# Patient Record
Sex: FEMALE | Race: WHITE | NOT HISPANIC OR LATINO
[De-identification: names, ages, dates, MRNs, and addresses within clinical notes are randomized per-mention and may not be internally consistent; named-entity substitution may affect disease eponyms.]

---

## 2017-01-09 ENCOUNTER — FORM ENCOUNTER (OUTPATIENT)
Age: 72
End: 2017-01-09

## 2017-01-10 ENCOUNTER — OUTPATIENT (OUTPATIENT)
Dept: OUTPATIENT SERVICES | Facility: HOSPITAL | Age: 72
LOS: 1 days | End: 2017-01-10
Payer: MEDICARE

## 2017-01-10 VITALS
HEART RATE: 60 BPM | TEMPERATURE: 98 F | WEIGHT: 222.01 LBS | SYSTOLIC BLOOD PRESSURE: 160 MMHG | HEIGHT: 65 IN | DIASTOLIC BLOOD PRESSURE: 82 MMHG | RESPIRATION RATE: 16 BRPM

## 2017-01-10 DIAGNOSIS — I10 ESSENTIAL (PRIMARY) HYPERTENSION: ICD-10-CM

## 2017-01-10 DIAGNOSIS — M16.11 UNILATERAL PRIMARY OSTEOARTHRITIS, RIGHT HIP: ICD-10-CM

## 2017-01-10 DIAGNOSIS — Z95.828 PRESENCE OF OTHER VASCULAR IMPLANTS AND GRAFTS: Chronic | ICD-10-CM

## 2017-01-10 DIAGNOSIS — Z96.652 PRESENCE OF LEFT ARTIFICIAL KNEE JOINT: Chronic | ICD-10-CM

## 2017-01-10 DIAGNOSIS — I26.99 OTHER PULMONARY EMBOLISM WITHOUT ACUTE COR PULMONALE: ICD-10-CM

## 2017-01-10 DIAGNOSIS — Z96.651 PRESENCE OF RIGHT ARTIFICIAL KNEE JOINT: Chronic | ICD-10-CM

## 2017-01-10 LAB
APPEARANCE UR: CLEAR — SIGNIFICANT CHANGE UP
APTT BLD: 27.7 SEC — SIGNIFICANT CHANGE UP (ref 27.5–37.4)
BILIRUB UR-MCNC: NEGATIVE — SIGNIFICANT CHANGE UP
BLD GP AB SCN SERPL QL: NEGATIVE — SIGNIFICANT CHANGE UP
BLOOD UR QL VISUAL: NEGATIVE — SIGNIFICANT CHANGE UP
BUN SERPL-MCNC: 13 MG/DL — SIGNIFICANT CHANGE UP (ref 7–23)
CALCIUM SERPL-MCNC: 9.4 MG/DL — SIGNIFICANT CHANGE UP (ref 8.4–10.5)
CHLORIDE SERPL-SCNC: 105 MMOL/L — SIGNIFICANT CHANGE UP (ref 98–107)
CO2 SERPL-SCNC: 26 MMOL/L — SIGNIFICANT CHANGE UP (ref 22–31)
COLOR SPEC: YELLOW — SIGNIFICANT CHANGE UP
CREAT SERPL-MCNC: 0.67 MG/DL — SIGNIFICANT CHANGE UP (ref 0.5–1.3)
GLUCOSE SERPL-MCNC: 74 MG/DL — SIGNIFICANT CHANGE UP (ref 70–99)
GLUCOSE UR-MCNC: NEGATIVE — SIGNIFICANT CHANGE UP
HCT VFR BLD CALC: 42.7 % — SIGNIFICANT CHANGE UP (ref 34.5–45)
HGB BLD-MCNC: 13.9 G/DL — SIGNIFICANT CHANGE UP (ref 11.5–15.5)
HYALINE CASTS # UR AUTO: SIGNIFICANT CHANGE UP (ref 0–?)
INR BLD: 0.95 — SIGNIFICANT CHANGE UP (ref 0.87–1.18)
KETONES UR-MCNC: NEGATIVE — SIGNIFICANT CHANGE UP
LEUKOCYTE ESTERASE UR-ACNC: NEGATIVE — SIGNIFICANT CHANGE UP
MCHC RBC-ENTMCNC: 29.2 PG — SIGNIFICANT CHANGE UP (ref 27–34)
MCHC RBC-ENTMCNC: 32.6 % — SIGNIFICANT CHANGE UP (ref 32–36)
MCV RBC AUTO: 89.7 FL — SIGNIFICANT CHANGE UP (ref 80–100)
MUCOUS THREADS # UR AUTO: SIGNIFICANT CHANGE UP
NITRITE UR-MCNC: NEGATIVE — SIGNIFICANT CHANGE UP
PH UR: 5.5 — SIGNIFICANT CHANGE UP (ref 4.6–8)
PLATELET # BLD AUTO: 207 K/UL — SIGNIFICANT CHANGE UP (ref 150–400)
PMV BLD: 10.7 FL — SIGNIFICANT CHANGE UP (ref 7–13)
POTASSIUM SERPL-MCNC: 4 MMOL/L — SIGNIFICANT CHANGE UP (ref 3.5–5.3)
POTASSIUM SERPL-SCNC: 4 MMOL/L — SIGNIFICANT CHANGE UP (ref 3.5–5.3)
PROT UR-MCNC: NEGATIVE — SIGNIFICANT CHANGE UP
PROTHROM AB SERPL-ACNC: 10.8 SEC — SIGNIFICANT CHANGE UP (ref 10–13.1)
RBC # BLD: 4.76 M/UL — SIGNIFICANT CHANGE UP (ref 3.8–5.2)
RBC # FLD: 13.9 % — SIGNIFICANT CHANGE UP (ref 10.3–14.5)
RH IG SCN BLD-IMP: POSITIVE — SIGNIFICANT CHANGE UP
SODIUM SERPL-SCNC: 146 MMOL/L — HIGH (ref 135–145)
SP GR SPEC: 1.02 — SIGNIFICANT CHANGE UP (ref 1–1.03)
SQUAMOUS # UR AUTO: SIGNIFICANT CHANGE UP
UROBILINOGEN FLD QL: NORMAL E.U. — SIGNIFICANT CHANGE UP (ref 0.1–0.2)
WBC # BLD: 5.02 K/UL — SIGNIFICANT CHANGE UP (ref 3.8–10.5)
WBC # FLD AUTO: 5.02 K/UL — SIGNIFICANT CHANGE UP (ref 3.8–10.5)
WBC UR QL: SIGNIFICANT CHANGE UP (ref 0–?)

## 2017-01-10 PROCEDURE — 93010 ELECTROCARDIOGRAM REPORT: CPT

## 2017-01-10 PROCEDURE — 71020: CPT | Mod: 26

## 2017-01-10 NOTE — H&P PST ADULT - LAB RESULTS AND INTERPRETATION
cbc, bmp, pt/ptt, type and screen , clean catch ua , urine culture , nasal culture cbc, bmp, pt/ptt, type and screen , clean catch ua , urine culture , nasal culture in pst

## 2017-01-10 NOTE — H&P PST ADULT - PROBLEM SELECTOR PLAN 3
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

## 2017-01-10 NOTE — H&P PST ADULT - HISTORY OF PRESENT ILLNESS
This is a 71 y.o. female who presented to Dr Clements complaining of right hip pain associated with groin pain. Pt evaluated , x-rays done . Pt has unilateral primary osteoarthritis ,right hip. Pt now for surgery.

## 2017-01-10 NOTE — H&P PST ADULT - ACTIVITY
walks with walker and/or cane 2-3 blocks daily, light housework ,ADL's walks with cane 2-3 blocks daily, light housework ,ADL's

## 2017-01-10 NOTE — H&P PST ADULT - RS GEN PE MLT RESP DETAILS PC
no wheezes/breath sounds equal/good air movement/no rhonchi/no rales/respirations non-labored/clear to auscultation bilaterally

## 2017-01-10 NOTE — H&P PST ADULT - PSH
[place] filter in place  placed approximately 5/2016 - [facility]  H/O total knee replacement, right  11/2015  History of total left knee replacement  2007 [place] filter in place  placed approximately 5/2016 - [facility]  H/O total knee replacement, right  11/2015, 8/2016  History of total left knee replacement  2007

## 2017-01-10 NOTE — H&P PST ADULT - NS MD HP INPLANTS MED DEV
right knee replacement , marilyn filter/Artificial joint right knee replacement , left knee replacement ,marilyn filter/Artificial joint

## 2017-01-10 NOTE — H&P PST ADULT - VISION (WITH CORRECTIVE LENSES IF THE PATIENT USUALLY WEARS THEM):
Partially impaired: cannot see medication labels or newsprint, but can see obstacles in path, and the surrounding layout; can count fingers at arm's length/Pt uses glasses

## 2017-01-10 NOTE — H&P PST ADULT - NEGATIVE ENMT SYMPTOMS
no gum bleeding/no nose bleeds/no tinnitus/no ear pain/no throat pain/no dysphagia/no hearing difficulty

## 2017-01-10 NOTE — H&P PST ADULT - NSANTHOSAYNRD_GEN_A_CORE
No. CONCEPCIÓN screening performed.  STOP BANG Legend: 0-2 = LOW Risk; 3-4 = INTERMEDIATE Risk; 5-8 = HIGH Risk

## 2017-01-10 NOTE — H&P PST ADULT - FAMILY HISTORY
Mother  Still living? Unknown  Family history of rheumatoid arthritis, Age at diagnosis: Age Unknown

## 2017-01-10 NOTE — H&P PST ADULT - PMH
Depression    Edema of lower extremity  both for 40 years  HTN (hypertension)    Hyperlipidemia    Knee pain, right    Obesity    Osteoarthritis    PE (pulmonary thromboembolism)  DVT/PE 2/2016 - on Xarelto - discontinued  Pneumonia  3/2016

## 2017-01-11 LAB — SPECIMEN SOURCE: SIGNIFICANT CHANGE UP

## 2017-01-12 ENCOUNTER — APPOINTMENT (OUTPATIENT)
Dept: INTERNAL MEDICINE | Facility: CLINIC | Age: 72
End: 2017-01-12

## 2017-01-12 VITALS
DIASTOLIC BLOOD PRESSURE: 72 MMHG | HEIGHT: 63 IN | BODY MASS INDEX: 38.98 KG/M2 | RESPIRATION RATE: 18 BRPM | HEART RATE: 82 BPM | SYSTOLIC BLOOD PRESSURE: 128 MMHG | OXYGEN SATURATION: 98 % | TEMPERATURE: 98.3 F | WEIGHT: 220 LBS

## 2017-01-12 DIAGNOSIS — M25.551 PAIN IN RIGHT HIP: ICD-10-CM

## 2017-01-12 LAB — SPECIMEN SOURCE: SIGNIFICANT CHANGE UP

## 2017-01-13 ENCOUNTER — OTHER (OUTPATIENT)
Age: 72
End: 2017-01-13

## 2017-01-13 LAB
-  AMIKACIN: SIGNIFICANT CHANGE UP
-  AMPICILLIN/SULBACTAM: SIGNIFICANT CHANGE UP
-  AMPICILLIN: SIGNIFICANT CHANGE UP
-  AZTREONAM: SIGNIFICANT CHANGE UP
-  CEFAZOLIN: SIGNIFICANT CHANGE UP
-  CEFEPIME: SIGNIFICANT CHANGE UP
-  CEFOXITIN: SIGNIFICANT CHANGE UP
-  CEFTAZIDIME: SIGNIFICANT CHANGE UP
-  CEFTRIAXONE: SIGNIFICANT CHANGE UP
-  CIPROFLOXACIN: SIGNIFICANT CHANGE UP
-  ERTAPENEM: SIGNIFICANT CHANGE UP
-  GENTAMICIN: SIGNIFICANT CHANGE UP
-  IMIPENEM: SIGNIFICANT CHANGE UP
-  LEVOFLOXACIN: SIGNIFICANT CHANGE UP
-  MEROPENEM: SIGNIFICANT CHANGE UP
-  NITROFURANTOIN: SIGNIFICANT CHANGE UP
-  PIPERACILLIN/TAZOBACTAM: SIGNIFICANT CHANGE UP
-  TIGECYCLINE: SIGNIFICANT CHANGE UP
-  TOBRAMYCIN: SIGNIFICANT CHANGE UP
-  TRIMETHOPRIM/SULFAMETHOXAZOLE: SIGNIFICANT CHANGE UP
BACTERIA NPH CULT: SIGNIFICANT CHANGE UP
BACTERIA UR CULT: SIGNIFICANT CHANGE UP
METHOD TYPE: SIGNIFICANT CHANGE UP
ORGANISM # SPEC MICROSCOPIC CNT: SIGNIFICANT CHANGE UP
ORGANISM # SPEC MICROSCOPIC CNT: SIGNIFICANT CHANGE UP

## 2017-01-17 ENCOUNTER — OTHER (OUTPATIENT)
Age: 72
End: 2017-01-17

## 2017-01-20 NOTE — ASU PATIENT PROFILE, ADULT - PSH
[place] filter in place  placed approximately 5/2016 - [facility]  H/O total knee replacement, right  11/2015, 8/2016  History of total left knee replacement  2007

## 2017-01-23 ENCOUNTER — OTHER (OUTPATIENT)
Age: 72
End: 2017-01-23

## 2017-01-23 ENCOUNTER — INPATIENT (INPATIENT)
Facility: HOSPITAL | Age: 72
LOS: 0 days | Discharge: HOME | End: 2017-01-23
Attending: ORTHOPAEDIC SURGERY | Admitting: ORTHOPAEDIC SURGERY

## 2017-01-23 VITALS
RESPIRATION RATE: 16 BRPM | HEART RATE: 83 BPM | HEIGHT: 65 IN | TEMPERATURE: 98 F | DIASTOLIC BLOOD PRESSURE: 75 MMHG | SYSTOLIC BLOOD PRESSURE: 165 MMHG | WEIGHT: 222.01 LBS | OXYGEN SATURATION: 98 %

## 2017-01-23 DIAGNOSIS — Z95.828 PRESENCE OF OTHER VASCULAR IMPLANTS AND GRAFTS: Chronic | ICD-10-CM

## 2017-01-23 DIAGNOSIS — Z96.652 PRESENCE OF LEFT ARTIFICIAL KNEE JOINT: Chronic | ICD-10-CM

## 2017-01-23 DIAGNOSIS — M16.11 UNILATERAL PRIMARY OSTEOARTHRITIS, RIGHT HIP: ICD-10-CM

## 2017-01-23 DIAGNOSIS — Z96.651 PRESENCE OF RIGHT ARTIFICIAL KNEE JOINT: Chronic | ICD-10-CM

## 2017-01-31 ENCOUNTER — OUTPATIENT (OUTPATIENT)
Dept: OUTPATIENT SERVICES | Facility: HOSPITAL | Age: 72
LOS: 1 days | End: 2017-01-31
Payer: MEDICARE

## 2017-01-31 VITALS
HEART RATE: 64 BPM | DIASTOLIC BLOOD PRESSURE: 80 MMHG | WEIGHT: 216.05 LBS | OXYGEN SATURATION: 98 % | HEIGHT: 65 IN | RESPIRATION RATE: 16 BRPM | SYSTOLIC BLOOD PRESSURE: 140 MMHG | TEMPERATURE: 98 F

## 2017-01-31 DIAGNOSIS — Z95.828 PRESENCE OF OTHER VASCULAR IMPLANTS AND GRAFTS: Chronic | ICD-10-CM

## 2017-01-31 DIAGNOSIS — Z98.890 OTHER SPECIFIED POSTPROCEDURAL STATES: Chronic | ICD-10-CM

## 2017-01-31 DIAGNOSIS — M16.11 UNILATERAL PRIMARY OSTEOARTHRITIS, RIGHT HIP: ICD-10-CM

## 2017-01-31 DIAGNOSIS — M19.90 UNSPECIFIED OSTEOARTHRITIS, UNSPECIFIED SITE: ICD-10-CM

## 2017-01-31 DIAGNOSIS — Z96.651 PRESENCE OF RIGHT ARTIFICIAL KNEE JOINT: Chronic | ICD-10-CM

## 2017-01-31 DIAGNOSIS — I10 ESSENTIAL (PRIMARY) HYPERTENSION: ICD-10-CM

## 2017-01-31 DIAGNOSIS — Z96.652 PRESENCE OF LEFT ARTIFICIAL KNEE JOINT: Chronic | ICD-10-CM

## 2017-01-31 LAB
APPEARANCE UR: CLEAR — SIGNIFICANT CHANGE UP
APTT BLD: 23.7 SEC — LOW (ref 27.5–37.4)
BACTERIA # UR AUTO: SIGNIFICANT CHANGE UP
BILIRUB UR-MCNC: NEGATIVE — SIGNIFICANT CHANGE UP
BLD GP AB SCN SERPL QL: NEGATIVE — SIGNIFICANT CHANGE UP
BLOOD UR QL VISUAL: NEGATIVE — SIGNIFICANT CHANGE UP
BUN SERPL-MCNC: 8 MG/DL — SIGNIFICANT CHANGE UP (ref 7–23)
CALCIUM SERPL-MCNC: 9.2 MG/DL — SIGNIFICANT CHANGE UP (ref 8.4–10.5)
CHLORIDE SERPL-SCNC: 104 MMOL/L — SIGNIFICANT CHANGE UP (ref 98–107)
CO2 SERPL-SCNC: 22 MMOL/L — SIGNIFICANT CHANGE UP (ref 22–31)
COLOR SPEC: YELLOW — SIGNIFICANT CHANGE UP
CREAT SERPL-MCNC: 0.63 MG/DL — SIGNIFICANT CHANGE UP (ref 0.5–1.3)
GLUCOSE SERPL-MCNC: 100 MG/DL — HIGH (ref 70–99)
GLUCOSE UR-MCNC: NEGATIVE — SIGNIFICANT CHANGE UP
HCT VFR BLD CALC: 43.7 % — SIGNIFICANT CHANGE UP (ref 34.5–45)
HGB BLD-MCNC: 14.6 G/DL — SIGNIFICANT CHANGE UP (ref 11.5–15.5)
INR BLD: 0.95 — SIGNIFICANT CHANGE UP (ref 0.87–1.18)
KETONES UR-MCNC: NEGATIVE — SIGNIFICANT CHANGE UP
LEUKOCYTE ESTERASE UR-ACNC: HIGH
MCHC RBC-ENTMCNC: 30 PG — SIGNIFICANT CHANGE UP (ref 27–34)
MCHC RBC-ENTMCNC: 33.4 % — SIGNIFICANT CHANGE UP (ref 32–36)
MCV RBC AUTO: 89.7 FL — SIGNIFICANT CHANGE UP (ref 80–100)
MUCOUS THREADS # UR AUTO: SIGNIFICANT CHANGE UP
NITRITE UR-MCNC: POSITIVE — HIGH
PH UR: 6 — SIGNIFICANT CHANGE UP (ref 4.6–8)
PLATELET # BLD AUTO: 194 K/UL — SIGNIFICANT CHANGE UP (ref 150–400)
PMV BLD: 11.4 FL — SIGNIFICANT CHANGE UP (ref 7–13)
POTASSIUM SERPL-MCNC: 4.3 MMOL/L — SIGNIFICANT CHANGE UP (ref 3.5–5.3)
POTASSIUM SERPL-SCNC: 4.3 MMOL/L — SIGNIFICANT CHANGE UP (ref 3.5–5.3)
PROT UR-MCNC: 30 — HIGH
PROTHROM AB SERPL-ACNC: 10.8 SEC — SIGNIFICANT CHANGE UP (ref 10–13.1)
RBC # BLD: 4.87 M/UL — SIGNIFICANT CHANGE UP (ref 3.8–5.2)
RBC # FLD: 13.6 % — SIGNIFICANT CHANGE UP (ref 10.3–14.5)
RBC CASTS # UR COMP ASSIST: SIGNIFICANT CHANGE UP (ref 0–?)
RH IG SCN BLD-IMP: POSITIVE — SIGNIFICANT CHANGE UP
SODIUM SERPL-SCNC: 143 MMOL/L — SIGNIFICANT CHANGE UP (ref 135–145)
SP GR SPEC: 1.02 — SIGNIFICANT CHANGE UP (ref 1–1.03)
SQUAMOUS # UR AUTO: SIGNIFICANT CHANGE UP
UROBILINOGEN FLD QL: NORMAL E.U. — SIGNIFICANT CHANGE UP (ref 0.1–0.2)
WBC # BLD: 4.98 K/UL — SIGNIFICANT CHANGE UP (ref 3.8–10.5)
WBC # FLD AUTO: 4.98 K/UL — SIGNIFICANT CHANGE UP (ref 3.8–10.5)
WBC UR QL: SIGNIFICANT CHANGE UP (ref 0–?)

## 2017-01-31 PROCEDURE — 93010 ELECTROCARDIOGRAM REPORT: CPT

## 2017-01-31 RX ORDER — TRAMADOL HYDROCHLORIDE 50 MG/1
50 TABLET ORAL ONCE
Qty: 0 | Refills: 0 | Status: DISCONTINUED | OUTPATIENT
Start: 2017-02-13 | End: 2017-02-13

## 2017-01-31 RX ORDER — PANTOPRAZOLE SODIUM 20 MG/1
40 TABLET, DELAYED RELEASE ORAL ONCE
Qty: 0 | Refills: 0 | Status: COMPLETED | OUTPATIENT
Start: 2017-02-13 | End: 2017-02-13

## 2017-01-31 RX ORDER — ACETAMINOPHEN 500 MG
975 TABLET ORAL ONCE
Qty: 0 | Refills: 0 | Status: COMPLETED | OUTPATIENT
Start: 2017-02-13 | End: 2017-02-13

## 2017-01-31 RX ORDER — SODIUM CHLORIDE 9 MG/ML
1000 INJECTION, SOLUTION INTRAVENOUS
Qty: 0 | Refills: 0 | Status: DISCONTINUED | OUTPATIENT
Start: 2017-02-13 | End: 2017-02-13

## 2017-01-31 RX ORDER — SODIUM CHLORIDE 9 MG/ML
3 INJECTION INTRAMUSCULAR; INTRAVENOUS; SUBCUTANEOUS EVERY 8 HOURS
Qty: 0 | Refills: 0 | Status: DISCONTINUED | OUTPATIENT
Start: 2017-02-13 | End: 2017-02-13

## 2017-01-31 RX ORDER — GABAPENTIN 400 MG/1
100 CAPSULE ORAL ONCE
Qty: 0 | Refills: 0 | Status: COMPLETED | OUTPATIENT
Start: 2017-02-13 | End: 2017-02-13

## 2017-01-31 NOTE — H&P PST ADULT - MUSCULOSKELETAL
details… detailed exam decreased ROM due to pain/Right hip pain with wt bearing and bending/decreased ROM

## 2017-01-31 NOTE — H&P PST ADULT - MUSCULOSKELETAL COMMENTS
Pt c/o of occasional lower back pain and neck stiffness - severe right hip pain especially with wt bearing - pt uses cane for support when walking.

## 2017-01-31 NOTE — H&P PST ADULT - NEGATIVE NEUROLOGICAL SYMPTOMS
no paresthesias/no vertigo/no weakness/no confusion/no transient paralysis/no tremors/no generalized seizures/no headache/no loss of sensation

## 2017-01-31 NOTE — H&P PST ADULT - NEGATIVE OPHTHALMOLOGIC SYMPTOMS
no pain R/no diplopia/no blurred vision R/no pain L/no blurred vision L/no lacrimation R/no lacrimation L

## 2017-01-31 NOTE — H&P PST ADULT - NEUROLOGICAL DETAILS
normal strength/sensation intact/alert and oriented x 3/responds to verbal commands/responds to pain

## 2017-01-31 NOTE — H&P PST ADULT - PROBLEM SELECTOR PLAN 1
Pt given pre-op instructions and Famotidine and Chlorhexidine and verbalized understanding.   Pt to see PCP for MC - forms given.   CXR printed from PST 2 weeks ago.   OR Booking notified of CONCEPCIÓN precautions and Natasha filter via fax.

## 2017-01-31 NOTE — H&P PST ADULT - GENITOURINARY COMMENTS
Surgery scheduled for 1/23/17 cancelled due to UTI that was not treated - pt took antibiotic and is now rescheduled for 2/13/17 - pt denies dysuria

## 2017-01-31 NOTE — H&P PST ADULT - ABILITY TO HEAR (WITH HEARING AID OR HEARING APPLIANCE IF NORMALLY USED):
Mildly to Moderately Impaired: difficulty hearing in some environments or speaker may need to increase volume or speak distinctly/Pt. feels she may need follow-up for hearing tests.

## 2017-01-31 NOTE — H&P PST ADULT - PSH
[place] filter in place  placed approximately 5/2016 - [facility]  H/O total knee replacement, right  11/2015, 8/2016  History of total left knee replacement  2007 [place] filter in place  placed approximately 5/2016 - [facility]  H/O knee surgery  2016 - repair right  H/O total knee replacement, right  11/2015, 8/2016  History of total left knee replacement  2007

## 2017-01-31 NOTE — H&P PST ADULT - HISTORY OF PRESENT ILLNESS
This is a 71 y.o. female who presented to Dr Clements complaining of right hip pain associated with groin pain. Pt evaluated , x-rays done . Pt has unilateral primary osteoarthritis ,right hip. Pt now for surgery. Pt is a 71 yr old female scheduled for Right Total Hip replacement with Dr Clements 2/13/17 for OA Rigth hip and chronic pain over past year that has increased. Pt c/o of pain 8/10 with activity. Surgery originally scheduled for 1/23/17 but cancelled on DOS for untreated UTI - pt given Rx and has completed med. Pt denies dysuria. Pt Hx of bilateral knee replacement and DVT/PE 2015 postop. Natasha filter placed

## 2017-02-01 LAB — SPECIMEN SOURCE: SIGNIFICANT CHANGE UP

## 2017-02-02 LAB
-  AMIKACIN: SIGNIFICANT CHANGE UP
-  AMPICILLIN/SULBACTAM: SIGNIFICANT CHANGE UP
-  AMPICILLIN: SIGNIFICANT CHANGE UP
-  AZTREONAM: SIGNIFICANT CHANGE UP
-  CEFAZOLIN: SIGNIFICANT CHANGE UP
-  CEFEPIME: SIGNIFICANT CHANGE UP
-  CEFOXITIN: SIGNIFICANT CHANGE UP
-  CEFTAZIDIME: SIGNIFICANT CHANGE UP
-  CEFTRIAXONE: SIGNIFICANT CHANGE UP
-  CIPROFLOXACIN: SIGNIFICANT CHANGE UP
-  ERTAPENEM: SIGNIFICANT CHANGE UP
-  GENTAMICIN: SIGNIFICANT CHANGE UP
-  IMIPENEM: SIGNIFICANT CHANGE UP
-  LEVOFLOXACIN: SIGNIFICANT CHANGE UP
-  MEROPENEM: SIGNIFICANT CHANGE UP
-  NITROFURANTOIN: SIGNIFICANT CHANGE UP
-  PIPERACILLIN/TAZOBACTAM: SIGNIFICANT CHANGE UP
-  TIGECYCLINE: SIGNIFICANT CHANGE UP
-  TOBRAMYCIN: SIGNIFICANT CHANGE UP
-  TRIMETHOPRIM/SULFAMETHOXAZOLE: SIGNIFICANT CHANGE UP
BACTERIA UR CULT: SIGNIFICANT CHANGE UP
METHOD TYPE: SIGNIFICANT CHANGE UP
ORGANISM # SPEC MICROSCOPIC CNT: SIGNIFICANT CHANGE UP
ORGANISM # SPEC MICROSCOPIC CNT: SIGNIFICANT CHANGE UP
SPECIMEN SOURCE: SIGNIFICANT CHANGE UP

## 2017-02-03 LAB — BACTERIA NPH CULT: SIGNIFICANT CHANGE UP

## 2017-02-04 ENCOUNTER — APPOINTMENT (OUTPATIENT)
Dept: INTERNAL MEDICINE | Facility: CLINIC | Age: 72
End: 2017-02-04

## 2017-02-04 VITALS
OXYGEN SATURATION: 98 % | HEIGHT: 63 IN | WEIGHT: 220 LBS | DIASTOLIC BLOOD PRESSURE: 70 MMHG | HEART RATE: 76 BPM | TEMPERATURE: 98.2 F | BODY MASS INDEX: 38.98 KG/M2 | SYSTOLIC BLOOD PRESSURE: 110 MMHG

## 2017-02-06 ENCOUNTER — OTHER (OUTPATIENT)
Age: 72
End: 2017-02-06

## 2017-02-10 ENCOUNTER — APPOINTMENT (OUTPATIENT)
Dept: ORTHOPEDIC SURGERY | Facility: CLINIC | Age: 72
End: 2017-02-10

## 2017-02-10 NOTE — ASU PATIENT PROFILE, ADULT - ABILITY TO HEAR (WITH HEARING AID OR HEARING APPLIANCE IF NORMALLY USED):
Pt. feels she may need follow-up for hearing tests./Mildly to Moderately Impaired: difficulty hearing in some environments or speaker may need to increase volume or speak distinctly

## 2017-02-12 ENCOUNTER — RESULT REVIEW (OUTPATIENT)
Age: 72
End: 2017-02-12

## 2017-02-13 ENCOUNTER — INPATIENT (INPATIENT)
Facility: HOSPITAL | Age: 72
LOS: 1 days | Discharge: HOME HEALTH | End: 2017-02-15
Attending: ORTHOPAEDIC SURGERY | Admitting: ORTHOPAEDIC SURGERY
Payer: MEDICARE

## 2017-02-13 ENCOUNTER — APPOINTMENT (OUTPATIENT)
Dept: ORTHOPEDIC SURGERY | Facility: HOSPITAL | Age: 72
End: 2017-02-13
Payer: MEDICARE

## 2017-02-13 ENCOUNTER — TRANSCRIPTION ENCOUNTER (OUTPATIENT)
Age: 72
End: 2017-02-13

## 2017-02-13 VITALS
TEMPERATURE: 97 F | RESPIRATION RATE: 16 BRPM | WEIGHT: 216.05 LBS | DIASTOLIC BLOOD PRESSURE: 52 MMHG | SYSTOLIC BLOOD PRESSURE: 142 MMHG | HEART RATE: 74 BPM | HEIGHT: 65 IN | OXYGEN SATURATION: 96 %

## 2017-02-13 DIAGNOSIS — Z95.828 PRESENCE OF OTHER VASCULAR IMPLANTS AND GRAFTS: Chronic | ICD-10-CM

## 2017-02-13 DIAGNOSIS — Z96.651 PRESENCE OF RIGHT ARTIFICIAL KNEE JOINT: Chronic | ICD-10-CM

## 2017-02-13 DIAGNOSIS — M16.11 UNILATERAL PRIMARY OSTEOARTHRITIS, RIGHT HIP: ICD-10-CM

## 2017-02-13 DIAGNOSIS — Z96.652 PRESENCE OF LEFT ARTIFICIAL KNEE JOINT: Chronic | ICD-10-CM

## 2017-02-13 DIAGNOSIS — Z98.890 OTHER SPECIFIED POSTPROCEDURAL STATES: Chronic | ICD-10-CM

## 2017-02-13 LAB
BUN SERPL-MCNC: 14 MG/DL — SIGNIFICANT CHANGE UP (ref 7–23)
CALCIUM SERPL-MCNC: 9.1 MG/DL — SIGNIFICANT CHANGE UP (ref 8.4–10.5)
CHLORIDE SERPL-SCNC: 104 MMOL/L — SIGNIFICANT CHANGE UP (ref 98–107)
CO2 SERPL-SCNC: 21 MMOL/L — LOW (ref 22–31)
CREAT SERPL-MCNC: 0.75 MG/DL — SIGNIFICANT CHANGE UP (ref 0.5–1.3)
GLUCOSE SERPL-MCNC: 151 MG/DL — HIGH (ref 70–99)
HCT VFR BLD CALC: 42.5 % — SIGNIFICANT CHANGE UP (ref 34.5–45)
HGB BLD-MCNC: 14 G/DL — SIGNIFICANT CHANGE UP (ref 11.5–15.5)
MCHC RBC-ENTMCNC: 29.9 PG — SIGNIFICANT CHANGE UP (ref 27–34)
MCHC RBC-ENTMCNC: 32.9 % — SIGNIFICANT CHANGE UP (ref 32–36)
MCV RBC AUTO: 90.6 FL — SIGNIFICANT CHANGE UP (ref 80–100)
PLATELET # BLD AUTO: 170 K/UL — SIGNIFICANT CHANGE UP (ref 150–400)
PMV BLD: 10.7 FL — SIGNIFICANT CHANGE UP (ref 7–13)
POTASSIUM SERPL-MCNC: 3.5 MMOL/L — SIGNIFICANT CHANGE UP (ref 3.5–5.3)
POTASSIUM SERPL-SCNC: 3.5 MMOL/L — SIGNIFICANT CHANGE UP (ref 3.5–5.3)
RBC # BLD: 4.69 M/UL — SIGNIFICANT CHANGE UP (ref 3.8–5.2)
RBC # FLD: 13.5 % — SIGNIFICANT CHANGE UP (ref 10.3–14.5)
SODIUM SERPL-SCNC: 142 MMOL/L — SIGNIFICANT CHANGE UP (ref 135–145)
WBC # BLD: 9.92 K/UL — SIGNIFICANT CHANGE UP (ref 3.8–10.5)
WBC # FLD AUTO: 9.92 K/UL — SIGNIFICANT CHANGE UP (ref 3.8–10.5)

## 2017-02-13 PROCEDURE — 27130 TOTAL HIP ARTHROPLASTY: CPT | Mod: RT

## 2017-02-13 PROCEDURE — 72170 X-RAY EXAM OF PELVIS: CPT | Mod: 26

## 2017-02-13 RX ORDER — PANTOPRAZOLE SODIUM 20 MG/1
40 TABLET, DELAYED RELEASE ORAL DAILY
Qty: 0 | Refills: 0 | Status: DISCONTINUED | OUTPATIENT
Start: 2017-02-13 | End: 2017-02-13

## 2017-02-13 RX ORDER — CELECOXIB 200 MG/1
200 CAPSULE ORAL
Qty: 0 | Refills: 0 | Status: DISCONTINUED | OUTPATIENT
Start: 2017-02-15 | End: 2017-02-15

## 2017-02-13 RX ORDER — ONDANSETRON 8 MG/1
4 TABLET, FILM COATED ORAL EVERY 6 HOURS
Qty: 0 | Refills: 0 | Status: DISCONTINUED | OUTPATIENT
Start: 2017-02-13 | End: 2017-02-13

## 2017-02-13 RX ORDER — SODIUM CHLORIDE 9 MG/ML
1000 INJECTION INTRAMUSCULAR; INTRAVENOUS; SUBCUTANEOUS ONCE
Qty: 0 | Refills: 0 | Status: DISCONTINUED | OUTPATIENT
Start: 2017-02-13 | End: 2017-02-15

## 2017-02-13 RX ORDER — CEFAZOLIN SODIUM 1 G
2000 VIAL (EA) INJECTION EVERY 8 HOURS
Qty: 0 | Refills: 0 | Status: COMPLETED | OUTPATIENT
Start: 2017-02-13 | End: 2017-02-13

## 2017-02-13 RX ORDER — TRAMADOL HYDROCHLORIDE 50 MG/1
50 TABLET ORAL EVERY 8 HOURS
Qty: 0 | Refills: 0 | Status: DISCONTINUED | OUTPATIENT
Start: 2017-02-13 | End: 2017-02-15

## 2017-02-13 RX ORDER — NALOXONE HYDROCHLORIDE 4 MG/.1ML
0.1 SPRAY NASAL
Qty: 0 | Refills: 0 | Status: DISCONTINUED | OUTPATIENT
Start: 2017-02-13 | End: 2017-02-15

## 2017-02-13 RX ORDER — POLYETHYLENE GLYCOL 3350 17 G/17G
17 POWDER, FOR SOLUTION ORAL DAILY
Qty: 0 | Refills: 0 | Status: DISCONTINUED | OUTPATIENT
Start: 2017-02-13 | End: 2017-02-15

## 2017-02-13 RX ORDER — SODIUM CHLORIDE 9 MG/ML
1000 INJECTION INTRAMUSCULAR; INTRAVENOUS; SUBCUTANEOUS ONCE
Qty: 0 | Refills: 0 | Status: COMPLETED | OUTPATIENT
Start: 2017-02-14 | End: 2017-02-14

## 2017-02-13 RX ORDER — ONDANSETRON 8 MG/1
4 TABLET, FILM COATED ORAL EVERY 6 HOURS
Qty: 0 | Refills: 0 | Status: DISCONTINUED | OUTPATIENT
Start: 2017-02-13 | End: 2017-02-15

## 2017-02-13 RX ORDER — MORPHINE SULFATE 50 MG/1
0.1 CAPSULE, EXTENDED RELEASE ORAL ONCE
Qty: 0 | Refills: 0 | Status: DISCONTINUED | OUTPATIENT
Start: 2017-02-13 | End: 2017-02-13

## 2017-02-13 RX ORDER — OXYCODONE HYDROCHLORIDE 5 MG/1
10 TABLET ORAL EVERY 4 HOURS
Qty: 0 | Refills: 0 | Status: DISCONTINUED | OUTPATIENT
Start: 2017-02-13 | End: 2017-02-15

## 2017-02-13 RX ORDER — RIVAROXABAN 15 MG-20MG
10 KIT ORAL DAILY
Qty: 0 | Refills: 0 | Status: DISCONTINUED | OUTPATIENT
Start: 2017-02-14 | End: 2017-02-15

## 2017-02-13 RX ORDER — KETOROLAC TROMETHAMINE 30 MG/ML
15 SYRINGE (ML) INJECTION EVERY 6 HOURS
Qty: 0 | Refills: 0 | Status: DISCONTINUED | OUTPATIENT
Start: 2017-02-13 | End: 2017-02-14

## 2017-02-13 RX ORDER — FERROUS SULFATE 325(65) MG
325 TABLET ORAL
Qty: 0 | Refills: 0 | Status: DISCONTINUED | OUTPATIENT
Start: 2017-02-13 | End: 2017-02-13

## 2017-02-13 RX ORDER — HYDROMORPHONE HYDROCHLORIDE 2 MG/ML
0.5 INJECTION INTRAMUSCULAR; INTRAVENOUS; SUBCUTANEOUS
Qty: 0 | Refills: 0 | Status: DISCONTINUED | OUTPATIENT
Start: 2017-02-13 | End: 2017-02-13

## 2017-02-13 RX ORDER — RIVAROXABAN 15 MG-20MG
10 KIT ORAL DAILY
Qty: 0 | Refills: 0 | Status: DISCONTINUED | OUTPATIENT
Start: 2017-02-13 | End: 2017-02-13

## 2017-02-13 RX ORDER — FAMOTIDINE 10 MG/ML
20 INJECTION INTRAVENOUS EVERY 12 HOURS
Qty: 0 | Refills: 0 | Status: DISCONTINUED | OUTPATIENT
Start: 2017-02-13 | End: 2017-02-15

## 2017-02-13 RX ORDER — TRAMADOL HYDROCHLORIDE 50 MG/1
50 TABLET ORAL EVERY 8 HOURS
Qty: 0 | Refills: 0 | Status: DISCONTINUED | OUTPATIENT
Start: 2017-02-13 | End: 2017-02-13

## 2017-02-13 RX ORDER — SIMVASTATIN 20 MG/1
40 TABLET, FILM COATED ORAL DAILY
Qty: 0 | Refills: 0 | Status: DISCONTINUED | OUTPATIENT
Start: 2017-02-13 | End: 2017-02-15

## 2017-02-13 RX ORDER — SENNA PLUS 8.6 MG/1
2 TABLET ORAL AT BEDTIME
Qty: 0 | Refills: 0 | Status: DISCONTINUED | OUTPATIENT
Start: 2017-02-13 | End: 2017-02-15

## 2017-02-13 RX ORDER — ACETAMINOPHEN 500 MG
650 TABLET ORAL EVERY 6 HOURS
Qty: 0 | Refills: 0 | Status: DISCONTINUED | OUTPATIENT
Start: 2017-02-13 | End: 2017-02-15

## 2017-02-13 RX ORDER — KETOROLAC TROMETHAMINE 30 MG/ML
15 SYRINGE (ML) INJECTION EVERY 8 HOURS
Qty: 0 | Refills: 0 | Status: DISCONTINUED | OUTPATIENT
Start: 2017-02-13 | End: 2017-02-13

## 2017-02-13 RX ORDER — MORPHINE SULFATE 50 MG/1
2 CAPSULE, EXTENDED RELEASE ORAL EVERY 4 HOURS
Qty: 0 | Refills: 0 | Status: DISCONTINUED | OUTPATIENT
Start: 2017-02-13 | End: 2017-02-15

## 2017-02-13 RX ORDER — FUROSEMIDE 40 MG
40 TABLET ORAL DAILY
Qty: 0 | Refills: 0 | Status: DISCONTINUED | OUTPATIENT
Start: 2017-02-14 | End: 2017-02-15

## 2017-02-13 RX ORDER — LISINOPRIL 2.5 MG/1
40 TABLET ORAL DAILY
Qty: 0 | Refills: 0 | Status: DISCONTINUED | OUTPATIENT
Start: 2017-02-13 | End: 2017-02-14

## 2017-02-13 RX ORDER — ASCORBIC ACID 60 MG
500 TABLET,CHEWABLE ORAL
Qty: 0 | Refills: 0 | Status: DISCONTINUED | OUTPATIENT
Start: 2017-02-13 | End: 2017-02-13

## 2017-02-13 RX ORDER — MORPHINE SULFATE 50 MG/1
4 CAPSULE, EXTENDED RELEASE ORAL ONCE
Qty: 0 | Refills: 0 | Status: DISCONTINUED | OUTPATIENT
Start: 2017-02-13 | End: 2017-02-13

## 2017-02-13 RX ORDER — OXYCODONE HYDROCHLORIDE 5 MG/1
5 TABLET ORAL EVERY 4 HOURS
Qty: 0 | Refills: 0 | Status: DISCONTINUED | OUTPATIENT
Start: 2017-02-13 | End: 2017-02-13

## 2017-02-13 RX ORDER — FOLIC ACID 0.8 MG
1 TABLET ORAL DAILY
Qty: 0 | Refills: 0 | Status: DISCONTINUED | OUTPATIENT
Start: 2017-02-13 | End: 2017-02-13

## 2017-02-13 RX ORDER — OXYCODONE HYDROCHLORIDE 5 MG/1
10 TABLET ORAL EVERY 4 HOURS
Qty: 0 | Refills: 0 | Status: DISCONTINUED | OUTPATIENT
Start: 2017-02-13 | End: 2017-02-13

## 2017-02-13 RX ORDER — DOCUSATE SODIUM 100 MG
100 CAPSULE ORAL THREE TIMES A DAY
Qty: 0 | Refills: 0 | Status: DISCONTINUED | OUTPATIENT
Start: 2017-02-13 | End: 2017-02-15

## 2017-02-13 RX ORDER — SODIUM CHLORIDE 9 MG/ML
1000 INJECTION, SOLUTION INTRAVENOUS
Qty: 0 | Refills: 0 | Status: DISCONTINUED | OUTPATIENT
Start: 2017-02-13 | End: 2017-02-14

## 2017-02-13 RX ORDER — OXYCODONE HYDROCHLORIDE 5 MG/1
5 TABLET ORAL EVERY 4 HOURS
Qty: 0 | Refills: 0 | Status: DISCONTINUED | OUTPATIENT
Start: 2017-02-13 | End: 2017-02-15

## 2017-02-13 RX ADMIN — GABAPENTIN 100 MILLIGRAM(S): 400 CAPSULE ORAL at 06:48

## 2017-02-13 RX ADMIN — Medication 15 MILLIGRAM(S): at 19:41

## 2017-02-13 RX ADMIN — Medication 975 MILLIGRAM(S): at 06:52

## 2017-02-13 RX ADMIN — FAMOTIDINE 20 MILLIGRAM(S): 10 INJECTION INTRAVENOUS at 18:08

## 2017-02-13 RX ADMIN — TRAMADOL HYDROCHLORIDE 50 MILLIGRAM(S): 50 TABLET ORAL at 06:53

## 2017-02-13 RX ADMIN — OXYCODONE HYDROCHLORIDE 5 MILLIGRAM(S): 5 TABLET ORAL at 14:45

## 2017-02-13 RX ADMIN — Medication 100 MILLIGRAM(S): at 23:35

## 2017-02-13 RX ADMIN — TRAMADOL HYDROCHLORIDE 50 MILLIGRAM(S): 50 TABLET ORAL at 21:50

## 2017-02-13 RX ADMIN — TRAMADOL HYDROCHLORIDE 50 MILLIGRAM(S): 50 TABLET ORAL at 16:01

## 2017-02-13 RX ADMIN — TRAMADOL HYDROCHLORIDE 50 MILLIGRAM(S): 50 TABLET ORAL at 06:49

## 2017-02-13 RX ADMIN — Medication 650 MILLIGRAM(S): at 23:35

## 2017-02-13 RX ADMIN — PANTOPRAZOLE SODIUM 40 MILLIGRAM(S): 20 TABLET, DELAYED RELEASE ORAL at 06:48

## 2017-02-13 RX ADMIN — OXYCODONE HYDROCHLORIDE 5 MILLIGRAM(S): 5 TABLET ORAL at 13:55

## 2017-02-13 RX ADMIN — Medication 100 MILLIGRAM(S): at 15:57

## 2017-02-13 RX ADMIN — Medication 15 MILLIGRAM(S): at 20:11

## 2017-02-13 RX ADMIN — SODIUM CHLORIDE 30 MILLILITER(S): 9 INJECTION, SOLUTION INTRAVENOUS at 06:48

## 2017-02-13 RX ADMIN — Medication 100 MILLIGRAM(S): at 21:50

## 2017-02-13 RX ADMIN — Medication 650 MILLIGRAM(S): at 18:08

## 2017-02-13 RX ADMIN — SODIUM CHLORIDE 125 MILLILITER(S): 9 INJECTION, SOLUTION INTRAVENOUS at 10:05

## 2017-02-13 RX ADMIN — Medication 975 MILLIGRAM(S): at 06:49

## 2017-02-13 RX ADMIN — SENNA PLUS 2 TABLET(S): 8.6 TABLET ORAL at 21:50

## 2017-02-13 NOTE — DISCHARGE NOTE ADULT - PATIENT PORTAL LINK FT
“You can access the [facility] Patient Portal, offered by [facility], by registering with the following website: [email or web address]”

## 2017-02-13 NOTE — DISCHARGE NOTE ADULT - HOSPITAL COURSE
72yo female is s/p Right total hip arthroplasty 2/13/2017 without any intraoperative complications.  Patient is doing well and stable for discharge.  Patient is tolerating physical therapy: weight bearing to Right leg, gait training, STRICT ANTERIOR HIP PRECAUTIONS.  If applicable, keep Aquacel dressing on as is until day of staple removal.  Staples/sutures to be removed 14 days from date of surgery.  Patient is on Xarelto for anticoagulation.  Orthopaedic Surgeon Dr. Clements would like patient to call private MD/Internist for appointment postop to maintain continuity of care.  Follow up with Dr. Clements's office in 1-2 weeks.     Istop reviewed 72yo female is s/p Right total hip arthroplasty 2/13/2017 without any intraoperative complications.  Patient is doing well and stable for discharge.  Patient is tolerating physical therapy: weight bearing to Right leg, gait training, STRICT ANTERIOR HIP PRECAUTIONS.  If applicable, keep Aquacel dressing on as is until day of staple removal.  Staples/sutures to be removed 14 days from date of surgery.  Patient is on Xarelto for anticoagulation.  Orthopaedic Surgeon Dr. Clements would like patient to call private MD/Internist for appointment postop to maintain continuity of care.  Follow up with Dr. Clements's office in 1-2 weeks.     Istop reviewed Reference #: [id number]

## 2017-02-13 NOTE — DISCHARGE NOTE ADULT - MEDICATION SUMMARY - MEDICATIONS TO CHANGE
I will SWITCH the dose or number of times a day I take the medications listed below when I get home from the hospital:    traMADol 50 mg oral tablet  -- 1 tab(s) by mouth prn I will SWITCH the dose or number of times a day I take the medications listed below when I get home from the hospital:    lisinopril 40 mg oral tablet  -- 1 tab(s) by mouth once a day in pm    traMADol 50 mg oral tablet  -- 1 tab(s) by mouth prn

## 2017-02-13 NOTE — PHYSICAL THERAPY INITIAL EVALUATION ADULT - CRITERIA FOR SKILLED THERAPEUTIC INTERVENTIONS
therapy frequency/impairments found/predicted duration of therapy intervention/anticipated discharge recommendation/Home w/ home PT

## 2017-02-13 NOTE — PHYSICAL THERAPY INITIAL EVALUATION ADULT - PERTINENT HX OF CURRENT PROBLEM, REHAB EVAL
Pt. is a 70 y/o female adm. to [facility] on 02/13/17 w/ a dx of primary OA R hip and chronic pain over last year.  PT consult request 2/2 ortho trauma/surgery --> s/p R anterior NANCY.  H/O Depression.

## 2017-02-13 NOTE — BRIEF OPERATIVE NOTE - OPERATION/FINDINGS
Preop Dx: Right Hip Osteoarthritis  Postop Dx; Right Hip Osteoarthritis  Procedure: Right Anterior Total Hip Replacement  Findings:No unusual Findings.

## 2017-02-13 NOTE — PHYSICAL THERAPY INITIAL EVALUATION ADULT - PLANNED THERAPY INTERVENTIONS, PT EVAL
stair training/transfer training/ROM/gait training/bed mobility training/strengthening/balance training

## 2017-02-13 NOTE — DISCHARGE NOTE ADULT - CONDITIONS AT DISCHARGE
Right hip Aquacel drssing clean dry and intact, to be removed by MD in office. Positve NV status VSS afebrile. Pt karmen po diet well, voiding without difficulty. Pt sen by PT and cleared for Dc to home per safe DC plan.

## 2017-02-13 NOTE — DISCHARGE NOTE ADULT - INSTRUCTIONS
resume same diet as prior to surgery Maintain Right hip Aquacel dressing clean dry and intact, to be removed by MD in office at follow-up appt on 3/3/2017 at 8:30 AM. Call MD with any signs ofinfection ie fever, redness or drainage, or with persistent nausea or unrelieved increased pain. Maintain Right hip Aquacel dressing clean dry and intact, to be removed by MD in office at follow-up appt on March 3rd, 2017 at 8:30 AM. Call MD with any signs of infection ie fever, redness or drainage, or with persistent nausea or unrelieved increased pain.

## 2017-02-13 NOTE — ASU PREOP CHECKLIST - SELECT TESTS ORDERED
BMP/PT/PTT/CXR/urine culture +   nasal culture/CBC/Urinalysis/EKG/INR/Type and Screen CXR/urine culture +   nasal culture -/CBC/Urinalysis/BMP/PT/PTT/EKG/INR/Type and Screen

## 2017-02-13 NOTE — PATIENT PROFILE ADULT. - PSH
[place] filter in place  placed approximately 5/2016 - [facility]  H/O knee surgery  2016 - repair right  H/O total knee replacement, right  11/2015, 8/2016  History of total left knee replacement  2007

## 2017-02-13 NOTE — DISCHARGE NOTE ADULT - HOME CARE AGENCY
[facility] - [phone]  Nurse to visit the day after hospital discharge; physical therapist to follow. Please contact the home care agency at the above phone number if you have not heard from them by 12 noon on the day after your hospital discharge.

## 2017-02-13 NOTE — DISCHARGE NOTE ADULT - CARE PROVIDERS DIRECT ADDRESSES
,[email or web address]

## 2017-02-13 NOTE — DISCHARGE NOTE ADULT - PLAN OF CARE
pain control-> pain med may cause drowsiness, refrain from activities require decision making; physical therapy to help resume activities of daily living call Surgeon's office to make appointment in 1 week Dr. Clements [phone]  weight bearing as tolerated to Right leg  TOTAL HIP PRECAUTIONS

## 2017-02-13 NOTE — DISCHARGE NOTE ADULT - MEDICATION SUMMARY - MEDICATIONS TO TAKE
I will START or STAY ON the medications listed below when I get home from the hospital:    Mobic 15 mg oral tablet  -- 1 tab(s) by mouth once a day take with food  -- Do not take this drug if you are pregnant.  Obtain medical advice before taking any non-prescription drugs as some may affect the action of this medication.  Take with food or milk.    -- Indication: For anti-inflammatory    Percocet 5/325 325 mg-5 mg oral tablet  -- 1-2 tab(s) by mouth every 6 hours as needed for moderate to severe pain  begin tapering off 2-3 weeks from date of surgery  MDD:EIGHT TABS  -- Caution federal law prohibits the transfer of this drug to any person other  than the person for whom it was prescribed.  May cause drowsiness.  Alcohol may intensify this effect.  Use care when operating dangerous machinery.  This prescription cannot be refilled.  This product contains acetaminophen.  Do not use  with any other product containing acetaminophen to prevent possible liver damage.  Using more of this medication than prescribed may cause serious breathing problems.    -- Indication: For Pain control    traMADol 50 mg oral tablet  -- 1 tab(s) by mouth every 8 hours as needed for mild pain  begin tapering off 2-3 weeks from date of surgery  MDD:THREE TABS  -- Indication: For Pain control    lisinopril 40 mg oral tablet  -- 1 tab(s) by mouth once a day in pm  -- Indication: For Home med    rivaroxaban 10 mg oral tablet  -- 1 tab(s) by mouth once a day  -- ***Note to pharmacist: please run this script into patient's insurace asap to check if this is covered; if not covered call [facility] at [phone].***  -- Indication: For blood thinner    simvastatin 40 mg oral tablet  -- 1 tab(s) by mouth once a day in am  -- Indication: For Home med    furosemide 40 mg oral tablet  -- 1 tab(s) by mouth once a day in am  -- Indication: For Home med    senna oral tablet  -- 2 tab(s) by mouth once a day (at bedtime)  -- over the counter for constipation from pain meds   -- Indication: For stool stimulant    docusate sodium 100 mg oral capsule  -- 1 cap(s) by mouth 3 times a day  -- over the counter for constipation from pain meds   -- Indication: For stool softener I will START or STAY ON the medications listed below when I get home from the hospital:    Mobic 15 mg oral tablet  -- 1 tab(s) by mouth once a day take with food  -- Do not take this drug if you are pregnant.  Obtain medical advice before taking any non-prescription drugs as some may affect the action of this medication.  Take with food or milk.    -- Indication: For anti-inflammatory    Percocet 5/325 325 mg-5 mg oral tablet  -- 1-2 tab(s) by mouth every 6 hours as needed for moderate to severe pain  begin tapering off 2-3 weeks from date of surgery  MDD:EIGHT TABS  -- Caution federal law prohibits the transfer of this drug to any person other  than the person for whom it was prescribed.  May cause drowsiness.  Alcohol may intensify this effect.  Use care when operating dangerous machinery.  This prescription cannot be refilled.  This product contains acetaminophen.  Do not use  with any other product containing acetaminophen to prevent possible liver damage.  Using more of this medication than prescribed may cause serious breathing problems.    -- Indication: For Pain control    traMADol 50 mg oral tablet  -- 1 tab(s) by mouth every 8 hours as needed for mild pain  begin tapering off 2-3 weeks from date of surgery  MDD:THREE TABS  -- Indication: For Pain control    lisinopril 40 mg oral tablet  -- 1 tab(s) by mouth once a day in pm  -- Indication: For Home med    rivaroxaban 10 mg oral tablet  -- 1 tab(s) by mouth once a day  -- ***Note to pharmacist: please run this script into patient's insurace asap to check if this is covered; if not covered call [facility] at [phone].***  -- Indication: For blood thinner    simvastatin 40 mg oral tablet  -- 1 tab(s) by mouth once a day in am  -- Indication: For Home med    furosemide 40 mg oral tablet  -- 1 tab(s) by mouth once a day in am  -- Indication: For Home med    senna oral tablet  -- 2 tab(s) by mouth once a day (at bedtime)  -- over the counter for constipation from pain meds   -- Indication: For stool stimulant    docusate sodium 100 mg oral capsule  -- 1 cap(s) by mouth 3 times a day  -- over the counter for constipation from pain meds   -- Indication: For stool softener I will START or STAY ON the medications listed below when I get home from the hospital:    Mobic 15 mg oral tablet  -- 1 tab(s) by mouth once a day take with food  -- Do not take this drug if you are pregnant.  Obtain medical advice before taking any non-prescription drugs as some may affect the action of this medication.  Take with food or milk.    -- Indication: For anti-inflammatory    Percocet 5/325 325 mg-5 mg oral tablet  -- 1-2 tab(s) by mouth every 6 hours as needed for moderate to severe pain  begin tapering off 2-3 weeks from date of surgery  MDD:EIGHT TABS  -- Caution federal law prohibits the transfer of this drug to any person other  than the person for whom it was prescribed.  May cause drowsiness.  Alcohol may intensify this effect.  Use care when operating dangerous machinery.  This prescription cannot be refilled.  This product contains acetaminophen.  Do not use  with any other product containing acetaminophen to prevent possible liver damage.  Using more of this medication than prescribed may cause serious breathing problems.    -- Indication: For Pain control    traMADol 50 mg oral tablet  -- 1 tab(s) by mouth every 8 hours as needed for mild pain  begin tapering off 2-3 weeks from date of surgery  MDD:THREE TABS  -- Indication: For Pain control    lisinopril 40 mg oral tablet  -- 0.5 tab(s) by mouth once a day  -- take half of tab for now while taking pain meds  -- Indication: For Home med - dosage changed    rivaroxaban 10 mg oral tablet  -- 1 tab(s) by mouth once a day  -- ***Note to pharmacist: please run this script into patient's insurace asap to check if this is covered; if not covered call [facility] at [phone].***  -- Indication: For blood thinner    simvastatin 40 mg oral tablet  -- 1 tab(s) by mouth once a day in am  -- Indication: For Home med    furosemide 40 mg oral tablet  -- 1 tab(s) by mouth once a day in am  -- Indication: For Home med    senna oral tablet  -- 2 tab(s) by mouth once a day (at bedtime)  -- over the counter for constipation from pain meds   -- Indication: For stool stimulant    docusate sodium 100 mg oral capsule  -- 1 cap(s) by mouth 3 times a day  -- over the counter for constipation from pain meds   -- Indication: For stool softener

## 2017-02-13 NOTE — DISCHARGE NOTE ADULT - ADDITIONAL INSTRUCTIONS
post surgical care  70yo female is s/p Right total hip arthroplasty 2/13/2017 without any intraoperative complications.  Patient is doing well and stable for discharge.  Patient is tolerating physical therapy: weight bearing to Right leg, gait training, STRICT ANTERIOR HIP PRECAUTIONS.  If applicable, keep Aquacel dressing on as is until day of staple removal.  Staples/sutures to be removed 14 days from date of surgery.  Patient is on Xarelto for anticoagulation.  Orthopaedic Surgeon Dr. Clements would like patient to call private MD/Internist for appointment postop to maintain continuity of care.  Follow up with Dr. Clements's office in 1-2 weeks.   Istop reviewed post surgical care  72yo female is s/p Right total hip arthroplasty 2/13/2017 without any intraoperative complications.  Patient is doing well and stable for discharge.  Patient is tolerating physical therapy: weight bearing to Right leg, gait training, STRICT ANTERIOR HIP PRECAUTIONS.  If applicable, keep Aquacel dressing on as is until day of staple removal.  Staples/sutures to be removed 14 days from date of surgery.  Patient is on Xarelto for anticoagulation.  Orthopaedic Surgeon Dr. Clements would like patient to call private MD/Internist for appointment postop to maintain continuity of care.  Follow up with Dr. Clements's office in 1-2 weeks.   Istop reviewed Reference #: [id number]

## 2017-02-13 NOTE — PHYSICAL THERAPY INITIAL EVALUATION ADULT - ADDITIONAL COMMENTS
Pt. left in bed post-PT in NAD w/ all lines/tubes intact & table/TV/phone/call bell within reach.  HEDY Dawkins @ bedside.  Visitor also @ bedside.

## 2017-02-14 LAB
BUN SERPL-MCNC: 13 MG/DL — SIGNIFICANT CHANGE UP (ref 7–23)
CALCIUM SERPL-MCNC: 8.7 MG/DL — SIGNIFICANT CHANGE UP (ref 8.4–10.5)
CHLORIDE SERPL-SCNC: 103 MMOL/L — SIGNIFICANT CHANGE UP (ref 98–107)
CO2 SERPL-SCNC: 24 MMOL/L — SIGNIFICANT CHANGE UP (ref 22–31)
CREAT SERPL-MCNC: 0.63 MG/DL — SIGNIFICANT CHANGE UP (ref 0.5–1.3)
GLUCOSE SERPL-MCNC: 134 MG/DL — HIGH (ref 70–99)
HCT VFR BLD CALC: 33.4 % — LOW (ref 34.5–45)
HGB BLD-MCNC: 11 G/DL — LOW (ref 11.5–15.5)
MCHC RBC-ENTMCNC: 29.2 PG — SIGNIFICANT CHANGE UP (ref 27–34)
MCHC RBC-ENTMCNC: 32.9 % — SIGNIFICANT CHANGE UP (ref 32–36)
MCV RBC AUTO: 88.6 FL — SIGNIFICANT CHANGE UP (ref 80–100)
PLATELET # BLD AUTO: 171 K/UL — SIGNIFICANT CHANGE UP (ref 150–400)
PMV BLD: 10.2 FL — SIGNIFICANT CHANGE UP (ref 7–13)
POTASSIUM SERPL-MCNC: 4.5 MMOL/L — SIGNIFICANT CHANGE UP (ref 3.5–5.3)
POTASSIUM SERPL-SCNC: 4.5 MMOL/L — SIGNIFICANT CHANGE UP (ref 3.5–5.3)
RBC # BLD: 3.77 M/UL — LOW (ref 3.8–5.2)
RBC # FLD: 13.5 % — SIGNIFICANT CHANGE UP (ref 10.3–14.5)
SODIUM SERPL-SCNC: 140 MMOL/L — SIGNIFICANT CHANGE UP (ref 135–145)
WBC # BLD: 8.52 K/UL — SIGNIFICANT CHANGE UP (ref 3.8–10.5)
WBC # FLD AUTO: 8.52 K/UL — SIGNIFICANT CHANGE UP (ref 3.8–10.5)

## 2017-02-14 PROCEDURE — 99223 1ST HOSP IP/OBS HIGH 75: CPT

## 2017-02-14 RX ORDER — DOCUSATE SODIUM 100 MG
1 CAPSULE ORAL
Qty: 0 | Refills: 0 | COMMUNITY
Start: 2017-02-14

## 2017-02-14 RX ORDER — TRAMADOL HYDROCHLORIDE 50 MG/1
1 TABLET ORAL
Qty: 0 | Refills: 0 | COMMUNITY

## 2017-02-14 RX ORDER — LISINOPRIL 2.5 MG/1
20 TABLET ORAL DAILY
Qty: 0 | Refills: 0 | Status: DISCONTINUED | OUTPATIENT
Start: 2017-02-14 | End: 2017-02-15

## 2017-02-14 RX ORDER — RIVAROXABAN 15 MG-20MG
1 KIT ORAL
Qty: 42 | Refills: 0 | OUTPATIENT
Start: 2017-02-14 | End: 2017-03-28

## 2017-02-14 RX ORDER — MELOXICAM 15 MG/1
1 TABLET ORAL
Qty: 30 | Refills: 0 | OUTPATIENT
Start: 2017-02-14 | End: 2017-03-16

## 2017-02-14 RX ORDER — SENNA PLUS 8.6 MG/1
2 TABLET ORAL
Qty: 0 | Refills: 0 | COMMUNITY
Start: 2017-02-14

## 2017-02-14 RX ORDER — TRAMADOL HYDROCHLORIDE 50 MG/1
1 TABLET ORAL
Qty: 21 | Refills: 0 | OUTPATIENT
Start: 2017-02-14 | End: 2017-02-21

## 2017-02-14 RX ADMIN — OXYCODONE HYDROCHLORIDE 10 MILLIGRAM(S): 5 TABLET ORAL at 08:56

## 2017-02-14 RX ADMIN — SODIUM CHLORIDE 2000 MILLILITER(S): 9 INJECTION INTRAMUSCULAR; INTRAVENOUS; SUBCUTANEOUS at 07:20

## 2017-02-14 RX ADMIN — Medication 15 MILLIGRAM(S): at 07:26

## 2017-02-14 RX ADMIN — FAMOTIDINE 20 MILLIGRAM(S): 10 INJECTION INTRAVENOUS at 18:18

## 2017-02-14 RX ADMIN — Medication 100 MILLIGRAM(S): at 13:38

## 2017-02-14 RX ADMIN — LISINOPRIL 40 MILLIGRAM(S): 2.5 TABLET ORAL at 05:40

## 2017-02-14 RX ADMIN — OXYCODONE HYDROCHLORIDE 10 MILLIGRAM(S): 5 TABLET ORAL at 02:50

## 2017-02-14 RX ADMIN — Medication 650 MILLIGRAM(S): at 13:38

## 2017-02-14 RX ADMIN — OXYCODONE HYDROCHLORIDE 10 MILLIGRAM(S): 5 TABLET ORAL at 20:18

## 2017-02-14 RX ADMIN — TRAMADOL HYDROCHLORIDE 50 MILLIGRAM(S): 50 TABLET ORAL at 05:40

## 2017-02-14 RX ADMIN — Medication 15 MILLIGRAM(S): at 13:38

## 2017-02-14 RX ADMIN — OXYCODONE HYDROCHLORIDE 10 MILLIGRAM(S): 5 TABLET ORAL at 16:01

## 2017-02-14 RX ADMIN — OXYCODONE HYDROCHLORIDE 10 MILLIGRAM(S): 5 TABLET ORAL at 17:00

## 2017-02-14 RX ADMIN — Medication 15 MILLIGRAM(S): at 01:19

## 2017-02-14 RX ADMIN — Medication 650 MILLIGRAM(S): at 05:40

## 2017-02-14 RX ADMIN — TRAMADOL HYDROCHLORIDE 50 MILLIGRAM(S): 50 TABLET ORAL at 13:38

## 2017-02-14 RX ADMIN — FAMOTIDINE 20 MILLIGRAM(S): 10 INJECTION INTRAVENOUS at 05:40

## 2017-02-14 RX ADMIN — OXYCODONE HYDROCHLORIDE 10 MILLIGRAM(S): 5 TABLET ORAL at 20:48

## 2017-02-14 RX ADMIN — RIVAROXABAN 10 MILLIGRAM(S): KIT at 13:38

## 2017-02-14 RX ADMIN — OXYCODONE HYDROCHLORIDE 10 MILLIGRAM(S): 5 TABLET ORAL at 09:30

## 2017-02-14 RX ADMIN — OXYCODONE HYDROCHLORIDE 10 MILLIGRAM(S): 5 TABLET ORAL at 02:16

## 2017-02-14 RX ADMIN — Medication 650 MILLIGRAM(S): at 18:19

## 2017-02-14 RX ADMIN — Medication 100 MILLIGRAM(S): at 05:40

## 2017-02-14 NOTE — OCCUPATIONAL THERAPY INITIAL EVALUATION ADULT - PERTINENT HX OF CURRENT PROBLEM, REHAB EVAL
Pt is a 71 yr old female w/ PMHx of bilateral knee replacement and DVT/PE 2015 postop. [place] filter placed. Pt w/ c/o of R Hip pain 8/10 with activity. Pt w/ OA of Right hip & chronic pain over past year that has increased. Surgery originally scheduled for 1/23/17 but cancelled on DOS for untreated UTI - pt given Rx and has completed med. Pt denies dysuria. Pt is now s/p R Total Hip Replacement on 2/13/17.

## 2017-02-14 NOTE — OCCUPATIONAL THERAPY INITIAL EVALUATION ADULT - NS ASR BATHING EQUIP NEEDS
Pt reports she already owns a Shower Chair. Pt. may also benefit from a Grab bar. Pt. to be educated on benefits and how to privately purchase during upcoming ADL session.

## 2017-02-14 NOTE — OCCUPATIONAL THERAPY INITIAL EVALUATION ADULT - GENERAL OBSERVATIONS, REHAB EVAL
Pt. received semisupine in bed. NAD. VSS. +C/D/I Dressing to R Hip, +IV, +pulse ox on L finger, +Tele.

## 2017-02-14 NOTE — OCCUPATIONAL THERAPY INITIAL EVALUATION ADULT - ANTICIPATED DISCHARGE DISPOSITION, OT EVAL
Pt. may benefit from Home w/ OT. The patient should be evaluated upon discharge for the need for home OT services.

## 2017-02-14 NOTE — OCCUPATIONAL THERAPY INITIAL EVALUATION ADULT - MD ORDER
Occupational Therapy to evaluate and treat. Occupational Therapy to evaluate and treat. OOB to Chair. Per HEDY Zamarripa, pt. is okay to participate in OT evaluation & perform OOB activity.

## 2017-02-14 NOTE — OCCUPATIONAL THERAPY INITIAL EVALUATION ADULT - ADDITIONAL COMMENTS
Pt. reports she owns a Shower Chair & 3:1 commode; however, pt. explains she was not using either prior to hospitalization.

## 2017-02-14 NOTE — OCCUPATIONAL THERAPY INITIAL EVALUATION ADULT - PRECAUTIONS/LIMITATIONS, REHAB EVAL
fall precautions/right hip precautions/Anterior & Posterior Dislocation precautions/surgical precautions

## 2017-02-14 NOTE — OCCUPATIONAL THERAPY INITIAL EVALUATION ADULT - LIVES WITH, PROFILE
Pt. reports she lives alone in a house with 2 steps to enter. Once inside, pt. reports she has no steps to negotiate & bedroom and bathroom are located on the main level. Per pt., she has a bathtub in her bathroom.

## 2017-02-15 VITALS
SYSTOLIC BLOOD PRESSURE: 121 MMHG | RESPIRATION RATE: 18 BRPM | DIASTOLIC BLOOD PRESSURE: 46 MMHG | TEMPERATURE: 98 F | HEART RATE: 72 BPM | OXYGEN SATURATION: 98 %

## 2017-02-15 PROCEDURE — 99232 SBSQ HOSP IP/OBS MODERATE 35: CPT

## 2017-02-15 PROCEDURE — 99239 HOSP IP/OBS DSCHRG MGMT >30: CPT

## 2017-02-15 RX ORDER — LISINOPRIL 2.5 MG/1
1 TABLET ORAL
Qty: 0 | Refills: 0 | COMMUNITY

## 2017-02-15 RX ADMIN — OXYCODONE HYDROCHLORIDE 10 MILLIGRAM(S): 5 TABLET ORAL at 09:31

## 2017-02-15 RX ADMIN — Medication 650 MILLIGRAM(S): at 13:04

## 2017-02-15 RX ADMIN — TRAMADOL HYDROCHLORIDE 50 MILLIGRAM(S): 50 TABLET ORAL at 13:03

## 2017-02-15 RX ADMIN — TRAMADOL HYDROCHLORIDE 50 MILLIGRAM(S): 50 TABLET ORAL at 07:36

## 2017-02-15 RX ADMIN — Medication 40 MILLIGRAM(S): at 05:18

## 2017-02-15 RX ADMIN — CELECOXIB 200 MILLIGRAM(S): 200 CAPSULE ORAL at 07:36

## 2017-02-15 RX ADMIN — Medication 650 MILLIGRAM(S): at 05:18

## 2017-02-15 RX ADMIN — Medication 100 MILLIGRAM(S): at 13:03

## 2017-02-15 RX ADMIN — OXYCODONE HYDROCHLORIDE 10 MILLIGRAM(S): 5 TABLET ORAL at 00:40

## 2017-02-15 RX ADMIN — FAMOTIDINE 20 MILLIGRAM(S): 10 INJECTION INTRAVENOUS at 05:18

## 2017-02-15 RX ADMIN — TRAMADOL HYDROCHLORIDE 50 MILLIGRAM(S): 50 TABLET ORAL at 00:10

## 2017-02-15 RX ADMIN — OXYCODONE HYDROCHLORIDE 10 MILLIGRAM(S): 5 TABLET ORAL at 00:07

## 2017-02-15 RX ADMIN — OXYCODONE HYDROCHLORIDE 10 MILLIGRAM(S): 5 TABLET ORAL at 06:00

## 2017-02-15 RX ADMIN — RIVAROXABAN 10 MILLIGRAM(S): KIT at 13:03

## 2017-02-15 RX ADMIN — Medication 100 MILLIGRAM(S): at 05:18

## 2017-02-15 RX ADMIN — OXYCODONE HYDROCHLORIDE 10 MILLIGRAM(S): 5 TABLET ORAL at 10:30

## 2017-02-15 RX ADMIN — Medication 650 MILLIGRAM(S): at 00:16

## 2017-02-15 RX ADMIN — OXYCODONE HYDROCHLORIDE 10 MILLIGRAM(S): 5 TABLET ORAL at 05:18

## 2017-02-15 RX ADMIN — LISINOPRIL 20 MILLIGRAM(S): 2.5 TABLET ORAL at 05:18

## 2017-02-15 RX ADMIN — SIMVASTATIN 40 MILLIGRAM(S): 20 TABLET, FILM COATED ORAL at 00:12

## 2017-02-16 ENCOUNTER — MEDICATION RENEWAL (OUTPATIENT)
Age: 72
End: 2017-02-16

## 2017-02-16 ENCOUNTER — MOBILE ON CALL (OUTPATIENT)
Age: 72
End: 2017-02-16

## 2017-02-16 ENCOUNTER — RX RENEWAL (OUTPATIENT)
Age: 72
End: 2017-02-16

## 2017-02-18 ENCOUNTER — INPATIENT (INPATIENT)
Facility: HOSPITAL | Age: 72
LOS: 4 days | Discharge: HOME HEALTH | End: 2017-02-23
Attending: INTERNAL MEDICINE | Admitting: INTERNAL MEDICINE
Payer: MEDICARE

## 2017-02-18 VITALS
TEMPERATURE: 98 F | DIASTOLIC BLOOD PRESSURE: 49 MMHG | SYSTOLIC BLOOD PRESSURE: 133 MMHG | OXYGEN SATURATION: 100 % | HEART RATE: 75 BPM | RESPIRATION RATE: 16 BRPM

## 2017-02-18 DIAGNOSIS — Z98.890 OTHER SPECIFIED POSTPROCEDURAL STATES: Chronic | ICD-10-CM

## 2017-02-18 DIAGNOSIS — Z96.652 PRESENCE OF LEFT ARTIFICIAL KNEE JOINT: Chronic | ICD-10-CM

## 2017-02-18 DIAGNOSIS — Z95.828 PRESENCE OF OTHER VASCULAR IMPLANTS AND GRAFTS: Chronic | ICD-10-CM

## 2017-02-18 DIAGNOSIS — R60.0 LOCALIZED EDEMA: ICD-10-CM

## 2017-02-18 DIAGNOSIS — I82.409 ACUTE EMBOLISM AND THROMBOSIS OF UNSPECIFIED DEEP VEINS OF UNSPECIFIED LOWER EXTREMITY: ICD-10-CM

## 2017-02-18 DIAGNOSIS — I10 ESSENTIAL (PRIMARY) HYPERTENSION: ICD-10-CM

## 2017-02-18 DIAGNOSIS — R79.89 OTHER SPECIFIED ABNORMAL FINDINGS OF BLOOD CHEMISTRY: ICD-10-CM

## 2017-02-18 DIAGNOSIS — F32.9 MAJOR DEPRESSIVE DISORDER, SINGLE EPISODE, UNSPECIFIED: ICD-10-CM

## 2017-02-18 DIAGNOSIS — I82.501 CHRONIC EMBOLISM AND THROMBOSIS OF UNSPECIFIED DEEP VEINS OF RIGHT LOWER EXTREMITY: ICD-10-CM

## 2017-02-18 DIAGNOSIS — R22.42 LOCALIZED SWELLING, MASS AND LUMP, LEFT LOWER LIMB: ICD-10-CM

## 2017-02-18 DIAGNOSIS — Z96.651 PRESENCE OF RIGHT ARTIFICIAL KNEE JOINT: Chronic | ICD-10-CM

## 2017-02-18 DIAGNOSIS — E78.5 HYPERLIPIDEMIA, UNSPECIFIED: ICD-10-CM

## 2017-02-18 LAB
ALBUMIN SERPL ELPH-MCNC: 3.5 G/DL — SIGNIFICANT CHANGE UP (ref 3.3–5)
ALP SERPL-CCNC: 142 U/L — HIGH (ref 40–120)
ALT FLD-CCNC: 31 U/L — SIGNIFICANT CHANGE UP (ref 4–33)
AST SERPL-CCNC: 43 U/L — HIGH (ref 4–32)
BASE EXCESS BLDV CALC-SCNC: 2.2 MMOL/L — SIGNIFICANT CHANGE UP
BASOPHILS # BLD AUTO: 0.02 K/UL — SIGNIFICANT CHANGE UP (ref 0–0.2)
BASOPHILS NFR BLD AUTO: 0.2 % — SIGNIFICANT CHANGE UP (ref 0–2)
BILIRUB SERPL-MCNC: 1.6 MG/DL — HIGH (ref 0.2–1.2)
BLOOD GAS VENOUS - CREATININE: 0.85 MG/DL — SIGNIFICANT CHANGE UP (ref 0.5–1.3)
BUN SERPL-MCNC: 29 MG/DL — HIGH (ref 7–23)
CALCIUM SERPL-MCNC: 9 MG/DL — SIGNIFICANT CHANGE UP (ref 8.4–10.5)
CHLORIDE BLDV-SCNC: 109 MMOL/L — HIGH (ref 96–108)
CHLORIDE SERPL-SCNC: 103 MMOL/L — SIGNIFICANT CHANGE UP (ref 98–107)
CO2 SERPL-SCNC: 23 MMOL/L — SIGNIFICANT CHANGE UP (ref 22–31)
CREAT SERPL-MCNC: 0.81 MG/DL — SIGNIFICANT CHANGE UP (ref 0.5–1.3)
EOSINOPHIL # BLD AUTO: 0.06 K/UL — SIGNIFICANT CHANGE UP (ref 0–0.5)
EOSINOPHIL NFR BLD AUTO: 0.7 % — SIGNIFICANT CHANGE UP (ref 0–6)
GAS PNL BLDV: 137 MMOL/L — SIGNIFICANT CHANGE UP (ref 136–146)
GLUCOSE BLDV-MCNC: 107 — HIGH (ref 70–99)
GLUCOSE SERPL-MCNC: 114 MG/DL — HIGH (ref 70–99)
HCO3 BLDV-SCNC: 25 MMOL/L — SIGNIFICANT CHANGE UP (ref 20–27)
HCT VFR BLD CALC: 34.5 % — SIGNIFICANT CHANGE UP (ref 34.5–45)
HCT VFR BLDV CALC: 33.9 % — LOW (ref 34.5–45)
HGB BLD-MCNC: 11.1 G/DL — LOW (ref 11.5–15.5)
HGB BLDV-MCNC: 11 G/DL — LOW (ref 11.5–15.5)
IMM GRANULOCYTES NFR BLD AUTO: 0.2 % — SIGNIFICANT CHANGE UP (ref 0–1.5)
LACTATE BLDV-MCNC: 2 MMOL/L — SIGNIFICANT CHANGE UP (ref 0.5–2)
LYMPHOCYTES # BLD AUTO: 1.22 K/UL — SIGNIFICANT CHANGE UP (ref 1–3.3)
LYMPHOCYTES # BLD AUTO: 15.1 % — SIGNIFICANT CHANGE UP (ref 13–44)
MCHC RBC-ENTMCNC: 29.4 PG — SIGNIFICANT CHANGE UP (ref 27–34)
MCHC RBC-ENTMCNC: 32.2 % — SIGNIFICANT CHANGE UP (ref 32–36)
MCV RBC AUTO: 91.5 FL — SIGNIFICANT CHANGE UP (ref 80–100)
MONOCYTES # BLD AUTO: 0.84 K/UL — SIGNIFICANT CHANGE UP (ref 0–0.9)
MONOCYTES NFR BLD AUTO: 10.4 % — SIGNIFICANT CHANGE UP (ref 2–14)
NEUTROPHILS # BLD AUTO: 5.93 K/UL — SIGNIFICANT CHANGE UP (ref 1.8–7.4)
NEUTROPHILS NFR BLD AUTO: 73.4 % — SIGNIFICANT CHANGE UP (ref 43–77)
PCO2 BLDV: 46 MMHG — SIGNIFICANT CHANGE UP (ref 41–51)
PH BLDV: 7.39 PH — SIGNIFICANT CHANGE UP (ref 7.32–7.43)
PLATELET # BLD AUTO: 293 K/UL — SIGNIFICANT CHANGE UP (ref 150–400)
PMV BLD: 10.5 FL — SIGNIFICANT CHANGE UP (ref 7–13)
PO2 BLDV: 29 MMHG — LOW (ref 35–40)
POTASSIUM BLDV-SCNC: 3.6 MMOL/L — SIGNIFICANT CHANGE UP (ref 3.4–4.5)
POTASSIUM SERPL-MCNC: 3.7 MMOL/L — SIGNIFICANT CHANGE UP (ref 3.5–5.3)
POTASSIUM SERPL-SCNC: 3.7 MMOL/L — SIGNIFICANT CHANGE UP (ref 3.5–5.3)
PROT SERPL-MCNC: 6.8 G/DL — SIGNIFICANT CHANGE UP (ref 6–8.3)
RBC # BLD: 3.77 M/UL — LOW (ref 3.8–5.2)
RBC # FLD: 14.1 % — SIGNIFICANT CHANGE UP (ref 10.3–14.5)
SAO2 % BLDV: 49 % — LOW (ref 60–85)
SODIUM SERPL-SCNC: 144 MMOL/L — SIGNIFICANT CHANGE UP (ref 135–145)
WBC # BLD: 8.09 K/UL — SIGNIFICANT CHANGE UP (ref 3.8–10.5)
WBC # FLD AUTO: 8.09 K/UL — SIGNIFICANT CHANGE UP (ref 3.8–10.5)

## 2017-02-18 RX ORDER — VANCOMYCIN HCL 1 G
1250 VIAL (EA) INTRAVENOUS EVERY 12 HOURS
Qty: 0 | Refills: 0 | Status: DISCONTINUED | OUTPATIENT
Start: 2017-02-19 | End: 2017-02-19

## 2017-02-18 RX ORDER — ONDANSETRON 8 MG/1
4 TABLET, FILM COATED ORAL EVERY 8 HOURS
Qty: 0 | Refills: 0 | Status: DISCONTINUED | OUTPATIENT
Start: 2017-02-18 | End: 2017-02-23

## 2017-02-18 RX ORDER — ACETAMINOPHEN 500 MG
650 TABLET ORAL EVERY 6 HOURS
Qty: 0 | Refills: 0 | Status: DISCONTINUED | OUTPATIENT
Start: 2017-02-18 | End: 2017-02-23

## 2017-02-18 RX ORDER — RIVAROXABAN 15 MG-20MG
20 KIT ORAL DAILY
Qty: 0 | Refills: 0 | Status: DISCONTINUED | OUTPATIENT
Start: 2017-02-18 | End: 2017-02-21

## 2017-02-18 RX ORDER — RIVAROXABAN 15 MG-20MG
10 KIT ORAL DAILY
Qty: 0 | Refills: 0 | Status: DISCONTINUED | OUTPATIENT
Start: 2017-02-18 | End: 2017-02-18

## 2017-02-18 RX ORDER — VANCOMYCIN HCL 1 G
1250 VIAL (EA) INTRAVENOUS ONCE
Qty: 0 | Refills: 0 | Status: COMPLETED | OUTPATIENT
Start: 2017-02-18 | End: 2017-02-18

## 2017-02-18 RX ORDER — FUROSEMIDE 40 MG
40 TABLET ORAL DAILY
Qty: 0 | Refills: 0 | Status: DISCONTINUED | OUTPATIENT
Start: 2017-02-18 | End: 2017-02-23

## 2017-02-18 RX ORDER — MORPHINE SULFATE 50 MG/1
4 CAPSULE, EXTENDED RELEASE ORAL ONCE
Qty: 0 | Refills: 0 | Status: DISCONTINUED | OUTPATIENT
Start: 2017-02-18 | End: 2017-02-18

## 2017-02-18 RX ORDER — OXYCODONE HYDROCHLORIDE 5 MG/1
5 TABLET ORAL EVERY 4 HOURS
Qty: 0 | Refills: 0 | Status: DISCONTINUED | OUTPATIENT
Start: 2017-02-18 | End: 2017-02-22

## 2017-02-18 RX ORDER — MORPHINE SULFATE 50 MG/1
2 CAPSULE, EXTENDED RELEASE ORAL EVERY 4 HOURS
Qty: 0 | Refills: 0 | Status: DISCONTINUED | OUTPATIENT
Start: 2017-02-18 | End: 2017-02-20

## 2017-02-18 RX ORDER — SENNA PLUS 8.6 MG/1
2 TABLET ORAL AT BEDTIME
Qty: 0 | Refills: 0 | Status: DISCONTINUED | OUTPATIENT
Start: 2017-02-18 | End: 2017-02-23

## 2017-02-18 RX ORDER — ONDANSETRON 8 MG/1
4 TABLET, FILM COATED ORAL ONCE
Qty: 0 | Refills: 0 | Status: COMPLETED | OUTPATIENT
Start: 2017-02-18 | End: 2017-02-18

## 2017-02-18 RX ORDER — DOCUSATE SODIUM 100 MG
100 CAPSULE ORAL THREE TIMES A DAY
Qty: 0 | Refills: 0 | Status: DISCONTINUED | OUTPATIENT
Start: 2017-02-18 | End: 2017-02-23

## 2017-02-18 RX ORDER — VANCOMYCIN HCL 1 G
VIAL (EA) INTRAVENOUS
Qty: 0 | Refills: 0 | Status: DISCONTINUED | OUTPATIENT
Start: 2017-02-18 | End: 2017-02-19

## 2017-02-18 RX ORDER — HYDROMORPHONE HYDROCHLORIDE 2 MG/ML
1 INJECTION INTRAMUSCULAR; INTRAVENOUS; SUBCUTANEOUS ONCE
Qty: 0 | Refills: 0 | Status: DISCONTINUED | OUTPATIENT
Start: 2017-02-18 | End: 2017-02-18

## 2017-02-18 RX ADMIN — HYDROMORPHONE HYDROCHLORIDE 1 MILLIGRAM(S): 2 INJECTION INTRAMUSCULAR; INTRAVENOUS; SUBCUTANEOUS at 18:00

## 2017-02-18 RX ADMIN — MORPHINE SULFATE 2 MILLIGRAM(S): 50 CAPSULE, EXTENDED RELEASE ORAL at 20:23

## 2017-02-18 RX ADMIN — ONDANSETRON 4 MILLIGRAM(S): 8 TABLET, FILM COATED ORAL at 17:38

## 2017-02-18 RX ADMIN — MORPHINE SULFATE 2 MILLIGRAM(S): 50 CAPSULE, EXTENDED RELEASE ORAL at 20:01

## 2017-02-18 RX ADMIN — Medication 100 MILLIGRAM(S): at 16:11

## 2017-02-18 RX ADMIN — SENNA PLUS 2 TABLET(S): 8.6 TABLET ORAL at 21:32

## 2017-02-18 RX ADMIN — Medication 100 MILLIGRAM(S): at 21:32

## 2017-02-18 RX ADMIN — HYDROMORPHONE HYDROCHLORIDE 1 MILLIGRAM(S): 2 INJECTION INTRAMUSCULAR; INTRAVENOUS; SUBCUTANEOUS at 17:38

## 2017-02-18 RX ADMIN — Medication 166.67 MILLIGRAM(S): at 20:24

## 2017-02-18 RX ADMIN — MORPHINE SULFATE 4 MILLIGRAM(S): 50 CAPSULE, EXTENDED RELEASE ORAL at 16:30

## 2017-02-18 RX ADMIN — MORPHINE SULFATE 4 MILLIGRAM(S): 50 CAPSULE, EXTENDED RELEASE ORAL at 16:14

## 2017-02-18 NOTE — H&P ADULT. - PROBLEM SELECTOR PLAN 3
-chronic elevation in LFTs, likely NAFLD vs MAXWELL   -previous hep panel WNL   -lifestyle modification

## 2017-02-18 NOTE — ED PROVIDER NOTE - MEDICAL DECISION MAKING DETAILS
72 yo F with likely appropriate pain and swelling post-operatively from Left hip surgery, but given history US of LE and antibiotics for erythema and warmth of leg with concern for cellultisi 72 yo F with likely appropriate pain and swelling post-operatively from rt hip surgery, but given history US of LE and antibiotics for erythema and warmth of leg with concern for cellultisi

## 2017-02-18 NOTE — H&P ADULT. - LAB RESULTS AND INTERPRETATION
Labs significant for WBC 8.09, hgb 11.1, plt 293, Cr .81, lytes WNL, total bili 1.6, Alk phos 142, Ast 43, Alt 31, lactate 2.0.

## 2017-02-18 NOTE — H&P ADULT. - HISTORY OF PRESENT ILLNESS
71 yo obese female with HTN, HLD osteoarthritis, left and right TKR w/ revision on right, provoked DVT/PE 2/16 in setting of decreased mobility 2/2 TKR, s/p R IVC filter and on xarelto, depression, s/p total R hip arthroplasty on 2/13/17 and d/c home w/ home PT, now presenting to ED with RLE pain and swelling. RLE diameter > LLE. No changes in strength and sensation of both LEs. No recent fevers, CP, SOB, N/V, abn pain. Has been able to accomplish ADLs at home with family assistance. Walking around house however needs to drag right leg. No recent sick contacts, fevers. Has been compliant with medications and takes her xarelto daily.     In ED vitals 71 yo obese female with HTN, HLD osteoarthritis, left and right TKR w/ revision on right, provoked DVT/PE 2/16 in setting of decreased mobility 2/2 TKR, s/p R IVC filter and on xarelto, depression, s/p total R hip arthroplasty on 2/13/17 and d/c home w/ home PT, now presenting to ED with RLE pain and swelling. RLE diameter > LLE. No changes in strength and sensation of both LEs. No recent fevers, CP, SOB, N/V, abn pain. Has been able to accomplish ADLs at home with family assistance. Walking around house however needs to drag right leg. No recent sick contacts, fevers. Has been compliant with medications and takes her xarelto daily.     In ED vitals temp 98.2, HR 75, /49, RR 16, 100 RA. Labs significant for WBC 8.09, hgb 11.1, plt 293, Cr .81, lytes WNL, total bili 1.6, Alk phos 142, Ast 43, Alt 31, lactate 2.0. S/p clindamycin, morphine, dilaudid, and zofran. 71 yo obese female with HTN, HLD, chronic LE edema, osteoarthritis, left and right TKR w/ revision on right, provoked DVT/PE 2/16 in setting of decreased mobility 2/2 TKR, s/p R IVC filter and on xarelto, depression, s/p total R hip arthroplasty on 2/13/17 and d/c home w/ home PT, now presenting to ED with RLE pain and swelling. RLE diameter > LLE. No changes in strength and sensation of both LEs. No recent fevers, CP, SOB, N/V, abn pain. Has been able to accomplish ADLs at home with family assistance. Walking around house however needs to drag right leg. No recent sick contacts, fevers. Has been compliant with medications and takes her xarelto daily.     In ED vitals temp 98.2, HR 75, /49, RR 16, 100 RA. Labs significant for WBC 8.09, hgb 11.1, plt 293, Cr .81, lytes WNL, total bili 1.6, Alk phos 142, Ast 43, Alt 31, lactate 2.0. S/p clindamycin, morphine, dilaudid, and zofran. 69 yo obese female with HTN, HLD, chronic LE edema, osteoarthritis, left and right TKR w/ revision on right, provoked DVT/PE 2/16 in setting of decreased mobility 2/2 TKR, s/p IVC filter and on xarelto, depression, s/p total R hip arthroplasty on 2/13/17 and d/c home w/ home PT, now presenting to ED with RLE pain and swelling. RLE diameter > LLE. No changes in strength and sensation of both LEs. No recent fevers, CP, SOB, N/V, abn pain. Has been able to accomplish ADLs at home with family assistance. Walking around house however needs to drag right leg. No recent sick contacts, fevers. Has been compliant with medications and takes her xarelto daily.     In ED vitals temp 98.2, HR 75, /49, RR 16, 100 RA. Labs significant for WBC 8.09, hgb 11.1, plt 293, Cr .81, lytes WNL, total bili 1.6, Alk phos 142, Ast 43, Alt 31, lactate 2.0. S/p clindamycin, morphine, dilaudid, and zofran.

## 2017-02-18 NOTE — H&P ADULT. - PSH
[place] filter in place  placed approximately 5/2016 - [facility]  H/O knee surgery  2016 - repair right  H/O total knee replacement, right  11/2015, 8/2016  History of total left knee replacement  2007

## 2017-02-18 NOTE — ED PROVIDER NOTE - CARE PLAN
Principal Discharge DX:	Edema of left lower extremity Principal Discharge DX:	Cellulitis of other specified site

## 2017-02-18 NOTE — ED PROVIDER NOTE - ATTENDING CONTRIBUTION TO CARE
I performed a face to face evaluation of this patient and performed a history and physical examination on the patient.  I agree with the resident's history, physical examination, and plan of the patient. patient complaining of right leg pain and swelling. has recent right hip surgery. also states that there is discharge from right shin. pain is severe. h/o dvt and ivc filter and on anticoagulation. concern for acute on chronic dvt vs cellulitis. will start abx and admit.

## 2017-02-18 NOTE — H&P ADULT. - PROBLEM SELECTOR PLAN 1
-RLE DVT vs IVC thrombosis vs cellulitis  -pending RLE duplex, will c/w xarelto   -s/p clindamycin in ED, will start vanc 1250 BID   -ID c/s given hardware and recent hip arthroplasty  -pain control w/ tylenol and PO oxycodone -RLE DVT vs IVC thrombosis vs cellulitis  -pending RLE duplex, will start therapeutic xarelto now   -s/p clindamycin in ED, will start vanc 1250 BID   -ID c/s given hardware and recent hip arthroplasty  -pain control w/ tylenol and PO oxycodone, IV morphine prn -RLE DVT vs IVC thrombosis vs cellulitis  -pending RLE duplex, will start therapeutic xarelto now   -s/p clindamycin in ED, will start vanc 1250 BID   -ID c/s given hardware and recent hip arthroplasty  -pain control w/ tylenol and PO oxycodone, IV morphine prn  -c/w PO lasix

## 2017-02-18 NOTE — ED ADULT NURSE NOTE - OBJECTIVE STATEMENT
received pt A&Ox3 in no apparent distress at this time. #22g IVL to R FA, bloods drawn and sent to the lab. no s/s of infiltration noted at this time. medicated for pain as per MD order. vss. MD and family at bedside. dispo pending.

## 2017-02-18 NOTE — H&P ADULT. - ASSESSMENT
69 yo obese female with HTN, HLD, chronic LE edema, osteoarthritis, left and right TKR w/ revision on right, provoked DVT/PE 2/16 in setting of decreased mobility 2/2 TKR, s/p R IVC filter and on xarelto, depression, s/p total R hip arthroplasty on 2/13/17 and d/c home w/ home PT, now presenting to ED with RLE pain and swelling 2/2 RLE DVT vs IVC thrombosis vs cellulitis 71 yo obese female with HTN, HLD, chronic LE edema, osteoarthritis, left and right TKR w/ revision on right, provoked DVT/PE 2/16 in setting of decreased mobility 2/2 TKR, s/p IVC filter and on xarelto, depression, s/p total R hip arthroplasty on 2/13/17 and d/c home w/ home PT, now presenting to ED with RLE pain and swelling 2/2 RLE DVT vs cellulitis

## 2017-02-18 NOTE — H&P ADULT. - SKIN COMMENTS
RLE diameter > LLE, 2x2 cm area of erythema on anterior shin, RLE weeping, both feet warm and perfusing well, difficult to appreciate pedal pulses 2/2 obesity

## 2017-02-18 NOTE — ED ADULT TRIAGE NOTE - CHIEF COMPLAINT QUOTE
pt with c/o Bilateral LLE with a r/o abscess to on the right shin. pt with recent Hip replacement 2/13/17. pt states since the surgery the pain has been excruciating and she has been unable to take her pain meds due to nausea.   pmhx HTN Hip Fracture

## 2017-02-18 NOTE — ED PROVIDER NOTE - OBJECTIVE STATEMENT
70 yo F with history of DVT on xarelto with IVC filter, htn and left hip replacement 5 days ago. Pt complaining of worsening pain, leg edema, and oozing from a blister on the anterior shin with erythema and warmth. Denies fevers, chills, cough, rhinorrhea, otorrhea, otalgia, nausea, vomiting, constipation, diarrhea, chest pain, shortness of breath or changes in urinary habits. Pt has not been appropriately mobile post surgery secondary to pain. Pt states she can barely move because of pain. 72 yo F with history of DVT on xarelto with IVC filter, htn and right hip replacement 5 days ago. Pt complaining of worsening pain, leg edema, and oozing from a blister on the anterior shin with erythema and warmth. Denies fevers, chills, cough, rhinorrhea, otorrhea, otalgia, nausea, vomiting, constipation, diarrhea, chest pain, shortness of breath or changes in urinary habits. Pt has not been appropriately mobile post surgery secondary to pain. Pt states she can barely move because of pain.

## 2017-02-18 NOTE — ED PROVIDER NOTE - PSH
[place] filter in place  placed approximately 5/2016 - [facility]  H/O knee surgery  2016 - repair right  H/O total knee replacement, right  11/2015, 8/2016  History of total left knee replacement  2007

## 2017-02-19 LAB
ALBUMIN SERPL ELPH-MCNC: 2.8 G/DL — LOW (ref 3.3–5)
ALP SERPL-CCNC: 100 U/L — SIGNIFICANT CHANGE UP (ref 40–120)
ALT FLD-CCNC: 24 U/L — SIGNIFICANT CHANGE UP (ref 4–33)
APTT BLD: 25.5 SEC — LOW (ref 27.5–37.4)
AST SERPL-CCNC: 30 U/L — SIGNIFICANT CHANGE UP (ref 4–32)
BILIRUB SERPL-MCNC: 1.1 MG/DL — SIGNIFICANT CHANGE UP (ref 0.2–1.2)
BUN SERPL-MCNC: 24 MG/DL — HIGH (ref 7–23)
CALCIUM SERPL-MCNC: 8.6 MG/DL — SIGNIFICANT CHANGE UP (ref 8.4–10.5)
CHLORIDE SERPL-SCNC: 102 MMOL/L — SIGNIFICANT CHANGE UP (ref 98–107)
CO2 SERPL-SCNC: 25 MMOL/L — SIGNIFICANT CHANGE UP (ref 22–31)
CREAT SERPL-MCNC: 0.74 MG/DL — SIGNIFICANT CHANGE UP (ref 0.5–1.3)
GLUCOSE SERPL-MCNC: 114 MG/DL — HIGH (ref 70–99)
INR BLD: 1.12 — SIGNIFICANT CHANGE UP (ref 0.87–1.18)
MAGNESIUM SERPL-MCNC: 1.9 MG/DL — SIGNIFICANT CHANGE UP (ref 1.6–2.6)
PHOSPHATE SERPL-MCNC: 3.2 MG/DL — SIGNIFICANT CHANGE UP (ref 2.5–4.5)
POTASSIUM SERPL-MCNC: 3.9 MMOL/L — SIGNIFICANT CHANGE UP (ref 3.5–5.3)
POTASSIUM SERPL-SCNC: 3.9 MMOL/L — SIGNIFICANT CHANGE UP (ref 3.5–5.3)
PROT SERPL-MCNC: 5.5 G/DL — LOW (ref 6–8.3)
PROTHROM AB SERPL-ACNC: 12.8 SEC — SIGNIFICANT CHANGE UP (ref 10–13.1)
SODIUM SERPL-SCNC: 141 MMOL/L — SIGNIFICANT CHANGE UP (ref 135–145)
SPECIMEN SOURCE: SIGNIFICANT CHANGE UP
SPECIMEN SOURCE: SIGNIFICANT CHANGE UP

## 2017-02-19 PROCEDURE — 73590 X-RAY EXAM OF LOWER LEG: CPT | Mod: 26,RT

## 2017-02-19 PROCEDURE — 99221 1ST HOSP IP/OBS SF/LOW 40: CPT | Mod: GC

## 2017-02-19 PROCEDURE — 73600 X-RAY EXAM OF ANKLE: CPT | Mod: 26,RT

## 2017-02-19 RX ADMIN — MORPHINE SULFATE 2 MILLIGRAM(S): 50 CAPSULE, EXTENDED RELEASE ORAL at 18:03

## 2017-02-19 RX ADMIN — RIVAROXABAN 20 MILLIGRAM(S): KIT at 13:48

## 2017-02-19 RX ADMIN — MORPHINE SULFATE 2 MILLIGRAM(S): 50 CAPSULE, EXTENDED RELEASE ORAL at 09:08

## 2017-02-19 RX ADMIN — Medication 100 MILLIGRAM(S): at 13:48

## 2017-02-19 RX ADMIN — MORPHINE SULFATE 2 MILLIGRAM(S): 50 CAPSULE, EXTENDED RELEASE ORAL at 09:22

## 2017-02-19 RX ADMIN — MORPHINE SULFATE 2 MILLIGRAM(S): 50 CAPSULE, EXTENDED RELEASE ORAL at 04:31

## 2017-02-19 RX ADMIN — MORPHINE SULFATE 2 MILLIGRAM(S): 50 CAPSULE, EXTENDED RELEASE ORAL at 00:37

## 2017-02-19 RX ADMIN — MORPHINE SULFATE 2 MILLIGRAM(S): 50 CAPSULE, EXTENDED RELEASE ORAL at 00:18

## 2017-02-19 RX ADMIN — MORPHINE SULFATE 2 MILLIGRAM(S): 50 CAPSULE, EXTENDED RELEASE ORAL at 13:48

## 2017-02-19 RX ADMIN — Medication 40 MILLIGRAM(S): at 05:31

## 2017-02-19 RX ADMIN — MORPHINE SULFATE 2 MILLIGRAM(S): 50 CAPSULE, EXTENDED RELEASE ORAL at 22:15

## 2017-02-19 RX ADMIN — MORPHINE SULFATE 2 MILLIGRAM(S): 50 CAPSULE, EXTENDED RELEASE ORAL at 04:50

## 2017-02-19 RX ADMIN — Medication 166.67 MILLIGRAM(S): at 05:29

## 2017-02-19 RX ADMIN — MORPHINE SULFATE 2 MILLIGRAM(S): 50 CAPSULE, EXTENDED RELEASE ORAL at 18:18

## 2017-02-19 RX ADMIN — MORPHINE SULFATE 2 MILLIGRAM(S): 50 CAPSULE, EXTENDED RELEASE ORAL at 14:03

## 2017-02-19 RX ADMIN — Medication 100 MILLIGRAM(S): at 22:17

## 2017-02-19 RX ADMIN — MORPHINE SULFATE 2 MILLIGRAM(S): 50 CAPSULE, EXTENDED RELEASE ORAL at 22:30

## 2017-02-19 RX ADMIN — Medication 100 MILLIGRAM(S): at 05:31

## 2017-02-20 LAB
BUN SERPL-MCNC: 15 MG/DL — SIGNIFICANT CHANGE UP (ref 7–23)
CALCIUM SERPL-MCNC: 8.6 MG/DL — SIGNIFICANT CHANGE UP (ref 8.4–10.5)
CHLORIDE SERPL-SCNC: 105 MMOL/L — SIGNIFICANT CHANGE UP (ref 98–107)
CO2 SERPL-SCNC: 25 MMOL/L — SIGNIFICANT CHANGE UP (ref 22–31)
CREAT SERPL-MCNC: 0.59 MG/DL — SIGNIFICANT CHANGE UP (ref 0.5–1.3)
GLUCOSE SERPL-MCNC: 98 MG/DL — SIGNIFICANT CHANGE UP (ref 70–99)
HCT VFR BLD CALC: 29.6 % — LOW (ref 34.5–45)
HGB BLD-MCNC: 9.5 G/DL — LOW (ref 11.5–15.5)
MAGNESIUM SERPL-MCNC: 1.9 MG/DL — SIGNIFICANT CHANGE UP (ref 1.6–2.6)
MCHC RBC-ENTMCNC: 29.3 PG — SIGNIFICANT CHANGE UP (ref 27–34)
MCHC RBC-ENTMCNC: 32.1 % — SIGNIFICANT CHANGE UP (ref 32–36)
MCV RBC AUTO: 91.4 FL — SIGNIFICANT CHANGE UP (ref 80–100)
PHOSPHATE SERPL-MCNC: 3.1 MG/DL — SIGNIFICANT CHANGE UP (ref 2.5–4.5)
PLATELET # BLD AUTO: 231 K/UL — SIGNIFICANT CHANGE UP (ref 150–400)
PMV BLD: 9.7 FL — SIGNIFICANT CHANGE UP (ref 7–13)
POTASSIUM SERPL-MCNC: 3.7 MMOL/L — SIGNIFICANT CHANGE UP (ref 3.5–5.3)
POTASSIUM SERPL-SCNC: 3.7 MMOL/L — SIGNIFICANT CHANGE UP (ref 3.5–5.3)
RBC # BLD: 3.24 M/UL — LOW (ref 3.8–5.2)
RBC # FLD: 14.4 % — SIGNIFICANT CHANGE UP (ref 10.3–14.5)
SODIUM SERPL-SCNC: 143 MMOL/L — SIGNIFICANT CHANGE UP (ref 135–145)
WBC # BLD: 5.56 K/UL — SIGNIFICANT CHANGE UP (ref 3.8–10.5)
WBC # FLD AUTO: 5.56 K/UL — SIGNIFICANT CHANGE UP (ref 3.8–10.5)

## 2017-02-20 RX ORDER — HYDROMORPHONE HYDROCHLORIDE 2 MG/ML
0.5 INJECTION INTRAMUSCULAR; INTRAVENOUS; SUBCUTANEOUS EVERY 4 HOURS
Qty: 0 | Refills: 0 | Status: DISCONTINUED | OUTPATIENT
Start: 2017-02-20 | End: 2017-02-22

## 2017-02-20 RX ORDER — HYDROMORPHONE HYDROCHLORIDE 2 MG/ML
0.5 INJECTION INTRAMUSCULAR; INTRAVENOUS; SUBCUTANEOUS ONCE
Qty: 0 | Refills: 0 | Status: DISCONTINUED | OUTPATIENT
Start: 2017-02-20 | End: 2017-02-20

## 2017-02-20 RX ADMIN — HYDROMORPHONE HYDROCHLORIDE 0.5 MILLIGRAM(S): 2 INJECTION INTRAMUSCULAR; INTRAVENOUS; SUBCUTANEOUS at 11:34

## 2017-02-20 RX ADMIN — HYDROMORPHONE HYDROCHLORIDE 0.5 MILLIGRAM(S): 2 INJECTION INTRAMUSCULAR; INTRAVENOUS; SUBCUTANEOUS at 16:35

## 2017-02-20 RX ADMIN — Medication 100 MILLIGRAM(S): at 21:20

## 2017-02-20 RX ADMIN — HYDROMORPHONE HYDROCHLORIDE 0.5 MILLIGRAM(S): 2 INJECTION INTRAMUSCULAR; INTRAVENOUS; SUBCUTANEOUS at 20:49

## 2017-02-20 RX ADMIN — RIVAROXABAN 20 MILLIGRAM(S): KIT at 13:49

## 2017-02-20 RX ADMIN — HYDROMORPHONE HYDROCHLORIDE 0.5 MILLIGRAM(S): 2 INJECTION INTRAMUSCULAR; INTRAVENOUS; SUBCUTANEOUS at 16:20

## 2017-02-20 RX ADMIN — HYDROMORPHONE HYDROCHLORIDE 0.5 MILLIGRAM(S): 2 INJECTION INTRAMUSCULAR; INTRAVENOUS; SUBCUTANEOUS at 08:13

## 2017-02-20 RX ADMIN — SENNA PLUS 2 TABLET(S): 8.6 TABLET ORAL at 21:20

## 2017-02-20 RX ADMIN — HYDROMORPHONE HYDROCHLORIDE 0.5 MILLIGRAM(S): 2 INJECTION INTRAMUSCULAR; INTRAVENOUS; SUBCUTANEOUS at 21:16

## 2017-02-20 RX ADMIN — MORPHINE SULFATE 2 MILLIGRAM(S): 50 CAPSULE, EXTENDED RELEASE ORAL at 02:25

## 2017-02-20 RX ADMIN — MORPHINE SULFATE 2 MILLIGRAM(S): 50 CAPSULE, EXTENDED RELEASE ORAL at 02:08

## 2017-02-20 RX ADMIN — MORPHINE SULFATE 2 MILLIGRAM(S): 50 CAPSULE, EXTENDED RELEASE ORAL at 06:18

## 2017-02-20 RX ADMIN — Medication 40 MILLIGRAM(S): at 05:56

## 2017-02-20 RX ADMIN — MORPHINE SULFATE 2 MILLIGRAM(S): 50 CAPSULE, EXTENDED RELEASE ORAL at 06:35

## 2017-02-20 RX ADMIN — Medication 100 MILLIGRAM(S): at 13:49

## 2017-02-20 RX ADMIN — HYDROMORPHONE HYDROCHLORIDE 0.5 MILLIGRAM(S): 2 INJECTION INTRAMUSCULAR; INTRAVENOUS; SUBCUTANEOUS at 08:28

## 2017-02-20 RX ADMIN — HYDROMORPHONE HYDROCHLORIDE 0.5 MILLIGRAM(S): 2 INJECTION INTRAMUSCULAR; INTRAVENOUS; SUBCUTANEOUS at 11:49

## 2017-02-20 NOTE — PHYSICAL THERAPY INITIAL EVALUATION ADULT - ACTIVE RANGE OF MOTION EXAMINATION, REHAB EVAL
except (R) hip flexion 0-30 degrees with pain/bilateral  lower extremity Active ROM was WFL (within functional limits)/bilateral upper extremity Active ROM was WFL (within functional limits)

## 2017-02-20 NOTE — PHYSICAL THERAPY INITIAL EVALUATION ADULT - PERTINENT HX OF CURRENT PROBLEM, REHAB EVAL
71 yo obese female with HTN, HLD, chronic LE edema, osteoarthritis, left and right TKR w/ revision on right, provoked DVT/PE 2/16 in setting of decreased mobility 2/2 TKR, s/p IVC filter and on xarelto, depression, s/p total R hip arthroplasty on 2/13/17 and d/c home w/ home PT, now presenting to ED with RLE pain and swelling 2/2 RLE DVT vs cellulitis

## 2017-02-21 ENCOUNTER — APPOINTMENT (OUTPATIENT)
Dept: INTERNAL MEDICINE | Facility: CLINIC | Age: 72
End: 2017-02-21

## 2017-02-21 ENCOUNTER — TRANSCRIPTION ENCOUNTER (OUTPATIENT)
Age: 72
End: 2017-02-21

## 2017-02-21 LAB
BUN SERPL-MCNC: 17 MG/DL — SIGNIFICANT CHANGE UP (ref 7–23)
CALCIUM SERPL-MCNC: 9 MG/DL — SIGNIFICANT CHANGE UP (ref 8.4–10.5)
CHLORIDE SERPL-SCNC: 105 MMOL/L — SIGNIFICANT CHANGE UP (ref 98–107)
CO2 SERPL-SCNC: 24 MMOL/L — SIGNIFICANT CHANGE UP (ref 22–31)
CREAT SERPL-MCNC: 0.57 MG/DL — SIGNIFICANT CHANGE UP (ref 0.5–1.3)
GLUCOSE SERPL-MCNC: 79 MG/DL — SIGNIFICANT CHANGE UP (ref 70–99)
HCT VFR BLD CALC: 31.3 % — LOW (ref 34.5–45)
HGB BLD-MCNC: 10.2 G/DL — LOW (ref 11.5–15.5)
MAGNESIUM SERPL-MCNC: 2 MG/DL — SIGNIFICANT CHANGE UP (ref 1.6–2.6)
MCHC RBC-ENTMCNC: 30.2 PG — SIGNIFICANT CHANGE UP (ref 27–34)
MCHC RBC-ENTMCNC: 32.6 % — SIGNIFICANT CHANGE UP (ref 32–36)
MCV RBC AUTO: 92.6 FL — SIGNIFICANT CHANGE UP (ref 80–100)
PHOSPHATE SERPL-MCNC: 3.4 MG/DL — SIGNIFICANT CHANGE UP (ref 2.5–4.5)
PLATELET # BLD AUTO: 265 K/UL — SIGNIFICANT CHANGE UP (ref 150–400)
PMV BLD: 10.7 FL — SIGNIFICANT CHANGE UP (ref 7–13)
POTASSIUM SERPL-MCNC: 4.3 MMOL/L — SIGNIFICANT CHANGE UP (ref 3.5–5.3)
POTASSIUM SERPL-SCNC: 4.3 MMOL/L — SIGNIFICANT CHANGE UP (ref 3.5–5.3)
RBC # BLD: 3.38 M/UL — LOW (ref 3.8–5.2)
RBC # FLD: 14.3 % — SIGNIFICANT CHANGE UP (ref 10.3–14.5)
SODIUM SERPL-SCNC: 144 MMOL/L — SIGNIFICANT CHANGE UP (ref 135–145)
WBC # BLD: 5.62 K/UL — SIGNIFICANT CHANGE UP (ref 3.8–10.5)
WBC # FLD AUTO: 5.62 K/UL — SIGNIFICANT CHANGE UP (ref 3.8–10.5)

## 2017-02-21 PROCEDURE — 93970 EXTREMITY STUDY: CPT | Mod: 26

## 2017-02-21 RX ORDER — RIVAROXABAN 15 MG-20MG
10 KIT ORAL DAILY
Qty: 0 | Refills: 0 | Status: DISCONTINUED | OUTPATIENT
Start: 2017-02-21 | End: 2017-02-23

## 2017-02-21 RX ORDER — OXYCODONE HYDROCHLORIDE 5 MG/1
5 TABLET ORAL ONCE
Qty: 0 | Refills: 0 | Status: DISCONTINUED | OUTPATIENT
Start: 2017-02-21 | End: 2017-02-21

## 2017-02-21 RX ADMIN — SENNA PLUS 2 TABLET(S): 8.6 TABLET ORAL at 21:42

## 2017-02-21 RX ADMIN — HYDROMORPHONE HYDROCHLORIDE 0.5 MILLIGRAM(S): 2 INJECTION INTRAMUSCULAR; INTRAVENOUS; SUBCUTANEOUS at 05:45

## 2017-02-21 RX ADMIN — RIVAROXABAN 10 MILLIGRAM(S): KIT at 14:46

## 2017-02-21 RX ADMIN — HYDROMORPHONE HYDROCHLORIDE 0.5 MILLIGRAM(S): 2 INJECTION INTRAMUSCULAR; INTRAVENOUS; SUBCUTANEOUS at 19:58

## 2017-02-21 RX ADMIN — HYDROMORPHONE HYDROCHLORIDE 0.5 MILLIGRAM(S): 2 INJECTION INTRAMUSCULAR; INTRAVENOUS; SUBCUTANEOUS at 05:31

## 2017-02-21 RX ADMIN — HYDROMORPHONE HYDROCHLORIDE 0.5 MILLIGRAM(S): 2 INJECTION INTRAMUSCULAR; INTRAVENOUS; SUBCUTANEOUS at 10:36

## 2017-02-21 RX ADMIN — HYDROMORPHONE HYDROCHLORIDE 0.5 MILLIGRAM(S): 2 INJECTION INTRAMUSCULAR; INTRAVENOUS; SUBCUTANEOUS at 15:00

## 2017-02-21 RX ADMIN — Medication 100 MILLIGRAM(S): at 05:27

## 2017-02-21 RX ADMIN — Medication 40 MILLIGRAM(S): at 05:27

## 2017-02-21 RX ADMIN — HYDROMORPHONE HYDROCHLORIDE 0.5 MILLIGRAM(S): 2 INJECTION INTRAMUSCULAR; INTRAVENOUS; SUBCUTANEOUS at 19:33

## 2017-02-21 RX ADMIN — HYDROMORPHONE HYDROCHLORIDE 0.5 MILLIGRAM(S): 2 INJECTION INTRAMUSCULAR; INTRAVENOUS; SUBCUTANEOUS at 10:51

## 2017-02-21 RX ADMIN — HYDROMORPHONE HYDROCHLORIDE 0.5 MILLIGRAM(S): 2 INJECTION INTRAMUSCULAR; INTRAVENOUS; SUBCUTANEOUS at 01:04

## 2017-02-21 RX ADMIN — HYDROMORPHONE HYDROCHLORIDE 0.5 MILLIGRAM(S): 2 INJECTION INTRAMUSCULAR; INTRAVENOUS; SUBCUTANEOUS at 00:39

## 2017-02-21 RX ADMIN — Medication 100 MILLIGRAM(S): at 14:47

## 2017-02-21 RX ADMIN — HYDROMORPHONE HYDROCHLORIDE 0.5 MILLIGRAM(S): 2 INJECTION INTRAMUSCULAR; INTRAVENOUS; SUBCUTANEOUS at 14:45

## 2017-02-21 RX ADMIN — Medication 100 MILLIGRAM(S): at 21:42

## 2017-02-21 NOTE — DISCHARGE NOTE ADULT - MEDICATION SUMMARY - MEDICATIONS TO TAKE
I will START or STAY ON the medications listed below when I get home from the hospital:    acetaminophen 325 mg oral tablet  -- 2 tab(s) by mouth every 6 hours, As needed, Mild Pain (1 - 3)  -- Indication: For pain     oxyCODONE 10 mg oral tablet  -- 1 tab(s) by mouth every 4 hours, As Needed -for moderate pain MDD:6  -- Caution federal law prohibits the transfer of this drug to any person other  than the person for whom it was prescribed.  Check with your doctor before becoming pregnant.  Do not drink alcoholic beverages when taking this medication.  May cause drowsiness.  Alcohol may intensify this effect.  Use care when operating dangerous machinery.  This drug may impair the ability to drive or operate machinery.  Use care until you become familiar with its effects.  This prescription cannot be refilled.  Using more of this medication than prescribed may cause serious breathing problems.    -- Indication: For pain    lisinopril 40 mg oral tablet  -- 0.5 tab(s) by mouth once a day  -- take half of tab for now while taking pain meds  -- Indication: For Hypertension     rivaroxaban 10 mg oral tablet  -- 1 tab(s) by mouth once a day  -- ***Note to pharmacist: please run this script into patient's insurace asap to check if this is covered; if not covered call [facility] PA at [phone].***  -- Indication: For anticoagulation prophylaxis     simvastatin 40 mg oral tablet  -- 1 tab(s) by mouth once a day in am  -- Indication: For Cholesterol     furosemide 40 mg oral tablet  -- 1 tab(s) by mouth once a day in am  -- Indication: For Lower extremity swelling     docusate sodium 100 mg oral capsule  -- 1 cap(s) by mouth 3 times a day  -- over the counter for constipation from pain meds   -- Indication: For Constipation I will START or STAY ON the medications listed below when I get home from the hospital:    acetaminophen 325 mg oral tablet  -- 2 tab(s) by mouth every 6 hours, As needed, Mild Pain (1 - 3)  -- Indication: For pain     oxyCODONE 10 mg oral tablet  -- 1 tab(s) by mouth every 4 hours, As Needed -for moderate pain MDD:6  -- Caution federal law prohibits the transfer of this drug to any person other  than the person for whom it was prescribed.  Check with your doctor before becoming pregnant.  Do not drink alcoholic beverages when taking this medication.  May cause drowsiness.  Alcohol may intensify this effect.  Use care when operating dangerous machinery.  This drug may impair the ability to drive or operate machinery.  Use care until you become familiar with its effects.  This prescription cannot be refilled.  Using more of this medication than prescribed may cause serious breathing problems.    -- Indication: For pain    lisinopril 40 mg oral tablet  -- 0.5 tab(s) by mouth once a day  -- take half of tab for now while taking pain meds  -- Indication: For Hypertension     rivaroxaban 10 mg oral tablet  -- 1 tab(s) by mouth once a day  -- ***Note to pharmacist: please run this script into patient's insurace asap to check if this is covered; if not covered call [facility] at [phone].***  -- Indication: For anticoagulation prophylaxis     furosemide 40 mg oral tablet  -- 1 tab(s) by mouth once a day in am  -- Indication: For Lower extremity swelling     docusate sodium 100 mg oral capsule  -- 1 cap(s) by mouth 3 times a day  -- over the counter for constipation from pain meds   -- Indication: For Constipation

## 2017-02-21 NOTE — DISCHARGE NOTE ADULT - MEDICATION SUMMARY - MEDICATIONS TO STOP TAKING
I will STOP taking the medications listed below when I get home from the hospital:    simvastatin 40 mg oral tablet  -- 1 tab(s) by mouth once a day in am    Mobic 15 mg oral tablet  -- 1 tab(s) by mouth once a day take with food  -- Do not take this drug if you are pregnant.  Obtain medical advice before taking any non-prescription drugs as some may affect the action of this medication.  Take with food or milk.    Percocet 5/325 325 mg-5 mg oral tablet  -- 1-2 tab(s) by mouth every 6 hours as needed for moderate to severe pain  begin tapering off 2-3 weeks from date of surgery  MDD:EIGHT TABS  -- Caution federal law prohibits the transfer of this drug to any person other  than the person for whom it was prescribed.  May cause drowsiness.  Alcohol may intensify this effect.  Use care when operating dangerous machinery.  This prescription cannot be refilled.  This product contains acetaminophen.  Do not use  with any other product containing acetaminophen to prevent possible liver damage.  Using more of this medication than prescribed may cause serious breathing problems.    traMADol 50 mg oral tablet  -- 1 tab(s) by mouth every 8 hours as needed for mild pain  begin tapering off 2-3 weeks from date of surgery  MDD:THREE TABS

## 2017-02-21 NOTE — DISCHARGE NOTE ADULT - CARE PLAN
Principal Discharge DX:	Edema of right lower extremity Principal Discharge DX:	Edema of right lower extremity  Goal:	Continuation of Care  Instructions for follow-up, activity and diet:	You came in for worsening swelling of your lower extremities. Infectious disease was consulted, and you did not have a infection, or cellulitis, of your leg.  You also had an ultrasound of your legs, which did not show a DVT or a blood clot.  Your swelling is likely do you lymphedema, which may be related to your recent surgeries. Please continue to work with physical therapy at home. Please follow-up with your orthopedic surgeon within 1-2 weeks of discharge.  Secondary Diagnosis:	Status post hip replacement  Instructions for follow-up, activity and diet:	Please follow-up with your orthopedic surgeon within 1-2 weeks of discharge. Principal Discharge DX:	Edema of right lower extremity  Goal:	Continuation of Care  Instructions for follow-up, activity and diet:	You came in for worsening swelling of your lower extremities. Infectious disease was consulted, and you did not have a infection, or cellulitis, of your leg.  You also had an ultrasound of your legs, which did not show a DVT or a blood clot.  Your swelling is likely do you lymphedema, which may be related to your recent surgeries. Please continue to work with physical therapy at home. Please follow-up with your orthopedic surgeon within 1-2 weeks of discharge. 5 days of oxycodone were sent to your pharmacy. Please take oxycodone as needed instead of percocet.  Secondary Diagnosis:	Status post hip replacement  Instructions for follow-up, activity and diet:	Please follow-up with your orthopedic surgeon within 1-2 weeks of discharge. Please continue taking xarelto as prescribed. Principal Discharge DX:	Edema of right lower extremity  Goal:	Continuation of Care  Instructions for follow-up, activity and diet:	You came in for worsening swelling of your lower extremities. Infectious disease was consulted, and you did not have a infection, or cellulitis, of your leg.  You also had an ultrasound of your legs, which did not show a DVT or a blood clot.  Your swelling is likely do you lymphedema, which may be related to your recent surgeries. Please continue to work with physical therapy at home. Please follow-up with your orthopedic surgeon within 1-2 weeks of discharge. 5 days of oxycodone were sent to your pharmacy. Please take oxycodone as needed instead of percocet.  Secondary Diagnosis:	Status post hip replacement  Goal:	medically managed  Instructions for follow-up, activity and diet:	Please follow-up with your orthopedic surgeon within 1-2 weeks of discharge. Please continue taking xarelto as prescribed.

## 2017-02-21 NOTE — DISCHARGE NOTE ADULT - PLAN OF CARE
Continuation of Care You came in for worsening swelling of your lower extremities. Infectious disease was consulted, and you did not have a infection, or cellulitis, of your leg.  You also had an ultrasound of your legs, which did not show a DVT or a blood clot.  Your swelling is likely do you lymphedema, which may be related to your recent surgeries. Please continue to work with physical therapy at home. Please follow-up with your orthopedic surgeon within 1-2 weeks of discharge. Please follow-up with your orthopedic surgeon within 1-2 weeks of discharge. You came in for worsening swelling of your lower extremities. Infectious disease was consulted, and you did not have a infection, or cellulitis, of your leg.  You also had an ultrasound of your legs, which did not show a DVT or a blood clot.  Your swelling is likely do you lymphedema, which may be related to your recent surgeries. Please continue to work with physical therapy at home. Please follow-up with your orthopedic surgeon within 1-2 weeks of discharge. 5 days of oxycodone were sent to your pharmacy. Please take oxycodone as needed instead of percocet. Please follow-up with your orthopedic surgeon within 1-2 weeks of discharge. Please continue taking xarelto as prescribed. medically managed

## 2017-02-21 NOTE — DISCHARGE NOTE ADULT - CARE PROVIDER_API CALL
José Miguel Clements), Orthopaedic Surgery  [address] [place] [address]  Phone: [phone]  Fax: [phone]

## 2017-02-21 NOTE — DISCHARGE NOTE ADULT - CARE PROVIDERS DIRECT ADDRESSES
[email or web address]

## 2017-02-21 NOTE — DISCHARGE NOTE ADULT - HOSPITAL COURSE
69 yo obese female with HTN, HLD, chronic LE edema, osteoarthritis, left and right TKR w/ revision on right, provoked DVT/PE 2/16 in setting of decreased mobility 2/2 TKR, s/p IVC filter and on xarelto, depression, s/p total R hip arthroplasty on 2/13/17 and d/c home w/ home PT, now presenting to ED with RLE pain and swelling. RLE diameter > LLE. No changes in strength and sensation of both LEs. No recent fevers, CP, SOB, N/V, abn pain. Has been able to accomplish ADLs at home with family assistance. Walking around house however needs to drag right leg. No recent sick contacts, fevers. Has been compliant with medications and takes her xarelto daily.     In ED vitals temp 98.2, HR 75, /49, RR 16, 100 RA. Labs significant for WBC 8.09, hgb 11.1, plt 293, Cr .81, lytes WNL, total bili 1.6, Alk phos 142, Ast 43, Alt 31, lactate 2.0. S/p clindamycin, morphine, dilaudid, and zofran.     Hospital Course: 69 yo obese female with HTN, HLD, chronic LE edema, osteoarthritis, left and right TKR w/ revision on right, provoked DVT/PE 2/16 in setting of decreased mobility 2/2 TKR, s/p IVC filter and on xarelto, depression, s/p total R hip arthroplasty on 2/13/17 and d/c home w/ home PT, now presenting to ED with RLE pain and swelling. RLE diameter > LLE. No changes in strength and sensation of both LEs. No recent fevers, CP, SOB, N/V, abn pain. Has been able to accomplish ADLs at home with family assistance. Walking around house however needs to drag right leg. No recent sick contacts, fevers. Has been compliant with medications and takes her xarelto daily.     In ED vitals temp 98.2, HR 75, /49, RR 16, 100 RA. Labs significant for WBC 8.09, hgb 11.1, plt 293, Cr .81, lytes WNL, total bili 1.6, Alk phos 142, Ast 43, Alt 31, lactate 2.0. S/p clindamycin, morphine, dilaudid, and zofran.     Hospital Course:    [facility] will finish on 2/23/17   Istop   Reference #: [id number] 69 yo obese female with HTN, HLD, chronic LE edema, osteoarthritis, left and right TKR w/ revision on right, provoked DVT/PE 2/16 in setting of decreased mobility 2/2 TKR, s/p IVC filter and on xarelto, depression, s/p total R hip arthroplasty on 2/13/17 and d/c home w/ home PT, now presenting to ED with RLE pain and swelling. RLE diameter > LLE. No changes in strength and sensation of both LEs. No recent fevers, CP, SOB, N/V, abn pain. Has been able to accomplish ADLs at home with family assistance. Walking around house however needs to drag right leg. No recent sick contacts, fevers. Has been compliant with medications and takes her xarelto daily.     In ED vitals temp 98.2, HR 75, /49, RR 16, 100 RA. Labs significant for WBC 8.09, hgb 11.1, plt 293, Cr .81, lytes WNL, total bili 1.6, Alk phos 142, Ast 43, Alt 31, lactate 2.0. S/p clindamycin, morphine, dilaudid, and zofran. Admitted to medicine.     Hospital course:   Patient started on vancomycin and therapeutic xarelto for suspected cellulitis vs DVT. ID evaluated patient and discontinued antibiotics given low clinical suspicion for infection. She remained afebrile and blood cultures NGTD during admission. B/L LE duplex showed no evidence of DVT and patient's xarelto dosing switched back to prophylactic level. Her home dose of lasix 40 mg qd was continued. RLE swelling improved w/ PT and patient OOB walking around room. The swelling in legs was determined to be most likely 2/2 chronic lymphedema. No further w/u needed. Her pain was controlled with oxycodone 10 mg q4 prn. Patient hemodynamically stable and ready for d/c with home PT. She will follow-up with her orthopaedic surgeon and was given a vascular surgery referral as well.     Istop  Reference #: [id number]

## 2017-02-21 NOTE — DISCHARGE NOTE ADULT - PATIENT PORTAL LINK FT
“You can access the Follow[facility] Patient Portal, offered by [facility], by registering with the following website: [email or web address]”

## 2017-02-22 ENCOUNTER — RESULT REVIEW (OUTPATIENT)
Age: 72
End: 2017-02-22

## 2017-02-22 LAB — SURGICAL PATHOLOGY STUDY: SIGNIFICANT CHANGE UP

## 2017-02-22 RX ORDER — OXYCODONE HYDROCHLORIDE 5 MG/1
5 TABLET ORAL EVERY 4 HOURS
Qty: 0 | Refills: 0 | Status: DISCONTINUED | OUTPATIENT
Start: 2017-02-22 | End: 2017-02-23

## 2017-02-22 RX ORDER — OXYCODONE HYDROCHLORIDE 5 MG/1
10 TABLET ORAL EVERY 4 HOURS
Qty: 0 | Refills: 0 | Status: DISCONTINUED | OUTPATIENT
Start: 2017-02-22 | End: 2017-02-23

## 2017-02-22 RX ADMIN — Medication 100 MILLIGRAM(S): at 13:21

## 2017-02-22 RX ADMIN — HYDROMORPHONE HYDROCHLORIDE 0.5 MILLIGRAM(S): 2 INJECTION INTRAMUSCULAR; INTRAVENOUS; SUBCUTANEOUS at 13:36

## 2017-02-22 RX ADMIN — HYDROMORPHONE HYDROCHLORIDE 0.5 MILLIGRAM(S): 2 INJECTION INTRAMUSCULAR; INTRAVENOUS; SUBCUTANEOUS at 09:20

## 2017-02-22 RX ADMIN — Medication 40 MILLIGRAM(S): at 05:22

## 2017-02-22 RX ADMIN — OXYCODONE HYDROCHLORIDE 10 MILLIGRAM(S): 5 TABLET ORAL at 22:26

## 2017-02-22 RX ADMIN — SENNA PLUS 2 TABLET(S): 8.6 TABLET ORAL at 22:27

## 2017-02-22 RX ADMIN — Medication 100 MILLIGRAM(S): at 22:27

## 2017-02-22 RX ADMIN — RIVAROXABAN 10 MILLIGRAM(S): KIT at 13:21

## 2017-02-22 RX ADMIN — HYDROMORPHONE HYDROCHLORIDE 0.5 MILLIGRAM(S): 2 INJECTION INTRAMUSCULAR; INTRAVENOUS; SUBCUTANEOUS at 05:35

## 2017-02-22 RX ADMIN — OXYCODONE HYDROCHLORIDE 10 MILLIGRAM(S): 5 TABLET ORAL at 23:20

## 2017-02-22 RX ADMIN — HYDROMORPHONE HYDROCHLORIDE 0.5 MILLIGRAM(S): 2 INJECTION INTRAMUSCULAR; INTRAVENOUS; SUBCUTANEOUS at 05:20

## 2017-02-22 RX ADMIN — OXYCODONE HYDROCHLORIDE 10 MILLIGRAM(S): 5 TABLET ORAL at 18:54

## 2017-02-22 RX ADMIN — HYDROMORPHONE HYDROCHLORIDE 0.5 MILLIGRAM(S): 2 INJECTION INTRAMUSCULAR; INTRAVENOUS; SUBCUTANEOUS at 13:21

## 2017-02-22 RX ADMIN — OXYCODONE HYDROCHLORIDE 10 MILLIGRAM(S): 5 TABLET ORAL at 17:54

## 2017-02-22 RX ADMIN — HYDROMORPHONE HYDROCHLORIDE 0.5 MILLIGRAM(S): 2 INJECTION INTRAMUSCULAR; INTRAVENOUS; SUBCUTANEOUS at 01:20

## 2017-02-22 RX ADMIN — HYDROMORPHONE HYDROCHLORIDE 0.5 MILLIGRAM(S): 2 INJECTION INTRAMUSCULAR; INTRAVENOUS; SUBCUTANEOUS at 00:59

## 2017-02-22 RX ADMIN — HYDROMORPHONE HYDROCHLORIDE 0.5 MILLIGRAM(S): 2 INJECTION INTRAMUSCULAR; INTRAVENOUS; SUBCUTANEOUS at 09:35

## 2017-02-22 RX ADMIN — Medication 100 MILLIGRAM(S): at 05:22

## 2017-02-23 ENCOUNTER — TRANSCRIPTION ENCOUNTER (OUTPATIENT)
Age: 72
End: 2017-02-23

## 2017-02-23 VITALS
OXYGEN SATURATION: 98 % | HEART RATE: 82 BPM | TEMPERATURE: 99 F | SYSTOLIC BLOOD PRESSURE: 137 MMHG | DIASTOLIC BLOOD PRESSURE: 60 MMHG | RESPIRATION RATE: 18 BRPM

## 2017-02-23 LAB
BACTERIA BLD CULT: SIGNIFICANT CHANGE UP
BACTERIA BLD CULT: SIGNIFICANT CHANGE UP

## 2017-02-23 RX ORDER — ACETAMINOPHEN 500 MG
2 TABLET ORAL
Qty: 0 | Refills: 0 | COMMUNITY
Start: 2017-02-23

## 2017-02-23 RX ORDER — OXYCODONE HYDROCHLORIDE 5 MG/1
1 TABLET ORAL
Qty: 30 | Refills: 0 | OUTPATIENT
Start: 2017-02-23 | End: 2017-02-28

## 2017-02-23 RX ORDER — SIMVASTATIN 20 MG/1
1 TABLET, FILM COATED ORAL
Qty: 0 | Refills: 0 | COMMUNITY

## 2017-02-23 RX ADMIN — OXYCODONE HYDROCHLORIDE 10 MILLIGRAM(S): 5 TABLET ORAL at 07:10

## 2017-02-23 RX ADMIN — RIVAROXABAN 10 MILLIGRAM(S): KIT at 12:57

## 2017-02-23 RX ADMIN — OXYCODONE HYDROCHLORIDE 10 MILLIGRAM(S): 5 TABLET ORAL at 06:32

## 2017-02-23 RX ADMIN — Medication 100 MILLIGRAM(S): at 12:57

## 2017-02-23 RX ADMIN — Medication 100 MILLIGRAM(S): at 06:18

## 2017-02-23 RX ADMIN — OXYCODONE HYDROCHLORIDE 10 MILLIGRAM(S): 5 TABLET ORAL at 11:20

## 2017-02-23 RX ADMIN — OXYCODONE HYDROCHLORIDE 10 MILLIGRAM(S): 5 TABLET ORAL at 10:37

## 2017-02-23 RX ADMIN — Medication 40 MILLIGRAM(S): at 06:18

## 2017-02-23 RX ADMIN — OXYCODONE HYDROCHLORIDE 10 MILLIGRAM(S): 5 TABLET ORAL at 03:29

## 2017-02-23 RX ADMIN — OXYCODONE HYDROCHLORIDE 10 MILLIGRAM(S): 5 TABLET ORAL at 02:30

## 2017-02-24 ENCOUNTER — RX RENEWAL (OUTPATIENT)
Age: 72
End: 2017-02-24

## 2017-02-24 DIAGNOSIS — S80.821A BLISTER (NONTHERMAL), RIGHT LOWER LEG, INITIAL ENCOUNTER: ICD-10-CM

## 2017-03-03 ENCOUNTER — APPOINTMENT (OUTPATIENT)
Dept: ORTHOPEDIC SURGERY | Facility: CLINIC | Age: 72
End: 2017-03-03

## 2017-03-03 VITALS
HEIGHT: 63 IN | SYSTOLIC BLOOD PRESSURE: 135 MMHG | BODY MASS INDEX: 38.98 KG/M2 | WEIGHT: 220 LBS | HEART RATE: 67 BPM | DIASTOLIC BLOOD PRESSURE: 76 MMHG

## 2017-03-21 ENCOUNTER — MEDICATION RENEWAL (OUTPATIENT)
Age: 72
End: 2017-03-21

## 2017-03-28 ENCOUNTER — APPOINTMENT (OUTPATIENT)
Dept: VASCULAR SURGERY | Facility: CLINIC | Age: 72
End: 2017-03-28

## 2017-03-28 VITALS
SYSTOLIC BLOOD PRESSURE: 145 MMHG | HEART RATE: 71 BPM | HEIGHT: 63 IN | BODY MASS INDEX: 38.98 KG/M2 | DIASTOLIC BLOOD PRESSURE: 75 MMHG | WEIGHT: 220 LBS | TEMPERATURE: 97.8 F

## 2017-04-11 ENCOUNTER — RX RENEWAL (OUTPATIENT)
Age: 72
End: 2017-04-11

## 2017-04-12 ENCOUNTER — RX RENEWAL (OUTPATIENT)
Age: 72
End: 2017-04-12

## 2017-04-14 ENCOUNTER — APPOINTMENT (OUTPATIENT)
Dept: ORTHOPEDIC SURGERY | Facility: CLINIC | Age: 72
End: 2017-04-14

## 2017-04-14 VITALS
SYSTOLIC BLOOD PRESSURE: 106 MMHG | HEART RATE: 58 BPM | HEIGHT: 63 IN | WEIGHT: 220 LBS | DIASTOLIC BLOOD PRESSURE: 68 MMHG | BODY MASS INDEX: 38.98 KG/M2

## 2017-04-14 DIAGNOSIS — Z96.641 PRESENCE OF RIGHT ARTIFICIAL HIP JOINT: ICD-10-CM

## 2017-04-18 ENCOUNTER — APPOINTMENT (OUTPATIENT)
Dept: INTERNAL MEDICINE | Facility: CLINIC | Age: 72
End: 2017-04-18

## 2017-04-18 VITALS
SYSTOLIC BLOOD PRESSURE: 100 MMHG | OXYGEN SATURATION: 97 % | DIASTOLIC BLOOD PRESSURE: 56 MMHG | TEMPERATURE: 97.9 F | HEART RATE: 62 BPM

## 2017-04-18 DIAGNOSIS — B35.1 TINEA UNGUIUM: ICD-10-CM

## 2017-04-18 DIAGNOSIS — L60.2 ONYCHOGRYPHOSIS: ICD-10-CM

## 2017-04-21 PROBLEM — Z96.641 STATUS POST TOTAL REPLACEMENT OF RIGHT HIP: Status: ACTIVE | Noted: 2017-03-03

## 2017-05-09 ENCOUNTER — APPOINTMENT (OUTPATIENT)
Dept: VASCULAR SURGERY | Facility: CLINIC | Age: 72
End: 2017-05-09

## 2017-05-09 VITALS
SYSTOLIC BLOOD PRESSURE: 153 MMHG | TEMPERATURE: 97.6 F | DIASTOLIC BLOOD PRESSURE: 86 MMHG | BODY MASS INDEX: 38.98 KG/M2 | WEIGHT: 220 LBS | HEART RATE: 62 BPM | HEIGHT: 63 IN

## 2017-05-09 DIAGNOSIS — Z95.828 PRESENCE OF OTHER VASCULAR IMPLANTS AND GRAFTS: ICD-10-CM

## 2017-05-18 ENCOUNTER — OUTPATIENT (OUTPATIENT)
Dept: OUTPATIENT SERVICES | Facility: HOSPITAL | Age: 72
LOS: 1 days | End: 2017-05-18
Payer: COMMERCIAL

## 2017-05-18 ENCOUNTER — APPOINTMENT (OUTPATIENT)
Dept: PAIN MANAGEMENT | Facility: CLINIC | Age: 72
End: 2017-05-18

## 2017-05-18 ENCOUNTER — APPOINTMENT (OUTPATIENT)
Dept: VASCULAR SURGERY | Facility: HOSPITAL | Age: 72
End: 2017-05-18

## 2017-05-18 VITALS
WEIGHT: 190.04 LBS | RESPIRATION RATE: 20 BRPM | HEART RATE: 62 BPM | TEMPERATURE: 98 F | DIASTOLIC BLOOD PRESSURE: 67 MMHG | HEIGHT: 66 IN | OXYGEN SATURATION: 96 % | SYSTOLIC BLOOD PRESSURE: 147 MMHG

## 2017-05-18 DIAGNOSIS — Z95.828 PRESENCE OF OTHER VASCULAR IMPLANTS AND GRAFTS: Chronic | ICD-10-CM

## 2017-05-18 DIAGNOSIS — Z96.651 PRESENCE OF RIGHT ARTIFICIAL KNEE JOINT: Chronic | ICD-10-CM

## 2017-05-18 DIAGNOSIS — Z98.890 OTHER SPECIFIED POSTPROCEDURAL STATES: Chronic | ICD-10-CM

## 2017-05-18 DIAGNOSIS — I82.411 ACUTE EMBOLISM AND THROMBOSIS OF RIGHT FEMORAL VEIN: ICD-10-CM

## 2017-05-18 DIAGNOSIS — Z96.652 PRESENCE OF LEFT ARTIFICIAL KNEE JOINT: Chronic | ICD-10-CM

## 2017-05-18 LAB
ALBUMIN SERPL ELPH-MCNC: 4.7 G/DL — SIGNIFICANT CHANGE UP (ref 3.3–5)
ALP SERPL-CCNC: 92 U/L — SIGNIFICANT CHANGE UP (ref 40–120)
ALT FLD-CCNC: 12 U/L RC — SIGNIFICANT CHANGE UP (ref 10–45)
ANION GAP SERPL CALC-SCNC: 14 MMOL/L — SIGNIFICANT CHANGE UP (ref 5–17)
APTT BLD: 29.8 SEC — SIGNIFICANT CHANGE UP (ref 27.5–37.4)
AST SERPL-CCNC: 15 U/L — SIGNIFICANT CHANGE UP (ref 10–40)
BILIRUB SERPL-MCNC: 0.9 MG/DL — SIGNIFICANT CHANGE UP (ref 0.2–1.2)
BUN SERPL-MCNC: 10 MG/DL — SIGNIFICANT CHANGE UP (ref 7–23)
CALCIUM SERPL-MCNC: 9.3 MG/DL — SIGNIFICANT CHANGE UP (ref 8.4–10.5)
CHLORIDE SERPL-SCNC: 106 MMOL/L — SIGNIFICANT CHANGE UP (ref 96–108)
CO2 SERPL-SCNC: 26 MMOL/L — SIGNIFICANT CHANGE UP (ref 22–31)
CREAT SERPL-MCNC: 0.61 MG/DL — SIGNIFICANT CHANGE UP (ref 0.5–1.3)
GLUCOSE SERPL-MCNC: 107 MG/DL — HIGH (ref 70–99)
HCT VFR BLD CALC: 43.1 % — SIGNIFICANT CHANGE UP (ref 34.5–45)
HGB BLD-MCNC: 14.3 G/DL — SIGNIFICANT CHANGE UP (ref 11.5–15.5)
INR BLD: 0.94 RATIO — SIGNIFICANT CHANGE UP (ref 0.88–1.16)
MCHC RBC-ENTMCNC: 30.2 PG — SIGNIFICANT CHANGE UP (ref 27–34)
MCHC RBC-ENTMCNC: 33.1 GM/DL — SIGNIFICANT CHANGE UP (ref 32–36)
MCV RBC AUTO: 91.2 FL — SIGNIFICANT CHANGE UP (ref 80–100)
PLATELET # BLD AUTO: 235 K/UL — SIGNIFICANT CHANGE UP (ref 150–400)
POTASSIUM SERPL-MCNC: 4.1 MMOL/L — SIGNIFICANT CHANGE UP (ref 3.5–5.3)
POTASSIUM SERPL-SCNC: 4.1 MMOL/L — SIGNIFICANT CHANGE UP (ref 3.5–5.3)
PROT SERPL-MCNC: 7.6 G/DL — SIGNIFICANT CHANGE UP (ref 6–8.3)
PROTHROM AB SERPL-ACNC: 10.2 SEC — SIGNIFICANT CHANGE UP (ref 9.8–12.7)
RBC # BLD: 4.73 M/UL — SIGNIFICANT CHANGE UP (ref 3.8–5.2)
RBC # FLD: 13.6 % — SIGNIFICANT CHANGE UP (ref 10.3–14.5)
SODIUM SERPL-SCNC: 146 MMOL/L — HIGH (ref 135–145)
WBC # BLD: 6.3 K/UL — SIGNIFICANT CHANGE UP (ref 3.8–10.5)
WBC # FLD AUTO: 6.3 K/UL — SIGNIFICANT CHANGE UP (ref 3.8–10.5)

## 2017-05-18 PROCEDURE — 37193 REM ENDOVAS VENA CAVA FILTER: CPT

## 2017-05-18 PROCEDURE — C1773: CPT

## 2017-05-18 PROCEDURE — 75827 VEIN X-RAY CHEST: CPT | Mod: 26,59

## 2017-05-18 PROCEDURE — 80053 COMPREHEN METABOLIC PANEL: CPT

## 2017-05-18 PROCEDURE — C1894: CPT

## 2017-05-18 PROCEDURE — 85610 PROTHROMBIN TIME: CPT

## 2017-05-18 PROCEDURE — 93005 ELECTROCARDIOGRAM TRACING: CPT

## 2017-05-18 PROCEDURE — 93010 ELECTROCARDIOGRAM REPORT: CPT

## 2017-05-18 PROCEDURE — 85730 THROMBOPLASTIN TIME PARTIAL: CPT

## 2017-05-18 PROCEDURE — 85027 COMPLETE CBC AUTOMATED: CPT

## 2017-05-18 NOTE — H&P CARDIOLOGY - PSH
[place] filter in place  placed approximately 5/2016 - [facility]  H/O knee surgery  2016 - repair right  H/O total knee replacement, right  11/2015, 8/2016  History of total left knee replacement  2007

## 2017-05-18 NOTE — H&P CARDIOLOGY - HISTORY OF PRESENT ILLNESS
71 yo obese female with HTN, HLD, chronic LE edema, osteoarthritis, left and right TKR w/ revision on right, provoked DVT/PE 2/16 in setting of decreased mobility 2/2 TKR, s/p IVC filter and on xarelto, depression, s/p total R hip arthroplasty on 2/13/17 and d/c home w/ home PT, now presenting to ED with RLE pain and swelling. RLE diameter > LLE. No changes in strength and sensation of both LEs. No recent fevers, CP, SOB, N/V, abn pain. Has been able to accomplish ADLs at home with family assistance. Walking around house however needs to drag right leg. No recent sick contacts, fevers. Has been compliant with medications and takes her xarelto daily.     I 69 yo obese female with HTN, HLD, chronic LE edema, osteoarthritis, left and right TKR w/ revision on right, provoked DVT/PE 2/16 in setting of decreased mobility 2/2 TKR, s/p IVC filter and on xarelto, depression, s/p total R hip arthroplasty on 2/13/17 and d/c home w/ home PT, now presenting to ED with RLE pain and swelling. RLE diameter > LLE. No changes in strength and sensation of both LEs. No recent fevers, CP, SOB, N/V, abn pain. Has been able to accomplish ADLs at home with family assistance. Walking around house however needs to drag right leg. No recent sick contacts, fevers. Has been compliant with medications and takes her xarelto daily. had IVC filter placed, now today returns for IVC filter removal       I

## 2017-05-31 ENCOUNTER — RX RENEWAL (OUTPATIENT)
Age: 72
End: 2017-05-31

## 2017-07-11 ENCOUNTER — APPOINTMENT (OUTPATIENT)
Dept: INTERNAL MEDICINE | Facility: CLINIC | Age: 72
End: 2017-07-11

## 2017-10-20 ENCOUNTER — RX RENEWAL (OUTPATIENT)
Age: 72
End: 2017-10-20

## 2017-10-30 ENCOUNTER — LABORATORY RESULT (OUTPATIENT)
Age: 72
End: 2017-10-30

## 2017-10-30 ENCOUNTER — APPOINTMENT (OUTPATIENT)
Dept: INTERNAL MEDICINE | Facility: CLINIC | Age: 72
End: 2017-10-30
Payer: MEDICARE

## 2017-10-30 VITALS — DIASTOLIC BLOOD PRESSURE: 100 MMHG | OXYGEN SATURATION: 95 % | SYSTOLIC BLOOD PRESSURE: 150 MMHG | HEART RATE: 121 BPM

## 2017-10-30 DIAGNOSIS — R06.2 WHEEZING: ICD-10-CM

## 2017-10-30 DIAGNOSIS — G47.00 INSOMNIA, UNSPECIFIED: ICD-10-CM

## 2017-10-30 PROCEDURE — 99214 OFFICE O/P EST MOD 30 MIN: CPT | Mod: 25

## 2017-10-30 PROCEDURE — 36415 COLL VENOUS BLD VENIPUNCTURE: CPT

## 2017-10-30 PROCEDURE — 90662 IIV NO PRSV INCREASED AG IM: CPT

## 2017-10-30 PROCEDURE — G0008: CPT

## 2017-10-30 RX ORDER — CIPROFLOXACIN HYDROCHLORIDE 500 MG/1
500 TABLET, FILM COATED ORAL
Qty: 10 | Refills: 0 | Status: DISCONTINUED | COMMUNITY
Start: 2017-01-23 | End: 2017-10-30

## 2017-10-30 RX ORDER — NITROFURANTOIN (MONOHYDRATE/MACROCRYSTALS) 25; 75 MG/1; MG/1
100 CAPSULE ORAL TWICE DAILY
Qty: 10 | Refills: 0 | Status: DISCONTINUED | COMMUNITY
Start: 2017-02-06 | End: 2017-10-30

## 2017-10-30 RX ORDER — SULFAMETHOXAZOLE AND TRIMETHOPRIM 800; 160 MG/1; MG/1
800-160 TABLET ORAL
Qty: 20 | Refills: 0 | Status: DISCONTINUED | COMMUNITY
Start: 2017-02-04 | End: 2017-10-30

## 2017-10-30 RX ORDER — CIPROFLOXACIN HYDROCHLORIDE 250 MG/1
250 TABLET, FILM COATED ORAL
Qty: 10 | Refills: 0 | Status: DISCONTINUED | COMMUNITY
Start: 2017-01-17 | End: 2017-10-30

## 2017-10-30 RX ORDER — MUPIROCIN CALCIUM 20 MG/G
2 OINTMENT TOPICAL
Qty: 32 | Refills: 0 | Status: DISCONTINUED | COMMUNITY
Start: 2017-01-13 | End: 2017-10-30

## 2017-10-30 RX ORDER — RIVAROXABAN 10 MG/1
10 TABLET, FILM COATED ORAL DAILY
Qty: 30 | Refills: 1 | Status: DISCONTINUED | COMMUNITY
Start: 2017-02-16 | End: 2017-10-30

## 2017-10-30 RX ORDER — RIVAROXABAN 20 MG/1
20 TABLET, FILM COATED ORAL DAILY
Qty: 30 | Refills: 1 | Status: DISCONTINUED | COMMUNITY
Start: 2017-02-16 | End: 2017-10-30

## 2017-10-30 RX ORDER — RIVAROXABAN 20 MG/1
20 TABLET, FILM COATED ORAL DAILY
Qty: 30 | Refills: 0 | Status: DISCONTINUED | COMMUNITY
Start: 2017-02-16 | End: 2017-10-30

## 2017-10-31 ENCOUNTER — RX RENEWAL (OUTPATIENT)
Age: 72
End: 2017-10-31

## 2017-10-31 LAB
25(OH)D3 SERPL-MCNC: 12.8 NG/ML
ALBUMIN SERPL ELPH-MCNC: 4.5 G/DL
ALP BLD-CCNC: 82 U/L
ALT SERPL-CCNC: 15 U/L
ANION GAP SERPL CALC-SCNC: 19 MMOL/L
AST SERPL-CCNC: 15 U/L
BILIRUB SERPL-MCNC: 1.4 MG/DL
BUN SERPL-MCNC: 12 MG/DL
CALCIUM SERPL-MCNC: 9.5 MG/DL
CHLORIDE SERPL-SCNC: 104 MMOL/L
CHOLEST SERPL-MCNC: 245 MG/DL
CHOLEST/HDLC SERPL: 3.9 RATIO
CO2 SERPL-SCNC: 23 MMOL/L
CREAT SERPL-MCNC: 0.7 MG/DL
GLUCOSE SERPL-MCNC: 107 MG/DL
HBA1C MFR BLD HPLC: 5.4 %
HDLC SERPL-MCNC: 63 MG/DL
LDLC SERPL CALC-MCNC: 146 MG/DL
POTASSIUM SERPL-SCNC: 4.4 MMOL/L
PROT SERPL-MCNC: 7.4 G/DL
SODIUM SERPL-SCNC: 146 MMOL/L
TRIGL SERPL-MCNC: 178 MG/DL
TSH SERPL-ACNC: 4.3 UIU/ML

## 2017-11-05 ENCOUNTER — FORM ENCOUNTER (OUTPATIENT)
Age: 72
End: 2017-11-05

## 2017-11-06 ENCOUNTER — OUTPATIENT (OUTPATIENT)
Dept: OUTPATIENT SERVICES | Facility: HOSPITAL | Age: 72
LOS: 1 days | End: 2017-11-06
Payer: COMMERCIAL

## 2017-11-06 ENCOUNTER — APPOINTMENT (OUTPATIENT)
Dept: RADIOLOGY | Facility: IMAGING CENTER | Age: 72
End: 2017-11-06
Payer: MEDICARE

## 2017-11-06 DIAGNOSIS — Z00.8 ENCOUNTER FOR OTHER GENERAL EXAMINATION: ICD-10-CM

## 2017-11-06 DIAGNOSIS — R06.2 WHEEZING: ICD-10-CM

## 2017-11-06 DIAGNOSIS — Z95.828 PRESENCE OF OTHER VASCULAR IMPLANTS AND GRAFTS: Chronic | ICD-10-CM

## 2017-11-06 DIAGNOSIS — Z96.652 PRESENCE OF LEFT ARTIFICIAL KNEE JOINT: Chronic | ICD-10-CM

## 2017-11-06 DIAGNOSIS — Z96.651 PRESENCE OF RIGHT ARTIFICIAL KNEE JOINT: Chronic | ICD-10-CM

## 2017-11-06 DIAGNOSIS — Z98.890 OTHER SPECIFIED POSTPROCEDURAL STATES: Chronic | ICD-10-CM

## 2017-11-06 PROCEDURE — 71020: CPT | Mod: 26

## 2017-11-06 PROCEDURE — 71046 X-RAY EXAM CHEST 2 VIEWS: CPT

## 2017-11-07 ENCOUNTER — OTHER (OUTPATIENT)
Age: 72
End: 2017-11-07

## 2017-11-11 ENCOUNTER — FORM ENCOUNTER (OUTPATIENT)
Age: 72
End: 2017-11-11

## 2017-11-12 ENCOUNTER — APPOINTMENT (OUTPATIENT)
Dept: MRI IMAGING | Facility: IMAGING CENTER | Age: 72
End: 2017-11-12
Payer: MEDICARE

## 2017-11-12 ENCOUNTER — OUTPATIENT (OUTPATIENT)
Dept: OUTPATIENT SERVICES | Facility: HOSPITAL | Age: 72
LOS: 1 days | End: 2017-11-12
Payer: COMMERCIAL

## 2017-11-12 DIAGNOSIS — Z96.652 PRESENCE OF LEFT ARTIFICIAL KNEE JOINT: Chronic | ICD-10-CM

## 2017-11-12 DIAGNOSIS — Z98.890 OTHER SPECIFIED POSTPROCEDURAL STATES: Chronic | ICD-10-CM

## 2017-11-12 DIAGNOSIS — Z96.651 PRESENCE OF RIGHT ARTIFICIAL KNEE JOINT: Chronic | ICD-10-CM

## 2017-11-12 DIAGNOSIS — Z95.828 PRESENCE OF OTHER VASCULAR IMPLANTS AND GRAFTS: Chronic | ICD-10-CM

## 2017-11-12 DIAGNOSIS — M54.5 LOW BACK PAIN: ICD-10-CM

## 2017-11-12 PROCEDURE — 72148 MRI LUMBAR SPINE W/O DYE: CPT

## 2017-11-12 PROCEDURE — 72148 MRI LUMBAR SPINE W/O DYE: CPT | Mod: 26

## 2017-12-15 ENCOUNTER — APPOINTMENT (OUTPATIENT)
Dept: INTERNAL MEDICINE | Facility: CLINIC | Age: 72
End: 2017-12-15
Payer: MEDICARE

## 2017-12-15 VITALS
TEMPERATURE: 97.7 F | OXYGEN SATURATION: 97 % | DIASTOLIC BLOOD PRESSURE: 94 MMHG | SYSTOLIC BLOOD PRESSURE: 170 MMHG | HEART RATE: 82 BPM

## 2017-12-15 PROCEDURE — 99213 OFFICE O/P EST LOW 20 MIN: CPT

## 2017-12-15 RX ORDER — MELOXICAM 15 MG/1
15 TABLET ORAL
Qty: 30 | Refills: 0 | Status: DISCONTINUED | COMMUNITY
Start: 2017-02-14 | End: 2017-12-15

## 2017-12-20 ENCOUNTER — RX RENEWAL (OUTPATIENT)
Age: 72
End: 2017-12-20

## 2017-12-22 ENCOUNTER — OTHER (OUTPATIENT)
Age: 72
End: 2017-12-22

## 2018-01-01 NOTE — H&P PST ADULT - NSANTHTOTALSCORECAL_ENT_A_CORE
[Erythema] : erythema [Purulent Effusion] : purulent effusion [Clear Effusion] : clear effusion [Clear Rhinorrhea] : clear rhinorrhea [NL] : warm 2

## 2018-01-03 ENCOUNTER — APPOINTMENT (OUTPATIENT)
Dept: MAMMOGRAPHY | Facility: IMAGING CENTER | Age: 73
End: 2018-01-03

## 2018-01-03 ENCOUNTER — APPOINTMENT (OUTPATIENT)
Dept: ULTRASOUND IMAGING | Facility: IMAGING CENTER | Age: 73
End: 2018-01-03

## 2018-01-11 ENCOUNTER — APPOINTMENT (OUTPATIENT)
Dept: INTERNAL MEDICINE | Facility: CLINIC | Age: 73
End: 2018-01-11

## 2018-01-19 ENCOUNTER — RX RENEWAL (OUTPATIENT)
Age: 73
End: 2018-01-19

## 2018-01-23 ENCOUNTER — APPOINTMENT (OUTPATIENT)
Dept: VASCULAR SURGERY | Facility: CLINIC | Age: 73
End: 2018-01-23
Payer: MEDICARE

## 2018-01-23 VITALS
HEART RATE: 99 BPM | BODY MASS INDEX: 38.98 KG/M2 | WEIGHT: 220 LBS | DIASTOLIC BLOOD PRESSURE: 95 MMHG | SYSTOLIC BLOOD PRESSURE: 150 MMHG | TEMPERATURE: 97.9 F | HEIGHT: 63 IN

## 2018-01-23 PROCEDURE — 29580 STRAPPING UNNA BOOT: CPT | Mod: 50

## 2018-01-23 PROCEDURE — 99214 OFFICE O/P EST MOD 30 MIN: CPT | Mod: 25

## 2018-01-26 ENCOUNTER — RX RENEWAL (OUTPATIENT)
Age: 73
End: 2018-01-26

## 2018-01-26 ENCOUNTER — APPOINTMENT (OUTPATIENT)
Dept: VASCULAR SURGERY | Facility: CLINIC | Age: 73
End: 2018-01-26
Payer: MEDICARE

## 2018-01-26 VITALS
WEIGHT: 220 LBS | HEART RATE: 83 BPM | DIASTOLIC BLOOD PRESSURE: 98 MMHG | BODY MASS INDEX: 38.98 KG/M2 | SYSTOLIC BLOOD PRESSURE: 163 MMHG | TEMPERATURE: 97.5 F | HEIGHT: 63 IN

## 2018-01-26 PROCEDURE — 99214 OFFICE O/P EST MOD 30 MIN: CPT | Mod: 25

## 2018-01-26 PROCEDURE — 29580 STRAPPING UNNA BOOT: CPT | Mod: 50

## 2018-01-30 ENCOUNTER — APPOINTMENT (OUTPATIENT)
Dept: VASCULAR SURGERY | Facility: CLINIC | Age: 73
End: 2018-01-30
Payer: MEDICARE

## 2018-01-30 VITALS
HEIGHT: 65 IN | TEMPERATURE: 98.3 F | HEART RATE: 90 BPM | WEIGHT: 190 LBS | BODY MASS INDEX: 31.65 KG/M2 | SYSTOLIC BLOOD PRESSURE: 154 MMHG | DIASTOLIC BLOOD PRESSURE: 98 MMHG

## 2018-01-30 PROCEDURE — 99214 OFFICE O/P EST MOD 30 MIN: CPT | Mod: 25

## 2018-01-30 PROCEDURE — 29580 STRAPPING UNNA BOOT: CPT | Mod: 50

## 2018-01-31 ENCOUNTER — RX RENEWAL (OUTPATIENT)
Age: 73
End: 2018-01-31

## 2018-02-02 ENCOUNTER — APPOINTMENT (OUTPATIENT)
Dept: VASCULAR SURGERY | Facility: CLINIC | Age: 73
End: 2018-02-02
Payer: MEDICARE

## 2018-02-02 VITALS
TEMPERATURE: 97.9 F | HEART RATE: 83 BPM | SYSTOLIC BLOOD PRESSURE: 152 MMHG | HEIGHT: 65 IN | WEIGHT: 190 LBS | DIASTOLIC BLOOD PRESSURE: 94 MMHG | BODY MASS INDEX: 31.65 KG/M2

## 2018-02-02 PROCEDURE — 29580 STRAPPING UNNA BOOT: CPT | Mod: 50

## 2018-02-02 PROCEDURE — 99214 OFFICE O/P EST MOD 30 MIN: CPT | Mod: 25

## 2018-02-07 ENCOUNTER — APPOINTMENT (OUTPATIENT)
Dept: VASCULAR SURGERY | Facility: CLINIC | Age: 73
End: 2018-02-07
Payer: MEDICARE

## 2018-02-07 VITALS
HEIGHT: 65 IN | SYSTOLIC BLOOD PRESSURE: 166 MMHG | BODY MASS INDEX: 31.65 KG/M2 | TEMPERATURE: 98 F | DIASTOLIC BLOOD PRESSURE: 91 MMHG | WEIGHT: 190 LBS | HEART RATE: 84 BPM

## 2018-02-07 PROCEDURE — 29580 STRAPPING UNNA BOOT: CPT | Mod: 50

## 2018-02-07 PROCEDURE — 99214 OFFICE O/P EST MOD 30 MIN: CPT | Mod: 25

## 2018-02-09 ENCOUNTER — APPOINTMENT (OUTPATIENT)
Dept: VASCULAR SURGERY | Facility: CLINIC | Age: 73
End: 2018-02-09
Payer: MEDICARE

## 2018-02-09 VITALS
HEART RATE: 77 BPM | BODY MASS INDEX: 31.65 KG/M2 | HEIGHT: 65 IN | TEMPERATURE: 97.9 F | WEIGHT: 190 LBS | DIASTOLIC BLOOD PRESSURE: 84 MMHG | SYSTOLIC BLOOD PRESSURE: 151 MMHG

## 2018-02-09 PROCEDURE — 99214 OFFICE O/P EST MOD 30 MIN: CPT | Mod: 25

## 2018-02-09 PROCEDURE — 29580 STRAPPING UNNA BOOT: CPT | Mod: 50

## 2018-02-13 ENCOUNTER — APPOINTMENT (OUTPATIENT)
Dept: VASCULAR SURGERY | Facility: CLINIC | Age: 73
End: 2018-02-13
Payer: MEDICARE

## 2018-02-13 VITALS
WEIGHT: 190 LBS | DIASTOLIC BLOOD PRESSURE: 83 MMHG | BODY MASS INDEX: 31.65 KG/M2 | TEMPERATURE: 97.4 F | HEART RATE: 83 BPM | HEIGHT: 65 IN | SYSTOLIC BLOOD PRESSURE: 138 MMHG

## 2018-02-13 PROCEDURE — 99214 OFFICE O/P EST MOD 30 MIN: CPT | Mod: 25

## 2018-02-13 PROCEDURE — 29580 STRAPPING UNNA BOOT: CPT | Mod: 50

## 2018-02-16 ENCOUNTER — APPOINTMENT (OUTPATIENT)
Dept: VASCULAR SURGERY | Facility: CLINIC | Age: 73
End: 2018-02-16
Payer: MEDICARE

## 2018-02-16 VITALS
WEIGHT: 190 LBS | HEART RATE: 96 BPM | HEIGHT: 65 IN | BODY MASS INDEX: 31.65 KG/M2 | SYSTOLIC BLOOD PRESSURE: 144 MMHG | TEMPERATURE: 97.6 F | DIASTOLIC BLOOD PRESSURE: 82 MMHG

## 2018-02-16 PROCEDURE — 99214 OFFICE O/P EST MOD 30 MIN: CPT | Mod: 25

## 2018-02-16 PROCEDURE — 29580 STRAPPING UNNA BOOT: CPT | Mod: 50

## 2018-02-20 ENCOUNTER — APPOINTMENT (OUTPATIENT)
Dept: VASCULAR SURGERY | Facility: CLINIC | Age: 73
End: 2018-02-20

## 2018-02-23 ENCOUNTER — APPOINTMENT (OUTPATIENT)
Dept: VASCULAR SURGERY | Facility: CLINIC | Age: 73
End: 2018-02-23

## 2018-02-25 ENCOUNTER — INPATIENT (INPATIENT)
Facility: HOSPITAL | Age: 73
LOS: 4 days | Discharge: HOME HEALTH | DRG: 175 | End: 2018-03-02
Attending: HOSPITALIST | Admitting: HOSPITALIST
Payer: COMMERCIAL

## 2018-02-25 VITALS
SYSTOLIC BLOOD PRESSURE: 159 MMHG | TEMPERATURE: 97 F | RESPIRATION RATE: 22 BRPM | OXYGEN SATURATION: 90 % | HEART RATE: 90 BPM | DIASTOLIC BLOOD PRESSURE: 97 MMHG

## 2018-02-25 DIAGNOSIS — F32.9 MAJOR DEPRESSIVE DISORDER, SINGLE EPISODE, UNSPECIFIED: ICD-10-CM

## 2018-02-25 DIAGNOSIS — Z96.641 PRESENCE OF RIGHT ARTIFICIAL HIP JOINT: Chronic | ICD-10-CM

## 2018-02-25 DIAGNOSIS — I26.92 SADDLE EMBOLUS OF PULMONARY ARTERY WITHOUT ACUTE COR PULMONALE: ICD-10-CM

## 2018-02-25 DIAGNOSIS — Z96.652 PRESENCE OF LEFT ARTIFICIAL KNEE JOINT: Chronic | ICD-10-CM

## 2018-02-25 DIAGNOSIS — R60.0 LOCALIZED EDEMA: ICD-10-CM

## 2018-02-25 DIAGNOSIS — I10 ESSENTIAL (PRIMARY) HYPERTENSION: ICD-10-CM

## 2018-02-25 DIAGNOSIS — Z98.890 OTHER SPECIFIED POSTPROCEDURAL STATES: Chronic | ICD-10-CM

## 2018-02-25 DIAGNOSIS — Z96.651 PRESENCE OF RIGHT ARTIFICIAL KNEE JOINT: Chronic | ICD-10-CM

## 2018-02-25 DIAGNOSIS — E78.5 HYPERLIPIDEMIA, UNSPECIFIED: ICD-10-CM

## 2018-02-25 DIAGNOSIS — I48.91 UNSPECIFIED ATRIAL FIBRILLATION: ICD-10-CM

## 2018-02-25 DIAGNOSIS — Z95.828 PRESENCE OF OTHER VASCULAR IMPLANTS AND GRAFTS: Chronic | ICD-10-CM

## 2018-02-25 LAB
ALBUMIN SERPL ELPH-MCNC: 3.9 G/DL — SIGNIFICANT CHANGE UP (ref 3.3–5)
ALP SERPL-CCNC: 114 U/L — SIGNIFICANT CHANGE UP (ref 40–120)
ALT FLD-CCNC: 10 U/L RC — SIGNIFICANT CHANGE UP (ref 10–45)
ANION GAP SERPL CALC-SCNC: 17 MMOL/L — SIGNIFICANT CHANGE UP (ref 5–17)
APTT BLD: 26 SEC — LOW (ref 27.5–37.4)
AST SERPL-CCNC: 19 U/L — SIGNIFICANT CHANGE UP (ref 10–40)
BASOPHILS # BLD AUTO: 0.1 K/UL — SIGNIFICANT CHANGE UP (ref 0–0.2)
BASOPHILS NFR BLD AUTO: 1 % — SIGNIFICANT CHANGE UP (ref 0–2)
BILIRUB SERPL-MCNC: 0.9 MG/DL — SIGNIFICANT CHANGE UP (ref 0.2–1.2)
BLD GP AB SCN SERPL QL: NEGATIVE — SIGNIFICANT CHANGE UP
BUN SERPL-MCNC: 14 MG/DL — SIGNIFICANT CHANGE UP (ref 7–23)
CALCIUM SERPL-MCNC: 9.5 MG/DL — SIGNIFICANT CHANGE UP (ref 8.4–10.5)
CHLORIDE SERPL-SCNC: 103 MMOL/L — SIGNIFICANT CHANGE UP (ref 96–108)
CK MB CFR SERPL CALC: 1.9 NG/ML — SIGNIFICANT CHANGE UP (ref 0–3.8)
CK SERPL-CCNC: 56 U/L — SIGNIFICANT CHANGE UP (ref 25–170)
CO2 SERPL-SCNC: 21 MMOL/L — LOW (ref 22–31)
CREAT SERPL-MCNC: 0.69 MG/DL — SIGNIFICANT CHANGE UP (ref 0.5–1.3)
EOSINOPHIL # BLD AUTO: 0.2 K/UL — SIGNIFICANT CHANGE UP (ref 0–0.5)
EOSINOPHIL NFR BLD AUTO: 3.1 % — SIGNIFICANT CHANGE UP (ref 0–6)
GAS PNL BLDV: SIGNIFICANT CHANGE UP
GLUCOSE SERPL-MCNC: 105 MG/DL — HIGH (ref 70–99)
HCT VFR BLD CALC: 42.7 % — SIGNIFICANT CHANGE UP (ref 34.5–45)
HGB BLD-MCNC: 14.6 G/DL — SIGNIFICANT CHANGE UP (ref 11.5–15.5)
INR BLD: 1.12 RATIO — SIGNIFICANT CHANGE UP (ref 0.88–1.16)
LYMPHOCYTES # BLD AUTO: 1.6 K/UL — SIGNIFICANT CHANGE UP (ref 1–3.3)
LYMPHOCYTES # BLD AUTO: 25.9 % — SIGNIFICANT CHANGE UP (ref 13–44)
MCHC RBC-ENTMCNC: 30.4 PG — SIGNIFICANT CHANGE UP (ref 27–34)
MCHC RBC-ENTMCNC: 34.2 GM/DL — SIGNIFICANT CHANGE UP (ref 32–36)
MCV RBC AUTO: 89.1 FL — SIGNIFICANT CHANGE UP (ref 80–100)
MONOCYTES # BLD AUTO: 0.6 K/UL — SIGNIFICANT CHANGE UP (ref 0–0.9)
MONOCYTES NFR BLD AUTO: 10.1 % — SIGNIFICANT CHANGE UP (ref 2–14)
NEUTROPHILS # BLD AUTO: 3.7 K/UL — SIGNIFICANT CHANGE UP (ref 1.8–7.4)
NEUTROPHILS NFR BLD AUTO: 59.9 % — SIGNIFICANT CHANGE UP (ref 43–77)
PLATELET # BLD AUTO: 210 K/UL — SIGNIFICANT CHANGE UP (ref 150–400)
POTASSIUM SERPL-MCNC: 3.8 MMOL/L — SIGNIFICANT CHANGE UP (ref 3.5–5.3)
POTASSIUM SERPL-SCNC: 3.8 MMOL/L — SIGNIFICANT CHANGE UP (ref 3.5–5.3)
PROT SERPL-MCNC: 8 G/DL — SIGNIFICANT CHANGE UP (ref 6–8.3)
PROTHROM AB SERPL-ACNC: 12.1 SEC — SIGNIFICANT CHANGE UP (ref 9.8–12.7)
RAPID RVP RESULT: SIGNIFICANT CHANGE UP
RBC # BLD: 4.79 M/UL — SIGNIFICANT CHANGE UP (ref 3.8–5.2)
RBC # FLD: 13.4 % — SIGNIFICANT CHANGE UP (ref 10.3–14.5)
RH IG SCN BLD-IMP: POSITIVE — SIGNIFICANT CHANGE UP
SODIUM SERPL-SCNC: 141 MMOL/L — SIGNIFICANT CHANGE UP (ref 135–145)
TROPONIN T SERPL-MCNC: <0.01 NG/ML — SIGNIFICANT CHANGE UP (ref 0–0.06)
WBC # BLD: 6.2 K/UL — SIGNIFICANT CHANGE UP (ref 3.8–10.5)
WBC # FLD AUTO: 6.2 K/UL — SIGNIFICANT CHANGE UP (ref 3.8–10.5)

## 2018-02-25 PROCEDURE — 71275 CT ANGIOGRAPHY CHEST: CPT | Mod: 26

## 2018-02-25 PROCEDURE — 99223 1ST HOSP IP/OBS HIGH 75: CPT | Mod: GC

## 2018-02-25 PROCEDURE — 99285 EMERGENCY DEPT VISIT HI MDM: CPT

## 2018-02-25 PROCEDURE — 71045 X-RAY EXAM CHEST 1 VIEW: CPT | Mod: 26

## 2018-02-25 RX ORDER — HEPARIN SODIUM 5000 [USP'U]/ML
9000 INJECTION INTRAVENOUS; SUBCUTANEOUS EVERY 6 HOURS
Qty: 0 | Refills: 0 | Status: DISCONTINUED | OUTPATIENT
Start: 2018-02-25 | End: 2018-02-26

## 2018-02-25 RX ORDER — ALENDRONATE SODIUM 70 MG/1
1 TABLET ORAL
Qty: 0 | Refills: 0 | COMMUNITY

## 2018-02-25 RX ORDER — SERTRALINE 25 MG/1
50 TABLET, FILM COATED ORAL DAILY
Qty: 0 | Refills: 0 | Status: DISCONTINUED | OUTPATIENT
Start: 2018-02-25 | End: 2018-03-02

## 2018-02-25 RX ORDER — SIMVASTATIN 20 MG/1
40 TABLET, FILM COATED ORAL AT BEDTIME
Qty: 0 | Refills: 0 | Status: DISCONTINUED | OUTPATIENT
Start: 2018-02-25 | End: 2018-03-02

## 2018-02-25 RX ORDER — HEPARIN SODIUM 5000 [USP'U]/ML
9000 INJECTION INTRAVENOUS; SUBCUTANEOUS ONCE
Qty: 0 | Refills: 0 | Status: COMPLETED | OUTPATIENT
Start: 2018-02-25 | End: 2018-02-25

## 2018-02-25 RX ORDER — ASPIRIN/CALCIUM CARB/MAGNESIUM 324 MG
325 TABLET ORAL ONCE
Qty: 0 | Refills: 0 | Status: COMPLETED | OUTPATIENT
Start: 2018-02-25 | End: 2018-02-25

## 2018-02-25 RX ORDER — FUROSEMIDE 40 MG
40 TABLET ORAL
Qty: 0 | Refills: 0 | Status: DISCONTINUED | OUTPATIENT
Start: 2018-02-25 | End: 2018-03-02

## 2018-02-25 RX ORDER — HEPARIN SODIUM 5000 [USP'U]/ML
INJECTION INTRAVENOUS; SUBCUTANEOUS
Qty: 25000 | Refills: 0 | Status: DISCONTINUED | OUTPATIENT
Start: 2018-02-25 | End: 2018-02-26

## 2018-02-25 RX ORDER — RIVAROXABAN 15 MG-20MG
1 KIT ORAL
Qty: 42 | Refills: 0 | COMMUNITY

## 2018-02-25 RX ORDER — SERTRALINE 25 MG/1
1 TABLET, FILM COATED ORAL
Qty: 0 | Refills: 0 | COMMUNITY

## 2018-02-25 RX ORDER — HEPARIN SODIUM 5000 [USP'U]/ML
4000 INJECTION INTRAVENOUS; SUBCUTANEOUS EVERY 6 HOURS
Qty: 0 | Refills: 0 | Status: DISCONTINUED | OUTPATIENT
Start: 2018-02-25 | End: 2018-02-26

## 2018-02-25 RX ADMIN — HEPARIN SODIUM 1900 UNIT(S)/HR: 5000 INJECTION INTRAVENOUS; SUBCUTANEOUS at 20:29

## 2018-02-25 RX ADMIN — SIMVASTATIN 40 MILLIGRAM(S): 20 TABLET, FILM COATED ORAL at 23:24

## 2018-02-25 RX ADMIN — Medication 325 MILLIGRAM(S): at 17:22

## 2018-02-25 RX ADMIN — SERTRALINE 50 MILLIGRAM(S): 25 TABLET, FILM COATED ORAL at 23:24

## 2018-02-25 RX ADMIN — HEPARIN SODIUM 9000 UNIT(S): 5000 INJECTION INTRAVENOUS; SUBCUTANEOUS at 20:27

## 2018-02-25 NOTE — H&P ADULT - PMH
Depression    Edema of lower extremity  both for 40 years  HTN (hypertension)    Hyperlipidemia    Knee pain, right    Obesity    Osteoarthritis    PE (pulmonary thromboembolism)  DVT/PE 2/2016 - on Xarelto - discontinued 6/2017  Pneumonia  3/2016

## 2018-02-25 NOTE — H&P ADULT - PROBLEM SELECTOR PLAN 1
Pulmonary embolism involving the main and segmental branches bilaterally   Though pt hemodynamically stable, concerning for submassive PE given the flattening of septum on CT angio and new onset afib   Appears to have been provoked from sedentary lifestyle, pt states had hematological workup negative for coagulopathy/hereditary predisposition   Started heparin gtt, will obtain official TTE, plan to change to full anticoagulation with xarelto after the echo   Monitor vitals q4h, on tele, supplement O2 PRN Pulmonary embolism involving the main and segmental branches bilaterally   Though pt hemodynamically stable, concerning for submassive PE given the flattening of septum on CT angio, RV/LV ratio and new onset afib   Appears to have been provoked from sedentary lifestyle, pt states had hematological workup negative for coagulopathy/hereditary predisposition   Started heparin gtt, will obtain official TTE, plan to change to full anticoagulation with xarelto after the echo   Monitor vitals q4h, on tele, supplement O2 PRN

## 2018-02-25 NOTE — ED PROVIDER NOTE - OBJECTIVE STATEMENT
71 yo f with pmhx of PE presents with sob. as per pt's daughter at bedside, pt had flu like symptoms that started a week ago, along with fever. started having progressively worsening sob 3 days ago. went to urgent care yesterday, where they did a chest x-ray and found no pneumonia.   PMD: Dr. Nilsa Wellington 71 yo f with pmhx of PE presents with sob. as per pt's daughter at bedside, pt had flu like symptoms that started a week ago, along with subjective fever. started having progressively worsening sob 3 days ago. Occasional non-productive fever.  Reports pain in R midback similar to previous episode of PE.  went to urgent care today, where they did a chest x-ray and found no pneumonia. Subsequently sent patient ot ED.  Prior PE after surgical procedure.  No longer on anticoagulation.      PMD: Dr. Nilsa Wellington

## 2018-02-25 NOTE — ED ADULT NURSE NOTE - PSH
[place] filter in place  placed approximately 5/2016 - [facility]  H/O knee surgery  2016 - repair right  H/O total knee replacement, right  11/2015, 8/2016  History of total left knee replacement  2007

## 2018-02-25 NOTE — H&P ADULT - PROBLEM SELECTOR PLAN 4
Will hold of antihypertensives at this time   Restart lisinopril home dose tomorrow if bp stable next 12 hrs

## 2018-02-25 NOTE — ED PROVIDER NOTE - PROGRESS NOTE DETAILS
Patient was endorsed to me by Dr. Salmeron    at  630    to follow up results of   cta chest      and dispo pending these results and re evaluation. Upon my re evaluation of the patient I found that   called by radiology regarding ct. - pt with b/l PE and some flattening of septum and some saddle embolism. will order heparin, tte Mason Bill M.D., Attending Physician spoke with Dr. Barriga of PERT team - will evaluate patient but thinks based on labs and vitals no need for tpa at this time which I agree with. WIll heparinize and PERT will follow. Mason Bill M.D., Attending Physician

## 2018-02-25 NOTE — ED PROVIDER NOTE - MEDICAL DECISION MAKING DETAILS
72F hx prior PE presents with worsening SOB x 3 days preceded my apparent viral prodrome.  Pt with o2 requirement in ED which is new.  mild tachypnea.  Not tachycardic on exam, no evidence of b-blocker or ca-channel blocker based on med review.  lungs CTA, LE swelling chronic 2/2 lymphedema, no ttp.  must consider PE versus infectious etiology PNA, viral URI.  pt HD stable at this time, respiratory status improved with supplemental O2.  EKG with appears sinus with no noted WALKER.  will obtain labs including cardiac enzymes, cxr, CTA chest, telemonitoring, continue supplemental O2, reassess

## 2018-02-25 NOTE — ED PROVIDER NOTE - PSH
[place] filter in place  placed approximately 5/2016 - [facility]  H/O knee surgery  2016 - repair right  H/O total knee replacement, right  11/2015, 8/2016  History of total left knee replacement  2007

## 2018-02-25 NOTE — H&P ADULT - NSHPREVIEWOFSYSTEMS_GEN_ALL_CORE
General: No fevers, + chills  HEENT: No headaches, dysphagia, changes in vision, hoarseness, nasal congestion  CVS: No chest pain, +palpitations   Resp: + shortness of breath, no wheezing   GI: No abdominal pain, nausea, vomiting, changes in bowel habits  Renal: No dysuria, hematuria   Ext: +chronic bilateral LE edema, no claudication   Skin: No rashes or itching   Neuro: No confusion, weakness  Endcorine: No excessive heat or cold symptoms

## 2018-02-25 NOTE — ED PROVIDER NOTE - NS ED MD DISPO ISOLATION TYPES
Is This A New Presentation, Or A Follow-Up?: Skin Lesions How Severe Is Your Skin Lesion?: mild Has Your Skin Lesion Been Treated?: not been treated None

## 2018-02-25 NOTE — H&P ADULT - PROBLEM SELECTOR PLAN 3
Chronic, although cannot r/o DVT at this time. Would not  to obtain US of LE so will hold off  Management per vascular outpt  Continue with daily lasix 40mg as per home dose Chronic, although cannot r/o DVT at this time.   Will obtain VA duplex LE to assess clot burden - if high clot burden, pt may require IVC filter   Management per vascular outpt  Continue with daily lasix 40mg as per home dose

## 2018-02-25 NOTE — H&P ADULT - NSHPPHYSICALEXAM_GEN_ALL_CORE
GENERAL: NAD at rest  Head: Head atraumatic, normocephalic  ENT: EOMI, PERRL, conjunctiva and sclera clear, neck supple, no erythema or exudates in the oropharynx   CHEST/LUNG: Clear to auscultation bilaterally, no crackles, rhonchi or wheezing   HEART: Irregular rate/rhythm, no murmurs, rubs or gallops   ABDOMEN: Soft, nontender, nondistended, normal bowel sounds   EXTREMITIES:  Unable to localize DP pulses, 4+ pitting edema bilateral LE, compression stockings on, edema up to bilateral knees   PSYCH: AAOx3  NEUROLOGY: No focal deficits   SKIN: No rashes or lesions

## 2018-02-25 NOTE — H&P ADULT - HISTORY OF PRESENT ILLNESS
73 yo F w pmhx of HTN, HLD, DVT/PE (2/2016) provoked post TKR s/p Xarelto &IVC filter x 16 months, obesity, osteoarthritis, chronic b/l LE lymphadema presenting with complaint of shortness of breath on minimal exertion for 3 days. Per patient, over the past few months she has been less active due to worsening lymphadema causing her discomfort. She follows with vascular routinely. About 3-4 weeks ago patient started noticing dyspnea on exertion after walking 15-20 minutes around the house. However, the dyspnea worsened acutely 3 days ago where she felt out of breath event from moving from bed to standing position. She was experiencing chills and palpitations but no fevers, nasal congestion, cough, chest pain. Patient saw vascular 1 wk ago for worsening lymphadema and had her unna boots changed at that time to compression stockings. She had noted erythema on her LE at that time as well, but no significant difference in swelling. Today patient came in as she was looking out of breath to her family members even at rest.     In ED, initial vitals were T 97.4F, HR 90, /97, RR 22, 90% on RA.   Patient was given aspirin 325 mg. CTA was was positive for saddle pulmonary embolus with involvement of main pulmonary artery, segmental and subsegmental branches bilaterally. There was septal flattening on CT concerning for RH strain. Bedside echo in ED did not reveal any d-ing of septum, RV was smaller than LV, per ED attending. PERT ([facility]) was called and no tPA was recommended as patient is hemodynamically stable.

## 2018-02-25 NOTE — H&P ADULT - ASSESSMENT
73 yo F w pmhx of HTN, HLD, DVT/PE (2/2016) provoked post TKR s/p Xarelto &IVC filter x 16 months, chronic b/l LE lymphedema presenting with shortness of breath admitted for submassive PE.

## 2018-02-25 NOTE — H&P ADULT - PROBLEM SELECTOR PROBLEM 1
Acute saddle pulmonary embolism without acute cor pulmonale Acute saddle pulmonary embolism with acute cor pulmonale

## 2018-02-25 NOTE — ED ADULT NURSE NOTE - OBJECTIVE STATEMENT
73 y/o female presenting to the ED via EMS complaining of SOB x 3 days. Per daughter patient had "flu-like symptoms" one week ago. Patient states developed SOB 3 days ago which has been getting worse upon walking. Patient went to urgent care and had chest xray done which was negative for pneumonia per daughter. Patient states saw vascular doctor who wrapped her legs this week. +2 edema noted bilateral lower extremities. Patient states edema is mildly worse than her baseline edema. Mild wheezes noted throughout lung bases. Patient denies chest pain, urinary s.s, fever, dizziness, difficulty breathing, numbness, tingling. SPO2 93% on RA. SPO2 97% on 3L NC. No retractions noted. No JVD noted. A&OX3. Safety and comfort measures provided. CM in place.

## 2018-02-25 NOTE — H&P ADULT - PROBLEM SELECTOR PLAN 2
New onset, likely secondary to submassive PE   Rate <110, blood pressure stable   Monitor on tele   CHADS Vasc 6   Patient on anticoagulation for PE

## 2018-02-25 NOTE — H&P ADULT - NSHPLABSRESULTS_GEN_ALL_CORE
LABS:                        14.6   6.2   )-----------( 210      ( 25 Feb 2018 16:47 )             42.7     02-25    141  |  103  |  14  ----------------------------<  105<H>  3.8   |  21<L>  |  0.69    Ca    9.5      25 Feb 2018 16:47    TPro  8.0  /  Alb  3.9  /  TBili  0.9  /  DBili  x   /  AST  19  /  ALT  10  /  AlkPhos  114  02-25    PT/INR - ( 25 Feb 2018 16:47 )   PT: 12.1 sec;   INR: 1.12 ratio         PTT - ( 25 Feb 2018 16:47 )  PTT:26.0 sec  CARDIAC MARKERS ( 25 Feb 2018 16:47 )  x     / <0.01 ng/mL / 56 U/L / x     / 1.9 ng/mL      EKG personally reviewed:    Atrial fibrillation, prolonged QT, t wave flattening in inferior and precordial leads, no ST segment elevation/depressions   Note, compared to prior EKG from 5/2017, the atrial fibrillation is new (NSR in 5/2017, pt denies hx of afib)       RADIOLOGY & ADDITIONAL TESTS:    Imaging Personally Reviewed:  < from: CT Angio Chest w/ IV Cont (02.25.18 @ 18:47) >    IMPRESSION:   Extensive bilateral pulmonary emboli with saddle embolus and suspicion of   mild right heart strain. Confirm with echocardiography.    < end of copied text >    Care Discussed with Consultants/Other Providers:  ED

## 2018-02-26 DIAGNOSIS — M19.90 UNSPECIFIED OSTEOARTHRITIS, UNSPECIFIED SITE: ICD-10-CM

## 2018-02-26 DIAGNOSIS — I82.409 ACUTE EMBOLISM AND THROMBOSIS OF UNSPECIFIED DEEP VEINS OF UNSPECIFIED LOWER EXTREMITY: ICD-10-CM

## 2018-02-26 DIAGNOSIS — Z29.9 ENCOUNTER FOR PROPHYLACTIC MEASURES, UNSPECIFIED: ICD-10-CM

## 2018-02-26 LAB
ANION GAP SERPL CALC-SCNC: 15 MMOL/L — SIGNIFICANT CHANGE UP (ref 5–17)
APTT BLD: 193 SEC — CRITICAL HIGH (ref 27.5–37.4)
APTT BLD: 53.4 SEC — HIGH (ref 27.5–37.4)
BUN SERPL-MCNC: 12 MG/DL — SIGNIFICANT CHANGE UP (ref 7–23)
CALCIUM SERPL-MCNC: 9 MG/DL — SIGNIFICANT CHANGE UP (ref 8.4–10.5)
CHLORIDE SERPL-SCNC: 108 MMOL/L — SIGNIFICANT CHANGE UP (ref 96–108)
CK MB CFR SERPL CALC: 1.8 NG/ML — SIGNIFICANT CHANGE UP (ref 0–3.8)
CK SERPL-CCNC: 41 U/L — SIGNIFICANT CHANGE UP (ref 25–170)
CO2 SERPL-SCNC: 22 MMOL/L — SIGNIFICANT CHANGE UP (ref 22–31)
CREAT SERPL-MCNC: 0.66 MG/DL — SIGNIFICANT CHANGE UP (ref 0.5–1.3)
GLUCOSE SERPL-MCNC: 108 MG/DL — HIGH (ref 70–99)
HCT VFR BLD CALC: 40.6 % — SIGNIFICANT CHANGE UP (ref 34.5–45)
HCT VFR BLD CALC: 42.1 % — SIGNIFICANT CHANGE UP (ref 34.5–45)
HGB BLD-MCNC: 13.2 G/DL — SIGNIFICANT CHANGE UP (ref 11.5–15.5)
HGB BLD-MCNC: 14.2 G/DL — SIGNIFICANT CHANGE UP (ref 11.5–15.5)
MCHC RBC-ENTMCNC: 28.8 PG — SIGNIFICANT CHANGE UP (ref 27–34)
MCHC RBC-ENTMCNC: 30.2 PG — SIGNIFICANT CHANGE UP (ref 27–34)
MCHC RBC-ENTMCNC: 32.5 GM/DL — SIGNIFICANT CHANGE UP (ref 32–36)
MCHC RBC-ENTMCNC: 33.7 GM/DL — SIGNIFICANT CHANGE UP (ref 32–36)
MCV RBC AUTO: 88.5 FL — SIGNIFICANT CHANGE UP (ref 80–100)
MCV RBC AUTO: 89.7 FL — SIGNIFICANT CHANGE UP (ref 80–100)
PLATELET # BLD AUTO: 188 K/UL — SIGNIFICANT CHANGE UP (ref 150–400)
PLATELET # BLD AUTO: 206 K/UL — SIGNIFICANT CHANGE UP (ref 150–400)
POTASSIUM SERPL-MCNC: 3.6 MMOL/L — SIGNIFICANT CHANGE UP (ref 3.5–5.3)
POTASSIUM SERPL-SCNC: 3.6 MMOL/L — SIGNIFICANT CHANGE UP (ref 3.5–5.3)
RBC # BLD: 4.59 M/UL — SIGNIFICANT CHANGE UP (ref 3.8–5.2)
RBC # BLD: 4.69 M/UL — SIGNIFICANT CHANGE UP (ref 3.8–5.2)
RBC # FLD: 13.2 % — SIGNIFICANT CHANGE UP (ref 10.3–14.5)
RBC # FLD: 14.6 % — HIGH (ref 10.3–14.5)
SODIUM SERPL-SCNC: 145 MMOL/L — SIGNIFICANT CHANGE UP (ref 135–145)
TROPONIN T SERPL-MCNC: <0.01 NG/ML — SIGNIFICANT CHANGE UP (ref 0–0.06)
WBC # BLD: 5.74 K/UL — SIGNIFICANT CHANGE UP (ref 3.8–10.5)
WBC # BLD: 6.1 K/UL — SIGNIFICANT CHANGE UP (ref 3.8–10.5)
WBC # FLD AUTO: 5.74 K/UL — SIGNIFICANT CHANGE UP (ref 3.8–10.5)
WBC # FLD AUTO: 6.1 K/UL — SIGNIFICANT CHANGE UP (ref 3.8–10.5)

## 2018-02-26 PROCEDURE — 93970 EXTREMITY STUDY: CPT | Mod: 26

## 2018-02-26 PROCEDURE — 99233 SBSQ HOSP IP/OBS HIGH 50: CPT | Mod: GC

## 2018-02-26 RX ORDER — ENOXAPARIN SODIUM 100 MG/ML
100 INJECTION SUBCUTANEOUS EVERY 12 HOURS
Qty: 0 | Refills: 0 | Status: DISCONTINUED | OUTPATIENT
Start: 2018-02-26 | End: 2018-02-28

## 2018-02-26 RX ORDER — OXYCODONE HYDROCHLORIDE 5 MG/1
5 TABLET ORAL EVERY 6 HOURS
Qty: 0 | Refills: 0 | Status: DISCONTINUED | OUTPATIENT
Start: 2018-02-26 | End: 2018-03-02

## 2018-02-26 RX ADMIN — HEPARIN SODIUM 1600 UNIT(S)/HR: 5000 INJECTION INTRAVENOUS; SUBCUTANEOUS at 05:03

## 2018-02-26 RX ADMIN — SIMVASTATIN 40 MILLIGRAM(S): 20 TABLET, FILM COATED ORAL at 22:11

## 2018-02-26 RX ADMIN — SERTRALINE 50 MILLIGRAM(S): 25 TABLET, FILM COATED ORAL at 12:16

## 2018-02-26 RX ADMIN — Medication 40 MILLIGRAM(S): at 08:28

## 2018-02-26 RX ADMIN — OXYCODONE HYDROCHLORIDE 5 MILLIGRAM(S): 5 TABLET ORAL at 09:30

## 2018-02-26 RX ADMIN — OXYCODONE HYDROCHLORIDE 5 MILLIGRAM(S): 5 TABLET ORAL at 09:02

## 2018-02-26 RX ADMIN — HEPARIN SODIUM 0 UNIT(S)/HR: 5000 INJECTION INTRAVENOUS; SUBCUTANEOUS at 03:58

## 2018-02-26 RX ADMIN — ENOXAPARIN SODIUM 100 MILLIGRAM(S): 100 INJECTION SUBCUTANEOUS at 22:11

## 2018-02-26 RX ADMIN — ENOXAPARIN SODIUM 100 MILLIGRAM(S): 100 INJECTION SUBCUTANEOUS at 12:16

## 2018-02-26 NOTE — PROGRESS NOTE ADULT - SUBJECTIVE AND OBJECTIVE BOX
Patient is a 72y old  Female who presents with a chief complaint of Shortness of breath with exertion x3 days (25 Feb 2018 20:38)      SUBJECTIVE / OVERNIGHT EVENTS: No acute events overnight. HD stable on heparin gtt. Patient endorses stable SOB. Denies any dizziness, CP, abdominal pain. Endorses chronic lymphedema.     MEDICATIONS  (STANDING):  furosemide    Tablet 40 milliGRAM(s) Oral <User Schedule>  heparin  Infusion.  Unit(s)/Hr (19 mL/Hr) IV Continuous <Continuous>  sertraline 50 milliGRAM(s) Oral daily  simvastatin 40 milliGRAM(s) Oral at bedtime    MEDICATIONS  (PRN):  heparin  Injectable 9000 Unit(s) IV Push every 6 hours PRN For aPTT less than 40  heparin  Injectable 4000 Unit(s) IV Push every 6 hours PRN For aPTT between 40 - 57  oxyCODONE    IR 5 milliGRAM(s) Oral every 6 hours PRN Severe Pain (7 - 10)      Vital Signs Last 24 Hrs  T(C): 36.4 (26 Feb 2018 07:25), Max: 36.6 (25 Feb 2018 17:01)  T(F): 97.6 (26 Feb 2018 07:25), Max: 97.8 (25 Feb 2018 17:01)  HR: 91 (26 Feb 2018 07:25) (88 - 98)  BP: 136/83 (26 Feb 2018 07:25) (121/68 - 159/97)  BP(mean): --  RR: 20 (26 Feb 2018 07:25) (16 - 22)  SpO2: 97% (26 Feb 2018 07:25) (90% - 99%)  CAPILLARY BLOOD GLUCOSE        I&O's Summary      PHYSICAL EXAM:  GENERAL: NAD  HEAD:  Atraumatic, Normocephalic  EYES: EOMI, PERRLA, conjunctiva and sclera clear  NECK: Supple, No JVD  CHEST/LUNG: No respiratory distress, Clear to auscultation bilaterally; No wheeze  HEART: Regular rate and rhythm; No murmurs, rubs, or gallops  ABDOMEN: Soft, Nontender, Nondistended; Bowel sounds present  EXTREMITIES:  Chronic lymphedema with swelling up to b/l knees. Compression stockings noted.  PSYCH: AAOx3  NEUROLOGY: non-focal  SKIN: No rashes or lesions, dry flaky skin beneath compression stockings.    LABS:                        13.2   5.74  )-----------( 206      ( 26 Feb 2018 07:03 )             40.6     02-26    145  |  108  |  12  ----------------------------<  108<H>  3.6   |  22  |  0.66    Ca    9.0      26 Feb 2018 07:01    TPro  8.0  /  Alb  3.9  /  TBili  0.9  /  DBili  x   /  AST  19  /  ALT  10  /  AlkPhos  114  02-25    PT/INR - ( 25 Feb 2018 16:47 )   PT: 12.1 sec;   INR: 1.12 ratio         PTT - ( 26 Feb 2018 02:33 )  PTT:193.0 sec  CARDIAC MARKERS ( 26 Feb 2018 07:01 )  x     / x     / 41 U/L / x     / x      CARDIAC MARKERS ( 26 Feb 2018 05:07 )  x     / <0.01 ng/mL / x     / x     / 1.8 ng/mL  CARDIAC MARKERS ( 25 Feb 2018 16:47 )  x     / <0.01 ng/mL / 56 U/L / x     / 1.9 ng/mL          RADIOLOGY & ADDITIONAL TESTS:    Imaging Personally Reviewed:    Consultant(s) Notes Reviewed:      Care Discussed with Consultants/Other Providers:

## 2018-02-26 NOTE — PROVIDER CONTACT NOTE (OTHER) - ACTION/TREATMENT ORDERED:
as per NP Du continue betablockers, add magnesium and potassium to AM labs. cardiology consult pending for AM. continue to monitor patient

## 2018-02-26 NOTE — PROGRESS NOTE ADULT - ASSESSMENT
71 yo F w pmhx of HTN, HLD, DVT/PE (2/2016) provoked post TKR s/p Xarelto &IVC filter x 16 months (reported negative hypercoaguable workup in the past), chronic b/l LE lymphedema presenting with shortness of breath admitted for submassive saddle PE now on heparin gtt.

## 2018-02-26 NOTE — PROGRESS NOTE ADULT - PROBLEM SELECTOR PLAN 2
LE duplex showed extensive RLE DVT  - Treat as above  - Will discuss need for IVC filter LE duplex showed extensive LLE DVT  - Treat as above  - Will discuss need for IVC filter

## 2018-02-26 NOTE — PROGRESS NOTE ADULT - ASSESSMENT
Acute saddle PE   Heparin gtt bridge to xarelto after echo  oxycodone IR 5mg PO q6 for pain  will obtain official TTE  Monitor vitals q4h, on tele, supplement O2 PRN.   Atrial fibrillation   Monitor on tele   CHADS Vasc 6 Patient on anticoagulation for PE.  Edema of Lower extremity   Will obtain VA duplex LE to assess clot burden - if high clot burden, pt may require IVC filter   Management per vascular outpt  Continue with daily lasix 40mg po daily  Hypertension   Will hold of antihypertensives at this time   Restart lisinopril home dose tomorrow if bp stable next 12 hrs  Hyperlipidemia   Continue with simvastatin 40 mg PO qhs  Nutrition  ·	DASH diet  Dispo  ·	admit to telemetry unit 73 y/o F with hx of HTN, HLD, DVT/PE (2/2016) provoked post TKR s/p Xarelto &IVC filter x 16 months, obesity, osteoarthritis, chronic b/l LE lymphadema who initially presented on 2/25 following worsening exertional dyspnea for the past 3-4 weeks 2/2 b/l PE with saddle embolus.  Acute saddle PE without acute cor pulmonale  Heparin gtt bridge to xarelto after echo  oxycodone IR 5mg PO q6 for pain  will obtain official TTE  Monitor vitals q4h, on tele, supplement O2 PRN.   New onset atrial fibrillation, likely 2/2 submassive PE  Monitor on tele   CHADS Vasc 6 Patient on anticoagulation for PE.  Chronic edema of Lower extremity   VA duplex of LE ruled out DVT   Management per vascular outpt  Continue with daily lasix 40mg po daily  Hypertension   Holding antihypertensives at this time   Restart lisinopril home dose tomorrow if bp stable next 12 hrs  Hyperlipidemia   Continue with simvastatin 40 mg PO qhs  Nutrition  ·	DASH diet  Dispo  ·	PT eval 71 y/o F with hx of HTN, HLD, DVT/PE (2/2016) provoked post TKR s/p Xarelto &IVC filter x 16 months, obesity, osteoarthritis, chronic b/l LE lymphadema who initially presented on 2/25 following worsening exertional dyspnea for the past 3-4 weeks 2/2 b/l PE with saddle embolus.  Acute saddle PE without acute cor pulmonale  Heparin gtt bridge to Lovenox after echo  oxycodone IR 5mg PO q6 for pain  IVF as need if becomes hypotensive  Monitor vitals q4h, on tele, supplement O2 PRN.   DVT of LLE  ·	VA duplex of LE showed extensive thrombus of LLE   ·	will discuss need for removable IVC filter  New onset atrial fibrillation, likely 2/2 submassive PE  ·	currently rate controlled; monitor on tele  Chronic edema of Lower extremity   Management per vascular outpt  Continue with daily lasix 40mg po daily  Hypertension   Holding antihypertensives at this time   Restart lisinopril home dose tomorrow if bp stable next 12 hrs  Hyperlipidemia   Continue with simvastatin 40 mg PO qhs  Nutrition  ·	DASH diet  VTE ppx  ·	Lovenox 100mg subq q12  Dispo  ·	PT eval

## 2018-02-27 PROCEDURE — 93306 TTE W/DOPPLER COMPLETE: CPT | Mod: 26

## 2018-02-27 PROCEDURE — 99233 SBSQ HOSP IP/OBS HIGH 50: CPT | Mod: GC

## 2018-02-27 RX ADMIN — SIMVASTATIN 40 MILLIGRAM(S): 20 TABLET, FILM COATED ORAL at 21:55

## 2018-02-27 RX ADMIN — ENOXAPARIN SODIUM 100 MILLIGRAM(S): 100 INJECTION SUBCUTANEOUS at 23:51

## 2018-02-27 RX ADMIN — OXYCODONE HYDROCHLORIDE 5 MILLIGRAM(S): 5 TABLET ORAL at 22:15

## 2018-02-27 RX ADMIN — SERTRALINE 50 MILLIGRAM(S): 25 TABLET, FILM COATED ORAL at 12:26

## 2018-02-27 RX ADMIN — Medication 40 MILLIGRAM(S): at 12:24

## 2018-02-27 RX ADMIN — ENOXAPARIN SODIUM 100 MILLIGRAM(S): 100 INJECTION SUBCUTANEOUS at 12:26

## 2018-02-27 RX ADMIN — OXYCODONE HYDROCHLORIDE 5 MILLIGRAM(S): 5 TABLET ORAL at 21:55

## 2018-02-27 NOTE — PROGRESS NOTE ADULT - PROBLEM SELECTOR PLAN 2
LE duplex showed extensive LLE DVT  Acute echogenic thrombus affecting the left popliteal and femoral veins; left gastrocnemius veins and left posterior tibial peroneal trunk are also thrombosed.  - Treat as above  - No current indication for  IVC filter  - Would repeat Duplex in 2 weeks for propagation and consider IVC filter if proximal extension of clot on AC

## 2018-02-27 NOTE — PROGRESS NOTE ADULT - ASSESSMENT
73 yo F w pmhx of HTN, HLD, DVT/PE (2/2016) provoked post TKR s/p Xarelto &IVC filter x 16 months (reported negative hypercoaguable workup in the past), chronic b/l LE lymphedema presenting with shortness of breath admitted for submassive saddle PE now on full AC Lovenox

## 2018-02-27 NOTE — PROGRESS NOTE ADULT - SUBJECTIVE AND OBJECTIVE BOX
SUBJECTIVE / OVERNIGHT EVENTS:  Patient is a 72y old  Female who presents with a chief complaint of Shortness of breath with exertion x3 days. Over past 24 hours, heparin d/c and started lovenox BID.  Still notes low level dyspnea at rest. Pt denies syncope, chest pain, pleuritic chest pain, cough, hemoptysis and n/v.    MEDICATIONS  (STANDING):  enoxaparin Injectable 100 milliGRAM(s) SubCutaneous every 12 hours  furosemide    Tablet 40 milliGRAM(s) Oral <User Schedule>  sertraline 50 milliGRAM(s) Oral daily  simvastatin 40 milliGRAM(s) Oral at bedtime    MEDICATIONS  (PRN):  oxyCODONE    IR 5 milliGRAM(s) Oral every 6 hours PRN Severe Pain (7 - 10)      Vital Signs Last 24 Hrs  T(C): 36.6 (27 Feb 2018 11:21), Max: 36.8 (26 Feb 2018 15:56)  T(F): 97.9 (27 Feb 2018 11:21), Max: 98.2 (26 Feb 2018 15:56)  HR: 104 (27 Feb 2018 11:21) (90 - 104); overnight ; morning   BP: 128/75 (27 Feb 2018 11:21) (109/65 - 138/80)  BP(mean): --  RR: 18 (27 Feb 2018 11:21) (18 - 20)  SpO2: 95% (27 Feb 2018 11:21) (93% - 97%)  CAPILLARY BLOOD GLUCOSE        I&O's Summary    26 Feb 2018 07:01  -  27 Feb 2018 07:00  --------------------------------------------------------  IN: 205 mL / OUT: 200 mL / NET: 5 mL          PHYSICAL EXAM  GENERAL: NAD, well-developed  HEAD:  Atraumatic, Normocephalic  EYES: EOMI, PERRLA, conjunctiva and sclera clear  NECK: Supple, No JVD  CHEST/LUNG: Clear to auscultation bilaterally; No wheeze; No labored breathing or accessory muscle use  HEART: Irregular rate; nl S1, S2; No murmurs, rubs, or gallops  ABDOMEN: Soft, Nontender, Nondistended; Bowel sounds present  EXTREMITIES:  4+ pitting edema present b/l up to the knees; 2+ Peripheral Pulses, No clubbing, cyanosis,   PSYCH: AAOx3  SKIN: No rashes or lesions    LABS:                        13.2   5.74  )-----------( 206      ( 26 Feb 2018 07:03 )             40.6     02-26    145  |  108  |  12  ----------------------------<  108<H>  3.6   |  22  |  0.66    Ca    9.0      26 Feb 2018 07:01    TPro  8.0  /  Alb  3.9  /  TBili  0.9  /  DBili  x   /  AST  19  /  ALT  10  /  AlkPhos  114  02-25    PT/INR - ( 25 Feb 2018 16:47 )   PT: 12.1 sec;   INR: 1.12 ratio         PTT - ( 26 Feb 2018 11:56 )  PTT:53.4 sec  CARDIAC MARKERS ( 26 Feb 2018 07:01 )  x     / x     / 41 U/L / x     / x      CARDIAC MARKERS ( 26 Feb 2018 05:07 )  x     / <0.01 ng/mL / x     / x     / 1.8 ng/mL  CARDIAC MARKERS ( 25 Feb 2018 16:47 )  x     / <0.01 ng/mL / 56 U/L / x     / 1.9 ng/mL          RADIOLOGY & ADDITIONAL TESTS:    Imaging Personally Reviewed:  Consultant(s) Notes Reviewed:    Care Discussed with Consultants/Other Providers:

## 2018-02-27 NOTE — PROGRESS NOTE ADULT - PROBLEM SELECTOR PLAN 2
LE duplex showed extensive LLE DVT  - Treat as above  - recommend repeat doppler of LLE in 2 weeks to assess for extension of thrombus while on anticoagulation LE duplex showed extensive LLE DVT  - Treat as above  - recommend repeat doppler of LLE in 2 weeks to assess for extension of thrombus while on anticoagulation and determine need for IVC filter

## 2018-02-27 NOTE — PROGRESS NOTE ADULT - ASSESSMENT
71 yo F w pmhx of HTN, HLD, DVT/PE (2/2016) provoked post TKR s/p Xarelto &IVC filter x 16 months (reported negative hypercoaguable workup in the past), chronic b/l LE lymphedema presenting with shortness of breath admitted for submassive saddle PE now on lovenox.

## 2018-02-28 DIAGNOSIS — I26.02 SADDLE EMBOLUS OF PULMONARY ARTERY WITH ACUTE COR PULMONALE: ICD-10-CM

## 2018-02-28 PROCEDURE — 99233 SBSQ HOSP IP/OBS HIGH 50: CPT | Mod: GC

## 2018-02-28 RX ORDER — APIXABAN 2.5 MG/1
10 TABLET, FILM COATED ORAL EVERY 12 HOURS
Qty: 0 | Refills: 0 | Status: DISCONTINUED | OUTPATIENT
Start: 2018-02-28 | End: 2018-02-28

## 2018-02-28 RX ORDER — APIXABAN 2.5 MG/1
10 TABLET, FILM COATED ORAL EVERY 12 HOURS
Qty: 0 | Refills: 0 | Status: DISCONTINUED | OUTPATIENT
Start: 2018-02-28 | End: 2018-03-02

## 2018-02-28 RX ORDER — METOPROLOL TARTRATE 50 MG
12.5 TABLET ORAL
Qty: 0 | Refills: 0 | Status: DISCONTINUED | OUTPATIENT
Start: 2018-02-28 | End: 2018-03-02

## 2018-02-28 RX ADMIN — SIMVASTATIN 40 MILLIGRAM(S): 20 TABLET, FILM COATED ORAL at 21:45

## 2018-02-28 RX ADMIN — Medication 40 MILLIGRAM(S): at 08:40

## 2018-02-28 RX ADMIN — SERTRALINE 50 MILLIGRAM(S): 25 TABLET, FILM COATED ORAL at 13:30

## 2018-02-28 RX ADMIN — APIXABAN 10 MILLIGRAM(S): 2.5 TABLET, FILM COATED ORAL at 13:43

## 2018-02-28 RX ADMIN — Medication 12.5 MILLIGRAM(S): at 17:23

## 2018-02-28 NOTE — PROGRESS NOTE ADULT - SUBJECTIVE AND OBJECTIVE BOX
Patient is a 72y old  Female who presents with a chief complaint of Shortness of breath with exertion x3 days (25 Feb 2018 20:38)    SUBJECTIVE / OVERNIGHT EVENTS: Over past 24 hours, respiratory status continues to improve while weaning supplemental O2. Pt denies syncope, cough, hemoptysis, pleuritic chest pain and n/v.      MEDICATIONS  (STANDING):  enoxaparin Injectable 100 milliGRAM(s) SubCutaneous every 12 hours  furosemide    Tablet 40 milliGRAM(s) Oral <User Schedule>  metoprolol     tartrate 12.5 milliGRAM(s) Oral two times a day  sertraline 50 milliGRAM(s) Oral daily  simvastatin 40 milliGRAM(s) Oral at bedtime    MEDICATIONS  (PRN):  oxyCODONE    IR 5 milliGRAM(s) Oral every 6 hours PRN Severe Pain (7 - 10)      Vital Signs Last 24 Hrs  T(C): 36.8 (28 Feb 2018 12:07), Max: 37 (27 Feb 2018 20:40)  T(F): 98.2 (28 Feb 2018 12:07), Max: 98.6 (27 Feb 2018 20:40)  HR: 83 (28 Feb 2018 12:07) (83 - 94)  BP: 107/67 (28 Feb 2018 12:07) (107/67 - 111/74)  BP(mean): --  RR: 18 (28 Feb 2018 12:07) (18 - 18)  SpO2: 96% (28 Feb 2018 12:07) (94% - 96%)  CAPILLARY BLOOD GLUCOSE        I&O's Summary    27 Feb 2018 07:01  -  28 Feb 2018 07:00  --------------------------------------------------------  IN: 740 mL / OUT: 0 mL / NET: 740 mL          PHYSICAL EXAM  GENERAL: NAD, well-developed  HEAD:  Atraumatic, Normocephalic  EYES: EOMI, PERRLA, conjunctiva and sclera clear  NECK: Supple, No JVD  CHEST/LUNG: Clear to auscultation bilaterally; No wheeze or crackles; No labored breathing or accessory muscle use  HEART: Regular rate and rhythm; No murmurs, rubs, or gallops  ABDOMEN: Soft, Nontender, Nondistended; Bowel sounds present  EXTREMITIES:  Chronic pitting edema from lymphedema; 2+ Peripheral Pulses, No clubbing, cyanosis  PSYCH: AAOx3  SKIN: No rashes or lesions    LABS:                    RADIOLOGY & ADDITIONAL TESTS:    Imaging Personally Reviewed:  Consultant(s) Notes Reviewed:    Care Discussed with Consultants/Other Providers:

## 2018-02-28 NOTE — PHYSICAL THERAPY INITIAL EVALUATION ADULT - CRITERIA FOR SKILLED THERAPEUTIC INTERVENTIONS
predicted duration of therapy intervention/anticipated discharge recommendation/rehab potential/impairments found/risk reduction/prevention/therapy frequency/functional limitations in following categories

## 2018-02-28 NOTE — PROGRESS NOTE ADULT - SUBJECTIVE AND OBJECTIVE BOX
Patient is a 72y old  Female who presents with a chief complaint of Shortness of breath with exertion x3 days (25 Feb 2018 20:38)      SUBJECTIVE / OVERNIGHT EVENTS:    MEDICATIONS  (STANDING):  enoxaparin Injectable 100 milliGRAM(s) SubCutaneous every 12 hours  furosemide    Tablet 40 milliGRAM(s) Oral <User Schedule>  metoprolol     tartrate 12.5 milliGRAM(s) Oral two times a day  sertraline 50 milliGRAM(s) Oral daily  simvastatin 40 milliGRAM(s) Oral at bedtime    MEDICATIONS  (PRN):  oxyCODONE    IR 5 milliGRAM(s) Oral every 6 hours PRN Severe Pain (7 - 10)      Vital Signs Last 24 Hrs  T(C): 36.8 (28 Feb 2018 12:07), Max: 37 (27 Feb 2018 20:40)  T(F): 98.2 (28 Feb 2018 12:07), Max: 98.6 (27 Feb 2018 20:40)  HR: 83 (28 Feb 2018 12:07) (83 - 94)  BP: 107/67 (28 Feb 2018 12:07) (107/67 - 111/74)  BP(mean): --  RR: 18 (28 Feb 2018 12:07) (18 - 18)  SpO2: 96% (28 Feb 2018 12:07) (94% - 96%)  CAPILLARY BLOOD GLUCOSE        I&O's Summary    27 Feb 2018 07:01  -  28 Feb 2018 07:00  --------------------------------------------------------  IN: 740 mL / OUT: 0 mL / NET: 740 mL      PHYSICAL EXAM:  HEAD:  Atraumatic, Normocephalic  EYES: EOMI, PERRLA, conjunctiva and sclera clear  NECK: Supple, No JVD  CHEST/LUNG: No respiratory distress, Clear to auscultation bilaterally; No wheeze  HEART: Regular rate and rhythm; No murmurs, rubs, or gallops  ABDOMEN: Soft, Nontender, Nondistended; Bowel sounds present  EXTREMITIES:  Chronic lymphedema with swelling up to b/l knees. Compression stockings noted.  PSYCH: AAOx3  NEUROLOGY: non-focal  SKIN: No rashes or lesions, dry flaky skin beneath compression stockings.    LABS:                    RADIOLOGY & ADDITIONAL TESTS:    Imaging Personally Reviewed:    Consultant(s) Notes Reviewed:      Care Discussed with Consultants/Other Providers:

## 2018-02-28 NOTE — PHYSICAL THERAPY INITIAL EVALUATION ADULT - LIVES WITH, PROFILE
pt reports she lives alone in  with 2 stairs to entrance & bedroom/bathroom on first level.  prior to admission pt was independent with functional mobility, uses RW for ambulation./alone

## 2018-02-28 NOTE — PROGRESS NOTE ADULT - ASSESSMENT
71 yo F w pmhx of HTN, HLD, DVT/PE (2/2016) provoked post TKR s/p Xarelto &IVC filter x 16 months (reported negative hypercoaguable workup in the past), chronic b/l LE lymphedema presenting with shortness of breath admitted for submassive saddle PE now on lovenox will transition to apixaban today.

## 2018-02-28 NOTE — PHYSICAL THERAPY INITIAL EVALUATION ADULT - REFERRING PHYSICIAN, REHAB EVAL
Tristin Diaz. Consult- PT evaluate and treat, Activity- increase as tolerated, Activity- OOB to chair

## 2018-02-28 NOTE — PROGRESS NOTE ADULT - PROBLEM SELECTOR PLAN 2
LE duplex showed extensive LLE DVT  Acute echogenic thrombus affecting the left popliteal and femoral veins; left gastrocnemius veins and left posterior tibial peroneal trunk are also thrombosed.  - No current indication for  IVC filter  - Would repeat Duplex in 2 weeks for propagation and consider IVC filter if proximal extension of clot on AC

## 2018-02-28 NOTE — PHYSICAL THERAPY INITIAL EVALUATION ADULT - ACTIVE RANGE OF MOTION EXAMINATION, REHAB EVAL
bilateral upper extremity Active ROM was WFL (within functional limits)/bilateral  lower extremity Active ROM was WFL (within functional limits)/BLE lymphedema

## 2018-02-28 NOTE — PROGRESS NOTE ADULT - ASSESSMENT
71 yo F w pmhx of HTN, HLD, DVT/PE (2/2016) provoked post TKR s/p Xarelto &IVC filter x 16 months (reported negative hypercoaguable workup in the past), chronic b/l LE lymphedema presenting with shortness of breath admitted for submassive saddle PE now on full AC Lovenox

## 2018-03-01 PROCEDURE — 99233 SBSQ HOSP IP/OBS HIGH 50: CPT | Mod: GC

## 2018-03-01 RX ORDER — BACITRACIN ZINC NEOMYCIN SULFATE POLYMYXIN B SULFATE 400; 3.5; 5 [IU]/G; MG/G; [IU]/G
1 OINTMENT TOPICAL DAILY
Qty: 0 | Refills: 0 | Status: DISCONTINUED | OUTPATIENT
Start: 2018-03-01 | End: 2018-03-02

## 2018-03-01 RX ADMIN — OXYCODONE HYDROCHLORIDE 5 MILLIGRAM(S): 5 TABLET ORAL at 00:31

## 2018-03-01 RX ADMIN — Medication 12.5 MILLIGRAM(S): at 06:37

## 2018-03-01 RX ADMIN — SERTRALINE 50 MILLIGRAM(S): 25 TABLET, FILM COATED ORAL at 11:06

## 2018-03-01 RX ADMIN — Medication 40 MILLIGRAM(S): at 10:29

## 2018-03-01 RX ADMIN — APIXABAN 10 MILLIGRAM(S): 2.5 TABLET, FILM COATED ORAL at 06:37

## 2018-03-01 RX ADMIN — APIXABAN 10 MILLIGRAM(S): 2.5 TABLET, FILM COATED ORAL at 17:56

## 2018-03-01 RX ADMIN — Medication 12.5 MILLIGRAM(S): at 17:56

## 2018-03-01 RX ADMIN — OXYCODONE HYDROCHLORIDE 5 MILLIGRAM(S): 5 TABLET ORAL at 23:55

## 2018-03-01 RX ADMIN — SIMVASTATIN 40 MILLIGRAM(S): 20 TABLET, FILM COATED ORAL at 22:12

## 2018-03-01 RX ADMIN — OXYCODONE HYDROCHLORIDE 5 MILLIGRAM(S): 5 TABLET ORAL at 01:00

## 2018-03-01 NOTE — PROGRESS NOTE ADULT - ASSESSMENT
71 yo F w pmhx of HTN, HLD, DVT/PE (2/2016) provoked post TKR s/p Xarelto &IVC filter x 16 months (reported negative hypercoaguable workup in the past), chronic b/l LE lymphedema presenting with shortness of breath admitted for submassive saddle PE now on full AC Lovenox transitioned to Eliquis.

## 2018-03-01 NOTE — PROGRESS NOTE ADULT - SUBJECTIVE AND OBJECTIVE BOX
Patient is a 72y old  Female who presents with a chief complaint of Shortness of breath with exertion x3 days (25 Feb 2018 20:38)      SUBJECTIVE / OVERNIGHT EVENTS: Complaints of small L anterior shin abrasion from compression stocking zipper. Otherwise feels well. Anxious to go home.     MEDICATIONS  (STANDING):  apixaban 10 milliGRAM(s) Oral every 12 hours  furosemide    Tablet 40 milliGRAM(s) Oral <User Schedule>  metoprolol     tartrate 12.5 milliGRAM(s) Oral two times a day  sertraline 50 milliGRAM(s) Oral daily  simvastatin 40 milliGRAM(s) Oral at bedtime    MEDICATIONS  (PRN):  oxyCODONE    IR 5 milliGRAM(s) Oral every 6 hours PRN Severe Pain (7 - 10)      Vital Signs Last 24 Hrs  T(C): 36.3 (01 Mar 2018 12:15), Max: 36.7 (28 Feb 2018 20:38)  T(F): 97.4 (01 Mar 2018 12:15), Max: 98.1 (01 Mar 2018 04:53)  HR: 81 (01 Mar 2018 12:15) (81 - 97)  BP: 109/76 (01 Mar 2018 12:15) (101/68 - 127/58)  BP(mean): --  RR: 18 (01 Mar 2018 12:15) (18 - 18)  SpO2: 95% (01 Mar 2018 12:15) (93% - 95%)  CAPILLARY BLOOD GLUCOSE        I&O's Summary    28 Feb 2018 07:01  -  01 Mar 2018 07:00  --------------------------------------------------------  IN: 1080 mL / OUT: 0 mL / NET: 1080 mL      Physical Exam:  HEAD:  Atraumatic, Normocephalic  EYES: EOMI, PERRLA, conjunctiva and sclera clear  NECK: Supple, No JVD  CHEST/LUNG: No respiratory distress, Clear to auscultation bilaterally; No wheeze  HEART: Regular rate and rhythm; No murmurs, rubs, or gallops  ABDOMEN: Soft, Nontender, Nondistended; Bowel sounds present  EXTREMITIES:  Chronic lymphedema with swelling up to b/l knees. Compression stockings noted.  PSYCH: AAOx3  NEUROLOGY: non-focal  SKIN: No rashes , dry flaky skin beneath compression stockings. Small abrasion on L anterior shin      LABS:                    RADIOLOGY & ADDITIONAL TESTS:    Imaging Personally Reviewed:    Consultant(s) Notes Reviewed:      Care Discussed with Consultants/Other Providers:

## 2018-03-01 NOTE — CHART NOTE - NSCHARTNOTEFT_GEN_A_CORE
O2 titration performed by RN.     Patient saturating 87% on RA at rest  Patient desaturated to 84% on RA ambulation  Patient improved to 93% on 4 LPM nasal cannula.  Will submit forms for home O2.    Tristin Diaz PGY 3  Internal Medicine  [phone]

## 2018-03-01 NOTE — PROGRESS NOTE ADULT - SUBJECTIVE AND OBJECTIVE BOX
Patient is a 72y old  Female who presents with a chief complaint of Shortness of breath with exertion x3 days (25 Feb 2018 20:38)        SUBJECTIVE / OVERNIGHT EVENTS: Pt reports respiratory status is unchanged from yesterday while still on 2L NC O2.  OOB to the chair and worked with PT yesterday evening.  Pt notes small abrasion on left shin from zipper, but otherwise ok.  Denies cough, chest pain, hemoptysis, pleuritic chest pain and calf pain.    MEDICATIONS  (STANDING):  apixaban 10 milliGRAM(s) Oral every 12 hours  furosemide    Tablet 40 milliGRAM(s) Oral <User Schedule>  metoprolol     tartrate 12.5 milliGRAM(s) Oral two times a day  sertraline 50 milliGRAM(s) Oral daily  simvastatin 40 milliGRAM(s) Oral at bedtime    MEDICATIONS  (PRN):  oxyCODONE    IR 5 milliGRAM(s) Oral every 6 hours PRN Severe Pain (7 - 10)      Vital Signs Last 24 Hrs  T(C): 36.3 (01 Mar 2018 12:15), Max: 36.7 (28 Feb 2018 20:38)  T(F): 97.4 (01 Mar 2018 12:15), Max: 98.1 (01 Mar 2018 04:53)  HR: 81 (01 Mar 2018 12:15) (81 - 97)  BP: 109/76 (01 Mar 2018 12:15) (101/68 - 127/58)  BP(mean): --  RR: 18 (01 Mar 2018 12:15) (18 - 18)  SpO2: 95% (01 Mar 2018 12:15) (93% - 95%)  CAPILLARY BLOOD GLUCOSE        I&O's Summary    28 Feb 2018 07:01  -  01 Mar 2018 07:00  --------------------------------------------------------  IN: 1080 mL / OUT: 0 mL / NET: 1080 mL          PHYSICAL EXAM  GENERAL: NAD, well-developed  HEAD:  Atraumatic, Normocephalic  EYES: EOMI, PERRLA, conjunctiva and sclera clear  NECK: Supple, No JVD  CHEST/LUNG: Clear to auscultation bilaterally; No wheeze  HEART: irregular rhythm ; No murmurs, rubs, or gallops  ABDOMEN: Soft, Nontender, Nondistended; Bowel sounds present  EXTREMITIES:  chronic b/l lymphedema; 2+ Peripheral Pulses, No clubbing, cyanosis,   PSYCH: AAOx3  SKIN: No rashes or lesions    LABS:                    RADIOLOGY & ADDITIONAL TESTS:    Imaging Personally Reviewed:  Consultant(s) Notes Reviewed:    Care Discussed with Consultants/Other Providers:

## 2018-03-02 ENCOUNTER — TRANSCRIPTION ENCOUNTER (OUTPATIENT)
Age: 73
End: 2018-03-02

## 2018-03-02 VITALS
DIASTOLIC BLOOD PRESSURE: 76 MMHG | HEART RATE: 88 BPM | TEMPERATURE: 98 F | RESPIRATION RATE: 17 BRPM | OXYGEN SATURATION: 96 % | SYSTOLIC BLOOD PRESSURE: 118 MMHG

## 2018-03-02 PROCEDURE — 97161 PT EVAL LOW COMPLEX 20 MIN: CPT

## 2018-03-02 PROCEDURE — 84295 ASSAY OF SERUM SODIUM: CPT

## 2018-03-02 PROCEDURE — 82553 CREATINE MB FRACTION: CPT

## 2018-03-02 PROCEDURE — 82947 ASSAY GLUCOSE BLOOD QUANT: CPT

## 2018-03-02 PROCEDURE — 99239 HOSP IP/OBS DSCHRG MGMT >30: CPT

## 2018-03-02 PROCEDURE — 82330 ASSAY OF CALCIUM: CPT

## 2018-03-02 PROCEDURE — 82550 ASSAY OF CK (CPK): CPT

## 2018-03-02 PROCEDURE — 85027 COMPLETE CBC AUTOMATED: CPT

## 2018-03-02 PROCEDURE — 85014 HEMATOCRIT: CPT

## 2018-03-02 PROCEDURE — 87633 RESP VIRUS 12-25 TARGETS: CPT

## 2018-03-02 PROCEDURE — 99285 EMERGENCY DEPT VISIT HI MDM: CPT | Mod: 25

## 2018-03-02 PROCEDURE — 86900 BLOOD TYPING SEROLOGIC ABO: CPT

## 2018-03-02 PROCEDURE — 71275 CT ANGIOGRAPHY CHEST: CPT

## 2018-03-02 PROCEDURE — 80048 BASIC METABOLIC PNL TOTAL CA: CPT

## 2018-03-02 PROCEDURE — 84132 ASSAY OF SERUM POTASSIUM: CPT

## 2018-03-02 PROCEDURE — 84484 ASSAY OF TROPONIN QUANT: CPT

## 2018-03-02 PROCEDURE — 87486 CHLMYD PNEUM DNA AMP PROBE: CPT

## 2018-03-02 PROCEDURE — 80053 COMPREHEN METABOLIC PANEL: CPT

## 2018-03-02 PROCEDURE — 87798 DETECT AGENT NOS DNA AMP: CPT

## 2018-03-02 PROCEDURE — 93005 ELECTROCARDIOGRAM TRACING: CPT

## 2018-03-02 PROCEDURE — C8929: CPT

## 2018-03-02 PROCEDURE — 83880 ASSAY OF NATRIURETIC PEPTIDE: CPT

## 2018-03-02 PROCEDURE — 82803 BLOOD GASES ANY COMBINATION: CPT

## 2018-03-02 PROCEDURE — 83605 ASSAY OF LACTIC ACID: CPT

## 2018-03-02 PROCEDURE — 86901 BLOOD TYPING SEROLOGIC RH(D): CPT

## 2018-03-02 PROCEDURE — 87581 M.PNEUMON DNA AMP PROBE: CPT

## 2018-03-02 PROCEDURE — 85730 THROMBOPLASTIN TIME PARTIAL: CPT

## 2018-03-02 PROCEDURE — 82435 ASSAY OF BLOOD CHLORIDE: CPT

## 2018-03-02 PROCEDURE — 86850 RBC ANTIBODY SCREEN: CPT

## 2018-03-02 PROCEDURE — 93970 EXTREMITY STUDY: CPT

## 2018-03-02 PROCEDURE — 71045 X-RAY EXAM CHEST 1 VIEW: CPT

## 2018-03-02 PROCEDURE — 85610 PROTHROMBIN TIME: CPT

## 2018-03-02 RX ORDER — LISINOPRIL 2.5 MG/1
0.5 TABLET ORAL
Qty: 0 | Refills: 0 | COMMUNITY

## 2018-03-02 RX ORDER — METOPROLOL TARTRATE 50 MG
1 TABLET ORAL
Qty: 30 | Refills: 0 | OUTPATIENT
Start: 2018-03-02 | End: 2018-03-31

## 2018-03-02 RX ORDER — APIXABAN 2.5 MG/1
1 TABLET, FILM COATED ORAL
Qty: 50 | Refills: 0 | OUTPATIENT
Start: 2018-03-02 | End: 2018-03-26

## 2018-03-02 RX ORDER — APIXABAN 2.5 MG/1
2 TABLET, FILM COATED ORAL
Qty: 20 | Refills: 0 | OUTPATIENT
Start: 2018-03-02 | End: 2018-03-06

## 2018-03-02 RX ADMIN — BACITRACIN ZINC NEOMYCIN SULFATE POLYMYXIN B SULFATE 1 APPLICATION(S): 400; 3.5; 5 OINTMENT TOPICAL at 14:51

## 2018-03-02 RX ADMIN — Medication 12.5 MILLIGRAM(S): at 06:34

## 2018-03-02 RX ADMIN — SERTRALINE 50 MILLIGRAM(S): 25 TABLET, FILM COATED ORAL at 14:51

## 2018-03-02 RX ADMIN — Medication 40 MILLIGRAM(S): at 10:23

## 2018-03-02 RX ADMIN — OXYCODONE HYDROCHLORIDE 5 MILLIGRAM(S): 5 TABLET ORAL at 00:44

## 2018-03-02 RX ADMIN — APIXABAN 10 MILLIGRAM(S): 2.5 TABLET, FILM COATED ORAL at 06:34

## 2018-03-02 NOTE — DISCHARGE NOTE ADULT - ADDITIONAL INSTRUCTIONS
Please follow up with your PMD and hematologist in 1 week. Please follow up with your PMD and hematologist in 1 week.  Repeat Leg Duplex US to evaluate progression of DVT in 2 weeks.

## 2018-03-02 NOTE — DISCHARGE NOTE ADULT - SECONDARY DIAGNOSIS.
DVT (deep venous thrombosis) Atrial fibrillation HTN (hypertension) Hyperlipidemia Edema of lower extremity

## 2018-03-02 NOTE — PROGRESS NOTE ADULT - SUBJECTIVE AND OBJECTIVE BOX
Patient is a 72y old  Female who presents with a chief complaint of Shortness of breath with exertion x3 days (25 Feb 2018 20:38)      SUBJECTIVE / OVERNIGHT EVENTS: No events overnight. Reports feeling improved and is anxious to go home.     MEDICATIONS  (STANDING):  apixaban 10 milliGRAM(s) Oral every 12 hours  furosemide    Tablet 40 milliGRAM(s) Oral <User Schedule>  metoprolol     tartrate 12.5 milliGRAM(s) Oral two times a day  neomycin/BACItracin/polymyxin Topical Ointment 1 Application(s) Topical daily  sertraline 50 milliGRAM(s) Oral daily  simvastatin 40 milliGRAM(s) Oral at bedtime    MEDICATIONS  (PRN):  oxyCODONE    IR 5 milliGRAM(s) Oral every 6 hours PRN Severe Pain (7 - 10)      Vital Signs Last 24 Hrs  T(C): 36.6 (02 Mar 2018 04:04), Max: 36.6 (02 Mar 2018 04:04)  T(F): 97.9 (02 Mar 2018 04:04), Max: 97.9 (02 Mar 2018 04:04)  HR: 80 (02 Mar 2018 06:32) (79 - 81)  BP: 112/74 (02 Mar 2018 06:32) (106/73 - 112/74)  BP(mean): --  RR: 18 (02 Mar 2018 06:32) (18 - 18)  SpO2: 95% (02 Mar 2018 06:32) (95% - 96%)  CAPILLARY BLOOD GLUCOSE        I&O's Summary    01 Mar 2018 07:01  -  02 Mar 2018 07:00  --------------------------------------------------------  IN: 490 mL / OUT: 0 mL / NET: 490 mL      Physical Exam:  HEAD:  Atraumatic, Normocephalic  EYES: EOMI, PERRLA, conjunctiva and sclera clear  NECK: Supple, No JVD  CHEST/LUNG: No respiratory distress, Clear to auscultation bilaterally; No wheeze  HEART: Regular rate and rhythm; No murmurs, rubs, or gallops  ABDOMEN: Soft, Nontender, Nondistended; Bowel sounds present  EXTREMITIES:  Chronic lymphedema with swelling up to b/l knees. Compression stockings noted.  PSYCH: AAOx3  NEUROLOGY: non-focal  SKIN: No rashes , dry flaky skin beneath compression stockings. Small abrasion on L anterior shin    LABS:                    RADIOLOGY & ADDITIONAL TESTS:    Imaging Personally Reviewed:    Consultant(s) Notes Reviewed:      Care Discussed with Consultants/Other Providers:

## 2018-03-02 NOTE — PROGRESS NOTE ADULT - PROBLEM SELECTOR PLAN 3
New onset Afib likely 2/2 submassive saddle PE. Chadsvasc score of 6. Currently rate controlled  - c/w AC as above  - Monitor on tele  - Will initiate low dose metoprolol for HR control
New onset Afib likely 2/2 submassive saddle PE. Chadsvasc score of 6. Currently rate controlled  - ordered low dose metoprolol PO for rate control  - Monitor on tele
New onset Afib likely 2/2 submassive saddle PE. Chadsvasc score of 6. Currently rate controlled  - Monitor on tele
New onset Afib likely 2/2 submassive saddle PE. Chadsvasc score of 6. Currently rate controlled  - c/w AC as above  - Monitor on tele
New onset Afib likely 2/2 submassive saddle PE. Chadsvasc score of 6. Currently rate controlled  - c/w AC as above  - Monitor on tele
New onset Afib likely 2/2 submassive saddle PE. Chadsvasc score of 6. Currently rate controlled  - c/w AC as above  - Monitor on tele  - Will initiate low dose metoprolol for HR control

## 2018-03-02 NOTE — DISCHARGE NOTE ADULT - HOSPITAL COURSE
HPI  73 yo F w/ HTN, HLD, DVT/PE (2/2016) provoked post TKR s/p Xarelto &IVC filter x 16 months, obesity, osteoarthritis, chronic b/l LE lymphadema presenting with complaint of shortness of breath on minimal exertion for 3 days. Per patient, over the past few months she has been less active due to worsening lymphadema causing her discomfort. She follows with vascular routinely. About 3-4 weeks ago patient started noticing dyspnea on exertion after walking 15-20 minutes around the house. However, the dyspnea worsened acutely 3 days ago where she felt out of breath event from moving from bed to standing position. She was experiencing chills and palpitations but no fevers, nasal congestion, cough, chest pain. Patient saw vascular 1 wk ago for worsening lymphadema and had her unna boots changed at that time to compression stockings. She had noted erythema on her LE at that time as well, but no significant difference in swelling. Today patient came in as she was looking out of breath to her family members even at rest.     In ED, initial vitals were T 97.4F, HR 90, /97, RR 22, 90% on RA.   Patient was given aspirin 325 mg. CTA was was positive for saddle pulmonary embolus with involvement of main pulmonary artery, segmental and subsegmental branches bilaterally. There was septal flattening on CT concerning for RH strain. Bedside echo in ED did not reveal any d-ing of septum, RV was smaller than LV, per ED attending. PERT (MICU) was called and no tPA was recommended as patient is hemodynamically stable.       Hospital Course:  Patient was admitted to medical floors for further management. Patient was transitioned from heparin drip to Lovenox and to Eliquis. Patient remained hemodynamically stable. Repeat TTE did not show any R heart strain. Patient was also started on low dose metoprolol for heart rate control for Afib. Patient was unable to be titrated completely off supplemental O2 despite having less requirements. Patient is stable and ready for discharge with home O2.

## 2018-03-02 NOTE — PROGRESS NOTE ADULT - PROBLEM SELECTOR PLAN 5
- Hold antihypertensives for now

## 2018-03-02 NOTE — DISCHARGE NOTE ADULT - CARE PROVIDER_API CALL
Gil Vasquez), Vascular Surgery  [address]  [address]  [place] [address]  Phone: [phone]  Fax: [phone]

## 2018-03-02 NOTE — PROGRESS NOTE ADULT - PROBLEM SELECTOR PROBLEM 5
HTN (hypertension)

## 2018-03-02 NOTE — DISCHARGE NOTE ADULT - PATIENT PORTAL LINK FT
You can access the [facility] Patient Portal, offered by [facility], by registering with the following website: [email or web address]

## 2018-03-02 NOTE — PROGRESS NOTE ADULT - ATTENDING COMMENTS
D/C home today on Eliquis and home O2. 40 minutes spent discharging the patient.
I was physically present for the key portions of the evaluation and management (E/M) service provided.  I agree with the above history, physical, and plan with following remarks: pt with acute PE and LLE DVT, CT with concern for RH strain, however ED Echo without noted RH strain. Will f/u formal Echo. If patient has RH strain, will call PERT team again for possible TPA given patient still moderately hypoxic. Otherwise, will transition to NOAC. f/u PT recs.
I was physically present for the key portions of the evaluation and management (E/M) service provided.  I agree with the above history, physical, and plan which I have reviewed and edited where appropriate.
I was physically present for the key portions of the evaluation and management (E/M) service provided.  I agree with the above history, physical, and plan which I have reviewed and edited where appropriate.
I was physically present for the key portions of the evaluation and management (E/M) service provided.  I agree with the above history, physical, and plan with following remarks: pt with acute PE, hemodynamically stable with no evidence of RH strain on formal echo, transitioning from LMWH to eliquis for long term AC. Will likely need lifelong treatment. New onset afib likely 2/2 acute PE, currently rate controlled, already on AC. f/u PT recs. Pt to follow up with PMD and cardiology upon discharge.

## 2018-03-02 NOTE — PROGRESS NOTE ADULT - PROBLEM SELECTOR PROBLEM 2
DVT (deep venous thrombosis)

## 2018-03-02 NOTE — PROGRESS NOTE ADULT - PROBLEM SELECTOR PLAN 4
Chronic lymphedema, followed by vascular  - c/w Lasix home dose

## 2018-03-02 NOTE — PROGRESS NOTE ADULT - PROBLEM SELECTOR PROBLEM 3
Atrial fibrillation

## 2018-03-02 NOTE — DISCHARGE NOTE ADULT - HOME CARE AGENCY
[facility] at [place] [phone]- Nursing, Physical therapy eval, and eval for an aide. Service is anticipated to start 24-48 hours after discharge.

## 2018-03-02 NOTE — PROGRESS NOTE ADULT - PROBLEM SELECTOR PROBLEM 6
Hyperlipidemia

## 2018-03-02 NOTE — DISCHARGE NOTE ADULT - PLAN OF CARE
Outpatient management and follow up You were found to have extensive blood clots in both your Left leg and lungs. You were started on Lovenox and transitioned to Eliquis. Please follow up with your PMD and hematologist in 1 week. You will need lifelong anticoagulation as this is your 3rd episode of blood clots. You will also need repeat doppler scans of your Left Leg to make sure the clot is not growing. If the clot is growing, you may need an IVC filter. You will be discharged on home oxygen which you will need until the blood clots in your lungs resolved. If you experience worsening SOB, Chest pain, palpitations, please go to the ED immediately. You were also found to have an abnormal heart rhythym likely due to the stress of the blood clots in your lungs. Treatment is blood thinners to prevent stroke which you will be on already (Eliquis). You were also started on Metoprolol for heart rate control. Please follow up with your PMD in 1 week. You were started on metoprolol for heart rate control. Stop taking your Lisinopril as your blood pressures are already in normal range until you follow up with your PMD in 1 week. Please continue taking your statin Please continue taking your Lasix.

## 2018-03-02 NOTE — PROGRESS NOTE ADULT - PROBLEM SELECTOR PLAN 6
- c/w home simvastatin

## 2018-03-02 NOTE — DISCHARGE NOTE ADULT - MEDICATION SUMMARY - MEDICATIONS TO TAKE
I will START or STAY ON the medications listed below when I get home from the hospital:    apixaban 5 mg oral tablet  -- 2 tab(s) by mouth every 12 hours x 5 days from discharge to 3/7/18  -- Indication: For Pulmonary Embolism    apixaban 5 mg oral tablet  -- 1 tab(s) by mouth every 12 hours starting 3/8  -- Indication: For Pulmonary Embolism    sertraline 50 mg oral tablet  -- 1 tab(s) by mouth once a day  -- Indication: For Depression    simvastatin 40 mg oral tablet  -- 1 tab(s) by mouth once a day (at bedtime)  -- Indication: For Hyperlipidemia    metoprolol succinate 25 mg oral tablet, extended release  -- 1 tab(s) by mouth once a day   -- It is very important that you take or use this exactly as directed.  Do not skip doses or discontinue unless directed by your doctor.  May cause drowsiness.  Alcohol may intensify this effect.  Use care when operating dangerous machinery.  Some non-prescription drugs may aggravate your condition.  Read all labels carefully.  If a warning appears, check with your doctor before taking.  Swallow whole.  Do not crush.  Take with food or milk.  This drug may impair the ability to drive or operate machinery.  Use care until you become familiar with its effects.    -- Indication: For Atrial fibrillation    furosemide 40 mg oral tablet  -- 1 tab(s) by mouth once a day in am  -- Indication: For Edema of lower extremity

## 2018-03-02 NOTE — DISCHARGE NOTE ADULT - MEDICATION SUMMARY - MEDICATIONS TO STOP TAKING
I will STOP taking the medications listed below when I get home from the hospital:    lisinopril 40 mg oral tablet  -- 0.5 tab(s) by mouth once a day  -- take half of tab for now while taking pain meds

## 2018-03-02 NOTE — PROGRESS NOTE ADULT - PROBLEM SELECTOR PROBLEM 1
Acute saddle pulmonary embolism without acute cor pulmonale

## 2018-03-02 NOTE — PROGRESS NOTE ADULT - PROVIDER SPECIALTY LIST ADULT
Internal Medicine

## 2018-03-02 NOTE — PROGRESS NOTE ADULT - PROBLEM SELECTOR PROBLEM 4
Edema of lower extremity

## 2018-03-02 NOTE — PROGRESS NOTE ADULT - PROBLEM SELECTOR PROBLEM 7
Osteoarthritis

## 2018-03-02 NOTE — PROGRESS NOTE ADULT - PROBLEM SELECTOR PLAN 7
- Pain control

## 2018-03-02 NOTE — DISCHARGE NOTE ADULT - OTHER SIGNIFICANT FINDINGS
CTA chest showed extensive bilateral pulmonary emboli with saddle embolus and suspicion of mild right heart strain.    TTE showed:  1. Normal mitral valve. Mild mitral regurgitation.  2. Normal trileaflet aortic valve. Minimal aortic  regurgitation.  3. Endocardial visualization enhanced with intravenous  injection of echo contrast (Definity). Normal left  ventricular systolic function. No segmental wall motion  abnormalities.  4. Normal diastolic function  5. Normal right atrium.  6. Normal right ventricular size and systolic function.    LE duplex showed:  Acute, above and below the knee, DVT of the left lower extremity as   described above.    No evidence of deep vein thrombosis of the right lower extremity.

## 2018-03-06 ENCOUNTER — APPOINTMENT (OUTPATIENT)
Dept: INTERNAL MEDICINE | Facility: CLINIC | Age: 73
End: 2018-03-06
Payer: MEDICARE

## 2018-03-06 VITALS
DIASTOLIC BLOOD PRESSURE: 60 MMHG | TEMPERATURE: 98.1 F | HEIGHT: 65 IN | SYSTOLIC BLOOD PRESSURE: 100 MMHG | OXYGEN SATURATION: 98 % | HEART RATE: 88 BPM

## 2018-03-06 PROCEDURE — 99214 OFFICE O/P EST MOD 30 MIN: CPT

## 2018-03-06 RX ORDER — AMOXICILLIN AND CLAVULANATE POTASSIUM 500; 125 MG/1; MG/1
500-125 TABLET, FILM COATED ORAL TWICE DAILY
Qty: 12 | Refills: 1 | Status: DISCONTINUED | COMMUNITY
Start: 2018-02-02 | End: 2018-03-06

## 2018-03-06 RX ORDER — OXYCODONE AND ACETAMINOPHEN 5; 325 MG/1; MG/1
5-325 TABLET ORAL
Qty: 30 | Refills: 0 | Status: DISCONTINUED | COMMUNITY
Start: 2017-03-03 | End: 2018-03-06

## 2018-03-06 RX ORDER — OXYCODONE 10 MG/1
10 TABLET ORAL EVERY 6 HOURS
Qty: 40 | Refills: 0 | Status: DISCONTINUED | COMMUNITY
Start: 2017-04-12 | End: 2018-03-06

## 2018-03-06 RX ORDER — OXYCODONE 10 MG/1
10 TABLET ORAL
Qty: 30 | Refills: 0 | Status: DISCONTINUED | COMMUNITY
Start: 2017-02-23 | End: 2018-03-06

## 2018-03-06 RX ORDER — ERGOCALCIFEROL 1.25 MG/1
1.25 MG CAPSULE, LIQUID FILLED ORAL
Qty: 12 | Refills: 0 | Status: DISCONTINUED | COMMUNITY
Start: 2017-10-31 | End: 2018-03-06

## 2018-03-06 RX ORDER — OXYCODONE AND ACETAMINOPHEN 5; 325 MG/1; MG/1
5-325 TABLET ORAL
Qty: 60 | Refills: 0 | Status: DISCONTINUED | COMMUNITY
Start: 2017-03-23 | End: 2018-03-06

## 2018-03-14 ENCOUNTER — NON-APPOINTMENT (OUTPATIENT)
Age: 73
End: 2018-03-14

## 2018-03-14 ENCOUNTER — APPOINTMENT (OUTPATIENT)
Dept: CARDIOLOGY | Facility: CLINIC | Age: 73
End: 2018-03-14
Payer: MEDICARE

## 2018-03-14 VITALS — RESPIRATION RATE: 16 BRPM

## 2018-03-14 VITALS
WEIGHT: 187 LBS | BODY MASS INDEX: 31.16 KG/M2 | HEART RATE: 82 BPM | SYSTOLIC BLOOD PRESSURE: 139 MMHG | HEIGHT: 65 IN | RESPIRATION RATE: 16 BRPM | OXYGEN SATURATION: 98 % | DIASTOLIC BLOOD PRESSURE: 79 MMHG

## 2018-03-14 PROCEDURE — 93000 ELECTROCARDIOGRAM COMPLETE: CPT

## 2018-03-14 PROCEDURE — 99205 OFFICE O/P NEW HI 60 MIN: CPT

## 2018-03-16 ENCOUNTER — APPOINTMENT (OUTPATIENT)
Dept: VASCULAR SURGERY | Facility: CLINIC | Age: 73
End: 2018-03-16
Payer: MEDICARE

## 2018-03-16 VITALS
DIASTOLIC BLOOD PRESSURE: 89 MMHG | BODY MASS INDEX: 31.16 KG/M2 | HEIGHT: 65 IN | HEART RATE: 91 BPM | SYSTOLIC BLOOD PRESSURE: 151 MMHG | WEIGHT: 187 LBS | TEMPERATURE: 97.7 F

## 2018-03-16 DIAGNOSIS — I87.092 POSTTHROMBOTIC SYNDROME WITH OTHER COMPLICATIONS OF LEFT LOWER EXTREMITY: ICD-10-CM

## 2018-03-16 PROCEDURE — 93970 EXTREMITY STUDY: CPT

## 2018-03-16 PROCEDURE — 99214 OFFICE O/P EST MOD 30 MIN: CPT

## 2018-03-24 ENCOUNTER — APPOINTMENT (OUTPATIENT)
Dept: INTERNAL MEDICINE | Facility: CLINIC | Age: 73
End: 2018-03-24
Payer: MEDICARE

## 2018-03-24 VITALS
OXYGEN SATURATION: 98 % | DIASTOLIC BLOOD PRESSURE: 90 MMHG | SYSTOLIC BLOOD PRESSURE: 160 MMHG | TEMPERATURE: 97.8 F | HEART RATE: 86 BPM

## 2018-03-24 PROCEDURE — 99213 OFFICE O/P EST LOW 20 MIN: CPT

## 2018-03-28 ENCOUNTER — APPOINTMENT (OUTPATIENT)
Dept: VASCULAR SURGERY | Facility: CLINIC | Age: 73
End: 2018-03-28
Payer: MEDICARE

## 2018-03-28 VITALS
DIASTOLIC BLOOD PRESSURE: 96 MMHG | WEIGHT: 187 LBS | HEIGHT: 65 IN | SYSTOLIC BLOOD PRESSURE: 153 MMHG | TEMPERATURE: 97.7 F | HEART RATE: 88 BPM | BODY MASS INDEX: 31.16 KG/M2

## 2018-03-28 DIAGNOSIS — I82.512 CHRONIC EMBOLISM AND THROMBOSIS OF LEFT FEMORAL VEIN: ICD-10-CM

## 2018-03-28 PROCEDURE — 29580 STRAPPING UNNA BOOT: CPT | Mod: 50

## 2018-03-28 PROCEDURE — 99214 OFFICE O/P EST MOD 30 MIN: CPT

## 2018-03-28 PROCEDURE — 93971 EXTREMITY STUDY: CPT | Mod: LT

## 2018-03-28 PROCEDURE — 93979 VASCULAR STUDY: CPT

## 2018-03-28 PROCEDURE — 99214 OFFICE O/P EST MOD 30 MIN: CPT | Mod: 25

## 2018-03-30 ENCOUNTER — OUTPATIENT (OUTPATIENT)
Dept: OUTPATIENT SERVICES | Facility: HOSPITAL | Age: 73
LOS: 1 days | Discharge: HOME | End: 2018-03-30

## 2018-03-30 ENCOUNTER — APPOINTMENT (OUTPATIENT)
Dept: VASCULAR SURGERY | Facility: CLINIC | Age: 73
End: 2018-03-30

## 2018-03-30 DIAGNOSIS — Z96.652 PRESENCE OF LEFT ARTIFICIAL KNEE JOINT: Chronic | ICD-10-CM

## 2018-03-30 DIAGNOSIS — Z96.651 PRESENCE OF RIGHT ARTIFICIAL KNEE JOINT: Chronic | ICD-10-CM

## 2018-03-30 DIAGNOSIS — Z95.828 PRESENCE OF OTHER VASCULAR IMPLANTS AND GRAFTS: Chronic | ICD-10-CM

## 2018-03-30 DIAGNOSIS — I26.99 OTHER PULMONARY EMBOLISM WITHOUT ACUTE COR PULMONALE: ICD-10-CM

## 2018-03-30 DIAGNOSIS — Z96.641 PRESENCE OF RIGHT ARTIFICIAL HIP JOINT: Chronic | ICD-10-CM

## 2018-03-30 DIAGNOSIS — I82.90 ACUTE EMBOLISM AND THROMBOSIS OF UNSPECIFIED VEIN: ICD-10-CM

## 2018-03-30 DIAGNOSIS — Z98.890 OTHER SPECIFIED POSTPROCEDURAL STATES: Chronic | ICD-10-CM

## 2018-04-03 ENCOUNTER — APPOINTMENT (OUTPATIENT)
Dept: VASCULAR SURGERY | Facility: CLINIC | Age: 73
End: 2018-04-03

## 2018-04-03 ENCOUNTER — RESULT REVIEW (OUTPATIENT)
Age: 73
End: 2018-04-03

## 2018-04-03 ENCOUNTER — APPOINTMENT (OUTPATIENT)
Dept: HEMATOLOGY ONCOLOGY | Facility: CLINIC | Age: 73
End: 2018-04-03
Payer: MEDICARE

## 2018-04-03 VITALS
WEIGHT: 250.87 LBS | SYSTOLIC BLOOD PRESSURE: 160 MMHG | TEMPERATURE: 97.7 F | HEIGHT: 66.14 IN | OXYGEN SATURATION: 98 % | DIASTOLIC BLOOD PRESSURE: 102 MMHG | HEART RATE: 90 BPM | RESPIRATION RATE: 16 BRPM | BODY MASS INDEX: 40.32 KG/M2

## 2018-04-03 LAB
BASOPHILS # BLD AUTO: 0 K/UL — SIGNIFICANT CHANGE UP (ref 0–0.2)
BASOPHILS NFR BLD AUTO: 0.9 % — SIGNIFICANT CHANGE UP (ref 0–2)
EOSINOPHIL # BLD AUTO: 0.2 K/UL — SIGNIFICANT CHANGE UP (ref 0–0.5)
EOSINOPHIL NFR BLD AUTO: 3.5 % — SIGNIFICANT CHANGE UP (ref 0–6)
HCT VFR BLD CALC: 39.8 % — SIGNIFICANT CHANGE UP (ref 34.5–45)
HGB BLD-MCNC: 13.4 G/DL — SIGNIFICANT CHANGE UP (ref 11.5–15.5)
LYMPHOCYTES # BLD AUTO: 1.4 K/UL — SIGNIFICANT CHANGE UP (ref 1–3.3)
LYMPHOCYTES # BLD AUTO: 25.6 % — SIGNIFICANT CHANGE UP (ref 13–44)
MCHC RBC-ENTMCNC: 30 PG — SIGNIFICANT CHANGE UP (ref 27–34)
MCHC RBC-ENTMCNC: 33.8 G/DL — SIGNIFICANT CHANGE UP (ref 32–36)
MCV RBC AUTO: 88.8 FL — SIGNIFICANT CHANGE UP (ref 80–100)
MONOCYTES # BLD AUTO: 0.4 K/UL — SIGNIFICANT CHANGE UP (ref 0–0.9)
MONOCYTES NFR BLD AUTO: 8.3 % — SIGNIFICANT CHANGE UP (ref 2–14)
NEUTROPHILS # BLD AUTO: 3.3 K/UL — SIGNIFICANT CHANGE UP (ref 1.8–7.4)
NEUTROPHILS NFR BLD AUTO: 61.8 % — SIGNIFICANT CHANGE UP (ref 43–77)
PLATELET # BLD AUTO: 231 K/UL — SIGNIFICANT CHANGE UP (ref 150–400)
RBC # BLD: 4.48 M/UL — SIGNIFICANT CHANGE UP (ref 3.8–5.2)
RBC # FLD: 14.5 % — SIGNIFICANT CHANGE UP (ref 10.3–14.5)
WBC # BLD: 5.4 K/UL — SIGNIFICANT CHANGE UP (ref 3.8–10.5)
WBC # FLD AUTO: 5.4 K/UL — SIGNIFICANT CHANGE UP (ref 3.8–10.5)

## 2018-04-03 PROCEDURE — 99205 OFFICE O/P NEW HI 60 MIN: CPT

## 2018-04-11 ENCOUNTER — RX RENEWAL (OUTPATIENT)
Age: 73
End: 2018-04-11

## 2018-04-12 ENCOUNTER — APPOINTMENT (OUTPATIENT)
Dept: ORTHOPEDIC SURGERY | Facility: CLINIC | Age: 73
End: 2018-04-12

## 2018-04-13 ENCOUNTER — APPOINTMENT (OUTPATIENT)
Dept: ORTHOPEDIC SURGERY | Facility: CLINIC | Age: 73
End: 2018-04-13
Payer: MEDICARE

## 2018-04-13 VITALS
SYSTOLIC BLOOD PRESSURE: 154 MMHG | BODY MASS INDEX: 40.18 KG/M2 | DIASTOLIC BLOOD PRESSURE: 73 MMHG | HEIGHT: 66 IN | HEART RATE: 116 BPM | WEIGHT: 250 LBS

## 2018-04-13 DIAGNOSIS — M16.12 UNILATERAL PRIMARY OSTEOARTHRITIS, LEFT HIP: ICD-10-CM

## 2018-04-13 DIAGNOSIS — Z71.9 COUNSELING, UNSPECIFIED: ICD-10-CM

## 2018-04-13 DIAGNOSIS — M48.061 SPINAL STENOSIS, LUMBAR REGION WITHOUT NEUROGENIC CLAUDICATION: ICD-10-CM

## 2018-04-13 DIAGNOSIS — Z09 ENCOUNTER FOR FOLLOW-UP EXAMINATION AFTER COMPLETED TREATMENT FOR CONDITIONS OTHER THAN MALIGNANT NEOPLASM: ICD-10-CM

## 2018-04-13 PROCEDURE — 99214 OFFICE O/P EST MOD 30 MIN: CPT

## 2018-04-13 PROCEDURE — 73502 X-RAY EXAM HIP UNI 2-3 VIEWS: CPT | Mod: LT

## 2018-04-13 PROCEDURE — 73562 X-RAY EXAM OF KNEE 3: CPT | Mod: 50

## 2018-04-17 ENCOUNTER — RESULT REVIEW (OUTPATIENT)
Age: 73
End: 2018-04-17

## 2018-04-17 ENCOUNTER — APPOINTMENT (OUTPATIENT)
Dept: HEMATOLOGY ONCOLOGY | Facility: CLINIC | Age: 73
End: 2018-04-17

## 2018-04-23 ENCOUNTER — APPOINTMENT (OUTPATIENT)
Dept: ORTHOPEDIC SURGERY | Facility: CLINIC | Age: 73
End: 2018-04-23
Payer: MEDICARE

## 2018-04-23 VITALS
HEIGHT: 66 IN | HEART RATE: 88 BPM | SYSTOLIC BLOOD PRESSURE: 143 MMHG | BODY MASS INDEX: 28.93 KG/M2 | WEIGHT: 180 LBS | DIASTOLIC BLOOD PRESSURE: 79 MMHG

## 2018-04-23 PROCEDURE — 99214 OFFICE O/P EST MOD 30 MIN: CPT

## 2018-04-25 ENCOUNTER — RX RENEWAL (OUTPATIENT)
Age: 73
End: 2018-04-25

## 2018-04-30 ENCOUNTER — OTHER (OUTPATIENT)
Age: 73
End: 2018-04-30

## 2018-05-01 ENCOUNTER — RX RENEWAL (OUTPATIENT)
Age: 73
End: 2018-05-01

## 2018-05-01 ENCOUNTER — MEDICATION RENEWAL (OUTPATIENT)
Age: 73
End: 2018-05-01

## 2018-05-07 LAB
ALBUMIN MFR SERPL ELPH: 58.1 %
ALBUMIN SERPL ELPH-MCNC: 4.5 G/DL
ALBUMIN SERPL-MCNC: 4.5 G/DL
ALBUMIN/GLOB SERPL: 1.4 RATIO
ALBUPE: 27.2 %
ALP BLD-CCNC: 106 U/L
ALPHA1 GLOB MFR SERPL ELPH: 4 %
ALPHA1 GLOB SERPL ELPH-MCNC: 0.3 G/DL
ALPHA1UPE: 7.5 %
ALPHA2 GLOB MFR SERPL ELPH: 10.6 %
ALPHA2 GLOB SERPL ELPH-MCNC: 0.8 G/DL
ALPHA2UPE: 13.6 %
ALT SERPL-CCNC: 7 U/L
ANION GAP SERPL CALC-SCNC: 14 MMOL/L
AST SERPL-CCNC: 14 U/L
B-GLOBULIN MFR SERPL ELPH: 13.1 %
B-GLOBULIN SERPL ELPH-MCNC: 1 G/DL
BETAUPE: 15 %
BILIRUB SERPL-MCNC: 1.2 MG/DL
BUN SERPL-MCNC: 12 MG/DL
CALCIUM SERPL-MCNC: 9.7 MG/DL
CARDIOLIPIN AB SER IA-ACNC: NEGATIVE
CARDIOLIPIN IGM SER-MCNC: 11 MPL
CHLORIDE SERPL-SCNC: 106 MMOL/L
CO2 SERPL-SCNC: 24 MMOL/L
CONFIRM: 42.6 SEC
CREAT 24H UR-MCNC: NORMAL G/24 H
CREAT SERPL-MCNC: 0.68 MG/DL
CREATININE UR (MAYO): 88 MG/DL
DEPRECATED KAPPA LC FREE/LAMBDA SER: 0.74 RATIO
DEPRECATED KAPPA LC FREE/LAMBDA SER: 0.74 RATIO
DRVVT 1H NP PPP: 39.4 SEC
DRVVT IMM 1:2 NP PPP: NORMAL
DRVVT SCREEN TO CONFIRM RATIO: 0.87 RATIO
FACT VIII ACT/NOR PPP: 240 %
GAMMA GLOB FLD ELPH-MCNC: 1.1 G/DL
GAMMA GLOB MFR SERPL ELPH: 14.2 %
GAMMAUPE: 36.7 %
GLUCOSE SERPL-MCNC: 99 MG/DL
IGA 24H UR QL IFE: NORMAL
IGA SER QL IEP: 170 MG/DL
IGG SER QL IEP: 919 MG/DL
IGM SER QL IEP: 201 MG/DL
INTERPRETATION SERPL IEP-IMP: NORMAL
KAPPA LC 24H UR QL: NORMAL
KAPPA LC CSF-MCNC: 1.72 MG/DL
KAPPA LC CSF-MCNC: 1.72 MG/DL
KAPPA LC SERPL-MCNC: 1.27 MG/DL
KAPPA LC SERPL-MCNC: 1.27 MG/DL
M PROTEIN MFR SERPL ELPH: 7.2 %
M PROTEIN SPEC IFE-MCNC: NORMAL
MONOCLON BAND OBS SERPL: 0.6 G/DL
POTASSIUM SERPL-SCNC: 4.4 MMOL/L
PROT C PPP CHRO-ACNC: 122 %
PROT PATTERN 24H UR ELPH-IMP: NORMAL
PROT S AG ACT/NOR PPP IA: 226 %
PROT SERPL-MCNC: 7.8 G/DL
PROT UR-MCNC: 13 MG/DL
PROT UR-MCNC: 13 MG/DL
SCREEN DRVVT: 42.9 SEC
SODIUM SERPL-SCNC: 144 MMOL/L
SPECIMEN VOL 24H UR: NORMAL ML

## 2018-05-08 ENCOUNTER — APPOINTMENT (OUTPATIENT)
Dept: VASCULAR SURGERY | Facility: CLINIC | Age: 73
End: 2018-05-08
Payer: MEDICARE

## 2018-05-08 VITALS
HEIGHT: 66 IN | DIASTOLIC BLOOD PRESSURE: 87 MMHG | TEMPERATURE: 97.9 F | SYSTOLIC BLOOD PRESSURE: 144 MMHG | HEART RATE: 99 BPM | BODY MASS INDEX: 28.93 KG/M2 | WEIGHT: 180 LBS

## 2018-05-08 PROCEDURE — 99214 OFFICE O/P EST MOD 30 MIN: CPT | Mod: 25

## 2018-05-08 PROCEDURE — 29580 STRAPPING UNNA BOOT: CPT | Mod: 50

## 2018-05-10 ENCOUNTER — RX RENEWAL (OUTPATIENT)
Age: 73
End: 2018-05-10

## 2018-05-14 ENCOUNTER — OUTPATIENT (OUTPATIENT)
Dept: OUTPATIENT SERVICES | Facility: HOSPITAL | Age: 73
LOS: 1 days | Discharge: HOME | End: 2018-05-14

## 2018-05-14 DIAGNOSIS — Z96.651 PRESENCE OF RIGHT ARTIFICIAL KNEE JOINT: Chronic | ICD-10-CM

## 2018-05-14 DIAGNOSIS — I26.99 OTHER PULMONARY EMBOLISM WITHOUT ACUTE COR PULMONALE: ICD-10-CM

## 2018-05-14 DIAGNOSIS — Z98.890 OTHER SPECIFIED POSTPROCEDURAL STATES: Chronic | ICD-10-CM

## 2018-05-14 DIAGNOSIS — Z95.828 PRESENCE OF OTHER VASCULAR IMPLANTS AND GRAFTS: Chronic | ICD-10-CM

## 2018-05-14 DIAGNOSIS — Z96.641 PRESENCE OF RIGHT ARTIFICIAL HIP JOINT: Chronic | ICD-10-CM

## 2018-05-14 DIAGNOSIS — Z96.652 PRESENCE OF LEFT ARTIFICIAL KNEE JOINT: Chronic | ICD-10-CM

## 2018-05-15 ENCOUNTER — APPOINTMENT (OUTPATIENT)
Dept: VASCULAR SURGERY | Facility: CLINIC | Age: 73
End: 2018-05-15

## 2018-05-15 ENCOUNTER — OUTPATIENT (OUTPATIENT)
Dept: OUTPATIENT SERVICES | Facility: HOSPITAL | Age: 73
LOS: 1 days | End: 2018-05-15
Payer: COMMERCIAL

## 2018-05-15 ENCOUNTER — OUTPATIENT (OUTPATIENT)
Dept: OUTPATIENT SERVICES | Facility: HOSPITAL | Age: 73
LOS: 1 days | End: 2018-05-15
Payer: MEDICARE

## 2018-05-15 DIAGNOSIS — Z95.828 PRESENCE OF OTHER VASCULAR IMPLANTS AND GRAFTS: Chronic | ICD-10-CM

## 2018-05-15 DIAGNOSIS — Z96.641 PRESENCE OF RIGHT ARTIFICIAL HIP JOINT: Chronic | ICD-10-CM

## 2018-05-15 DIAGNOSIS — Z96.652 PRESENCE OF LEFT ARTIFICIAL KNEE JOINT: Chronic | ICD-10-CM

## 2018-05-15 DIAGNOSIS — Z96.651 PRESENCE OF RIGHT ARTIFICIAL KNEE JOINT: Chronic | ICD-10-CM

## 2018-05-15 DIAGNOSIS — Z98.890 OTHER SPECIFIED POSTPROCEDURAL STATES: Chronic | ICD-10-CM

## 2018-05-15 DIAGNOSIS — I26.99 OTHER PULMONARY EMBOLISM WITHOUT ACUTE COR PULMONALE: ICD-10-CM

## 2018-05-16 ENCOUNTER — RESULT REVIEW (OUTPATIENT)
Age: 73
End: 2018-05-16

## 2018-05-16 ENCOUNTER — APPOINTMENT (OUTPATIENT)
Dept: HEMATOLOGY ONCOLOGY | Facility: CLINIC | Age: 73
End: 2018-05-16
Payer: MEDICARE

## 2018-05-16 ENCOUNTER — APPOINTMENT (OUTPATIENT)
Dept: HEMATOLOGY ONCOLOGY | Facility: CLINIC | Age: 73
End: 2018-05-16

## 2018-05-16 VITALS
DIASTOLIC BLOOD PRESSURE: 83 MMHG | WEIGHT: 189.99 LBS | RESPIRATION RATE: 16 BRPM | HEART RATE: 87 BPM | OXYGEN SATURATION: 94 % | TEMPERATURE: 97.9 F | SYSTOLIC BLOOD PRESSURE: 154 MMHG | BODY MASS INDEX: 30.67 KG/M2

## 2018-05-16 LAB
BASOPHILS # BLD AUTO: 0 K/UL — SIGNIFICANT CHANGE UP (ref 0–0.2)
BASOPHILS NFR BLD AUTO: 0.4 % — SIGNIFICANT CHANGE UP (ref 0–2)
EOSINOPHIL # BLD AUTO: 0.1 K/UL — SIGNIFICANT CHANGE UP (ref 0–0.5)
EOSINOPHIL NFR BLD AUTO: 1.8 % — SIGNIFICANT CHANGE UP (ref 0–6)
HCT VFR BLD CALC: 37.7 % — SIGNIFICANT CHANGE UP (ref 34.5–45)
HGB BLD-MCNC: 13.1 G/DL — SIGNIFICANT CHANGE UP (ref 11.5–15.5)
LYMPHOCYTES # BLD AUTO: 1.2 K/UL — SIGNIFICANT CHANGE UP (ref 1–3.3)
LYMPHOCYTES # BLD AUTO: 26.5 % — SIGNIFICANT CHANGE UP (ref 13–44)
MCHC RBC-ENTMCNC: 30.3 PG — SIGNIFICANT CHANGE UP (ref 27–34)
MCHC RBC-ENTMCNC: 34.7 G/DL — SIGNIFICANT CHANGE UP (ref 32–36)
MCV RBC AUTO: 87.3 FL — SIGNIFICANT CHANGE UP (ref 80–100)
MONOCYTES # BLD AUTO: 0.4 K/UL — SIGNIFICANT CHANGE UP (ref 0–0.9)
MONOCYTES NFR BLD AUTO: 9 % — SIGNIFICANT CHANGE UP (ref 2–14)
NEUTROPHILS # BLD AUTO: 2.8 K/UL — SIGNIFICANT CHANGE UP (ref 1.8–7.4)
NEUTROPHILS NFR BLD AUTO: 62.3 % — SIGNIFICANT CHANGE UP (ref 43–77)
PLATELET # BLD AUTO: 193 K/UL — SIGNIFICANT CHANGE UP (ref 150–400)
RBC # BLD: 4.32 M/UL — SIGNIFICANT CHANGE UP (ref 3.8–5.2)
RBC # FLD: 14.2 % — SIGNIFICANT CHANGE UP (ref 10.3–14.5)
WBC # BLD: 4.5 K/UL — SIGNIFICANT CHANGE UP (ref 3.8–10.5)
WBC # FLD AUTO: 4.5 K/UL — SIGNIFICANT CHANGE UP (ref 3.8–10.5)

## 2018-05-16 PROCEDURE — 88313 SPECIAL STAINS GROUP 2: CPT

## 2018-05-16 PROCEDURE — 88271 CYTOGENETICS DNA PROBE: CPT

## 2018-05-16 PROCEDURE — 88305 TISSUE EXAM BY PATHOLOGIST: CPT | Mod: 26

## 2018-05-16 PROCEDURE — 88184 FLOWCYTOMETRY/ TC 1 MARKER: CPT

## 2018-05-16 PROCEDURE — 85097 BONE MARROW INTERPRETATION: CPT

## 2018-05-16 PROCEDURE — 88264 CHROMOSOME ANALYSIS 20-25: CPT

## 2018-05-16 PROCEDURE — 88280 CHROMOSOME KARYOTYPE STUDY: CPT

## 2018-05-16 PROCEDURE — 88275 CYTOGENETICS 100-300: CPT

## 2018-05-16 PROCEDURE — 88185 FLOWCYTOMETRY/TC ADD-ON: CPT

## 2018-05-16 PROCEDURE — 88188 FLOWCYTOMETRY/READ 9-15: CPT

## 2018-05-16 PROCEDURE — 88237 TISSUE CULTURE BONE MARROW: CPT

## 2018-05-16 PROCEDURE — 38222 DX BONE MARROW BX & ASPIR: CPT | Mod: LT

## 2018-05-16 PROCEDURE — 88305 TISSUE EXAM BY PATHOLOGIST: CPT

## 2018-05-16 PROCEDURE — 88313 SPECIAL STAINS GROUP 2: CPT | Mod: 26

## 2018-05-17 ENCOUNTER — RESULT REVIEW (OUTPATIENT)
Age: 73
End: 2018-05-17

## 2018-05-20 LAB
HEMATOPATHOLOGY REPORT: SIGNIFICANT CHANGE UP
TM INTERPRETATION: SIGNIFICANT CHANGE UP

## 2018-05-22 ENCOUNTER — APPOINTMENT (OUTPATIENT)
Dept: VASCULAR SURGERY | Facility: CLINIC | Age: 73
End: 2018-05-22

## 2018-05-24 LAB — CHROM ANALY INTERPHASE BLD FISH-IMP: SIGNIFICANT CHANGE UP

## 2018-05-25 ENCOUNTER — APPOINTMENT (OUTPATIENT)
Dept: VASCULAR SURGERY | Facility: CLINIC | Age: 73
End: 2018-05-25

## 2018-05-29 ENCOUNTER — APPOINTMENT (OUTPATIENT)
Dept: PULMONOLOGY | Facility: CLINIC | Age: 73
End: 2018-05-29
Payer: MEDICARE

## 2018-05-29 VITALS
DIASTOLIC BLOOD PRESSURE: 70 MMHG | HEIGHT: 65 IN | TEMPERATURE: 97.5 F | SYSTOLIC BLOOD PRESSURE: 128 MMHG | RESPIRATION RATE: 15 BRPM | WEIGHT: 250 LBS | HEART RATE: 92 BPM | OXYGEN SATURATION: 93 % | BODY MASS INDEX: 41.65 KG/M2

## 2018-05-29 VITALS — WEIGHT: 248 LBS | BODY MASS INDEX: 41.32 KG/M2 | HEIGHT: 65 IN

## 2018-05-29 PROCEDURE — 94729 DIFFUSING CAPACITY: CPT

## 2018-05-29 PROCEDURE — 99204 OFFICE O/P NEW MOD 45 MIN: CPT | Mod: 25

## 2018-05-29 PROCEDURE — 94726 PLETHYSMOGRAPHY LUNG VOLUMES: CPT

## 2018-05-29 PROCEDURE — ZZZZZ: CPT

## 2018-05-29 PROCEDURE — 94010 BREATHING CAPACITY TEST: CPT

## 2018-05-30 ENCOUNTER — APPOINTMENT (OUTPATIENT)
Dept: HEMATOLOGY ONCOLOGY | Facility: CLINIC | Age: 73
End: 2018-05-30
Payer: MEDICARE

## 2018-05-30 VITALS
RESPIRATION RATE: 16 BRPM | DIASTOLIC BLOOD PRESSURE: 83 MMHG | OXYGEN SATURATION: 95 % | TEMPERATURE: 97.8 F | HEART RATE: 85 BPM | SYSTOLIC BLOOD PRESSURE: 161 MMHG

## 2018-05-30 PROCEDURE — 99214 OFFICE O/P EST MOD 30 MIN: CPT

## 2018-06-05 LAB — CHROM ANALY OVERALL INTERP SPEC-IMP: SIGNIFICANT CHANGE UP

## 2018-06-06 ENCOUNTER — APPOINTMENT (OUTPATIENT)
Dept: VASCULAR SURGERY | Facility: CLINIC | Age: 73
End: 2018-06-06
Payer: MEDICARE

## 2018-06-06 ENCOUNTER — RX RENEWAL (OUTPATIENT)
Age: 73
End: 2018-06-06

## 2018-06-06 VITALS
HEIGHT: 65 IN | WEIGHT: 250 LBS | HEART RATE: 109 BPM | TEMPERATURE: 97.7 F | DIASTOLIC BLOOD PRESSURE: 93 MMHG | BODY MASS INDEX: 41.65 KG/M2 | SYSTOLIC BLOOD PRESSURE: 170 MMHG

## 2018-06-06 DIAGNOSIS — M79.604 PAIN IN RIGHT LEG: ICD-10-CM

## 2018-06-06 PROCEDURE — 99214 OFFICE O/P EST MOD 30 MIN: CPT | Mod: 25

## 2018-06-06 PROCEDURE — 29580 STRAPPING UNNA BOOT: CPT | Mod: 50

## 2018-06-26 ENCOUNTER — APPOINTMENT (OUTPATIENT)
Dept: CARDIOLOGY | Facility: CLINIC | Age: 73
End: 2018-06-26

## 2018-06-27 ENCOUNTER — APPOINTMENT (OUTPATIENT)
Dept: VASCULAR SURGERY | Facility: CLINIC | Age: 73
End: 2018-06-27

## 2018-06-27 ENCOUNTER — INPATIENT (INPATIENT)
Facility: HOSPITAL | Age: 73
LOS: 7 days | Discharge: NURSING, REHAB OR LONG-TERM CARE | DRG: 543 | End: 2018-07-05
Attending: INTERNAL MEDICINE | Admitting: HOSPITALIST
Payer: COMMERCIAL

## 2018-06-27 ENCOUNTER — APPOINTMENT (OUTPATIENT)
Dept: VASCULAR SURGERY | Facility: CLINIC | Age: 73
End: 2018-06-27
Payer: MEDICARE

## 2018-06-27 VITALS
OXYGEN SATURATION: 95 % | DIASTOLIC BLOOD PRESSURE: 86 MMHG | HEART RATE: 88 BPM | RESPIRATION RATE: 18 BRPM | SYSTOLIC BLOOD PRESSURE: 160 MMHG | TEMPERATURE: 97 F

## 2018-06-27 VITALS
DIASTOLIC BLOOD PRESSURE: 105 MMHG | TEMPERATURE: 98.1 F | SYSTOLIC BLOOD PRESSURE: 171 MMHG | WEIGHT: 250 LBS | HEIGHT: 65 IN | BODY MASS INDEX: 41.65 KG/M2 | HEART RATE: 98 BPM

## 2018-06-27 DIAGNOSIS — G57.92 UNSPECIFIED MONONEUROPATHY OF LEFT LOWER LIMB: ICD-10-CM

## 2018-06-27 DIAGNOSIS — Z98.890 OTHER SPECIFIED POSTPROCEDURAL STATES: Chronic | ICD-10-CM

## 2018-06-27 DIAGNOSIS — Z95.828 PRESENCE OF OTHER VASCULAR IMPLANTS AND GRAFTS: Chronic | ICD-10-CM

## 2018-06-27 DIAGNOSIS — M79.89 OTHER SPECIFIED SOFT TISSUE DISORDERS: ICD-10-CM

## 2018-06-27 DIAGNOSIS — Z96.651 PRESENCE OF RIGHT ARTIFICIAL KNEE JOINT: Chronic | ICD-10-CM

## 2018-06-27 DIAGNOSIS — G57.91 UNSPECIFIED MONONEUROPATHY OF RIGHT LOWER LIMB: ICD-10-CM

## 2018-06-27 DIAGNOSIS — Z96.641 PRESENCE OF RIGHT ARTIFICIAL HIP JOINT: Chronic | ICD-10-CM

## 2018-06-27 DIAGNOSIS — Z96.652 PRESENCE OF LEFT ARTIFICIAL KNEE JOINT: Chronic | ICD-10-CM

## 2018-06-27 PROCEDURE — 72146 MRI CHEST SPINE W/O DYE: CPT | Mod: 26

## 2018-06-27 PROCEDURE — 72148 MRI LUMBAR SPINE W/O DYE: CPT | Mod: 26

## 2018-06-27 PROCEDURE — 99285 EMERGENCY DEPT VISIT HI MDM: CPT

## 2018-06-27 PROCEDURE — 99214 OFFICE O/P EST MOD 30 MIN: CPT

## 2018-06-27 RX ORDER — FUROSEMIDE 40 MG
40 TABLET ORAL ONCE
Qty: 0 | Refills: 0 | Status: COMPLETED | OUTPATIENT
Start: 2018-06-27 | End: 2018-06-27

## 2018-06-27 RX ORDER — KETOROLAC TROMETHAMINE 30 MG/ML
15 SYRINGE (ML) INJECTION ONCE
Qty: 0 | Refills: 0 | Status: DISCONTINUED | OUTPATIENT
Start: 2018-06-27 | End: 2018-06-27

## 2018-06-27 RX ORDER — LIDOCAINE 4 G/100G
1 CREAM TOPICAL ONCE
Qty: 0 | Refills: 0 | Status: COMPLETED | OUTPATIENT
Start: 2018-06-27 | End: 2018-06-27

## 2018-06-27 RX ORDER — MORPHINE SULFATE 50 MG/1
4 CAPSULE, EXTENDED RELEASE ORAL ONCE
Qty: 0 | Refills: 0 | Status: DISCONTINUED | OUTPATIENT
Start: 2018-06-27 | End: 2018-06-27

## 2018-06-27 RX ORDER — OXYCODONE HYDROCHLORIDE 5 MG/1
5 TABLET ORAL ONCE
Qty: 0 | Refills: 0 | Status: DISCONTINUED | OUTPATIENT
Start: 2018-06-27 | End: 2018-06-27

## 2018-06-27 RX ADMIN — OXYCODONE HYDROCHLORIDE 5 MILLIGRAM(S): 5 TABLET ORAL at 23:00

## 2018-06-27 RX ADMIN — MORPHINE SULFATE 4 MILLIGRAM(S): 50 CAPSULE, EXTENDED RELEASE ORAL at 19:57

## 2018-06-27 RX ADMIN — Medication 15 MILLIGRAM(S): at 22:31

## 2018-06-27 RX ADMIN — MORPHINE SULFATE 4 MILLIGRAM(S): 50 CAPSULE, EXTENDED RELEASE ORAL at 19:45

## 2018-06-27 RX ADMIN — Medication 40 MILLIGRAM(S): at 22:40

## 2018-06-27 RX ADMIN — Medication 15 MILLIGRAM(S): at 23:00

## 2018-06-27 RX ADMIN — OXYCODONE HYDROCHLORIDE 5 MILLIGRAM(S): 5 TABLET ORAL at 22:30

## 2018-06-27 NOTE — ED PROVIDER NOTE - OBJECTIVE STATEMENT
72yof w/ hx of of HTN, HLD, hx of DVT/PE on eliquis p/w low back pain. Progressive lower right back pain worsening for several months, gradual onset, dull/aching, localized to the low back radiating down both legs bilaterally. Used to walk independently, then cane, then walker, and now barely ambulatory. Also reporting some urinary incontinence which may be attributed to inability to ambulate but pt unsure if its urge incontinence. No saddle anesthesia. No fevers, chills, other systemic symptoms. No trauma

## 2018-06-27 NOTE — ED PROVIDER NOTE - PROGRESS NOTE DETAILS
Alessia: Assumed care at sign out. Work up in progress for low back pain, inability to walk, urinary incontinence. Awaiting MRI to r/o cord compression. Alessia: Prelim read of MRI w/ L2-3 disc herniation w/ mild dural sac indentation but no central canal stenosis. Discussed w/ Ortho (Spine). Not an acute surgical issue. Will admit to medicine for inability to ambulate.

## 2018-06-27 NOTE — ED PROVIDER NOTE - ATTENDING CONTRIBUTION TO CARE
Attending MD Lucio:  I personally have seen and examined this patient.  Resident note reviewed and agree on plan of care and except where noted.  See MDM for details.

## 2018-06-27 NOTE — ED PROVIDER NOTE - MEDICAL DECISION MAKING DETAILS
Attending MD Lucio: 73 yo F w/ HTN, HLD, DVT/PE (2/2016) provoked post TKR s/p Xarelto &IVC filter x 16 months, obesity, osteoarthritis, chronic b/l LE lymphadema presents with worsening left sided back pain and difficulty ambulating.  Patient states a few weeks ago she was able walk with walker and now difficulty ambulating with walker. Does endorse some urinary incontinence, but felt mostly because she can not get up to go to bathroom.  On exam the heart and lungs are normal, abd is obsese but non-tender, back with no midline TTP and left paraspinal TTP, bilateral pitting LE edema and chronic erythematous skin changes, motor in bilateral LE 3+/5, normal perineal sensation, refused rectal exam.  Plan: labs, UA, MRI to rule out progressive disc disease and impingement. Pain management and likely admission for rehab and PT.  Patient lives alone with no home care and in not a safe discharge.

## 2018-06-28 DIAGNOSIS — M54.9 DORSALGIA, UNSPECIFIED: ICD-10-CM

## 2018-06-28 DIAGNOSIS — I83.899 VARICOSE VEINS OF UNSPECIFIED LOWER EXTREMITY WITH OTHER COMPLICATIONS: ICD-10-CM

## 2018-06-28 DIAGNOSIS — Z29.9 ENCOUNTER FOR PROPHYLACTIC MEASURES, UNSPECIFIED: ICD-10-CM

## 2018-06-28 DIAGNOSIS — F32.9 MAJOR DEPRESSIVE DISORDER, SINGLE EPISODE, UNSPECIFIED: ICD-10-CM

## 2018-06-28 DIAGNOSIS — I87.2 VENOUS INSUFFICIENCY (CHRONIC) (PERIPHERAL): ICD-10-CM

## 2018-06-28 DIAGNOSIS — I10 ESSENTIAL (PRIMARY) HYPERTENSION: ICD-10-CM

## 2018-06-28 DIAGNOSIS — I48.91 UNSPECIFIED ATRIAL FIBRILLATION: ICD-10-CM

## 2018-06-28 DIAGNOSIS — I82.409 ACUTE EMBOLISM AND THROMBOSIS OF UNSPECIFIED DEEP VEINS OF UNSPECIFIED LOWER EXTREMITY: ICD-10-CM

## 2018-06-28 DIAGNOSIS — R06.09 OTHER FORMS OF DYSPNEA: ICD-10-CM

## 2018-06-28 DIAGNOSIS — E78.5 HYPERLIPIDEMIA, UNSPECIFIED: ICD-10-CM

## 2018-06-28 DIAGNOSIS — E66.9 OBESITY, UNSPECIFIED: ICD-10-CM

## 2018-06-28 DIAGNOSIS — R60.0 LOCALIZED EDEMA: ICD-10-CM

## 2018-06-28 PROCEDURE — 12345: CPT | Mod: NC

## 2018-06-28 PROCEDURE — 99223 1ST HOSP IP/OBS HIGH 75: CPT

## 2018-06-28 PROCEDURE — 99221 1ST HOSP IP/OBS SF/LOW 40: CPT

## 2018-06-28 RX ORDER — APIXABAN 2.5 MG/1
5 TABLET, FILM COATED ORAL EVERY 12 HOURS
Qty: 0 | Refills: 0 | Status: DISCONTINUED | OUTPATIENT
Start: 2018-06-28 | End: 2018-07-05

## 2018-06-28 RX ORDER — SERTRALINE 25 MG/1
75 TABLET, FILM COATED ORAL DAILY
Qty: 0 | Refills: 0 | Status: DISCONTINUED | OUTPATIENT
Start: 2018-06-28 | End: 2018-07-05

## 2018-06-28 RX ORDER — ALENDRONATE SODIUM 70 MG/1
70 TABLET ORAL
Qty: 0 | Refills: 0 | COMMUNITY

## 2018-06-28 RX ORDER — OXYCODONE HYDROCHLORIDE 5 MG/1
2.5 TABLET ORAL EVERY 6 HOURS
Qty: 0 | Refills: 0 | Status: DISCONTINUED | OUTPATIENT
Start: 2018-06-28 | End: 2018-07-01

## 2018-06-28 RX ORDER — OXYCODONE HYDROCHLORIDE 5 MG/1
5 TABLET ORAL EVERY 6 HOURS
Qty: 0 | Refills: 0 | Status: DISCONTINUED | OUTPATIENT
Start: 2018-06-28 | End: 2018-07-05

## 2018-06-28 RX ORDER — OXYCODONE HYDROCHLORIDE 5 MG/1
5 TABLET ORAL EVERY 6 HOURS
Qty: 0 | Refills: 0 | Status: DISCONTINUED | OUTPATIENT
Start: 2018-06-28 | End: 2018-06-28

## 2018-06-28 RX ORDER — SERTRALINE 25 MG/1
1 TABLET, FILM COATED ORAL
Qty: 0 | Refills: 0 | COMMUNITY

## 2018-06-28 RX ORDER — SIMVASTATIN 20 MG/1
1 TABLET, FILM COATED ORAL
Qty: 0 | Refills: 0 | COMMUNITY

## 2018-06-28 RX ORDER — METOPROLOL TARTRATE 50 MG
25 TABLET ORAL DAILY
Qty: 0 | Refills: 0 | Status: DISCONTINUED | OUTPATIENT
Start: 2018-06-28 | End: 2018-07-05

## 2018-06-28 RX ORDER — POLYETHYLENE GLYCOL 3350 17 G/17G
17 POWDER, FOR SOLUTION ORAL DAILY
Qty: 0 | Refills: 0 | Status: DISCONTINUED | OUTPATIENT
Start: 2018-06-28 | End: 2018-07-05

## 2018-06-28 RX ORDER — ATORVASTATIN CALCIUM 80 MG/1
40 TABLET, FILM COATED ORAL AT BEDTIME
Qty: 0 | Refills: 0 | Status: DISCONTINUED | OUTPATIENT
Start: 2018-06-28 | End: 2018-07-05

## 2018-06-28 RX ORDER — OXYCODONE HYDROCHLORIDE 5 MG/1
10 TABLET ORAL EVERY 6 HOURS
Qty: 0 | Refills: 0 | Status: DISCONTINUED | OUTPATIENT
Start: 2018-06-28 | End: 2018-06-28

## 2018-06-28 RX ORDER — GABAPENTIN 400 MG/1
100 CAPSULE ORAL THREE TIMES A DAY
Qty: 0 | Refills: 0 | Status: DISCONTINUED | OUTPATIENT
Start: 2018-06-28 | End: 2018-07-05

## 2018-06-28 RX ORDER — FUROSEMIDE 40 MG
40 TABLET ORAL DAILY
Qty: 0 | Refills: 0 | Status: DISCONTINUED | OUTPATIENT
Start: 2018-06-28 | End: 2018-07-05

## 2018-06-28 RX ORDER — OXYCODONE HYDROCHLORIDE 5 MG/1
5 TABLET ORAL ONCE
Qty: 0 | Refills: 0 | Status: DISCONTINUED | OUTPATIENT
Start: 2018-06-28 | End: 2018-06-28

## 2018-06-28 RX ORDER — LIDOCAINE 4 G/100G
1 CREAM TOPICAL DAILY
Qty: 0 | Refills: 0 | Status: DISCONTINUED | OUTPATIENT
Start: 2018-06-28 | End: 2018-07-05

## 2018-06-28 RX ORDER — ACETAMINOPHEN 500 MG
650 TABLET ORAL EVERY 6 HOURS
Qty: 0 | Refills: 0 | Status: DISCONTINUED | OUTPATIENT
Start: 2018-06-28 | End: 2018-07-02

## 2018-06-28 RX ADMIN — OXYCODONE HYDROCHLORIDE 5 MILLIGRAM(S): 5 TABLET ORAL at 12:07

## 2018-06-28 RX ADMIN — Medication 25 MILLIGRAM(S): at 05:41

## 2018-06-28 RX ADMIN — LIDOCAINE 1 PATCH: 4 CREAM TOPICAL at 14:41

## 2018-06-28 RX ADMIN — OXYCODONE HYDROCHLORIDE 2.5 MILLIGRAM(S): 5 TABLET ORAL at 08:40

## 2018-06-28 RX ADMIN — GABAPENTIN 100 MILLIGRAM(S): 400 CAPSULE ORAL at 21:38

## 2018-06-28 RX ADMIN — Medication 40 MILLIGRAM(S): at 05:41

## 2018-06-28 RX ADMIN — GABAPENTIN 100 MILLIGRAM(S): 400 CAPSULE ORAL at 13:56

## 2018-06-28 RX ADMIN — OXYCODONE HYDROCHLORIDE 5 MILLIGRAM(S): 5 TABLET ORAL at 18:41

## 2018-06-28 RX ADMIN — ATORVASTATIN CALCIUM 40 MILLIGRAM(S): 80 TABLET, FILM COATED ORAL at 21:38

## 2018-06-28 RX ADMIN — OXYCODONE HYDROCHLORIDE 5 MILLIGRAM(S): 5 TABLET ORAL at 12:37

## 2018-06-28 RX ADMIN — GABAPENTIN 100 MILLIGRAM(S): 400 CAPSULE ORAL at 05:41

## 2018-06-28 RX ADMIN — Medication 650 MILLIGRAM(S): at 16:53

## 2018-06-28 RX ADMIN — OXYCODONE HYDROCHLORIDE 2.5 MILLIGRAM(S): 5 TABLET ORAL at 08:10

## 2018-06-28 RX ADMIN — APIXABAN 5 MILLIGRAM(S): 2.5 TABLET, FILM COATED ORAL at 05:41

## 2018-06-28 RX ADMIN — SERTRALINE 75 MILLIGRAM(S): 25 TABLET, FILM COATED ORAL at 12:07

## 2018-06-28 RX ADMIN — Medication 650 MILLIGRAM(S): at 17:23

## 2018-06-28 RX ADMIN — OXYCODONE HYDROCHLORIDE 5 MILLIGRAM(S): 5 TABLET ORAL at 04:05

## 2018-06-28 RX ADMIN — APIXABAN 5 MILLIGRAM(S): 2.5 TABLET, FILM COATED ORAL at 18:11

## 2018-06-28 RX ADMIN — OXYCODONE HYDROCHLORIDE 5 MILLIGRAM(S): 5 TABLET ORAL at 18:11

## 2018-06-28 NOTE — CHART NOTE - NSCHARTNOTEFT_GEN_A_CORE
Reference #: [id number]   Others' Prescriptions    Patient Name: Alesia Aparicio YOB: 1945   Address: [address] [place] [address] Sex: Female     06/06/2018 06/06/2018 oxycodone-acetaminophen 5-325 mg tablet  40 20 Gil Vasquez MD     04/13/2018 04/13/2018 tramadol hcl 50 mg tablet  90 30 Derek Lang PA-C

## 2018-06-28 NOTE — CONSULT NOTE ADULT - ASSESSMENT
72F w/ hx of DVT, chronic venous stasis, admitted for back pain    - no acute surgical intervention  - elevate lower extremities  - continue Unna boots at home  - will discuss need for venous duplex while inpatient vs return to Dr. Vasquez's office outpatient    NUNO Hanson MD  [id number]

## 2018-06-28 NOTE — H&P ADULT - ATTENDING COMMENTS
Seen and examined with PGY2 Dr. Oates. Agree with his assessment above and edited where appropriate.    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 No trauma or injury. B/l LE weakness, but otherwise non-focal neuro exam. Urgent MRI lumbar spine showing disc herniation with mild dural sac indentation at L2-3. No neural foraminal or central canal stenosis. Schmorl's node in L3 body. Pain control achieved with oxycodone. Needs PT eval in the morning for mobility and case management assistance for A resources as patient lives alone at home without any assistance (typically ambulates with walker but uses wheelchair outside the home). Her chronic RHODES likely attributable to her recurrent PEs +/- obesity hypoventilation. She does not require oxygen at this time, but O2 sat should be checked when ambulating with PT.

## 2018-06-28 NOTE — H&P ADULT - PROBLEM SELECTOR PLAN 3
- c/w metoprolol and lasix - c/w metoprolol and lasix  - vital signs per routine - c/w metoprolol and lasix (home medication)  - vital signs per routine

## 2018-06-28 NOTE — H&P ADULT - HISTORY OF PRESENT ILLNESS
71 yo F w pmhx of HTN, HLD, DVT/PE s/p [place] IVC filter on lifelong anticoagulation with Eliquis, MGUS, chronic b/l LE lymphedema presenting with worsening back pain.   Patient was seen by Dr. Vasquez on Jun 27 who prompted her to come to the ED. 73 yo F w pmhx of HTN, HLD, DVT/PE s/p [place] IVC filter on lifelong anticoagulation with Eliquis, Afib, MGUS, chronic b/l LE lymphedema presenting with worsening back pain and LE edema. Patient endorses she has had chronic back pain for months. But for the past few days her pain has gotten acutely worse. She has tried over the counter medications without resolution. The pain has been increasingly debilitating which prompted her to see Dr. Vasquez on Jun 27 whom prompted her to come to the ED. She complains of lower extremity swelling which began after her hip and knee surgeries. She endorses that she developed a blister on right leg, that seemed infected. She was started on keflex by Dr. Vasquez for 10 days. She endorses 2 pills of keflex left, from missed doses.       In the ED, T 97.4, HR 88, /86 R18, Sat 95% on room air. She was given Lasix 40mg IV, Ketorolac 15mg IV, Lidoderm patch, Morphine and Oxycodone. MRI of the spine showed disk bulging of L2-L3, but no stenosis. 71 yo F w pmhx of HTN, HLD, DVT/PE s/p [place] IVC filter on lifelong anticoagulation with Eliquis, Afib, MGUS, chronic b/l LE lymphedema presenting with worsening back pain and LE edema. Patient endorses she has had chronic back pain for months. But for the past few days her pain has gotten acutely worse. Pain is located in the lower back, above the belt line, and radiates to the left side. She has tried over the counter medications, with improvement but without resolution. The pain has been increasingly debilitating which prompted her to see Dr. Vasquez on Jun 27 whom prompted her to come to the ED. She complains of lower extremity swelling which began after her hip and knee surgeries. She endorses that she developed a blister on right leg, that seemed infected. She was started on keflex by Dr. Vasquez for 10 days. She endorses 2 pills of keflex left, from missed doses.     In the ED, T 97.4, HR 88, /86 R18, Sat 95% on room air. She was given Lasix 40mg IV, Ketorolac 15mg IV, Lidoderm patch, Morphine and Oxycodone. MRI of the spine showed disk bulging of L2-L3, but no stenosis.

## 2018-06-28 NOTE — H&P ADULT - NSHPLABSRESULTS_GEN_ALL_CORE
12.8   6.1   )-----------( 154      ( 27 Jun 2018 18:42 )             36.9     Hgb Trend: 12.8<--  06-27    143  |  105  |  10  ----------------------------<  95  4.1   |  25  |  0.54    Ca    9.5      27 Jun 2018 18:43    TPro  7.3  /  Alb  4.2  /  TBili  2.0<H>  /  DBili  x   /  AST  36  /  ALT  15  /  AlkPhos  115  06-27    Creatinine Trend: 0.54<--  MR Thoracic Spine No Cont (06.27.18 @ 21:52) >    INTERPRETATION:  T-spine: Accentuated kyphotic curvature. No acute   abnormality.  L-spine: Disc herniation with mild dural sac indentation at L2-3. No   neural foraminal or central canal stenosis. Schmorl's node in L3 body.     TTE with Doppler (w/Cont) (02.27.18 @ 14:41) >    EF (Visual Estimate): 60-65 %    Conclusions:  1. Normal mitral valve. Mild mitral regurgitation.  2. Normal trileaflet aortic valve. Minimal aortic  regurgitation.  3. Endocardial visualizationenhanced with intravenous  injection of echo contrast (Definity). Normal left  ventricular systolic function. No segmental wall motion  abnormalities.  4. Normal diastolic function  5. Normal right atrium.  6. Normal right ventricular size and systolic function.

## 2018-06-28 NOTE — H&P ADULT - PROBLEM SELECTOR PLAN 2
- Patient endorsed LE edema after surgeries.   - Last echo 02/18 showed EF of 60-65% with normal LV function.   - C/w home lasix   - Vascular follow up for unna boot placement. - Patient endorsed LE edema after surgeries.   - Last echo 02/18 showed EF of 60-65% with normal LV function.   - C/w home lasix   - Vascular follow up for unna boot placement.  --legs with b/l erythema likely secondary to venous stasis. Skin breakdown not appearing infected.

## 2018-06-28 NOTE — H&P ADULT - PSH
[place] filter in place  Placed 4/2016 removed 5/2017  H/O knee surgery  2016 - repair right  H/O total knee replacement, right  11/2015, 8/2016  History of hip replacement, total, right    History of total left knee replacement  2007

## 2018-06-28 NOTE — ED ADULT NURSE REASSESSMENT NOTE - COMFORT CARE
repositioned/treatment delay explained/wait time explained/assisted to bedpan/plan of care explained/meal provided/side rails up/warm blanket provided

## 2018-06-28 NOTE — PROGRESS NOTE ADULT - PROBLEM SELECTOR PLAN 1
- MRI with Degenerative disc disease L2-3 with left-sided disc osteophyte complex   which extends into the left neural foramen. Far lateral left-sided disc   herniation L3-4 extending into the neural foramen.   Edema in the sacral alae bilaterally left greater than right suspicious   for sacral insufficiency fractures which appear new   - Acetaminophen for mild pain. Oxycodone for mod-severe pain. Continue with lidocaine patch.   - Miralax for constipation.  -Ortho/spine eval pending

## 2018-06-28 NOTE — H&P ADULT - NSHPSOCIALHISTORY_GEN_ALL_CORE
, former smoker, denied ethanol and drugs. , former smoker, 1/2 pack 20years, denied ethanol and drugs. Ambulates with walker, wheelchair to get around.

## 2018-06-28 NOTE — H&P ADULT - NSHPOUTPATIENTPROVIDERS_GEN_ALL_CORE
Dr. Gil Vasquez - Vascular surg  Dr Nay Soto Hem   Dr. Jose Eric Pulm   Dr. Alex Buckner ortho   Dr. Nilsa Wellington Dr. Gil Vasquez - Vascular surg  Dr Nay Soto Hem   Dr. Jose Eric Pulm   Dr. Alex Buckner ortho   Dr. Nilsa Tomas

## 2018-06-28 NOTE — ED ADULT NURSE REASSESSMENT NOTE - NS ED NURSE REASSESS COMMENT FT1
Patient is still at MRI.
received report from Tawnya KNOTT. pt is bed bound. has back pain is awaiting MRI of Spine. pt VSS. was notified to not stand up by themselves, has open wounds on bilateral legs due to edema, weeping clear drainage, redness noted to bilateral feet. will will cont to monitor back pain and VS. awaiting MD dispo.

## 2018-06-28 NOTE — H&P ADULT - PROBLEM SELECTOR PLAN 10
--chronic RHODES for "years" was felt by her pulmonologist to be primarily from clot burden from recurrent PEs (most recent Feb 2018 with saddle PE). Also former smoker and obese. Suspect some mild component of obesity hypoventilation syndrome.   --normal spirometry as outpatient, normal TTE.   --avoid oxygen supplementation as not necessary

## 2018-06-28 NOTE — PROGRESS NOTE ADULT - SUBJECTIVE AND OBJECTIVE BOX
Patient is a 72y old  Female who presents with a chief complaint of Back pain (28 Jun 2018 03:57)      SUBJECTIVE / OVERNIGHT EVENTS: Reports still has back pain, somewhat better than when she came in     MEDICATIONS  (STANDING):  apixaban 5 milliGRAM(s) Oral every 12 hours  atorvastatin 40 milliGRAM(s) Oral at bedtime  furosemide    Tablet 40 milliGRAM(s) Oral daily  gabapentin 100 milliGRAM(s) Oral three times a day  lidocaine   Patch 1 Patch Transdermal daily  metoprolol succinate ER 25 milliGRAM(s) Oral daily  sertraline 75 milliGRAM(s) Oral daily    MEDICATIONS  (PRN):  acetaminophen    Suspension. 650 milliGRAM(s) Oral every 6 hours PRN Mild Pain (1 - 3)  oxyCODONE    IR 2.5 milliGRAM(s) Oral every 6 hours PRN Moderate Pain (4 - 6)  oxyCODONE    IR 5 milliGRAM(s) Oral every 6 hours PRN Severe Pain (7 - 10)  polyethylene glycol 3350 17 Gram(s) Oral daily PRN Constipation        CAPILLARY BLOOD GLUCOSE        I&O's Summary    28 Jun 2018 07:01  -  28 Jun 2018 14:28  --------------------------------------------------------  IN: 320 mL / OUT: 550 mL / NET: -230 mL        PHYSICAL EXAM:  GENERAL: NAD, well-developed  HEAD:  Atraumatic, Normocephalic  EYES: EOMI, PERRLA, conjunctiva and sclera clear  NECK: Supple, No JVD  CHEST/LUNG: Clear to auscultation bilaterally; No wheeze  HEART: Regular rate and rhythm; S1S2  ABDOMEN: Soft, Nontender, Nondistended; Bowel sounds present  EXTREMITIES:  +2 LE edema, chronic stasis changes le b/l   PSYCH: AAOx3  NEUROLOGY: non-focal      LABS:                        12.8   6.1   )-----------( 154      ( 27 Jun 2018 18:42 )             36.9     06-27    143  |  105  |  10  ----------------------------<  95  4.1   |  25  |  0.54    Ca    9.5      27 Jun 2018 18:43    TPro  7.3  /  Alb  4.2  /  TBili  2.0<H>  /  DBili  x   /  AST  36  /  ALT  15  /  AlkPhos  115  06-27              RADIOLOGY & ADDITIONAL TESTS:    Imaging Personally Reviewed:    Consultant(s) Notes Reviewed:  vascular    Care Discussed with Consultants/Other Providers:

## 2018-06-28 NOTE — H&P ADULT - ASSESSMENT
71 yo F w pmhx of HTN, HLD, DVT/PE s/p [place] IVC filter on lifelong anticoagulation with Eliquis, Afib, MGUS, chronic b/l LE lymphedema presenting with worsening back pain and LE edema.

## 2018-06-28 NOTE — H&P ADULT - NSHPPHYSICALEXAM_GEN_ALL_CORE
VITAL SIGNS (Last 24 hrs):  T(C): 36.7 (06-28-18 @ 02:01), Max: 36.7 (06-27-18 @ 22:20)  HR: 95 (06-28-18 @ 02:01) (83 - 96)  BP: 121/69 (06-28-18 @ 02:01) (121/69 - 160/86)  RR: 20 (06-28-18 @ 02:01) (18 - 22)  SpO2: 93% (06-28-18 @ 02:01) (90% - 97%)    GENERAL: Obese, uncomfortable   HEAD:  Atraumatic, Normocephalic  EYES: EOMI, PERRLA, conjunctiva and sclera clear  NECK: Supple, No JVD  CHEST/LUNG: Clear to auscultation bilaterally; No wheeze/rhonchi/rales   HEART: Irregular; normal S1 S2,  No murmurs, rubs, or gallops  ABDOMEN: Soft, Nontender, Nondistended; Bowel sounds normal  EXTREMITIES:  2+ Peripheral Pulses, Bilateral massive LE edema.   PSYCH: AAOx3, No acute distress   NEUROLOGY: non-focal  SKIN: LE with erythema and blisters

## 2018-06-28 NOTE — CONSULT NOTE ADULT - SUBJECTIVE AND OBJECTIVE BOX
GENERAL SURGERY CONSULT NOTE    Patient is a 72y old  Female who presents with a chief complaint of Back pain (28 Jun 2018 03:57)      HPI:  71 yo F w pmhx of HTN, HLD, DVT/PE s/p [place] IVC filter on lifelong anticoagulation with Eliquis, Afib, MGUS, chronic b/l LE lymphedema presenting with worsening back pain and LE edema. Patient endorses she has had chronic back pain for months. But for the past few days her pain has gotten acutely worse. Pain is located in the lower back, above the belt line, and radiates to the left side. She has tried over the counter medications, with improvement but without resolution. The pain has been increasingly debilitating. She complains of lower extremity swelling which began after her hip and knee surgeries. She endorses that she developed a blister on right leg, that seemed infected. She was started on keflex by Dr. Vasquez for 10 days. She endorses 2 pills of keflex left, from missed doses.     The patient was sent in from Dr. Vasquez's office as back pain limited her ability to complete a venous duplex study. She says her back pain is unchanged. She says she is trying to wrap and elevate the legs.  After I examined her, her son took me aside and expressed concerns about her compliance, saying that she rarely elevates her legs and is not mobile at home, mostly from lack of effort. He expressed concerns about her reliance on pain medications.          PAST MEDICAL & SURGICAL HISTORY:  Obesity  Hyperlipidemia  PE (pulmonary thromboembolism): DVT/PE 2/2016 - on Xarelto - discontinued 6/2017  Pneumonia: 3/2016  Edema of lower extremity: both for 40 years  Osteoarthritis  Knee pain, right  Depression  HTN (hypertension)  History of hip replacement, total, right  H/O knee surgery: 2016 - repair right  [place] filter in place: Placed 4/2016 removed 5/2017  H/O total knee replacement, right: 11/2015, 8/2016  History of total left knee replacement: 2007    [  ] No significant past history as reviewed with the patient and family    FAMILY HISTORY:  No pertinent family history in first degree relatives    [  ] Family history not pertinent as reviewed with the patient and family    SOCIAL HISTORY:    MEDICATIONS  (STANDING):  apixaban 5 milliGRAM(s) Oral every 12 hours  atorvastatin 40 milliGRAM(s) Oral at bedtime  furosemide    Tablet 40 milliGRAM(s) Oral daily  gabapentin 100 milliGRAM(s) Oral three times a day  metoprolol succinate ER 25 milliGRAM(s) Oral daily  sertraline 75 milliGRAM(s) Oral daily    MEDICATIONS  (PRN):  acetaminophen    Suspension. 650 milliGRAM(s) Oral every 6 hours PRN Mild Pain (1 - 3)  oxyCODONE    IR 2.5 milliGRAM(s) Oral every 6 hours PRN Moderate Pain (4 - 6)  oxyCODONE    IR 5 milliGRAM(s) Oral every 6 hours PRN Severe Pain (7 - 10)  polyethylene glycol 3350 17 Gram(s) Oral daily PRN Constipation    Allergies    No Known Allergies    Intolerances        Vital Signs Last 24 Hrs  T(C): 36.9 (28 Jun 2018 10:20), Max: 37 (28 Jun 2018 05:26)  T(F): 98.5 (28 Jun 2018 10:20), Max: 98.6 (28 Jun 2018 05:26)  HR: 90 (28 Jun 2018 10:20) (83 - 99)  BP: 154/82 (28 Jun 2018 10:20) (121/69 - 162/78)  BP(mean): 80 (28 Jun 2018 02:01) (80 - 103)  RR: 18 (28 Jun 2018 10:20) (18 - 22)  SpO2: 98% (28 Jun 2018 10:20) (90% - 98%)  Daily     Daily       NAD, awake and alert  Respirations nonlabored  Abdomen soft, obese, nontender, nondistended  Bilateral lower extremities edematous, with chronic venous stasis changes, tender, erythematous  Pulses nonpalpable                            12.8   6.1   )-----------( 154      ( 27 Jun 2018 18:42 )             36.9     06-27    143  |  105  |  10  ----------------------------<  95  4.1   |  25  |  0.54    Ca    9.5      27 Jun 2018 18:43    TPro  7.3  /  Alb  4.2  /  TBili  2.0<H>  /  DBili  x   /  AST  36  /  ALT  15  /  AlkPhos  115  06-27          IMAGING STUDIES:  Available in [employer] records

## 2018-06-28 NOTE — H&P ADULT - PROBLEM SELECTOR PLAN 1
- MRI with preliminary read showing Lumbarspine: Disc herniation with mild dural sac indentation at L2-3. With no neural foraminal or central canal stenosis.   - Acetaminophen for mild pain. Oxycodone for mod-severe pain.   - Miralax for constipation. - MRI with preliminary read showing Lumbarspine: Disc herniation with mild dural sac indentation at L2-3. With no neural foraminal or central canal stenosis.   - Acetaminophen for mild pain. Oxycodone for mod-severe pain.   - Miralax for constipation.  - Follow up official report. - MRI with preliminary read showing Lumbarspine: Disc herniation with mild dural sac indentation at L2-3. With no neural foraminal or central canal stenosis.   - Acetaminophen for mild pain. Oxycodone for mod-severe pain. Continue with lidocaine patch.   - Miralax for constipation.  - Follow up official report.

## 2018-06-28 NOTE — H&P ADULT - NSHPREVIEWOFSYSTEMS_GEN_ALL_CORE
Constitutional: No Fever, Fatigue, Weight Changes  Eyes: No recent vision problems or eye pain.  ENT: No congestion, ear pain, or sore throat.  Endocrine: No excess sweating, temperature intolerance  Cardiovascular: No chest pain, palpitations, shortness of breath, pre-syncope, syncope  Respiratory: No cough, congestion, or wheezing.  Gastrointestinal: No abdominal pain, nausea, vomiting, or diarrhea.  Genitourinary: No dysuria, hematuria  Musculoskeletal: Lower leg swelling an tenderness   Neurologic: No headache, dizziness, focal weakness  Skin: Erythema in b/l LE, with blisters.

## 2018-06-29 PROCEDURE — 99232 SBSQ HOSP IP/OBS MODERATE 35: CPT

## 2018-06-29 PROCEDURE — 99222 1ST HOSP IP/OBS MODERATE 55: CPT

## 2018-06-29 PROCEDURE — 99233 SBSQ HOSP IP/OBS HIGH 50: CPT

## 2018-06-29 PROCEDURE — 93970 EXTREMITY STUDY: CPT | Mod: 26

## 2018-06-29 RX ORDER — MUPIROCIN 20 MG/G
1 OINTMENT TOPICAL
Qty: 0 | Refills: 0 | Status: DISCONTINUED | OUTPATIENT
Start: 2018-06-29 | End: 2018-07-01

## 2018-06-29 RX ORDER — ACETAMINOPHEN 500 MG
1000 TABLET ORAL ONCE
Qty: 0 | Refills: 0 | Status: COMPLETED | OUTPATIENT
Start: 2018-06-29 | End: 2018-06-29

## 2018-06-29 RX ADMIN — OXYCODONE HYDROCHLORIDE 5 MILLIGRAM(S): 5 TABLET ORAL at 18:36

## 2018-06-29 RX ADMIN — OXYCODONE HYDROCHLORIDE 5 MILLIGRAM(S): 5 TABLET ORAL at 00:25

## 2018-06-29 RX ADMIN — OXYCODONE HYDROCHLORIDE 5 MILLIGRAM(S): 5 TABLET ORAL at 06:13

## 2018-06-29 RX ADMIN — APIXABAN 5 MILLIGRAM(S): 2.5 TABLET, FILM COATED ORAL at 05:36

## 2018-06-29 RX ADMIN — LIDOCAINE 1 PATCH: 4 CREAM TOPICAL at 12:15

## 2018-06-29 RX ADMIN — MUPIROCIN 1 APPLICATION(S): 20 OINTMENT TOPICAL at 18:23

## 2018-06-29 RX ADMIN — GABAPENTIN 100 MILLIGRAM(S): 400 CAPSULE ORAL at 13:33

## 2018-06-29 RX ADMIN — GABAPENTIN 100 MILLIGRAM(S): 400 CAPSULE ORAL at 05:36

## 2018-06-29 RX ADMIN — Medication 25 MILLIGRAM(S): at 05:36

## 2018-06-29 RX ADMIN — Medication 400 MILLIGRAM(S): at 15:55

## 2018-06-29 RX ADMIN — Medication 40 MILLIGRAM(S): at 05:36

## 2018-06-29 RX ADMIN — LIDOCAINE 1 PATCH: 4 CREAM TOPICAL at 02:03

## 2018-06-29 RX ADMIN — OXYCODONE HYDROCHLORIDE 5 MILLIGRAM(S): 5 TABLET ORAL at 00:55

## 2018-06-29 RX ADMIN — OXYCODONE HYDROCHLORIDE 5 MILLIGRAM(S): 5 TABLET ORAL at 12:15

## 2018-06-29 RX ADMIN — SERTRALINE 75 MILLIGRAM(S): 25 TABLET, FILM COATED ORAL at 12:15

## 2018-06-29 RX ADMIN — OXYCODONE HYDROCHLORIDE 5 MILLIGRAM(S): 5 TABLET ORAL at 18:06

## 2018-06-29 RX ADMIN — GABAPENTIN 100 MILLIGRAM(S): 400 CAPSULE ORAL at 22:19

## 2018-06-29 RX ADMIN — APIXABAN 5 MILLIGRAM(S): 2.5 TABLET, FILM COATED ORAL at 18:06

## 2018-06-29 RX ADMIN — OXYCODONE HYDROCHLORIDE 5 MILLIGRAM(S): 5 TABLET ORAL at 12:45

## 2018-06-29 RX ADMIN — Medication 1000 MILLIGRAM(S): at 16:15

## 2018-06-29 RX ADMIN — ATORVASTATIN CALCIUM 40 MILLIGRAM(S): 80 TABLET, FILM COATED ORAL at 22:20

## 2018-06-29 RX ADMIN — OXYCODONE HYDROCHLORIDE 5 MILLIGRAM(S): 5 TABLET ORAL at 06:42

## 2018-06-29 NOTE — CONSULT NOTE ADULT - ASSESSMENT
2  F w  HTN, HLD, DVT/PE s/p [place] IVC filter on lifelong anticoagulation with Eliquis, Afib, MGUS, chronic b/l LE lymphedema presenting with worsening back pain and LE edema. She had a blister on her leg and received keflex by PMD.   Here afebrile, chronic lymphoedema with oozing of b/l Legs, no warmth or tenderness, normal WBC, doppler with non occlusive chronic DVT    b/l LE lymphedema, oozing and stasis, no evidence of cellulitis    * monitor off antibiotics  * topical mupirocin on the legs  * edema/oozing management as per the primary team  * please call with questions

## 2018-06-29 NOTE — PROGRESS NOTE ADULT - PROBLEM SELECTOR PLAN 10
--chronic RHODES for "years" was felt by her pulmonologist to be primarily from clot burden from recurrent PEs (most recent Feb 2018 with saddle PE). Also former smoker and obese. Suspect some mild component of obesity hypoventilation syndrome.   --normal spirometry as outpatient, normal TTE.   --avoid oxygen supplementation as not necessary
--chronic RHODES for "years" was felt by her pulmonologist to be primarily from clot burden from recurrent PEs (most recent Feb 2018 with saddle PE). Also former smoker and obese. Suspect some mild component of obesity hypoventilation syndrome.   --normal spirometry as outpatient, normal TTE.   --avoid oxygen supplementation as not necessary

## 2018-06-29 NOTE — PHYSICAL THERAPY INITIAL EVALUATION ADULT - MODALITIES TREATMENT COMMENTS
No
PT WC order rec'ed to address B LE swelling & weeping ulcers. Pt rec'ed supine in bed, BLE elevated, no dressing present. L LE w/ medial calf ulcer measuring 3.5cm x 4.0cm, noted erythema surrounding wound & anterior shin. R LE w/ medial calf ulcer measuring 3cm x 3cm w/ erythema surrounding wound & anterior shin. Wounds cleansed w/ NS, cavilon to periwound, aquacel SILVER to cover ulcers, ABD to cover, roverto to secure, Figure 8 ace wraps to address B LE edema. Pt left supine in bed, NAD, call bell in reach, purposeful proactive rounding took place, RN aware.

## 2018-06-29 NOTE — PROGRESS NOTE ADULT - SUBJECTIVE AND OBJECTIVE BOX
Patient is a 72y old  Female who presents with a chief complaint of Back pain (28 Jun 2018 03:57)      Vascular Surgery Attending Progress Note    Interval HPI: pt w/o c/o    Medications:  acetaminophen    Suspension. 650 milliGRAM(s) Oral every 6 hours PRN  apixaban 5 milliGRAM(s) Oral every 12 hours  atorvastatin 40 milliGRAM(s) Oral at bedtime  furosemide    Tablet 40 milliGRAM(s) Oral daily  gabapentin 100 milliGRAM(s) Oral three times a day  lidocaine   Patch 1 Patch Transdermal daily  metoprolol succinate ER 25 milliGRAM(s) Oral daily  mupirocin 2% Ointment 1 Application(s) Topical two times a day  oxyCODONE    IR 2.5 milliGRAM(s) Oral every 6 hours PRN  oxyCODONE    IR 5 milliGRAM(s) Oral every 6 hours PRN  polyethylene glycol 3350 17 Gram(s) Oral daily PRN  sertraline 75 milliGRAM(s) Oral daily      Vital Signs Last 24 Hrs  T(C): 36.8 (29 Jun 2018 16:15), Max: 36.8 (29 Jun 2018 16:15)  T(F): 98.2 (29 Jun 2018 16:15), Max: 98.2 (29 Jun 2018 16:15)  HR: 90 (29 Jun 2018 16:15) (86 - 106)  BP: 126/79 (29 Jun 2018 16:15) (123/75 - 160/95)  BP(mean): --  RR: 18 (29 Jun 2018 16:15) (18 - 18)  SpO2: 96% (29 Jun 2018 16:15) (94% - 96%)  I&O's Summary    28 Jun 2018 07:01  -  29 Jun 2018 07:00  --------------------------------------------------------  IN: 1060 mL / OUT: 650 mL / NET: 410 mL    29 Jun 2018 07:01  -  29 Jun 2018 18:48  --------------------------------------------------------  IN: 640 mL / OUT: 300 mL / NET: 340 mL        Physical Exam:  Neuro  A&Ox3 VSS  Vascular: mindi le severe edema stable w severe skin changes            LE venous duplex reviewed     PIA ADHIKARI MD  [phone]

## 2018-06-29 NOTE — PHYSICAL THERAPY INITIAL EVALUATION ADULT - IMPAIRMENTS FOUND, PT EVAL
gait, locomotion, and balance/aerobic capacity/endurance integumentary integrity/aerobic capacity/endurance/gait, locomotion, and balance

## 2018-06-29 NOTE — CONSULT NOTE ADULT - ASSESSMENT
72F with chronic back pain    ·	multimodal pain control  ·	weight loss  ·	PT/OT  ·	recently seen by Dr. Buckner in the office and recommended to get epidural injection.   ·	No acute ortho intervention  ·	please call back with further questions  ·	can follow up with Dr. Dudley URIBE in his office    Ortho [phone]

## 2018-06-29 NOTE — PROGRESS NOTE ADULT - PROBLEM SELECTOR PLAN 1
- MRI with Degenerative disc disease L2-3 with left-sided disc osteophyte complex   which extends into the left neural foramen. Far lateral left-sided disc   herniation L3-4 extending into the neural foramen.   Edema in the sacral alae bilaterally left greater than right suspicious   for sacral insufficiency fractures which appear new   - Acetaminophen for mild pain. Oxycodone for mod-severe pain. Continue with lidocaine patch.   - Miralax for constipation.  -Ortho/spine recs appreciated   -Pending PT eval

## 2018-06-29 NOTE — CHART NOTE - NSCHARTNOTEFT_GEN_A_CORE
Lower extremity duplexes reviewed. Nonocclusive chronic DVT of L popliteal, improved overall from previous imaging. No acute thrombus.  Would not . Continue a/c, compression, elevation.  Can follow up outpatient with Dr. Vasquez.    NUNO Hanson MD  [phone]

## 2018-06-29 NOTE — CONSULT NOTE ADULT - SUBJECTIVE AND OBJECTIVE BOX
72yFemale c/o progressive back pain over the past few weeks to months. Has seen multiple spine surgeons who said nothing to do. Denies radicular symptoms. States that she has some weakness, but that it is complicated by her overall poor health and deconditioning. Denies bowel or bladder dysfunction. Admitted with back pain and inability to ambulate.     PMH:  Obesity  Hyperlipidemia  PE (pulmonary thromboembolism)  Pneumonia  Edema of lower extremity  Osteoarthritis  Knee pain, right  Depression  HTN (hypertension)    PSH:  History of hip replacement, total, right  H/O knee surgery  Natasha filter in place  H/O total knee replacement, right  History of total left knee replacement    AH:    Meds: See med rec    T(C): 36.6 (06-29-18 @ 00:01)  HR: 90 (06-29-18 @ 00:01)  BP: 158/97 (06-29-18 @ 00:01)  RR: 18 (06-29-18 @ 00:01)  SpO2: 94% (06-29-18 @ 00:01)  Wt(kg): --                        12.8   6.1   )-----------( 154      ( 27 Jun 2018 18:42 )             36.9     06-27    143  |  105  |  10  ----------------------------<  95  4.1   |  25  |  0.54    Ca    9.5      27 Jun 2018 18:43    TPro  7.3  /  Alb  4.2  /  TBili  2.0<H>  /  DBili  x   /  AST  36  /  ALT  15  /  AlkPhos  115  06-27        Gen; NAD  BLE: weeping chronic lymphedema and wounds that is erythematous with serosanguinous discharge.   Spine PE:  Negative Straight leg raise  Negative clonus/Babinski/ann  Denies saddle anesthesia    Motor:                   C5                C6              C7               C8           T1   R            5/5                5/5            5/5             5/5          5/5  L             5/5               5/5             5/5             5/5          5/5                L2             L3             L4               L5            S1  R         5/5           5/5          5/5             5/5           5/5  L          5/5          5/5           5/5             5/5           5/5    Sensory:            C5         C6         C7      C8       T1        (0=absent, 1=impaired, 2=normal, NT=not testable)  R         2            2           2        2         2  L          2            2           2        2         2               L2          L3         L4      L5       S1         (0=absent, 1=impaired, 2=normal, NT=not testable)  R         2            2            2        2        2  L          2            2           2        2         2    < from: MR Lumbar Spine No Cont (06.27.18 @ 22:04) >  IMPRESSION: Unremarkable MRI of the thoracic spine. No cord compression.   Degenerative disc disease L2-3 with left-sided disc osteophyte complex   which extends into the left neural foramen. Far lateral left-sided disc   herniation L3-4 extending into the neural foramen. These are unchanged   from 11/12/2017.    Edema in the sacral alae bilaterally left greater than right suspicious   for sacral insufficiency fractures which appear new since the prior exam.    < end of copied text >

## 2018-06-29 NOTE — PHYSICAL THERAPY INITIAL EVALUATION ADULT - PERTINENT HX OF CURRENT PROBLEM, REHAB EVAL
72yFemale c/o progressive back pain over the past few weeks to months. Has seen multiple spine surgeons who said nothing to do. Denies radicular symptoms. States that she has some weakness, but that it is complicated by her overall poor health and deconditioning. Denies bowel or bladder dysfunction. Admitted with back pain and inability to ambulate.

## 2018-06-29 NOTE — CONSULT NOTE ADULT - SUBJECTIVE AND OBJECTIVE BOX
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 Patient endorses she has had chronic back pain for months. But for the past few days her pain has gotten acutely worse. Pain is located in the lower back, above the belt line, and radiates to the left side. She has tried over the counter medications, with improvement but without resolution. The pain has been increasingly debilitating which prompted her to see Dr. Vasquez on Jun 27 whom prompted her to come to the ED. She complains of lower extremity swelling which began after her hip and knee surgeries. She endorses that she developed a blister on right leg, that seemed infected. She was started on keflex by Dr. Vasquez for 10 days. She endorses 2 pills of keflex left, from missed doses.     In the ED, T 97.4, HR 88, /86 R18, Sat 95% on room air. She was given Lasix 40mg IV, Ketorolac 15mg IV, Lidoderm patch, Morphine and Oxycodone. MRI of the spine showed disk bulging of L2-L3, but no stenosis. (28 Jun 2018 03:57)      PAST MEDICAL & SURGICAL HISTORY:  Obesity  Hyperlipidemia  PE (pulmonary thromboembolism): DVT/PE 2/2016 - on Xarelto - discontinued 6/2017  Pneumonia: 3/2016  Edema of lower extremity: both for 40 years  Osteoarthritis  Knee pain, right  Depression  HTN (hypertension)  History of hip replacement, total, right  H/O knee surgery: 2016 - repair right  Spalding filter in place: Placed 4/2016 removed 5/2017  H/O total knee replacement, right: 11/2015, 8/2016  History of total left knee replacement: 2007      Allergies    No Known Allergies    Intolerances        ANTIMICROBIALS:      OTHER MEDS:  acetaminophen    Suspension. 650 milliGRAM(s) Oral every 6 hours PRN  apixaban 5 milliGRAM(s) Oral every 12 hours  atorvastatin 40 milliGRAM(s) Oral at bedtime  furosemide    Tablet 40 milliGRAM(s) Oral daily  gabapentin 100 milliGRAM(s) Oral three times a day  lidocaine   Patch 1 Patch Transdermal daily  metoprolol succinate ER 25 milliGRAM(s) Oral daily  oxyCODONE    IR 2.5 milliGRAM(s) Oral every 6 hours PRN  oxyCODONE    IR 5 milliGRAM(s) Oral every 6 hours PRN  polyethylene glycol 3350 17 Gram(s) Oral daily PRN  sertraline 75 milliGRAM(s) Oral daily      SOCIAL HISTORY:  , lives alone, former smoker, denied alcohol or drug abuse    FAMILY HISTORY:  reviewed, No pertinent family history in first degree relatives      ROS:    All other systems negative     Constitutional: no fever, no chills, no weight loss, no night sweats  Eye: no eye pain, no redness, no vision changes  ENT:  no sore throat, no rhinorrhea  Cardiovascular:  no chest pain, no palpitation  Respiratory:  no SOB, no cough  GI:  no abd pain, no vomiting, no diarrhea  urinary: no dysuria, no hematuria, no flank pain  : no  discharge or bleeding  musculoskeletal:  + back pain, LE edema and oozing  skin:  no rash  neurology:  no headache, no seizure, no change in mental status  psych: no anxiety, no depression     Physical Exam:    General:    NAD, non toxic  Head: atraumatic, normocephalic  Eyes: normal sclera and conjunctiva  ENT:   no oropharyngeal lesions, no LAD, neck supple  Cardio:    regular S1,S2, no murmur  Respiratory:   clear b/l, no wheezing  abd:   soft, BS +, not tender, no hepatosplenomegaly  :     no CVAT, no suprapubic tenderness, no su  Musculoskeletal : b/l TKA and lymphedema with some chronic skin changes hyperpigmentation and skin thickening, a blister with no surrounding erythema, oozing, no tenderness no warmth  Skin:    no rash  vascular: no lines, normal pulses  Neurologic:     no focal deficits  psych: normal affect, no suicidal ideation      Drug Dosing Weight  Height (cm): 165.1 (26 Feb 2018 17:19)  Weight (kg): 103.8 (26 Feb 2018 17:19)  BMI (kg/m2): 38.1 (26 Feb 2018 17:19)  BSA (m2): 2.09 (26 Feb 2018 17:19)    Vital Signs Last 24 Hrs  T(F): 97.7 (06-29-18 @ 08:25), Max: 98.6 (06-28-18 @ 05:26)    Vital Signs Last 24 Hrs  HR: 106 (06-29-18 @ 11:55) (69 - 106)  BP: 123/75 (06-29-18 @ 11:55) (123/75 - 160/95)  RR: 18 (06-29-18 @ 11:55)  SpO2: 96% (06-29-18 @ 11:55) (94% - 96%)  Wt(kg): --                          12.8   6.1   )-----------( 154      ( 27 Jun 2018 18:42 )             36.9       06-27    143  |  105  |  10  ----------------------------<  95  4.1   |  25  |  0.54    Ca    9.5      27 Jun 2018 18:43    TPro  7.3  /  Alb  4.2  /  TBili  2.0<H>  /  DBili  x   /  AST  36  /  ALT  15  /  AlkPhos  115  06-27          MICROBIOLOGY:  v  .Blood  06-27-18   No growth to date.  --  --                  RADIOLOGY:    < from: [facility] Duplex Lower Ext Vein Scan, Yulissa (06.29.18 @ 09:43) >  IMPRESSION:     Focal, partially occlusive thrombus in the left popliteal vein, improved   compared to prior study, likely scarring related to previous DVT. No new   acute DVT.

## 2018-06-29 NOTE — PROGRESS NOTE ADULT - PROBLEM SELECTOR PLAN 8
- continue with metoprolol and eliquis   - Currently rate controlled
- continue with metoprolol and eliquis   - Currently rate controlled

## 2018-06-30 DIAGNOSIS — I10 ESSENTIAL (PRIMARY) HYPERTENSION: ICD-10-CM

## 2018-06-30 DIAGNOSIS — I48.2 CHRONIC ATRIAL FIBRILLATION: ICD-10-CM

## 2018-06-30 LAB
ANION GAP SERPL CALC-SCNC: 15 MMOL/L — SIGNIFICANT CHANGE UP (ref 5–17)
BUN SERPL-MCNC: 19 MG/DL — SIGNIFICANT CHANGE UP (ref 7–23)
CALCIUM SERPL-MCNC: 8.9 MG/DL — SIGNIFICANT CHANGE UP (ref 8.4–10.5)
CHLORIDE SERPL-SCNC: 101 MMOL/L — SIGNIFICANT CHANGE UP (ref 96–108)
CO2 SERPL-SCNC: 27 MMOL/L — SIGNIFICANT CHANGE UP (ref 22–31)
CREAT SERPL-MCNC: 0.68 MG/DL — SIGNIFICANT CHANGE UP (ref 0.5–1.3)
CRP SERPL-MCNC: 2.2 MG/DL — HIGH (ref 0–0.4)
GLUCOSE SERPL-MCNC: 102 MG/DL — HIGH (ref 70–99)
HCT VFR BLD CALC: 40.3 % — SIGNIFICANT CHANGE UP (ref 34.5–45)
HGB BLD-MCNC: 12.8 G/DL — SIGNIFICANT CHANGE UP (ref 11.5–15.5)
MCHC RBC-ENTMCNC: 27.9 PG — SIGNIFICANT CHANGE UP (ref 27–34)
MCHC RBC-ENTMCNC: 31.8 GM/DL — LOW (ref 32–36)
MCV RBC AUTO: 87.8 FL — SIGNIFICANT CHANGE UP (ref 80–100)
PLATELET # BLD AUTO: 225 K/UL — SIGNIFICANT CHANGE UP (ref 150–400)
POTASSIUM SERPL-MCNC: 3.5 MMOL/L — SIGNIFICANT CHANGE UP (ref 3.5–5.3)
POTASSIUM SERPL-SCNC: 3.5 MMOL/L — SIGNIFICANT CHANGE UP (ref 3.5–5.3)
RBC # BLD: 4.59 M/UL — SIGNIFICANT CHANGE UP (ref 3.8–5.2)
RBC # FLD: 15 % — HIGH (ref 10.3–14.5)
SODIUM SERPL-SCNC: 143 MMOL/L — SIGNIFICANT CHANGE UP (ref 135–145)
WBC # BLD: 5.21 K/UL — SIGNIFICANT CHANGE UP (ref 3.8–10.5)
WBC # FLD AUTO: 5.21 K/UL — SIGNIFICANT CHANGE UP (ref 3.8–10.5)

## 2018-06-30 PROCEDURE — 99233 SBSQ HOSP IP/OBS HIGH 50: CPT

## 2018-06-30 RX ADMIN — OXYCODONE HYDROCHLORIDE 5 MILLIGRAM(S): 5 TABLET ORAL at 20:23

## 2018-06-30 RX ADMIN — OXYCODONE HYDROCHLORIDE 5 MILLIGRAM(S): 5 TABLET ORAL at 12:51

## 2018-06-30 RX ADMIN — MUPIROCIN 1 APPLICATION(S): 20 OINTMENT TOPICAL at 05:27

## 2018-06-30 RX ADMIN — LIDOCAINE 1 PATCH: 4 CREAM TOPICAL at 05:25

## 2018-06-30 RX ADMIN — APIXABAN 5 MILLIGRAM(S): 2.5 TABLET, FILM COATED ORAL at 17:59

## 2018-06-30 RX ADMIN — APIXABAN 5 MILLIGRAM(S): 2.5 TABLET, FILM COATED ORAL at 05:24

## 2018-06-30 RX ADMIN — GABAPENTIN 100 MILLIGRAM(S): 400 CAPSULE ORAL at 22:00

## 2018-06-30 RX ADMIN — ATORVASTATIN CALCIUM 40 MILLIGRAM(S): 80 TABLET, FILM COATED ORAL at 22:00

## 2018-06-30 RX ADMIN — OXYCODONE HYDROCHLORIDE 5 MILLIGRAM(S): 5 TABLET ORAL at 13:36

## 2018-06-30 RX ADMIN — OXYCODONE HYDROCHLORIDE 5 MILLIGRAM(S): 5 TABLET ORAL at 00:48

## 2018-06-30 RX ADMIN — OXYCODONE HYDROCHLORIDE 5 MILLIGRAM(S): 5 TABLET ORAL at 05:24

## 2018-06-30 RX ADMIN — OXYCODONE HYDROCHLORIDE 5 MILLIGRAM(S): 5 TABLET ORAL at 21:00

## 2018-06-30 RX ADMIN — OXYCODONE HYDROCHLORIDE 5 MILLIGRAM(S): 5 TABLET ORAL at 00:17

## 2018-06-30 RX ADMIN — OXYCODONE HYDROCHLORIDE 5 MILLIGRAM(S): 5 TABLET ORAL at 06:00

## 2018-06-30 RX ADMIN — Medication 40 MILLIGRAM(S): at 05:23

## 2018-06-30 RX ADMIN — Medication 25 MILLIGRAM(S): at 05:24

## 2018-06-30 RX ADMIN — SERTRALINE 75 MILLIGRAM(S): 25 TABLET, FILM COATED ORAL at 12:48

## 2018-06-30 RX ADMIN — GABAPENTIN 100 MILLIGRAM(S): 400 CAPSULE ORAL at 14:19

## 2018-06-30 RX ADMIN — MUPIROCIN 1 APPLICATION(S): 20 OINTMENT TOPICAL at 18:00

## 2018-06-30 RX ADMIN — GABAPENTIN 100 MILLIGRAM(S): 400 CAPSULE ORAL at 05:24

## 2018-06-30 NOTE — PROGRESS NOTE ADULT - SUBJECTIVE AND OBJECTIVE BOX
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          Medications:MEDICATIONS  (STANDING):  apixaban 5 milliGRAM(s) Oral every 12 hours  atorvastatin 40 milliGRAM(s) Oral at bedtime  furosemide    Tablet 40 milliGRAM(s) Oral daily  gabapentin 100 milliGRAM(s) Oral three times a day  lidocaine   Patch 1 Patch Transdermal daily  metoprolol succinate ER 25 milliGRAM(s) Oral daily  mupirocin 2% Ointment 1 Application(s) Topical two times a day  sertraline 75 milliGRAM(s) Oral daily    MEDICATIONS  (PRN):  acetaminophen    Suspension. 650 milliGRAM(s) Oral every 6 hours PRN Mild Pain (1 - 3)  oxyCODONE    IR 2.5 milliGRAM(s) Oral every 6 hours PRN Moderate Pain (4 - 6)  oxyCODONE    IR 5 milliGRAM(s) Oral every 6 hours PRN Severe Pain (7 - 10)  polyethylene glycol 3350 17 Gram(s) Oral daily PRN Constipation      Allergies: Allergies    No Known Allergies    Intolerances        REVIEW OF SYSTEMS:  CONSTITUTIONAL: No fever, weight loss, or fatigue  EYES: No eye pain, visual disturbances, or discharge  ENMT:  No difficulty hearing, tinnitus, vertigo; No sinus or throat pain  NECK: No pain or stiffness  BREASTS: No pain, masses, or nipple discharge  RESPIRATORY: No cough, wheezing, chills or hemoptysis; No shortness of breath  CARDIOVASCULAR: No chest pain, palpitations, dizziness, or leg swelling  GASTROINTESTINAL: No abdominal or epigastric pain. No nausea, vomiting, or hematemesis; No diarrhea or constipation. No melena or hematochezia.  GENITOURINARY: No dysuria, frequency, hematuria, or incontinence  NEUROLOGICAL: No headaches, memory loss, loss of strength, numbness, or tremors  SKIN: No itching, burning, rashes, or lesions   LYMPH NODES: No enlarged glands  ENDOCRINE: No heat or cold intolerance; No hair loss  MUSCULOSKELETAL: No joint pain or swelling; No muscle, back, or extremity pain  PSYCHIATRIC: No depression, anxiety, mood swings, or difficulty sleeping  HEME/LYMPH: No easy bruising, or bleeding gums  ALLERY AND IMMUNOLOGIC: No hives or eczema    T(C): 36.7 (06-30-18 @ 09:20), Max: 36.8 (06-29-18 @ 16:15)  HR: 90 (06-30-18 @ 09:20) (86 - 106)  BP: 144/67 (06-30-18 @ 09:20) (123/75 - 144/67)  RR: 16 (06-30-18 @ 09:20) (16 - 18)  SpO2: 95% (06-30-18 @ 09:20) (94% - 96%)  Wt(kg): --Vital Signs Last 24 Hrs  T(C): 36.7 (30 Jun 2018 09:20), Max: 36.8 (29 Jun 2018 16:15)  T(F): 98.1 (30 Jun 2018 09:20), Max: 98.2 (29 Jun 2018 16:15)  HR: 90 (30 Jun 2018 09:20) (86 - 106)  BP: 144/67 (30 Jun 2018 09:20) (123/75 - 144/67)  BP(mean): --  RR: 16 (30 Jun 2018 09:20) (16 - 18)  SpO2: 95% (30 Jun 2018 09:20) (94% - 96%)  I&O's Summary    29 Jun 2018 07:01  -  30 Jun 2018 07:00  --------------------------------------------------------  IN: 1120 mL / OUT: 300 mL / NET: 820 mL    30 Jun 2018 07:01  -  30 Jun 2018 10:20  --------------------------------------------------------  IN: 380 mL / OUT: 400 mL / NET: -20 mL      Last Menstrual Period      PHYSICAL EXAM:  GENERAL: NAD, well-groomed, well-developed  HEAD:  Atraumatic, Normocephalic  EYES: EOMI, PERRLA, conjunctiva and sclera clear  ENMT: No tonsillar erythema, exudates, or enlargement; Moist mucous membranes, Good dentition, No lesions  NECK: Supple, No JVD, Normal thyroid  NERVOUS SYSTEM:  Alert & Oriented X3, Good concentration; Motor Strength 5/5 B/L upper and lower extremities; DTRs 2+ intact and symmetric  CHEST/LUNG: Clear to percussion bilaterally; No rales, rhonchi, wheezing, or rubs  HEART: Regular rate and rhythm; No murmurs, rubs, or gallops  ABDOMEN: Soft, Nontender, Nondistended; Bowel sounds present  EXTREMITIES:  2+ Peripheral Pulses, No clubbing, cyanosis, or edema  LYMPH: No lymphadenopathy noted  SKIN: No rashes or lesions    Consultant(s) Notes Reviewed:  [x ] YES  [ ] NO  Care Discussed with Consultants/Other Providers [ x] YES  [ ] NO  Name of Consultant  LABS:                        12.8   5.21  )-----------( 225      ( 30 Jun 2018 08:27 )             40.3     06-30    143  |  101  |  19  ----------------------------<  102<H>  3.5   |  27  |  0.68    Ca    8.9      30 Jun 2018 06:54          CAPILLARY BLOOD GLUCOSE                RADIOLOGY & ADDITIONAL TESTS:  EKG :     Imaging Personally Reviewed:  [ ] YES  [ ] NO  HEALTH ISSUES - PROBLEM Dx:  Varicose veins with other complication: Varicose veins with other complication  Venous insufficiency of both lower extremities: Venous insufficiency of both lower extremities  Dyspnea on exertion: Dyspnea on exertion  Atrial fibrillation: Atrial fibrillation  Need for prophylactic measure: Need for prophylactic measure  Depression: Depression  DVT (deep venous thrombosis): DVT (deep venous thrombosis)  Obesity: Obesity  Hyperlipidemia: Hyperlipidemia  HTN (hypertension): HTN (hypertension)  Edema of lower extremity: Edema of lower extremity  Back pain: Back pain Patient is a 72y old  Female who presents with a chief complaint of Back pain (28 Jun 2018 03:57)      INTERVAL HPI/OVERNIGHT EVENTS:  71 yo F w pmhx of HTN, HLD, 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  No fever , no diarrhea , no chest pain         Medications:MEDICATIONS  (STANDING):  apixaban 5 milliGRAM(s) Oral every 12 hours  atorvastatin 40 milliGRAM(s) Oral at bedtime  furosemide    Tablet 40 milliGRAM(s) Oral daily  gabapentin 100 milliGRAM(s) Oral three times a day  lidocaine   Patch 1 Patch Transdermal daily  metoprolol succinate ER 25 milliGRAM(s) Oral daily  mupirocin 2% Ointment 1 Application(s) Topical two times a day  sertraline 75 milliGRAM(s) Oral daily    MEDICATIONS  (PRN):  acetaminophen    Suspension. 650 milliGRAM(s) Oral every 6 hours PRN Mild Pain (1 - 3)  oxyCODONE    IR 2.5 milliGRAM(s) Oral every 6 hours PRN Moderate Pain (4 - 6)  oxyCODONE    IR 5 milliGRAM(s) Oral every 6 hours PRN Severe Pain (7 - 10)  polyethylene glycol 3350 17 Gram(s) Oral daily PRN Constipation      Allergies: Allergies    No Known Allergies    Intolerances        REVIEW OF SYSTEMS:  CONSTITUTIONAL: No fever  NECK: No pain or stiffness  RESPIRATORY: No cough, No shortness of breath  CARDIOVASCULAR: No chest pain, palpitations, dizziness, pos chronic b/l  legs swelling  GASTROINTESTINAL: No abdominal pain. No nausea, vomiting, or hematemesis; No diarrhea  NEUROLOGICAL: No headaches    T(C): 36.7 (06-30-18 @ 09:20), Max: 36.8 (06-29-18 @ 16:15)  HR: 90 (06-30-18 @ 09:20) (86 - 106)  BP: 144/67 (06-30-18 @ 09:20) (123/75 - 144/67)  RR: 16 (06-30-18 @ 09:20) (16 - 18)  SpO2: 95% (06-30-18 @ 09:20) (94% - 96%)  Wt(kg): --Vital Signs Last 24 Hrs  T(C): 36.7 (30 Jun 2018 09:20), Max: 36.8 (29 Jun 2018 16:15)  T(F): 98.1 (30 Jun 2018 09:20), Max: 98.2 (29 Jun 2018 16:15)  HR: 90 (30 Jun 2018 09:20) (86 - 106)  BP: 144/67 (30 Jun 2018 09:20) (123/75 - 144/67)  BP(mean): --  RR: 16 (30 Jun 2018 09:20) (16 - 18)  SpO2: 95% (30 Jun 2018 09:20) (94% - 96%)  I&O's Summary    29 Jun 2018 07:01  -  30 Jun 2018 07:00  --------------------------------------------------------  IN: 1120 mL / OUT: 300 mL / NET: 820 mL    30 Jun 2018 07:01  -  30 Jun 2018 10:20  --------------------------------------------------------  IN: 380 mL / OUT: 400 mL / NET: -20 mL      Last Menstrual Period      PHYSICAL EXAM:  GENERAL: NAD, well-groomed, well-developed  HEAD:  Atraumatic, Normocephalic  NECK: Supple, No JVD, Normal thyroid  NERVOUS SYSTEM:  Alert & Oriented X3, Good concentration  CHEST/LUNG: Clear to percussion bilaterally  HEART: Regular rate and rhythm  ABDOMEN: Soft, Nontender, Nondistended; Bowel sounds present  EXTREMITIES:  pos b/l large leg edema with no gross erythema      Consultant(s) Notes Reviewed:  [x ] YES  [ ] NO  Care Discussed with Consultants/Other Providers [ x] YES  [ ] NO  Name of Consultant  LABS:                        12.8   5.21  )-----------( 225      ( 30 Jun 2018 08:27 )             40.3     06-30    143  |  101  |  19  ----------------------------<  102<H>  3.5   |  27  |  0.68    Ca    8.9      30 Jun 2018 06:54          CAPILLARY BLOOD GLUCOSE                RADIOLOGY & ADDITIONAL TESTS:  EKG :     Imaging Personally Reviewed:  [ ] YES  [ ] NO  HEALTH ISSUES - PROBLEM Dx:  Varicose veins with other complication: Varicose veins with other complication  Venous insufficiency of both lower extremities: Venous insufficiency of both lower extremities  Dyspnea on exertion: Dyspnea on exertion  Atrial fibrillation: Atrial fibrillation  Need for prophylactic measure: Need for prophylactic measure  Depression: Depression  DVT (deep venous thrombosis): DVT (deep venous thrombosis)  Obesity: Obesity  Hyperlipidemia: Hyperlipidemia  HTN (hypertension): HTN (hypertension)  Edema of lower extremity: Edema of lower extremity  Back pain: Back pain

## 2018-06-30 NOTE — PROGRESS NOTE ADULT - PROBLEM SELECTOR PLAN 1
MRI with Degenerative disc disease L2-3 with left-sided disc osteophyte complex   which extends into the left neural foramen. Far lateral left-sided disc   herniation L3-4 extending into the neural foramen.   Edema in the sacral alae bilaterally left greater than right suspicious   for sacral insufficiency fractures which appear new   - Acetaminophen for mild pain. Oxycodone for mod-severe pain. Continue with lidocaine patch and Gabapentin.    - Miralax for constipation.  -Ortho/spine recs appreciated   -PT evaluated patient with recommendation for RICCARDO

## 2018-07-01 DIAGNOSIS — R82.90 UNSPECIFIED ABNORMAL FINDINGS IN URINE: ICD-10-CM

## 2018-07-01 LAB
ANION GAP SERPL CALC-SCNC: 14 MMOL/L — SIGNIFICANT CHANGE UP (ref 5–17)
APPEARANCE UR: ABNORMAL
BACTERIA # UR AUTO: ABNORMAL
BASOPHILS # BLD AUTO: 0.01 K/UL — SIGNIFICANT CHANGE UP (ref 0–0.2)
BASOPHILS NFR BLD AUTO: 0.1 % — SIGNIFICANT CHANGE UP (ref 0–2)
BILIRUB UR-MCNC: NEGATIVE — SIGNIFICANT CHANGE UP
BUN SERPL-MCNC: 16 MG/DL — SIGNIFICANT CHANGE UP (ref 7–23)
CALCIUM SERPL-MCNC: 8.9 MG/DL — SIGNIFICANT CHANGE UP (ref 8.4–10.5)
CHLORIDE SERPL-SCNC: 99 MMOL/L — SIGNIFICANT CHANGE UP (ref 96–108)
CO2 SERPL-SCNC: 32 MMOL/L — HIGH (ref 22–31)
COLOR SPEC: YELLOW — SIGNIFICANT CHANGE UP
CREAT SERPL-MCNC: 0.75 MG/DL — SIGNIFICANT CHANGE UP (ref 0.5–1.3)
DIFF PNL FLD: ABNORMAL
EOSINOPHIL # BLD AUTO: 0.21 K/UL — SIGNIFICANT CHANGE UP (ref 0–0.5)
EOSINOPHIL NFR BLD AUTO: 3.1 % — SIGNIFICANT CHANGE UP (ref 0–6)
EPI CELLS # UR: 2 /HPF — SIGNIFICANT CHANGE UP (ref 0–5)
GLUCOSE SERPL-MCNC: 102 MG/DL — HIGH (ref 70–99)
GLUCOSE UR QL: NEGATIVE MG/DL — SIGNIFICANT CHANGE UP
HCT VFR BLD CALC: 39.4 % — SIGNIFICANT CHANGE UP (ref 34.5–45)
HGB BLD-MCNC: 12.3 G/DL — SIGNIFICANT CHANGE UP (ref 11.5–15.5)
HYALINE CASTS # UR AUTO: 9 /LPF — HIGH (ref 0–7)
IMM GRANULOCYTES NFR BLD AUTO: 0.1 % — SIGNIFICANT CHANGE UP (ref 0–1.5)
KETONES UR-MCNC: NEGATIVE — SIGNIFICANT CHANGE UP
LEUKOCYTE ESTERASE UR-ACNC: ABNORMAL
LYMPHOCYTES # BLD AUTO: 1.35 K/UL — SIGNIFICANT CHANGE UP (ref 1–3.3)
LYMPHOCYTES # BLD AUTO: 20.2 % — SIGNIFICANT CHANGE UP (ref 13–44)
MCHC RBC-ENTMCNC: 27.2 PG — SIGNIFICANT CHANGE UP (ref 27–34)
MCHC RBC-ENTMCNC: 31.2 GM/DL — LOW (ref 32–36)
MCV RBC AUTO: 87 FL — SIGNIFICANT CHANGE UP (ref 80–100)
MONOCYTES # BLD AUTO: 0.75 K/UL — SIGNIFICANT CHANGE UP (ref 0–0.9)
MONOCYTES NFR BLD AUTO: 11.2 % — SIGNIFICANT CHANGE UP (ref 2–14)
NEUTROPHILS # BLD AUTO: 4.34 K/UL — SIGNIFICANT CHANGE UP (ref 1.8–7.4)
NEUTROPHILS NFR BLD AUTO: 65.3 % — SIGNIFICANT CHANGE UP (ref 43–77)
NITRITE UR-MCNC: POSITIVE
PH UR: 7 — SIGNIFICANT CHANGE UP (ref 5–8)
PLATELET # BLD AUTO: 243 K/UL — SIGNIFICANT CHANGE UP (ref 150–400)
POTASSIUM SERPL-MCNC: 3.8 MMOL/L — SIGNIFICANT CHANGE UP (ref 3.5–5.3)
POTASSIUM SERPL-SCNC: 3.8 MMOL/L — SIGNIFICANT CHANGE UP (ref 3.5–5.3)
PROT UR-MCNC: NEGATIVE MG/DL — SIGNIFICANT CHANGE UP
RBC # BLD: 4.53 M/UL — SIGNIFICANT CHANGE UP (ref 3.8–5.2)
RBC # FLD: 14.8 % — HIGH (ref 10.3–14.5)
RBC CASTS # UR COMP ASSIST: 34 /HPF — HIGH (ref 0–4)
SODIUM SERPL-SCNC: 145 MMOL/L — SIGNIFICANT CHANGE UP (ref 135–145)
SP GR SPEC: 1.01 — SIGNIFICANT CHANGE UP (ref 1.01–1.02)
UROBILINOGEN FLD QL: 1 MG/DL — SIGNIFICANT CHANGE UP
WBC # BLD: 6.67 K/UL — SIGNIFICANT CHANGE UP (ref 3.8–10.5)
WBC # FLD AUTO: 6.67 K/UL — SIGNIFICANT CHANGE UP (ref 3.8–10.5)
WBC UR QL: >720 /HPF — HIGH (ref 0–5)

## 2018-07-01 PROCEDURE — 99233 SBSQ HOSP IP/OBS HIGH 50: CPT

## 2018-07-01 RX ORDER — ACETAMINOPHEN 500 MG
1000 TABLET ORAL ONCE
Qty: 0 | Refills: 0 | Status: COMPLETED | OUTPATIENT
Start: 2018-07-01 | End: 2018-07-01

## 2018-07-01 RX ORDER — OXYCODONE HYDROCHLORIDE 5 MG/1
2.5 TABLET ORAL EVERY 4 HOURS
Qty: 0 | Refills: 0 | Status: DISCONTINUED | OUTPATIENT
Start: 2018-07-01 | End: 2018-07-02

## 2018-07-01 RX ORDER — MUPIROCIN 20 MG/G
1 OINTMENT TOPICAL
Qty: 0 | Refills: 0 | Status: DISCONTINUED | OUTPATIENT
Start: 2018-07-01 | End: 2018-07-05

## 2018-07-01 RX ORDER — NYSTATIN CREAM 100000 [USP'U]/G
1 CREAM TOPICAL
Qty: 0 | Refills: 0 | Status: DISCONTINUED | OUTPATIENT
Start: 2018-07-01 | End: 2018-07-05

## 2018-07-01 RX ORDER — CEFTRIAXONE 500 MG/1
1 INJECTION, POWDER, FOR SOLUTION INTRAMUSCULAR; INTRAVENOUS EVERY 24 HOURS
Qty: 0 | Refills: 0 | Status: DISCONTINUED | OUTPATIENT
Start: 2018-07-01 | End: 2018-07-03

## 2018-07-01 RX ADMIN — APIXABAN 5 MILLIGRAM(S): 2.5 TABLET, FILM COATED ORAL at 17:57

## 2018-07-01 RX ADMIN — Medication 40 MILLIGRAM(S): at 06:28

## 2018-07-01 RX ADMIN — OXYCODONE HYDROCHLORIDE 5 MILLIGRAM(S): 5 TABLET ORAL at 07:00

## 2018-07-01 RX ADMIN — Medication 25 MILLIGRAM(S): at 06:28

## 2018-07-01 RX ADMIN — MUPIROCIN 1 APPLICATION(S): 20 OINTMENT TOPICAL at 06:29

## 2018-07-01 RX ADMIN — OXYCODONE HYDROCHLORIDE 5 MILLIGRAM(S): 5 TABLET ORAL at 17:58

## 2018-07-01 RX ADMIN — NYSTATIN CREAM 1 APPLICATION(S): 100000 CREAM TOPICAL at 17:57

## 2018-07-01 RX ADMIN — OXYCODONE HYDROCHLORIDE 5 MILLIGRAM(S): 5 TABLET ORAL at 18:30

## 2018-07-01 RX ADMIN — OXYCODONE HYDROCHLORIDE 5 MILLIGRAM(S): 5 TABLET ORAL at 23:12

## 2018-07-01 RX ADMIN — SERTRALINE 75 MILLIGRAM(S): 25 TABLET, FILM COATED ORAL at 11:49

## 2018-07-01 RX ADMIN — ATORVASTATIN CALCIUM 40 MILLIGRAM(S): 80 TABLET, FILM COATED ORAL at 22:20

## 2018-07-01 RX ADMIN — Medication 400 MILLIGRAM(S): at 11:48

## 2018-07-01 RX ADMIN — GABAPENTIN 100 MILLIGRAM(S): 400 CAPSULE ORAL at 22:20

## 2018-07-01 RX ADMIN — GABAPENTIN 100 MILLIGRAM(S): 400 CAPSULE ORAL at 14:33

## 2018-07-01 RX ADMIN — OXYCODONE HYDROCHLORIDE 5 MILLIGRAM(S): 5 TABLET ORAL at 06:28

## 2018-07-01 RX ADMIN — APIXABAN 5 MILLIGRAM(S): 2.5 TABLET, FILM COATED ORAL at 06:28

## 2018-07-01 RX ADMIN — CEFTRIAXONE 100 GRAM(S): 500 INJECTION, POWDER, FOR SOLUTION INTRAMUSCULAR; INTRAVENOUS at 14:34

## 2018-07-01 RX ADMIN — GABAPENTIN 100 MILLIGRAM(S): 400 CAPSULE ORAL at 06:28

## 2018-07-01 RX ADMIN — Medication 1000 MILLIGRAM(S): at 12:15

## 2018-07-01 RX ADMIN — OXYCODONE HYDROCHLORIDE 5 MILLIGRAM(S): 5 TABLET ORAL at 23:42

## 2018-07-01 NOTE — PROGRESS NOTE ADULT - PROBLEM SELECTOR PLAN 1
MRI with Degenerative disc disease L2-3 with left-sided disc osteophyte complex   which extends into the left neural foramen. Far lateral left-sided disc   herniation L3-4 extending into the neural foramen.   Edema in the sacral alae bilaterally left greater than right suspicious   for sacral insufficiency fractures which appear new   - Acetaminophen for mild pain. Oxycodone for mod-severe pain. Continue with lidocaine patch and Gabapentin.    - Miralax for constipation.  -Ortho/spine recs appreciated   -PT evaluated patient with recommendation for RICCARDO.

## 2018-07-01 NOTE — CHART NOTE - NSCHARTNOTEFT_GEN_A_CORE
Received call from RN, stating Pt's urine smelled foul when changing Pt.  UA sent.  Pt afebrile, offering no complaints.  F/u UA result.  F/u with primary team in am.    Lynne Mulligan, ANP ext [phone]

## 2018-07-02 DIAGNOSIS — K59.00 CONSTIPATION, UNSPECIFIED: ICD-10-CM

## 2018-07-02 DIAGNOSIS — N30.01 ACUTE CYSTITIS WITH HEMATURIA: ICD-10-CM

## 2018-07-02 LAB
ANION GAP SERPL CALC-SCNC: 12 MMOL/L — SIGNIFICANT CHANGE UP (ref 5–17)
BUN SERPL-MCNC: 15 MG/DL — SIGNIFICANT CHANGE UP (ref 7–23)
CALCIUM SERPL-MCNC: 8.9 MG/DL — SIGNIFICANT CHANGE UP (ref 8.4–10.5)
CHLORIDE SERPL-SCNC: 99 MMOL/L — SIGNIFICANT CHANGE UP (ref 96–108)
CO2 SERPL-SCNC: 30 MMOL/L — SIGNIFICANT CHANGE UP (ref 22–31)
CREAT SERPL-MCNC: 0.71 MG/DL — SIGNIFICANT CHANGE UP (ref 0.5–1.3)
GLUCOSE SERPL-MCNC: 99 MG/DL — SIGNIFICANT CHANGE UP (ref 70–99)
HCT VFR BLD CALC: 37.4 % — SIGNIFICANT CHANGE UP (ref 34.5–45)
HGB BLD-MCNC: 11.9 G/DL — SIGNIFICANT CHANGE UP (ref 11.5–15.5)
MCHC RBC-ENTMCNC: 28.1 PG — SIGNIFICANT CHANGE UP (ref 27–34)
MCHC RBC-ENTMCNC: 31.8 GM/DL — LOW (ref 32–36)
MCV RBC AUTO: 88.4 FL — SIGNIFICANT CHANGE UP (ref 80–100)
PLATELET # BLD AUTO: 231 K/UL — SIGNIFICANT CHANGE UP (ref 150–400)
POTASSIUM SERPL-MCNC: 3.4 MMOL/L — LOW (ref 3.5–5.3)
POTASSIUM SERPL-SCNC: 3.4 MMOL/L — LOW (ref 3.5–5.3)
RBC # BLD: 4.23 M/UL — SIGNIFICANT CHANGE UP (ref 3.8–5.2)
RBC # FLD: 15 % — HIGH (ref 10.3–14.5)
SODIUM SERPL-SCNC: 141 MMOL/L — SIGNIFICANT CHANGE UP (ref 135–145)
WBC # BLD: 5.37 K/UL — SIGNIFICANT CHANGE UP (ref 3.8–10.5)
WBC # FLD AUTO: 5.37 K/UL — SIGNIFICANT CHANGE UP (ref 3.8–10.5)

## 2018-07-02 PROCEDURE — 99233 SBSQ HOSP IP/OBS HIGH 50: CPT

## 2018-07-02 RX ORDER — OXYCODONE AND ACETAMINOPHEN 5; 325 MG/1; MG/1
1 TABLET ORAL EVERY 4 HOURS
Qty: 0 | Refills: 0 | Status: DISCONTINUED | OUTPATIENT
Start: 2018-07-02 | End: 2018-07-05

## 2018-07-02 RX ORDER — ACETAMINOPHEN 500 MG
1000 TABLET ORAL ONCE
Qty: 0 | Refills: 0 | Status: COMPLETED | OUTPATIENT
Start: 2018-07-02 | End: 2018-07-02

## 2018-07-02 RX ORDER — MORPHINE SULFATE 50 MG/1
2 CAPSULE, EXTENDED RELEASE ORAL ONCE
Qty: 0 | Refills: 0 | Status: DISCONTINUED | OUTPATIENT
Start: 2018-07-02 | End: 2018-07-02

## 2018-07-02 RX ORDER — POTASSIUM CHLORIDE 20 MEQ
40 PACKET (EA) ORAL ONCE
Qty: 0 | Refills: 0 | Status: COMPLETED | OUTPATIENT
Start: 2018-07-02 | End: 2018-07-02

## 2018-07-02 RX ADMIN — CEFTRIAXONE 100 GRAM(S): 500 INJECTION, POWDER, FOR SOLUTION INTRAMUSCULAR; INTRAVENOUS at 13:51

## 2018-07-02 RX ADMIN — Medication 650 MILLIGRAM(S): at 09:25

## 2018-07-02 RX ADMIN — GABAPENTIN 100 MILLIGRAM(S): 400 CAPSULE ORAL at 13:51

## 2018-07-02 RX ADMIN — APIXABAN 5 MILLIGRAM(S): 2.5 TABLET, FILM COATED ORAL at 06:01

## 2018-07-02 RX ADMIN — Medication 400 MILLIGRAM(S): at 01:56

## 2018-07-02 RX ADMIN — ATORVASTATIN CALCIUM 40 MILLIGRAM(S): 80 TABLET, FILM COATED ORAL at 23:05

## 2018-07-02 RX ADMIN — Medication 40 MILLIGRAM(S): at 06:01

## 2018-07-02 RX ADMIN — APIXABAN 5 MILLIGRAM(S): 2.5 TABLET, FILM COATED ORAL at 17:02

## 2018-07-02 RX ADMIN — Medication 650 MILLIGRAM(S): at 10:00

## 2018-07-02 RX ADMIN — NYSTATIN CREAM 1 APPLICATION(S): 100000 CREAM TOPICAL at 06:00

## 2018-07-02 RX ADMIN — OXYCODONE HYDROCHLORIDE 5 MILLIGRAM(S): 5 TABLET ORAL at 09:24

## 2018-07-02 RX ADMIN — Medication 25 MILLIGRAM(S): at 06:01

## 2018-07-02 RX ADMIN — Medication 1000 MILLIGRAM(S): at 02:15

## 2018-07-02 RX ADMIN — Medication 40 MILLIEQUIVALENT(S): at 17:03

## 2018-07-02 RX ADMIN — GABAPENTIN 100 MILLIGRAM(S): 400 CAPSULE ORAL at 06:01

## 2018-07-02 RX ADMIN — OXYCODONE AND ACETAMINOPHEN 1 TABLET(S): 5; 325 TABLET ORAL at 23:03

## 2018-07-02 RX ADMIN — NYSTATIN CREAM 1 APPLICATION(S): 100000 CREAM TOPICAL at 17:04

## 2018-07-02 RX ADMIN — GABAPENTIN 100 MILLIGRAM(S): 400 CAPSULE ORAL at 23:04

## 2018-07-02 RX ADMIN — MORPHINE SULFATE 2 MILLIGRAM(S): 50 CAPSULE, EXTENDED RELEASE ORAL at 03:22

## 2018-07-02 RX ADMIN — OXYCODONE HYDROCHLORIDE 5 MILLIGRAM(S): 5 TABLET ORAL at 10:00

## 2018-07-02 RX ADMIN — OXYCODONE AND ACETAMINOPHEN 1 TABLET(S): 5; 325 TABLET ORAL at 15:32

## 2018-07-02 RX ADMIN — OXYCODONE AND ACETAMINOPHEN 1 TABLET(S): 5; 325 TABLET ORAL at 16:00

## 2018-07-02 RX ADMIN — MORPHINE SULFATE 2 MILLIGRAM(S): 50 CAPSULE, EXTENDED RELEASE ORAL at 03:37

## 2018-07-02 RX ADMIN — MUPIROCIN 1 APPLICATION(S): 20 OINTMENT TOPICAL at 13:52

## 2018-07-02 RX ADMIN — SERTRALINE 75 MILLIGRAM(S): 25 TABLET, FILM COATED ORAL at 13:51

## 2018-07-02 NOTE — PROGRESS NOTE ADULT - SUBJECTIVE AND OBJECTIVE BOX
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  back pain is not well controlled , but patient states that her pain is better when she takes Oxycodone and tylenol.  NO fever , no diarrhea.  Pos constipation .        Medications:MEDICATIONS  (STANDING):  apixaban 5 milliGRAM(s) Oral every 12 hours  atorvastatin 40 milliGRAM(s) Oral at bedtime  cefTRIAXone   IVPB 1 Gram(s) IV Intermittent every 24 hours  furosemide    Tablet 40 milliGRAM(s) Oral daily  gabapentin 100 milliGRAM(s) Oral three times a day  lidocaine   Patch 1 Patch Transdermal daily  metoprolol succinate ER 25 milliGRAM(s) Oral daily  mupirocin 2% Ointment 1 Application(s) Topical two times a day  nystatin Cream 1 Application(s) Topical two times a day  potassium chloride    Tablet ER 40 milliEquivalent(s) Oral once  sertraline 75 milliGRAM(s) Oral daily    MEDICATIONS  (PRN):  bisacodyl 5 milliGRAM(s) Oral every 12 hours PRN Constipation  oxyCODONE    5 mG/acetaminophen 325 mG 1 Tablet(s) Oral every 4 hours PRN Moderate Pain (4 - 6)  oxyCODONE    IR 5 milliGRAM(s) Oral every 6 hours PRN Severe Pain (7 - 10)  polyethylene glycol 3350 17 Gram(s) Oral daily PRN Constipation      Allergies: Allergies    No Known Allergies    Intolerances        REVIEW OF SYSTEMS:  No fever  NECK: No pain or stiffness  RESPIRATORY: No cough, No shortness of breath  CARDIOVASCULAR: No chest pain, palpitations, dizziness, pos chronic b/l  legs swelling  GASTROINTESTINAL: No abdominal pain. No nausea, vomiting, or hematemesis; No diarrhea  Genitourinary          :   pos urinary incontinence   NEUROLOGICAL: No headaches  Musculoskeletal : uncontrolled back pain       T(C): 36.6 (18 @ 14:30), Max: 36.9 (18 @ 21:50)  HR: 69 (18 @ 14:30) (69 - 86)  BP: 102/54 (18 @ 14:30) (102/54 - 135/86)  RR: 18 (18 @ 14:30) (18 - 18)  SpO2: 98% (18 @ 14:30) (93% - 98%)  Wt(kg): --Vital Signs Last 24 Hrs  T(C): 36.6 (2018 14:30), Max: 36.9 (2018 21:50)  T(F): 97.8 (2018 14:30), Max: 98.4 (2018 21:50)  HR: 69 (2018 14:30) (69 - 86)  BP: 102/54 (2018 14:30) (102/54 - 135/86)  BP(mean): --  RR: 18 (2018 14:30) (18 - 18)  SpO2: 98% (2018 14:30) (93% - 98%)  I&O's Summary    2018 07:01  -  2018 07:00  --------------------------------------------------------  IN: 1470 mL / OUT: 400 mL / NET: 1070 mL    2018 07:01  -  2018 15:17  --------------------------------------------------------  IN: 200 mL / OUT: 0 mL / NET: 200 mL      Last Menstrual Period      PHYSICAL EXAM:  GENERAL: NAD, well-groomed, well-developed  HEAD:  Atraumatic, Normocephalic  EYES: EOMI, PERRLA, conjunctiva and sclera clear  ENMT: No tonsillar erythema, exudates, or enlargement; Moist mucous membranes, Good dentition, No lesions  NECK: Supple, No JVD, Normal thyroid  NERVOUS SYSTEM:  Alert & Oriented X3, Good concentration; Motor Strength 5/5 B/L upper and lower extremities; DTRs 2+ intact and symmetric  CHEST/LUNG: Clear to percussion bilaterally; No rales, rhonchi, wheezing, or rubs  HEART: Regular rate and rhythm; No murmurs, rubs, or gallops  ABDOMEN: Soft, Nontender, Nondistended; Bowel sounds present  EXTREMITIES:  2+ Peripheral Pulses, No clubbing, cyanosis, or edema  LYMPH: No lymphadenopathy noted  SKIN: No rashes or lesions    Consultant(s) Notes Reviewed:  [x ] YES  [ ] NO  Care Discussed with Consultants/Other Providers [ x] YES  [ ] NO  Name of Consultant  LABS:                        11.9   5.37  )-----------( 231      ( 2018 08:05 )             37.4     07-02    141  |  99  |  15  ----------------------------<  99  3.4<L>   |  30  |  0.71    Ca    8.9      2018 06:58        Urinalysis Basic - ( 2018 09:49 )    Color: Yellow / Appearance: Turbid / S.014 / pH: x  Gluc: x / Ketone: Negative  / Bili: Negative / Urobili: 1.0 mg/dL   Blood: x / Protein: Negative mg/dL / Nitrite: Positive   Leuk Esterase: Large / RBC: 34 /HPF / WBC >720 /HPF   Sq Epi: x / Non Sq Epi: 2 /HPF / Bacteria: Moderate      CAPILLARY BLOOD GLUCOSE            Urinalysis Basic - ( 2018 09:49 )    Color: Yellow / Appearance: Turbid / S.014 / pH: x  Gluc: x / Ketone: Negative  / Bili: Negative / Urobili: 1.0 mg/dL   Blood: x / Protein: Negative mg/dL / Nitrite: Positive   Leuk Esterase: Large / RBC: 34 /HPF / WBC >720 /HPF   Sq Epi: x / Non Sq Epi: 2 /HPF / Bacteria: Moderate        RADIOLOGY & ADDITIONAL TESTS:  EKG :     Imaging Personally Reviewed:  [ ] YES  [ ] NO  HEALTH ISSUES - PROBLEM Dx:  Urine malodor: Urine malodor  Chronic atrial fibrillation: Chronic atrial fibrillation  Essential hypertension: Essential hypertension  Varicose veins with other complication: Varicose veins with other complication  Venous insufficiency of both lower extremities: Venous insufficiency of both lower extremities  Dyspnea on exertion: Dyspnea on exertion  Atrial fibrillation: Atrial fibrillation  Need for prophylactic measure: Need for prophylactic measure  Depression: Depression  DVT (deep venous thrombosis): DVT (deep venous thrombosis)  Obesity: Obesity  Hyperlipidemia: Hyperlipidemia  HTN (hypertension): HTN (hypertension)  Edema of lower extremity: Edema of lower extremity  Back pain: Back pain Patient is a 72y old  Female who presents with a chief complaint of Back pain (2018 03:57)      INTERVAL HPI/OVERNIGHT EVENTS:     73 yo F w pmhx of HTN, HLD, 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  back pain is not well controlled , but patient states that her pain is better when she takes Oxycodone and tylenol.  NO fever , no diarrhea.  Pos constipation .        Medications:MEDICATIONS  (STANDING):  apixaban 5 milliGRAM(s) Oral every 12 hours  atorvastatin 40 milliGRAM(s) Oral at bedtime  cefTRIAXone   IVPB 1 Gram(s) IV Intermittent every 24 hours  furosemide    Tablet 40 milliGRAM(s) Oral daily  gabapentin 100 milliGRAM(s) Oral three times a day  lidocaine   Patch 1 Patch Transdermal daily  metoprolol succinate ER 25 milliGRAM(s) Oral daily  mupirocin 2% Ointment 1 Application(s) Topical two times a day  nystatin Cream 1 Application(s) Topical two times a day  potassium chloride    Tablet ER 40 milliEquivalent(s) Oral once  sertraline 75 milliGRAM(s) Oral daily    MEDICATIONS  (PRN):  bisacodyl 5 milliGRAM(s) Oral every 12 hours PRN Constipation  oxyCODONE    5 mG/acetaminophen 325 mG 1 Tablet(s) Oral every 4 hours PRN Moderate Pain (4 - 6)  oxyCODONE    IR 5 milliGRAM(s) Oral every 6 hours PRN Severe Pain (7 - 10)  polyethylene glycol 3350 17 Gram(s) Oral daily PRN Constipation      Allergies: Allergies    No Known Allergies    Intolerances        REVIEW OF SYSTEMS:  No fever  NECK: No pain or stiffness  RESPIRATORY: No cough, No shortness of breath  CARDIOVASCULAR: No chest pain, palpitations, dizziness, pos chronic b/l  legs swelling  GASTROINTESTINAL: No abdominal pain. No nausea, vomiting, or hematemesis; No diarrhea, pos constipation   Genitourinary          :   pos urinary incontinence   NEUROLOGICAL: No headaches  Musculoskeletal : uncontrolled back pain       T(C): 36.6 (18 @ 14:30), Max: 36.9 (18 @ 21:50)  HR: 69 (18 @ 14:30) (69 - 86)  BP: 102/54 (18 @ 14:30) (102/54 - 135/86)  RR: 18 (18 @ 14:30) (18 - 18)  SpO2: 98% (18 @ 14:30) (93% - 98%)  Wt(kg): --Vital Signs Last 24 Hrs  T(C): 36.6 (2018 14:30), Max: 36.9 (2018 21:50)  T(F): 97.8 (2018 14:30), Max: 98.4 (2018 21:50)  HR: 69 (2018 14:30) (69 - 86)  BP: 102/54 (2018 14:30) (102/54 - 135/86)  BP(mean): --  RR: 18 (2018 14:30) (18 - 18)  SpO2: 98% (2018 14:30) (93% - 98%)  I&O's Summary    2018 07:01  -  2018 07:00  --------------------------------------------------------  IN: 1470 mL / OUT: 400 mL / NET: 1070 mL    2018 07:01  -  2018 15:17  --------------------------------------------------------  IN: 200 mL / OUT: 0 mL / NET: 200 mL      Last Menstrual Period      PHYSICAL EXAM:  GENERAL: NAD, well-groomed, well-developed  HEAD:  Atraumatic, Normocephalic  NECK: Supple, No JVD, Normal thyroid  NERVOUS SYSTEM:  Alert & Oriented X3, Good concentration  CHEST/LUNG: Clear to percussion bilaterally  HEART: Regular rate and rhythm  ABDOMEN: Soft, Nontender, Nondistended; Bowel sounds present  EXTREMITIES:  pos b/l large leg edema with no gross erythema/ open blister       Consultant(s) Notes Reviewed:  [x ] YES  [ ] NO  Care Discussed with Consultants/Other Providers [ x] YES  [ ] NO  Name of Consultant  LABS:                        11.9   5.37  )-----------( 231      ( 2018 08:05 )             37.4     07-02    141  |  99  |  15  ----------------------------<  99  3.4<L>   |  30  |  0.71    Ca    8.9      2018 06:58        Urinalysis Basic - ( 2018 09:49 )    Color: Yellow / Appearance: Turbid / S.014 / pH: x  Gluc: x / Ketone: Negative  / Bili: Negative / Urobili: 1.0 mg/dL   Blood: x / Protein: Negative mg/dL / Nitrite: Positive   Leuk Esterase: Large / RBC: 34 /HPF / WBC >720 /HPF   Sq Epi: x / Non Sq Epi: 2 /HPF / Bacteria: Moderate      CAPILLARY BLOOD GLUCOSE            Urinalysis Basic - ( 2018 09:49 )    Color: Yellow / Appearance: Turbid / S.014 / pH: x  Gluc: x / Ketone: Negative  / Bili: Negative / Urobili: 1.0 mg/dL   Blood: x / Protein: Negative mg/dL / Nitrite: Positive   Leuk Esterase: Large / RBC: 34 /HPF / WBC >720 /HPF   Sq Epi: x / Non Sq Epi: 2 /HPF / Bacteria: Moderate        RADIOLOGY & ADDITIONAL TESTS:  EKG :     Imaging Personally Reviewed:  [ ] YES  [ ] NO  HEALTH ISSUES - PROBLEM Dx:  Urine malodor: Urine malodor  Chronic atrial fibrillation: Chronic atrial fibrillation  Essential hypertension: Essential hypertension  Varicose veins with other complication: Varicose veins with other complication  Venous insufficiency of both lower extremities: Venous insufficiency of both lower extremities  Dyspnea on exertion: Dyspnea on exertion  Atrial fibrillation: Atrial fibrillation  Need for prophylactic measure: Need for prophylactic measure  Depression: Depression  DVT (deep venous thrombosis): DVT (deep venous thrombosis)  Obesity: Obesity  Hyperlipidemia: Hyperlipidemia  HTN (hypertension): HTN (hypertension)  Edema of lower extremity: Edema of lower extremity  Back pain: Back pain

## 2018-07-02 NOTE — CHART NOTE - NSCHARTNOTEFT_GEN_A_CORE
Called by RN for Pt. c/o pain in Left lower ext, not relieved by mediction. 0200 Called by RN for Pt. c/o pain in right lower ext, not relieved by oxycodone 5mg as previously ordered.  Tylenol 1000 mg x 1 ordered.  Continue to closely monitor, f/u with primary team in am.  Lynne Mulligan, KATARZYNA ext [phone]

## 2018-07-02 NOTE — PROGRESS NOTE ADULT - PROBLEM SELECTOR PLAN 1
MRI with Degenerative disc disease L2-3 with left-sided disc osteophyte complex   which extends into the left neural foramen. Far lateral left-sided disc   herniation L3-4 extending into the neural foramen.   Edema in the sacral alae bilaterally left greater than right suspicious   for sacral insufficiency fractures which appear new   - Will try Percocet/ Continue with lidocaine patch and Gabapentin.    - Miralax/ Dulcolax  for constipation.  -Ortho/spine recs appreciated   -PT evaluated patient with recommendation for [facility].   Once pain is improved/controlled / and treatment for UTI is completed / pt will be transferred to [facility]

## 2018-07-03 ENCOUNTER — TRANSCRIPTION ENCOUNTER (OUTPATIENT)
Age: 73
End: 2018-07-03

## 2018-07-03 DIAGNOSIS — M54.5 LOW BACK PAIN: ICD-10-CM

## 2018-07-03 LAB
-  AMIKACIN: SIGNIFICANT CHANGE UP
-  AMOXICILLIN/CLAVULANIC ACID: SIGNIFICANT CHANGE UP
-  AMPICILLIN/SULBACTAM: SIGNIFICANT CHANGE UP
-  AMPICILLIN: SIGNIFICANT CHANGE UP
-  AZTREONAM: SIGNIFICANT CHANGE UP
-  CEFAZOLIN: SIGNIFICANT CHANGE UP
-  CEFEPIME: SIGNIFICANT CHANGE UP
-  CEFOXITIN: SIGNIFICANT CHANGE UP
-  CEFTRIAXONE: SIGNIFICANT CHANGE UP
-  CIPROFLOXACIN: SIGNIFICANT CHANGE UP
-  ERTAPENEM: SIGNIFICANT CHANGE UP
-  GENTAMICIN: SIGNIFICANT CHANGE UP
-  IMIPENEM: SIGNIFICANT CHANGE UP
-  LEVOFLOXACIN: SIGNIFICANT CHANGE UP
-  MEROPENEM: SIGNIFICANT CHANGE UP
-  NITROFURANTOIN: SIGNIFICANT CHANGE UP
-  PIPERACILLIN/TAZOBACTAM: SIGNIFICANT CHANGE UP
-  TIGECYCLINE: SIGNIFICANT CHANGE UP
-  TOBRAMYCIN: SIGNIFICANT CHANGE UP
-  TRIMETHOPRIM/SULFAMETHOXAZOLE: SIGNIFICANT CHANGE UP
ANION GAP SERPL CALC-SCNC: 9 MMOL/L — SIGNIFICANT CHANGE UP (ref 5–17)
BUN SERPL-MCNC: 14 MG/DL — SIGNIFICANT CHANGE UP (ref 7–23)
CALCIUM SERPL-MCNC: 9.3 MG/DL — SIGNIFICANT CHANGE UP (ref 8.4–10.5)
CHLORIDE SERPL-SCNC: 101 MMOL/L — SIGNIFICANT CHANGE UP (ref 96–108)
CO2 SERPL-SCNC: 32 MMOL/L — HIGH (ref 22–31)
CREAT SERPL-MCNC: 0.66 MG/DL — SIGNIFICANT CHANGE UP (ref 0.5–1.3)
CULTURE RESULTS: SIGNIFICANT CHANGE UP
GLUCOSE SERPL-MCNC: 94 MG/DL — SIGNIFICANT CHANGE UP (ref 70–99)
MAGNESIUM SERPL-MCNC: 2.1 MG/DL — SIGNIFICANT CHANGE UP (ref 1.6–2.6)
METHOD TYPE: SIGNIFICANT CHANGE UP
ORGANISM # SPEC MICROSCOPIC CNT: SIGNIFICANT CHANGE UP
ORGANISM # SPEC MICROSCOPIC CNT: SIGNIFICANT CHANGE UP
POTASSIUM SERPL-MCNC: 3.7 MMOL/L — SIGNIFICANT CHANGE UP (ref 3.5–5.3)
POTASSIUM SERPL-SCNC: 3.7 MMOL/L — SIGNIFICANT CHANGE UP (ref 3.5–5.3)
SODIUM SERPL-SCNC: 142 MMOL/L — SIGNIFICANT CHANGE UP (ref 135–145)
SPECIMEN SOURCE: SIGNIFICANT CHANGE UP

## 2018-07-03 PROCEDURE — 93923 UPR/LXTR ART STDY 3+ LVLS: CPT | Mod: 26

## 2018-07-03 PROCEDURE — 99233 SBSQ HOSP IP/OBS HIGH 50: CPT

## 2018-07-03 RX ORDER — SERTRALINE 25 MG/1
3 TABLET, FILM COATED ORAL
Qty: 0 | Refills: 0 | DISCHARGE
Start: 2018-07-03

## 2018-07-03 RX ORDER — NYSTATIN CREAM 100000 [USP'U]/G
1 CREAM TOPICAL
Qty: 0 | Refills: 0 | DISCHARGE
Start: 2018-07-03

## 2018-07-03 RX ORDER — METOPROLOL TARTRATE 50 MG
1 TABLET ORAL
Qty: 0 | Refills: 0 | DISCHARGE
Start: 2018-07-03

## 2018-07-03 RX ORDER — SERTRALINE 25 MG/1
1 TABLET, FILM COATED ORAL
Qty: 0 | Refills: 0 | DISCHARGE
Start: 2018-07-03

## 2018-07-03 RX ORDER — MUPIROCIN 20 MG/G
1 OINTMENT TOPICAL
Qty: 0 | Refills: 0 | DISCHARGE
Start: 2018-07-03

## 2018-07-03 RX ORDER — SERTRALINE 25 MG/1
1.5 TABLET, FILM COATED ORAL
Qty: 0 | Refills: 0 | COMMUNITY

## 2018-07-03 RX ORDER — POLYETHYLENE GLYCOL 3350 17 G/17G
17 POWDER, FOR SOLUTION ORAL
Qty: 0 | Refills: 0 | DISCHARGE
Start: 2018-07-03

## 2018-07-03 RX ORDER — CIPROFLOXACIN LACTATE 400MG/40ML
400 VIAL (ML) INTRAVENOUS ONCE
Qty: 0 | Refills: 0 | Status: COMPLETED | OUTPATIENT
Start: 2018-07-03 | End: 2018-07-03

## 2018-07-03 RX ORDER — NYSTATIN CREAM 100000 [USP'U]/G
1 CREAM TOPICAL
Qty: 0 | Refills: 0 | Status: DISCONTINUED | OUTPATIENT
Start: 2018-07-03 | End: 2018-07-05

## 2018-07-03 RX ORDER — CIPROFLOXACIN LACTATE 400MG/40ML
VIAL (ML) INTRAVENOUS
Qty: 0 | Refills: 0 | Status: DISCONTINUED | OUTPATIENT
Start: 2018-07-03 | End: 2018-07-05

## 2018-07-03 RX ORDER — LIDOCAINE 4 G/100G
1 CREAM TOPICAL
Qty: 0 | Refills: 0 | DISCHARGE
Start: 2018-07-03

## 2018-07-03 RX ORDER — CIPROFLOXACIN LACTATE 400MG/40ML
400 VIAL (ML) INTRAVENOUS EVERY 12 HOURS
Qty: 0 | Refills: 0 | Status: DISCONTINUED | OUTPATIENT
Start: 2018-07-04 | End: 2018-07-05

## 2018-07-03 RX ADMIN — OXYCODONE AND ACETAMINOPHEN 1 TABLET(S): 5; 325 TABLET ORAL at 19:55

## 2018-07-03 RX ADMIN — NYSTATIN CREAM 1 APPLICATION(S): 100000 CREAM TOPICAL at 17:27

## 2018-07-03 RX ADMIN — SERTRALINE 75 MILLIGRAM(S): 25 TABLET, FILM COATED ORAL at 11:53

## 2018-07-03 RX ADMIN — APIXABAN 5 MILLIGRAM(S): 2.5 TABLET, FILM COATED ORAL at 17:26

## 2018-07-03 RX ADMIN — Medication 200 MILLIGRAM(S): at 21:51

## 2018-07-03 RX ADMIN — OXYCODONE AND ACETAMINOPHEN 1 TABLET(S): 5; 325 TABLET ORAL at 19:30

## 2018-07-03 RX ADMIN — OXYCODONE AND ACETAMINOPHEN 1 TABLET(S): 5; 325 TABLET ORAL at 00:03

## 2018-07-03 RX ADMIN — NYSTATIN CREAM 1 APPLICATION(S): 100000 CREAM TOPICAL at 05:47

## 2018-07-03 RX ADMIN — APIXABAN 5 MILLIGRAM(S): 2.5 TABLET, FILM COATED ORAL at 05:43

## 2018-07-03 RX ADMIN — GABAPENTIN 100 MILLIGRAM(S): 400 CAPSULE ORAL at 05:47

## 2018-07-03 RX ADMIN — OXYCODONE AND ACETAMINOPHEN 1 TABLET(S): 5; 325 TABLET ORAL at 03:00

## 2018-07-03 RX ADMIN — MUPIROCIN 1 APPLICATION(S): 20 OINTMENT TOPICAL at 11:53

## 2018-07-03 RX ADMIN — OXYCODONE AND ACETAMINOPHEN 1 TABLET(S): 5; 325 TABLET ORAL at 04:00

## 2018-07-03 RX ADMIN — CEFTRIAXONE 100 GRAM(S): 500 INJECTION, POWDER, FOR SOLUTION INTRAMUSCULAR; INTRAVENOUS at 13:39

## 2018-07-03 RX ADMIN — Medication 40 MILLIGRAM(S): at 05:47

## 2018-07-03 RX ADMIN — ATORVASTATIN CALCIUM 40 MILLIGRAM(S): 80 TABLET, FILM COATED ORAL at 21:54

## 2018-07-03 RX ADMIN — NYSTATIN CREAM 1 APPLICATION(S): 100000 CREAM TOPICAL at 17:26

## 2018-07-03 RX ADMIN — OXYCODONE AND ACETAMINOPHEN 1 TABLET(S): 5; 325 TABLET ORAL at 17:17

## 2018-07-03 RX ADMIN — OXYCODONE AND ACETAMINOPHEN 1 TABLET(S): 5; 325 TABLET ORAL at 10:26

## 2018-07-03 RX ADMIN — GABAPENTIN 100 MILLIGRAM(S): 400 CAPSULE ORAL at 13:43

## 2018-07-03 RX ADMIN — GABAPENTIN 100 MILLIGRAM(S): 400 CAPSULE ORAL at 21:54

## 2018-07-03 RX ADMIN — OXYCODONE AND ACETAMINOPHEN 1 TABLET(S): 5; 325 TABLET ORAL at 15:10

## 2018-07-03 RX ADMIN — Medication 25 MILLIGRAM(S): at 05:47

## 2018-07-03 NOTE — PROGRESS NOTE ADULT - PROBLEM SELECTOR PLAN 1
MRI with Degenerative disc disease L2-3 with left-sided disc osteophyte complex   which extends into the left neural foramen. Far lateral left-sided disc   herniation L3-4 extending into the neural foramen.   Edema in the sacral alae bilaterally left greater than right suspicious   for sacral insufficiency fractures which appear new   -pain is better controlled with Percocet/ Continue with lidocaine patch and Gabapentin.    - Miralax/ Dulcolax  for constipation.  -Ortho/spine recs appreciated   -PT is waiting for d/c to [facility].

## 2018-07-03 NOTE — DISCHARGE NOTE ADULT - MEDICATION SUMMARY - MEDICATIONS TO TAKE
I will START or STAY ON the medications listed below when I get home from the hospital:    oxyCODONE-acetaminophen 5 mg-325 mg oral tablet  -- 1 tab(s) by mouth every 4 hours, As needed, Moderate Pain (4 - 6)  -- Indication: For pain    apixaban 5 mg oral tablet  -- 1 tab(s) by mouth 2 times a day  -- Indication: For Atrial fibrillation    gabapentin 100 mg oral capsule  -- 1 cap(s) by mouth 3 times a day  -- Indication: For pain    sertraline 25 mg oral tablet  -- 3 tab(s) by mouth once a day  -- Indication: For Depression    atorvastatin 40 mg oral tablet  -- 1 tab(s) by mouth once a day  -- Indication: For Hld    metoprolol succinate 25 mg oral tablet, extended release  -- 1 tab(s) by mouth once a day  -- Indication: For HTN (hypertension)    alendronate weekly  -- 70 milligram(s) by mouth once a week  -- Indication: For Osteoporosis     nystatin 100,000 units/g topical powder  -- 1 application on skin 2 times a day  -- Indication: For rash    nystatin 100,000 units/g topical cream  -- 1 application on skin 2 times a day  -- Indication: For rash    mupirocin 2% topical ointment  -- 1 application on skin 2 times a day  -- Indication: For rash    lidocaine 5% topical film  -- Apply on skin to affected area once a day  -- Indication: For pain    furosemide 40 mg oral tablet  -- 1 tab(s) by mouth once a day in am  -- Indication: For leg swelling    bisacodyl 5 mg oral delayed release tablet  -- 1 tab(s) by mouth every 12 hours, As needed, Constipation  -- Indication: For Constipation, unspecified constipation type    polyethylene glycol 3350 oral powder for reconstitution  -- 17 gram(s) by mouth once a day, As needed, Constipation  -- Indication: For Constipation, unspecified constipation type    Cipro 500 mg oral tablet  -- 1 tab(s) by mouth every 12 hours until 7/6/18  -- Indication: For Uti I will START or STAY ON the medications listed below when I get home from the hospital:    oxyCODONE-acetaminophen 5 mg-325 mg oral tablet  -- 1 tab(s) by mouth every 4 hours, As needed, Moderate Pain (4 - 6)  -- Indication: For pain    apixaban 5 mg oral tablet  -- 1 tab(s) by mouth 2 times a day  -- Indication: For Atrial fibrillation    gabapentin 100 mg oral capsule  -- 1 cap(s) by mouth 3 times a day  -- Indication: For pain    sertraline 25 mg oral tablet  -- 3 tab(s) by mouth once a day  -- Indication: For Depression    atorvastatin 40 mg oral tablet  -- 1 tab(s) by mouth once a day  -- Indication: For Hld    metoprolol succinate 25 mg oral tablet, extended release  -- 1 tab(s) by mouth once a day  -- Indication: For HTN (hypertension)    alendronate weekly  -- 70 milligram(s) by mouth once a week  -- Indication: For Osteoporosis     nystatin 100,000 units/g topical powder  -- 1 application on skin 2 times a day  -- Indication: For rash    nystatin 100,000 units/g topical cream  -- 1 application on skin 2 times a day  -- Indication: For rash    mupirocin 2% topical ointment  -- 1 application on skin 2 times a day  -- Indication: For rash    lidocaine 5% topical film  -- Apply on skin to affected area once a day  -- Indication: For pain    furosemide 40 mg oral tablet  -- 1 tab(s) by mouth once a day in am  -- Indication: For leg swelling    bisacodyl 5 mg oral delayed release tablet  -- 1 tab(s) by mouth every 12 hours, As needed, Constipation  -- Indication: For Constipation, unspecified constipation type    polyethylene glycol 3350 oral powder for reconstitution  -- 17 gram(s) by mouth once a day, As needed, Constipation  -- Indication: For Constipation, unspecified constipation type    ciprofloxacin 500 mg oral tablet  -- 1 tab(s) by mouth every 12 hours until 7/6  -- Indication: For Uti

## 2018-07-03 NOTE — DISCHARGE NOTE ADULT - PATIENT PORTAL LINK FT
You can access the [facility] Patient Portal, offered by [facility], by registering with the following website: [email or web address]

## 2018-07-03 NOTE — DISCHARGE NOTE ADULT - CARE PROVIDER_API CALL
Gil Vasquez), Vascular Surgery  [address]  [address]  [place] [address]  Phone: [phone]  Fax: [phone]    Jaime Soto), Hematology; Internal Medicine; Medical Oncology  [facility]  [address] [place] [address]  Phone: [phone]  Fax: [phone]    Alex Buckner (MD; DC), Orthopaedic Surgery  [address]  [address]  [place] [address]  Phone: [phone]  Fax: [phone]

## 2018-07-03 NOTE — DISCHARGE NOTE ADULT - CARE PLAN
Principal Discharge DX:	Acute low back pain, unspecified back pain laterality, with sciatica presence unspecified Principal Discharge DX:	Acute low back pain, unspecified back pain laterality, with sciatica presence unspecified  Goal:	resolution of symptoms  Assessment and plan of treatment:	Pain control as needed  Physical therapy  [facility]  Secondary Diagnosis:	Acute cystitis without hematuria  Assessment and plan of treatment:	HOME CARE INSTRUCTIONS  if you were prescribed antibiotics, take them exactly as your caregiver instructs you. Finish the medication even if you feel better after you have only taken some of the medication.  Drink enough water and fluids to keep your urine clear or pale yellow.  Avoid caffeine, tea, and carbonated beverages. They tend to irritate your bladder.  Empty your bladder often. Avoid holding urine for long periods of time.  After a bowel movement, women should cleanse from front to back. Use each tissue only once.  SEEK MEDICAL CARE IF:  You have back pain.  You develop a fever.  Your symptoms do not begin to resolve within 3 days.  SEEK IMMEDIATE MEDICAL CARE IF:  You have severe back pain or lower abdominal pain.  You develop chills.  You have nausea or vomiting.  You have continued burning or discomfort with urination.  Secondary Diagnosis:	Essential hypertension  Assessment and plan of treatment:	Take medications for your blood pressure as recommended.  Eat a heart healthy diet that is low in saturated fats and salt, and includes whole grains, fruits, vegetables and lean protein   Exercise regularly (consult with your physician or cardiologist first); maintain a heart healthy weight.   If you smoke - quit (A resource to help you stop smoking is the [facility] for Tobacco Control – phone number [phone].). Continue to follow with your primary physician or cardiologist.   Seek medical help for dizziness, Lightheadedness, Blurry vision, Headache, Chest pain, Shortness of breath  Follow up with your medical doctor to establish long term blood pressure treatment goals.  Secondary Diagnosis:	Venous insufficiency of both lower extremities  Assessment and plan of treatment:	BL LE wound care: Cleanse wounds w/ NS, cavilon to periwound, aquacel SILVER to cover ulcers, ABD to cover, roverto to secure, Figure 8 ace wraps to address B LE edema daily

## 2018-07-03 NOTE — DISCHARGE NOTE ADULT - PLAN OF CARE
resolution of symptoms Pain control as needed  Physical therapy  WBAT HOME CARE INSTRUCTIONS  if you were prescribed antibiotics, take them exactly as your caregiver instructs you. Finish the medication even if you feel better after you have only taken some of the medication.  Drink enough water and fluids to keep your urine clear or pale yellow.  Avoid caffeine, tea, and carbonated beverages. They tend to irritate your bladder.  Empty your bladder often. Avoid holding urine for long periods of time.  After a bowel movement, women should cleanse from front to back. Use each tissue only once.  SEEK MEDICAL CARE IF:  You have back pain.  You develop a fever.  Your symptoms do not begin to resolve within 3 days.  SEEK IMMEDIATE MEDICAL CARE IF:  You have severe back pain or lower abdominal pain.  You develop chills.  You have nausea or vomiting.  You have continued burning or discomfort with urination. Take medications for your blood pressure as recommended.  Eat a heart healthy diet that is low in saturated fats and salt, and includes whole grains, fruits, vegetables and lean protein   Exercise regularly (consult with your physician or cardiologist first); maintain a heart healthy weight.   If you smoke - quit (A resource to help you stop smoking is the [facility] for Tobacco Control – phone number [phone].). Continue to follow with your primary physician or cardiologist.   Seek medical help for dizziness, Lightheadedness, Blurry vision, Headache, Chest pain, Shortness of breath  Follow up with your medical doctor to establish long term blood pressure treatment goals. BL LE wound care: Cleanse wounds w/ NS, cavilon to periwound, aquacel SILVER to cover ulcers, ABD to cover, roverto to secure, Figure 8 ace wraps to address B LE edema daily

## 2018-07-03 NOTE — DISCHARGE NOTE ADULT - SECONDARY DIAGNOSIS.
Acute cystitis without hematuria Essential hypertension Venous insufficiency of both lower extremities

## 2018-07-03 NOTE — CHART NOTE - NSCHARTNOTEFT_GEN_A_CORE
Culture - Urine (collected 01 Jul 2018 16:59)  Source: .Urine Clean Catch (Midstream)  Final Report (03 Jul 2018 16:20):    >100,000 CFU/ml Enterobacter cloacae/asburiae    <10,000 CFU/ml Normal Urogenital booker present  Organism: Enterobacter cloacae/absuria (03 Jul 2018 16:20)  Organism: Enterobacter cloacae/absuria (03 Jul 2018 16:20)    Resistant to abx ceftriaxone, abx changed to cipro, would treat for 3 days for uncomplicated UTI or upon Attending's discretion.     [place] [id number]

## 2018-07-03 NOTE — PROGRESS NOTE ADULT - SUBJECTIVE AND OBJECTIVE BOX
Patient is a 72y old  Female who presents with a chief complaint of Back pain (03 Jul 2018 11:06)      INTERVAL HPI/OVERNIGHT EVENTS:    Medications:MEDICATIONS  (STANDING):  apixaban 5 milliGRAM(s) Oral every 12 hours  atorvastatin 40 milliGRAM(s) Oral at bedtime  cefTRIAXone   IVPB 1 Gram(s) IV Intermittent every 24 hours  furosemide    Tablet 40 milliGRAM(s) Oral daily  gabapentin 100 milliGRAM(s) Oral three times a day  lidocaine   Patch 1 Patch Transdermal daily  metoprolol succinate ER 25 milliGRAM(s) Oral daily  mupirocin 2% Ointment 1 Application(s) Topical two times a day  nystatin Cream 1 Application(s) Topical two times a day  nystatin Powder 1 Application(s) Topical two times a day  sertraline 75 milliGRAM(s) Oral daily    MEDICATIONS  (PRN):  bisacodyl 5 milliGRAM(s) Oral every 12 hours PRN Constipation  oxyCODONE    5 mG/acetaminophen 325 mG 1 Tablet(s) Oral every 4 hours PRN Moderate Pain (4 - 6)  oxyCODONE    IR 5 milliGRAM(s) Oral every 6 hours PRN Severe Pain (7 - 10)  polyethylene glycol 3350 17 Gram(s) Oral daily PRN Constipation      Allergies: Allergies    No Known Allergies    Intolerances        REVIEW OF SYSTEMS:  CONSTITUTIONAL: No fever, weight loss, or fatigue  EYES: No eye pain, visual disturbances, or discharge  ENMT:  No difficulty hearing, tinnitus, vertigo; No sinus or throat pain  NECK: No pain or stiffness  BREASTS: No pain, masses, or nipple discharge  RESPIRATORY: No cough, wheezing, chills or hemoptysis; No shortness of breath  CARDIOVASCULAR: No chest pain, palpitations, dizziness, or leg swelling  GASTROINTESTINAL: No abdominal or epigastric pain. No nausea, vomiting, or hematemesis; No diarrhea or constipation. No melena or hematochezia.  GENITOURINARY: No dysuria, frequency, hematuria, or incontinence  NEUROLOGICAL: No headaches, memory loss, loss of strength, numbness, or tremors  SKIN: No itching, burning, rashes, or lesions   LYMPH NODES: No enlarged glands  ENDOCRINE: No heat or cold intolerance; No hair loss  MUSCULOSKELETAL: No joint pain or swelling; No muscle, back, or extremity pain  PSYCHIATRIC: No depression, anxiety, mood swings, or difficulty sleeping  HEME/LYMPH: No easy bruising, or bleeding gums  ALLERY AND IMMUNOLOGIC: No hives or eczema    T(C): 36.6 (07-03-18 @ 09:54), Max: 36.7 (07-03-18 @ 00:36)  HR: 86 (07-03-18 @ 09:54) (69 - 88)  BP: 133/85 (07-03-18 @ 09:54) (102/54 - 133/85)  RR: 18 (07-03-18 @ 09:54) (18 - 18)  SpO2: 95% (07-03-18 @ 09:54) (93% - 98%)  Wt(kg): --Vital Signs Last 24 Hrs  T(C): 36.6 (03 Jul 2018 09:54), Max: 36.7 (03 Jul 2018 00:36)  T(F): 97.8 (03 Jul 2018 09:54), Max: 98 (03 Jul 2018 00:36)  HR: 86 (03 Jul 2018 09:54) (69 - 88)  BP: 133/85 (03 Jul 2018 09:54) (102/54 - 133/85)  BP(mean): --  RR: 18 (03 Jul 2018 09:54) (18 - 18)  SpO2: 95% (03 Jul 2018 09:54) (93% - 98%)  I&O's Summary    02 Jul 2018 07:01  -  03 Jul 2018 07:00  --------------------------------------------------------  IN: 870 mL / OUT: 0 mL / NET: 870 mL      Last Menstrual Period      PHYSICAL EXAM:  GENERAL: NAD, well-groomed, well-developed  HEAD:  Atraumatic, Normocephalic  EYES: EOMI, PERRLA, conjunctiva and sclera clear  ENMT: No tonsillar erythema, exudates, or enlargement; Moist mucous membranes, Good dentition, No lesions  NECK: Supple, No JVD, Normal thyroid  NERVOUS SYSTEM:  Alert & Oriented X3, Good concentration; Motor Strength 5/5 B/L upper and lower extremities; DTRs 2+ intact and symmetric  CHEST/LUNG: Clear to percussion bilaterally; No rales, rhonchi, wheezing, or rubs  HEART: Regular rate and rhythm; No murmurs, rubs, or gallops  ABDOMEN: Soft, Nontender, Nondistended; Bowel sounds present  EXTREMITIES:  2+ Peripheral Pulses, No clubbing, cyanosis, or edema  LYMPH: No lymphadenopathy noted  SKIN: No rashes or lesions    Consultant(s) Notes Reviewed:  [x ] YES  [ ] NO  Care Discussed with Consultants/Other Providers [ x] YES  [ ] NO  Name of Consultant  LABS:                        11.9   5.37  )-----------( 231      ( 02 Jul 2018 08:05 )             37.4     07-03    142  |  101  |  14  ----------------------------<  94  3.7   |  32<H>  |  0.66    Ca    9.3      03 Jul 2018 07:23  Mg     2.1     07-03          CAPILLARY BLOOD GLUCOSE                RADIOLOGY & ADDITIONAL TESTS:  EKG :     Imaging Personally Reviewed:  [ ] YES  [ ] NO  HEALTH ISSUES - PROBLEM Dx:  Constipation, unspecified constipation type: Constipation, unspecified constipation type  Acute cystitis with hematuria: Acute cystitis with hematuria  Urine malodor: Urine malodor  Chronic atrial fibrillation: Chronic atrial fibrillation  Essential hypertension: Essential hypertension  Varicose veins with other complication: Varicose veins with other complication  Venous insufficiency of both lower extremities: Venous insufficiency of both lower extremities  Dyspnea on exertion: Dyspnea on exertion  Atrial fibrillation: Atrial fibrillation  Need for prophylactic measure: Need for prophylactic measure  Depression: Depression  DVT (deep venous thrombosis): DVT (deep venous thrombosis)  Obesity: Obesity  Hyperlipidemia: Hyperlipidemia  HTN (hypertension): HTN (hypertension)  Edema of lower extremity: Edema of lower extremity  Back pain: Back pain Patient is a 72y old  Female who presents with a chief complaint of Back pain (03 Jul 2018 11:06)      INTERVAL HPI/OVERNIGHT EVENTS:    Patient offers no complaints today .  Her pain is better controlled on the Percocet.  She has moved her bowel.  NO fever or chills or diarrhea.        Medications:MEDICATIONS  (STANDING):  apixaban 5 milliGRAM(s) Oral every 12 hours  atorvastatin 40 milliGRAM(s) Oral at bedtime  cefTRIAXone   IVPB 1 Gram(s) IV Intermittent every 24 hours  furosemide    Tablet 40 milliGRAM(s) Oral daily  gabapentin 100 milliGRAM(s) Oral three times a day  lidocaine   Patch 1 Patch Transdermal daily  metoprolol succinate ER 25 milliGRAM(s) Oral daily  mupirocin 2% Ointment 1 Application(s) Topical two times a day  nystatin Cream 1 Application(s) Topical two times a day  nystatin Powder 1 Application(s) Topical two times a day  sertraline 75 milliGRAM(s) Oral daily    MEDICATIONS  (PRN):  bisacodyl 5 milliGRAM(s) Oral every 12 hours PRN Constipation  oxyCODONE    5 mG/acetaminophen 325 mG 1 Tablet(s) Oral every 4 hours PRN Moderate Pain (4 - 6)  oxyCODONE    IR 5 milliGRAM(s) Oral every 6 hours PRN Severe Pain (7 - 10)  polyethylene glycol 3350 17 Gram(s) Oral daily PRN Constipation      Allergies: Allergies    No Known Allergies    Intolerances        REVIEW OF SYSTEMS:  No fever  NECK: No pain or stiffness  RESPIRATORY: No cough, No shortness of breath  CARDIOVASCULAR: No chest pain, palpitations, dizziness, pos chronic b/l  legs swelling  GASTROINTESTINAL: No abdominal pain. No nausea, vomiting, or hematemesis; No diarrhea, pos constipation   Genitourinary          :   pos urinary incontinence   NEUROLOGICAL: No headaches  Musculoskeletal : improved back pain     T(C): 36.6 (07-03-18 @ 09:54), Max: 36.7 (07-03-18 @ 00:36)  HR: 86 (07-03-18 @ 09:54) (69 - 88)  BP: 133/85 (07-03-18 @ 09:54) (102/54 - 133/85)  RR: 18 (07-03-18 @ 09:54) (18 - 18)  SpO2: 95% (07-03-18 @ 09:54) (93% - 98%)  Wt(kg): --Vital Signs Last 24 Hrs  T(C): 36.6 (03 Jul 2018 09:54), Max: 36.7 (03 Jul 2018 00:36)  T(F): 97.8 (03 Jul 2018 09:54), Max: 98 (03 Jul 2018 00:36)  HR: 86 (03 Jul 2018 09:54) (69 - 88)  BP: 133/85 (03 Jul 2018 09:54) (102/54 - 133/85)  BP(mean): --  RR: 18 (03 Jul 2018 09:54) (18 - 18)  SpO2: 95% (03 Jul 2018 09:54) (93% - 98%)  I&O's Summary    02 Jul 2018 07:01  -  03 Jul 2018 07:00  --------------------------------------------------------  IN: 870 mL / OUT: 0 mL / NET: 870 mL      Last Menstrual Period      PHYSICAL EXAM:  GENERAL: NAD, well-groomed, well-developed  HEAD:  Atraumatic, Normocephalic  NECK: Supple, No JVD, Normal thyroid  NERVOUS SYSTEM:  Alert & Oriented X3, Good concentration  CHEST/LUNG: Clear to percussion bilaterally  HEART: Regular rate and rhythm  ABDOMEN: Soft, Nontender, Nondistended; Bowel sounds present  EXTREMITIES:  pos b/l large leg edema with no gross erythema/ open blister     Consultant(s) Notes Reviewed:  [x ] YES  [ ] NO  Care Discussed with Consultants/Other Providers [ x] YES  [ ] NO  Name of Consultant  LABS:                        11.9   5.37  )-----------( 231      ( 02 Jul 2018 08:05 )             37.4     07-03    142  |  101  |  14  ----------------------------<  94  3.7   |  32<H>  |  0.66    Ca    9.3      03 Jul 2018 07:23  Mg     2.1     07-03          CAPILLARY BLOOD GLUCOSE                RADIOLOGY & ADDITIONAL TESTS:  EKG :     Imaging Personally Reviewed:  [ ] YES  [ ] NO  HEALTH ISSUES - PROBLEM Dx:  Constipation, unspecified constipation type: Constipation, unspecified constipation type  Acute cystitis with hematuria: Acute cystitis with hematuria  Urine malodor: Urine malodor  Chronic atrial fibrillation: Chronic atrial fibrillation  Essential hypertension: Essential hypertension  Varicose veins with other complication: Varicose veins with other complication  Venous insufficiency of both lower extremities: Venous insufficiency of both lower extremities  Dyspnea on exertion: Dyspnea on exertion  Atrial fibrillation: Atrial fibrillation  Need for prophylactic measure: Need for prophylactic measure  Depression: Depression  DVT (deep venous thrombosis): DVT (deep venous thrombosis)  Obesity: Obesity  Hyperlipidemia: Hyperlipidemia  HTN (hypertension): HTN (hypertension)  Edema of lower extremity: Edema of lower extremity  Back pain: Back pain

## 2018-07-03 NOTE — DISCHARGE NOTE ADULT - HOSPITAL COURSE
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 Patient is on Day 3/3 of Ceftriaxone due to uncomplicated UTI.      Back pain.   MRI with Degenerative disc disease L2-3 with left-sided disc osteophyte complex   which extends into the left neural foramen. Far lateral left-sided disc   herniation L3-4 extending into the neural foramen.   Edema in the sacral alae bilaterally left greater than right suspicious   for sacral insufficiency fractures which appear new . Orthopedics consulted.   - Will try Percocet/ Continue with lidocaine patch and Gabapentin.    - Miralax/ Dulcolax  for constipation.  -PT evaluated patient with recommendation for [facility].   Once pain is improved/controlled / and treatment for UTI is completed / pt will be transferred to [facility].   Acute cystitis with hematuria.  continue ceftriaxone D3/3 for uncomplicated UTI   F/UP UCX. ___________________  Edema of lower extremity.  Cont Lasix . 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 Patient is on Day 3/3 of Ceftriaxone due to uncomplicated UTI.      Back pain.   MRI with Degenerative disc disease L2-3 with left-sided disc osteophyte complex   which extends into the left neural foramen. Far lateral left-sided disc   herniation L3-4 extending into the neural foramen.   Edema in the sacral alae bilaterally left greater than right suspicious   for sacral insufficiency fractures which appear new . Orthopedics consulted.   - Will try Percocet/ Continue with lidocaine patch and Gabapentin.    - Miralax/ Dulcolax  for constipation.  -PT evaluated patient with recommendation for [facility].   Once pain is improved/controlled / and treatment for UTI is completed / pt will be transferred to [facility].   Acute cystitis with hematuria.  continue ceftriaxone D3/3 for uncomplicated UTI   Edema of lower extremity.  Cont Lasix . Patient was seen by the Vascular team , no acute vascular surgical intervention is recommended  Discharge to [facility]

## 2018-07-03 NOTE — DISCHARGE NOTE ADULT - ADDITIONAL INSTRUCTIONS
Dr. Gil Vasquez - Vascular surg  Dr Nay Soto Hem   Dr. Jose Eric Pulm   Dr. Alex Buckner ortho   Dr. Nilsa Tomas Follow up with PMD after rehab

## 2018-07-03 NOTE — DISCHARGE NOTE ADULT - CARE PROVIDERS DIRECT ADDRESSES
[email or web address]

## 2018-07-04 PROCEDURE — 99233 SBSQ HOSP IP/OBS HIGH 50: CPT

## 2018-07-04 RX ADMIN — OXYCODONE AND ACETAMINOPHEN 1 TABLET(S): 5; 325 TABLET ORAL at 04:18

## 2018-07-04 RX ADMIN — OXYCODONE AND ACETAMINOPHEN 1 TABLET(S): 5; 325 TABLET ORAL at 11:33

## 2018-07-04 RX ADMIN — NYSTATIN CREAM 1 APPLICATION(S): 100000 CREAM TOPICAL at 06:34

## 2018-07-04 RX ADMIN — OXYCODONE AND ACETAMINOPHEN 1 TABLET(S): 5; 325 TABLET ORAL at 22:45

## 2018-07-04 RX ADMIN — OXYCODONE AND ACETAMINOPHEN 1 TABLET(S): 5; 325 TABLET ORAL at 12:03

## 2018-07-04 RX ADMIN — SERTRALINE 75 MILLIGRAM(S): 25 TABLET, FILM COATED ORAL at 13:01

## 2018-07-04 RX ADMIN — GABAPENTIN 100 MILLIGRAM(S): 400 CAPSULE ORAL at 22:45

## 2018-07-04 RX ADMIN — OXYCODONE AND ACETAMINOPHEN 1 TABLET(S): 5; 325 TABLET ORAL at 04:50

## 2018-07-04 RX ADMIN — OXYCODONE AND ACETAMINOPHEN 1 TABLET(S): 5; 325 TABLET ORAL at 23:15

## 2018-07-04 RX ADMIN — OXYCODONE AND ACETAMINOPHEN 1 TABLET(S): 5; 325 TABLET ORAL at 00:10

## 2018-07-04 RX ADMIN — ATORVASTATIN CALCIUM 40 MILLIGRAM(S): 80 TABLET, FILM COATED ORAL at 22:45

## 2018-07-04 RX ADMIN — Medication 40 MILLIGRAM(S): at 06:34

## 2018-07-04 RX ADMIN — APIXABAN 5 MILLIGRAM(S): 2.5 TABLET, FILM COATED ORAL at 17:44

## 2018-07-04 RX ADMIN — Medication 200 MILLIGRAM(S): at 13:01

## 2018-07-04 RX ADMIN — Medication 25 MILLIGRAM(S): at 06:34

## 2018-07-04 RX ADMIN — MUPIROCIN 1 APPLICATION(S): 20 OINTMENT TOPICAL at 11:34

## 2018-07-04 RX ADMIN — APIXABAN 5 MILLIGRAM(S): 2.5 TABLET, FILM COATED ORAL at 06:34

## 2018-07-04 RX ADMIN — OXYCODONE AND ACETAMINOPHEN 1 TABLET(S): 5; 325 TABLET ORAL at 17:47

## 2018-07-04 RX ADMIN — GABAPENTIN 100 MILLIGRAM(S): 400 CAPSULE ORAL at 06:34

## 2018-07-04 RX ADMIN — OXYCODONE AND ACETAMINOPHEN 1 TABLET(S): 5; 325 TABLET ORAL at 18:17

## 2018-07-04 RX ADMIN — NYSTATIN CREAM 1 APPLICATION(S): 100000 CREAM TOPICAL at 17:43

## 2018-07-04 RX ADMIN — GABAPENTIN 100 MILLIGRAM(S): 400 CAPSULE ORAL at 13:01

## 2018-07-04 RX ADMIN — OXYCODONE AND ACETAMINOPHEN 1 TABLET(S): 5; 325 TABLET ORAL at 00:40

## 2018-07-04 NOTE — PROGRESS NOTE ADULT - PROBLEM SELECTOR PLAN 1
MRI with Degenerative disc disease L2-3 with left-sided disc osteophyte complex   which extends into the left neural foramen. Far lateral left-sided disc   herniation L3-4 extending into the neural foramen.   Edema in the sacral alae bilaterally left greater than right suspicious   for sacral insufficiency fractures which appear new   -pain is better controlled with Percocet/ Continue with lidocaine patch and Gabapentin.    - Miralax/ Dulcolax  for constipation.  -Ortho/spine recs appreciated   -PT is waiting for d/c to [facility].

## 2018-07-04 NOTE — PROGRESS NOTE ADULT - SUBJECTIVE AND OBJECTIVE BOX
Patient is a 72y old  Female who presents with a chief complaint of Back pain (03 Jul 2018 11:06)      SUBJECTIVE / OVERNIGHT EVENTS:    no overnight events. no complaints today. awaits RICCARDO tomorrow.    MEDICATIONS  (STANDING):  apixaban 5 milliGRAM(s) Oral every 12 hours  atorvastatin 40 milliGRAM(s) Oral at bedtime  ciprofloxacin   IVPB      ciprofloxacin   IVPB 400 milliGRAM(s) IV Intermittent every 12 hours  furosemide    Tablet 40 milliGRAM(s) Oral daily  gabapentin 100 milliGRAM(s) Oral three times a day  lidocaine   Patch 1 Patch Transdermal daily  metoprolol succinate ER 25 milliGRAM(s) Oral daily  mupirocin 2% Ointment 1 Application(s) Topical two times a day  nystatin Cream 1 Application(s) Topical two times a day  nystatin Powder 1 Application(s) Topical two times a day  sertraline 75 milliGRAM(s) Oral daily    MEDICATIONS  (PRN):  bisacodyl 5 milliGRAM(s) Oral every 12 hours PRN Constipation  oxyCODONE    5 mG/acetaminophen 325 mG 1 Tablet(s) Oral every 4 hours PRN Moderate Pain (4 - 6)  oxyCODONE    IR 5 milliGRAM(s) Oral every 6 hours PRN Severe Pain (7 - 10)  polyethylene glycol 3350 17 Gram(s) Oral daily PRN Constipation        CAPILLARY BLOOD GLUCOSE        I&O's Summary    03 Jul 2018 07:01  -  04 Jul 2018 07:00  --------------------------------------------------------  IN: 650 mL / OUT: 0 mL / NET: 650 mL    04 Jul 2018 07:01  -  04 Jul 2018 10:30  --------------------------------------------------------  IN: 380 mL / OUT: 0 mL / NET: 380 mL      VS T 97.9, P 75, /60, R 16, O2 95% RA  PHYSICAL EXAM:  GENERAL: NAD, well-groomed, well-developed  HEAD:  Atraumatic, Normocephalic  NECK: Supple, No JVD, Normal thyroid  NERVOUS SYSTEM:  Alert & Oriented X3, Good concentration  CHEST/LUNG: Clear to percussion bilaterally  HEART: Regular rate and rhythm  ABDOMEN: Soft, Nontender, Nondistended; Bowel sounds present  EXTREMITIES:  pos b/l large leg edema with no gross erythema/ open blister     LABS:    07-03    142  |  101  |  14  ----------------------------<  94  3.7   |  32<H>  |  0.66    Ca    9.3      03 Jul 2018 07:23  Mg     2.1     07-03          urine culture: enterobacter sensitive to cipro

## 2018-07-05 VITALS
SYSTOLIC BLOOD PRESSURE: 123 MMHG | TEMPERATURE: 98 F | OXYGEN SATURATION: 95 % | HEART RATE: 70 BPM | RESPIRATION RATE: 16 BRPM | DIASTOLIC BLOOD PRESSURE: 72 MMHG

## 2018-07-05 DIAGNOSIS — N30.00 ACUTE CYSTITIS WITHOUT HEMATURIA: ICD-10-CM

## 2018-07-05 PROCEDURE — 97116 GAIT TRAINING THERAPY: CPT

## 2018-07-05 PROCEDURE — 87040 BLOOD CULTURE FOR BACTERIA: CPT

## 2018-07-05 PROCEDURE — 97530 THERAPEUTIC ACTIVITIES: CPT

## 2018-07-05 PROCEDURE — 85027 COMPLETE CBC AUTOMATED: CPT

## 2018-07-05 PROCEDURE — 85014 HEMATOCRIT: CPT

## 2018-07-05 PROCEDURE — 93010 ELECTROCARDIOGRAM REPORT: CPT

## 2018-07-05 PROCEDURE — 83735 ASSAY OF MAGNESIUM: CPT

## 2018-07-05 PROCEDURE — 87086 URINE CULTURE/COLONY COUNT: CPT

## 2018-07-05 PROCEDURE — 85652 RBC SED RATE AUTOMATED: CPT

## 2018-07-05 PROCEDURE — 81001 URINALYSIS AUTO W/SCOPE: CPT

## 2018-07-05 PROCEDURE — 93005 ELECTROCARDIOGRAM TRACING: CPT

## 2018-07-05 PROCEDURE — 82803 BLOOD GASES ANY COMBINATION: CPT

## 2018-07-05 PROCEDURE — 82947 ASSAY GLUCOSE BLOOD QUANT: CPT

## 2018-07-05 PROCEDURE — 93923 UPR/LXTR ART STDY 3+ LVLS: CPT

## 2018-07-05 PROCEDURE — 72146 MRI CHEST SPINE W/O DYE: CPT

## 2018-07-05 PROCEDURE — 82330 ASSAY OF CALCIUM: CPT

## 2018-07-05 PROCEDURE — 84132 ASSAY OF SERUM POTASSIUM: CPT

## 2018-07-05 PROCEDURE — G0378: CPT

## 2018-07-05 PROCEDURE — 96374 THER/PROPH/DIAG INJ IV PUSH: CPT

## 2018-07-05 PROCEDURE — 97161 PT EVAL LOW COMPLEX 20 MIN: CPT

## 2018-07-05 PROCEDURE — 99239 HOSP IP/OBS DSCHRG MGMT >30: CPT

## 2018-07-05 PROCEDURE — 72148 MRI LUMBAR SPINE W/O DYE: CPT

## 2018-07-05 PROCEDURE — 84295 ASSAY OF SERUM SODIUM: CPT

## 2018-07-05 PROCEDURE — 97602 WOUND(S) CARE NON-SELECTIVE: CPT

## 2018-07-05 PROCEDURE — 87186 SC STD MICRODIL/AGAR DIL: CPT

## 2018-07-05 PROCEDURE — 83605 ASSAY OF LACTIC ACID: CPT

## 2018-07-05 PROCEDURE — 80053 COMPREHEN METABOLIC PANEL: CPT

## 2018-07-05 PROCEDURE — 99285 EMERGENCY DEPT VISIT HI MDM: CPT | Mod: 25

## 2018-07-05 PROCEDURE — 96375 TX/PRO/DX INJ NEW DRUG ADDON: CPT

## 2018-07-05 PROCEDURE — 86140 C-REACTIVE PROTEIN: CPT

## 2018-07-05 PROCEDURE — 82435 ASSAY OF BLOOD CHLORIDE: CPT

## 2018-07-05 PROCEDURE — 80048 BASIC METABOLIC PNL TOTAL CA: CPT

## 2018-07-05 PROCEDURE — 93970 EXTREMITY STUDY: CPT

## 2018-07-05 RX ORDER — CIPROFLOXACIN LACTATE 400MG/40ML
500 VIAL (ML) INTRAVENOUS EVERY 12 HOURS
Qty: 0 | Refills: 0 | Status: DISCONTINUED | OUTPATIENT
Start: 2018-07-05 | End: 2018-07-05

## 2018-07-05 RX ORDER — CIPROFLOXACIN LACTATE 400MG/40ML
1 VIAL (ML) INTRAVENOUS
Qty: 0 | Refills: 0 | DISCHARGE
Start: 2018-07-05

## 2018-07-05 RX ORDER — MOXIFLOXACIN HYDROCHLORIDE TABLETS, 400 MG 400 MG/1
1 TABLET, FILM COATED ORAL
Qty: 0 | Refills: 0 | COMMUNITY

## 2018-07-05 RX ADMIN — OXYCODONE AND ACETAMINOPHEN 1 TABLET(S): 5; 325 TABLET ORAL at 08:47

## 2018-07-05 RX ADMIN — GABAPENTIN 100 MILLIGRAM(S): 400 CAPSULE ORAL at 06:56

## 2018-07-05 RX ADMIN — MUPIROCIN 1 APPLICATION(S): 20 OINTMENT TOPICAL at 00:16

## 2018-07-05 RX ADMIN — APIXABAN 5 MILLIGRAM(S): 2.5 TABLET, FILM COATED ORAL at 06:56

## 2018-07-05 RX ADMIN — Medication 25 MILLIGRAM(S): at 06:56

## 2018-07-05 RX ADMIN — NYSTATIN CREAM 1 APPLICATION(S): 100000 CREAM TOPICAL at 06:57

## 2018-07-05 RX ADMIN — Medication 40 MILLIGRAM(S): at 06:56

## 2018-07-05 RX ADMIN — Medication 200 MILLIGRAM(S): at 00:16

## 2018-07-05 RX ADMIN — GABAPENTIN 100 MILLIGRAM(S): 400 CAPSULE ORAL at 13:03

## 2018-07-05 RX ADMIN — OXYCODONE AND ACETAMINOPHEN 1 TABLET(S): 5; 325 TABLET ORAL at 09:17

## 2018-07-05 RX ADMIN — NYSTATIN CREAM 1 APPLICATION(S): 100000 CREAM TOPICAL at 06:56

## 2018-07-05 RX ADMIN — SERTRALINE 75 MILLIGRAM(S): 25 TABLET, FILM COATED ORAL at 13:04

## 2018-07-05 RX ADMIN — OXYCODONE AND ACETAMINOPHEN 1 TABLET(S): 5; 325 TABLET ORAL at 03:45

## 2018-07-05 RX ADMIN — OXYCODONE AND ACETAMINOPHEN 1 TABLET(S): 5; 325 TABLET ORAL at 04:15

## 2018-07-05 RX ADMIN — MUPIROCIN 1 APPLICATION(S): 20 OINTMENT TOPICAL at 13:03

## 2018-07-05 RX ADMIN — OXYCODONE AND ACETAMINOPHEN 1 TABLET(S): 5; 325 TABLET ORAL at 13:04

## 2018-07-05 NOTE — PROGRESS NOTE ADULT - PROBLEM SELECTOR PROBLEM 3
Chronic atrial fibrillation
Constipation, unspecified constipation type
Essential hypertension
Essential hypertension
Acute low back pain, unspecified back pain laterality, with sciatica presence unspecified
HTN (hypertension)
Varicose veins with other complication
Constipation, unspecified constipation type
HTN (hypertension)

## 2018-07-05 NOTE — PROGRESS NOTE ADULT - PROBLEM SELECTOR PROBLEM 6
Edema of lower extremity
Constipation, unspecified constipation type
Edema of lower extremity
DVT (deep venous thrombosis)
DVT (deep venous thrombosis)

## 2018-07-05 NOTE — PROGRESS NOTE ADULT - PROBLEM SELECTOR PROBLEM 2
Acute cystitis with hematuria
Edema of lower extremity
Hyperlipidemia
Urine malodor
Venous insufficiency of both lower extremities
Venous insufficiency of both lower extremities
Acute cystitis with hematuria
Edema of lower extremity
Essential hypertension

## 2018-07-05 NOTE — PROGRESS NOTE ADULT - PROBLEM SELECTOR PROBLEM 4
Chronic atrial fibrillation
Acute cystitis with hematuria
Chronic atrial fibrillation
Essential hypertension
Hyperlipidemia
Hyperlipidemia

## 2018-07-05 NOTE — PROGRESS NOTE ADULT - PROBLEM SELECTOR PROBLEM 5
Essential hypertension
Edema of lower extremity
Essential hypertension
Venous insufficiency of both lower extremities
Venous insufficiency of both lower extremities
Chronic atrial fibrillation
Obesity
Obesity

## 2018-07-05 NOTE — PROGRESS NOTE ADULT - ATTENDING COMMENTS
as above
Per son would like pt to be placed in long term facility
Angélica Roldan   [facility]   [phone].
Angélica Roldan   [facility]   [phone].
Angélica Roldan   [facility]ist   
Angélica Roldan   [facility]   [phone].
KARLENE [facility]  [phone]
F/UP QTc from ECG / ordered this AM     Angélica Roldan   [facility]italist   [phone]
D/w daughter Jacqueline over the phone updated on full plan of care

## 2018-07-05 NOTE — PROGRESS NOTE ADULT - SUBJECTIVE AND OBJECTIVE BOX
Patient is a 72y old  Female who presents with a chief complaint of Back pain (03 Jul 2018 11:06)      INTERVAL HPI/OVERNIGHT EVENTS:    Medications:MEDICATIONS  (STANDING):  apixaban 5 milliGRAM(s) Oral every 12 hours  atorvastatin 40 milliGRAM(s) Oral at bedtime  ciprofloxacin   IVPB      ciprofloxacin   IVPB 400 milliGRAM(s) IV Intermittent every 12 hours  furosemide    Tablet 40 milliGRAM(s) Oral daily  gabapentin 100 milliGRAM(s) Oral three times a day  lidocaine   Patch 1 Patch Transdermal daily  metoprolol succinate ER 25 milliGRAM(s) Oral daily  mupirocin 2% Ointment 1 Application(s) Topical two times a day  nystatin Cream 1 Application(s) Topical two times a day  nystatin Powder 1 Application(s) Topical two times a day  sertraline 75 milliGRAM(s) Oral daily    MEDICATIONS  (PRN):  bisacodyl 5 milliGRAM(s) Oral every 12 hours PRN Constipation  oxyCODONE    5 mG/acetaminophen 325 mG 1 Tablet(s) Oral every 4 hours PRN Moderate Pain (4 - 6)  oxyCODONE    IR 5 milliGRAM(s) Oral every 6 hours PRN Severe Pain (7 - 10)  polyethylene glycol 3350 17 Gram(s) Oral daily PRN Constipation      Allergies: Allergies    No Known Allergies    Intolerances        REVIEW OF SYSTEMS:  CONSTITUTIONAL: No fever, weight loss, or fatigue  EYES: No eye pain, visual disturbances, or discharge  ENMT:  No difficulty hearing, tinnitus, vertigo; No sinus or throat pain  NECK: No pain or stiffness  BREASTS: No pain, masses, or nipple discharge  RESPIRATORY: No cough, wheezing, chills or hemoptysis; No shortness of breath  CARDIOVASCULAR: No chest pain, palpitations, dizziness, or leg swelling  GASTROINTESTINAL: No abdominal or epigastric pain. No nausea, vomiting, or hematemesis; No diarrhea or constipation. No melena or hematochezia.  GENITOURINARY: No dysuria, frequency, hematuria, or incontinence  NEUROLOGICAL: No headaches, memory loss, loss of strength, numbness, or tremors  SKIN: No itching, burning, rashes, or lesions   LYMPH NODES: No enlarged glands  ENDOCRINE: No heat or cold intolerance; No hair loss  MUSCULOSKELETAL: No joint pain or swelling; No muscle, back, or extremity pain  PSYCHIATRIC: No depression, anxiety, mood swings, or difficulty sleeping  HEME/LYMPH: No easy bruising, or bleeding gums  ALLERY AND IMMUNOLOGIC: No hives or eczema    T(C): 36.8 (07-05-18 @ 08:34), Max: 36.9 (07-05-18 @ 00:21)  HR: 84 (07-05-18 @ 08:34) (75 - 84)  BP: 129/84 (07-05-18 @ 08:34) (114/73 - 147/77)  RR: 18 (07-05-18 @ 08:34) (16 - 18)  SpO2: 95% (07-05-18 @ 08:34) (94% - 96%)  Wt(kg): --Vital Signs Last 24 Hrs  T(C): 36.8 (05 Jul 2018 08:34), Max: 36.9 (05 Jul 2018 00:21)  T(F): 98.3 (05 Jul 2018 08:34), Max: 98.4 (05 Jul 2018 00:21)  HR: 84 (05 Jul 2018 08:34) (75 - 84)  BP: 129/84 (05 Jul 2018 08:34) (114/73 - 147/77)  BP(mean): --  RR: 18 (05 Jul 2018 08:34) (16 - 18)  SpO2: 95% (05 Jul 2018 08:34) (94% - 96%)  I&O's Summary    04 Jul 2018 07:01  -  05 Jul 2018 07:00  --------------------------------------------------------  IN: 1590 mL / OUT: 50 mL / NET: 1540 mL    05 Jul 2018 07:01  -  05 Jul 2018 08:56  --------------------------------------------------------  IN: 380 mL / OUT: 200 mL / NET: 180 mL      Last Menstrual Period      PHYSICAL EXAM:  GENERAL: NAD, well-groomed, well-developed  HEAD:  Atraumatic, Normocephalic  EYES: EOMI, PERRLA, conjunctiva and sclera clear  ENMT: No tonsillar erythema, exudates, or enlargement; Moist mucous membranes, Good dentition, No lesions  NECK: Supple, No JVD, Normal thyroid  NERVOUS SYSTEM:  Alert & Oriented X3, Good concentration; Motor Strength 5/5 B/L upper and lower extremities; DTRs 2+ intact and symmetric  CHEST/LUNG: Clear to percussion bilaterally; No rales, rhonchi, wheezing, or rubs  HEART: Regular rate and rhythm; No murmurs, rubs, or gallops  ABDOMEN: Soft, Nontender, Nondistended; Bowel sounds present  EXTREMITIES:  2+ Peripheral Pulses, No clubbing, cyanosis, or edema  LYMPH: No lymphadenopathy noted  SKIN: No rashes or lesions    Consultant(s) Notes Reviewed:  [x ] YES  [ ] NO  Care Discussed with Consultants/Other Providers [ x] YES  [ ] NO  Name of Consultant  LABS:              CAPILLARY BLOOD GLUCOSE                RADIOLOGY & ADDITIONAL TESTS:  EKG :     Imaging Personally Reviewed:  [ ] YES  [ ] NO  HEALTH ISSUES - PROBLEM Dx:  Acute low back pain, unspecified back pain laterality, with sciatica presence unspecified: Acute low back pain, unspecified back pain laterality, with sciatica presence unspecified  Constipation, unspecified constipation type: Constipation, unspecified constipation type  Acute cystitis with hematuria: Acute cystitis with hematuria  Urine malodor: Urine malodor  Chronic atrial fibrillation: Chronic atrial fibrillation  Essential hypertension: Essential hypertension  Varicose veins with other complication: Varicose veins with other complication  Venous insufficiency of both lower extremities: Venous insufficiency of both lower extremities  Dyspnea on exertion: Dyspnea on exertion  Atrial fibrillation: Atrial fibrillation  Need for prophylactic measure: Need for prophylactic measure  Depression: Depression  DVT (deep venous thrombosis): DVT (deep venous thrombosis)  Obesity: Obesity  Hyperlipidemia: Hyperlipidemia  HTN (hypertension): HTN (hypertension)  Edema of lower extremity: Edema of lower extremity  Back pain: Back pain Patient is a 72y old  Female who presents with a chief complaint of Back pain (03 Jul 2018 11:06)      INTERVAL HPI/OVERNIGHT EVENTS:    Patient states to feel better with no complaints today.  Patient is ready to be d/c to Rehab today .  Patient was placed on Cipro since the bacteria in the Urine was resistant to Ceftriaxone.  NO fever , no diarrhea , no chest pain     Medications:MEDICATIONS  (STANDING):  apixaban 5 milliGRAM(s) Oral every 12 hours  atorvastatin 40 milliGRAM(s) Oral at bedtime  ciprofloxacin   IVPB      ciprofloxacin   IVPB 400 milliGRAM(s) IV Intermittent every 12 hours  furosemide    Tablet 40 milliGRAM(s) Oral daily  gabapentin 100 milliGRAM(s) Oral three times a day  lidocaine   Patch 1 Patch Transdermal daily  metoprolol succinate ER 25 milliGRAM(s) Oral daily  mupirocin 2% Ointment 1 Application(s) Topical two times a day  nystatin Cream 1 Application(s) Topical two times a day  nystatin Powder 1 Application(s) Topical two times a day  sertraline 75 milliGRAM(s) Oral daily    MEDICATIONS  (PRN):  bisacodyl 5 milliGRAM(s) Oral every 12 hours PRN Constipation  oxyCODONE    5 mG/acetaminophen 325 mG 1 Tablet(s) Oral every 4 hours PRN Moderate Pain (4 - 6)  oxyCODONE    IR 5 milliGRAM(s) Oral every 6 hours PRN Severe Pain (7 - 10)  polyethylene glycol 3350 17 Gram(s) Oral daily PRN Constipation      Allergies: Allergies    No Known Allergies    Intolerances        REVIEW OF SYSTEMS:   CONSTITUTIONAL : No fever  NECK: No pain or stiffness  RESPIRATORY: No cough, No shortness of breath  CARDIOVASCULAR: No chest pain, palpitations, dizziness, pos chronic b/l  legs swelling  GASTROINTESTINAL: No abdominal pain. No nausea, vomiting, or hematemesis; No diarrhea, pos constipation   Genitourinary          :   pos urinary incontinence   NEUROLOGICAL: No headaches  Musculoskeletal : improved back pain     T(C): 36.8 (07-05-18 @ 08:34), Max: 36.9 (07-05-18 @ 00:21)  HR: 84 (07-05-18 @ 08:34) (75 - 84)  BP: 129/84 (07-05-18 @ 08:34) (114/73 - 147/77)  RR: 18 (07-05-18 @ 08:34) (16 - 18)  SpO2: 95% (07-05-18 @ 08:34) (94% - 96%)  Wt(kg): --Vital Signs Last 24 Hrs  T(C): 36.8 (05 Jul 2018 08:34), Max: 36.9 (05 Jul 2018 00:21)  T(F): 98.3 (05 Jul 2018 08:34), Max: 98.4 (05 Jul 2018 00:21)  HR: 84 (05 Jul 2018 08:34) (75 - 84)  BP: 129/84 (05 Jul 2018 08:34) (114/73 - 147/77)  BP(mean): --  RR: 18 (05 Jul 2018 08:34) (16 - 18)  SpO2: 95% (05 Jul 2018 08:34) (94% - 96%)  I&O's Summary    04 Jul 2018 07:01  -  05 Jul 2018 07:00  --------------------------------------------------------  IN: 1590 mL / OUT: 50 mL / NET: 1540 mL    05 Jul 2018 07:01  -  05 Jul 2018 08:56  --------------------------------------------------------  IN: 380 mL / OUT: 200 mL / NET: 180 mL      Last Menstrual Period      PHYSICAL EXAM:  GENERAL: NAD, well-groomed, well-developed  HEAD:  Atraumatic, Normocephalic  NECK: Supple, No JVD, Normal thyroid  NERVOUS SYSTEM:  Alert & Oriented X3, Good concentration  CHEST/LUNG: Clear to percussion bilaterally  HEART: Regular rate and rhythm  ABDOMEN: Soft, Nontender, Nondistended; Bowel sounds present  EXTREMITIES:  pos b/l large leg edema with no gross erythema/ open blister    Consultant(s) Notes Reviewed:  [x ] YES  [ ] NO  Care Discussed with Consultants/Other Providers [ x] YES  [ ] NO  Name of Consultant  LABS:              CAPILLARY BLOOD GLUCOSE                RADIOLOGY & ADDITIONAL TESTS:  EKG :     Imaging Personally Reviewed:  [ ] YES  [ ] NO  HEALTH ISSUES - PROBLEM Dx:  Acute low back pain, unspecified back pain laterality, with sciatica presence unspecified: Acute low back pain, unspecified back pain laterality, with sciatica presence unspecified  Constipation, unspecified constipation type: Constipation, unspecified constipation type  Acute cystitis with hematuria: Acute cystitis with hematuria  Urine malodor: Urine malodor  Chronic atrial fibrillation: Chronic atrial fibrillation  Essential hypertension: Essential hypertension  Varicose veins with other complication: Varicose veins with other complication  Venous insufficiency of both lower extremities: Venous insufficiency of both lower extremities  Dyspnea on exertion: Dyspnea on exertion  Atrial fibrillation: Atrial fibrillation  Need for prophylactic measure: Need for prophylactic measure  Depression: Depression  DVT (deep venous thrombosis): DVT (deep venous thrombosis)  Obesity: Obesity  Hyperlipidemia: Hyperlipidemia  HTN (hypertension): HTN (hypertension)  Edema of lower extremity: Edema of lower extremity  Back pain: Back pain Patient is a 72y old  Female who presents with a chief complaint of Back pain (03 Jul 2018 11:06)      INTERVAL HPI/OVERNIGHT EVENTS:    Patient states to feel better with no complaints today.  Patient is ready to be d/c to Rehab today .  Patient was placed on Cipro since the bacteria in the Urine was resistant to Ceftriaxone.  NO fever , no diarrhea , no chest pain     Medications:MEDICATIONS  (STANDING):  apixaban 5 milliGRAM(s) Oral every 12 hours  atorvastatin 40 milliGRAM(s) Oral at bedtime  ciprofloxacin   IVPB      ciprofloxacin   IVPB 400 milliGRAM(s) IV Intermittent every 12 hours  furosemide    Tablet 40 milliGRAM(s) Oral daily  gabapentin 100 milliGRAM(s) Oral three times a day  lidocaine   Patch 1 Patch Transdermal daily  metoprolol succinate ER 25 milliGRAM(s) Oral daily  mupirocin 2% Ointment 1 Application(s) Topical two times a day  nystatin Cream 1 Application(s) Topical two times a day  nystatin Powder 1 Application(s) Topical two times a day  sertraline 75 milliGRAM(s) Oral daily    MEDICATIONS  (PRN):  bisacodyl 5 milliGRAM(s) Oral every 12 hours PRN Constipation  oxyCODONE    5 mG/acetaminophen 325 mG 1 Tablet(s) Oral every 4 hours PRN Moderate Pain (4 - 6)  oxyCODONE    IR 5 milliGRAM(s) Oral every 6 hours PRN Severe Pain (7 - 10)  polyethylene glycol 3350 17 Gram(s) Oral daily PRN Constipation      Allergies: Allergies    No Known Allergies    Intolerances        REVIEW OF SYSTEMS:   CONSTITUTIONAL : No fever  NECK: No pain or stiffness  RESPIRATORY: No cough, No shortness of breath  CARDIOVASCULAR: No chest pain, palpitations, dizziness, pos chronic b/l  legs swelling  GASTROINTESTINAL: No abdominal pain. No nausea, vomiting, or hematemesis; No diarrhea, pos constipation   Genitourinary          :  no dysuria   NEUROLOGICAL: No headaches  Musculoskeletal : improved back pain     T(C): 36.8 (07-05-18 @ 08:34), Max: 36.9 (07-05-18 @ 00:21)  HR: 84 (07-05-18 @ 08:34) (75 - 84)  BP: 129/84 (07-05-18 @ 08:34) (114/73 - 147/77)  RR: 18 (07-05-18 @ 08:34) (16 - 18)  SpO2: 95% (07-05-18 @ 08:34) (94% - 96%)  Wt(kg): --Vital Signs Last 24 Hrs  T(C): 36.8 (05 Jul 2018 08:34), Max: 36.9 (05 Jul 2018 00:21)  T(F): 98.3 (05 Jul 2018 08:34), Max: 98.4 (05 Jul 2018 00:21)  HR: 84 (05 Jul 2018 08:34) (75 - 84)  BP: 129/84 (05 Jul 2018 08:34) (114/73 - 147/77)  BP(mean): --  RR: 18 (05 Jul 2018 08:34) (16 - 18)  SpO2: 95% (05 Jul 2018 08:34) (94% - 96%)  I&O's Summary    04 Jul 2018 07:01  -  05 Jul 2018 07:00  --------------------------------------------------------  IN: 1590 mL / OUT: 50 mL / NET: 1540 mL    05 Jul 2018 07:01  -  05 Jul 2018 08:56  --------------------------------------------------------  IN: 380 mL / OUT: 200 mL / NET: 180 mL      Last Menstrual Period      PHYSICAL EXAM:  GENERAL: NAD, well-groomed, well-developed  HEAD:  Atraumatic, Normocephalic  NECK: Supple, No JVD, Normal thyroid  NERVOUS SYSTEM:  Alert & Oriented X3, Good concentration  CHEST/LUNG: Clear to percussion bilaterally  HEART: Regular rate and rhythm  ABDOMEN: Soft, Nontender, Nondistended; Bowel sounds present  EXTREMITIES:  pos b/l large leg edema with no gross erythema/ open blister    Consultant(s) Notes Reviewed:  [x ] YES  [ ] NO  Care Discussed with Consultants/Other Providers [ x] YES  [ ] NO  Name of Consultant  LABS:              CAPILLARY BLOOD GLUCOSE                RADIOLOGY & ADDITIONAL TESTS:  EKG :     Imaging Personally Reviewed:  [ ] YES  [ ] NO  HEALTH ISSUES - PROBLEM Dx:  Acute low back pain, unspecified back pain laterality, with sciatica presence unspecified: Acute low back pain, unspecified back pain laterality, with sciatica presence unspecified  Constipation, unspecified constipation type: Constipation, unspecified constipation type  Acute cystitis with hematuria: Acute cystitis with hematuria  Urine malodor: Urine malodor  Chronic atrial fibrillation: Chronic atrial fibrillation  Essential hypertension: Essential hypertension  Varicose veins with other complication: Varicose veins with other complication  Venous insufficiency of both lower extremities: Venous insufficiency of both lower extremities  Dyspnea on exertion: Dyspnea on exertion  Atrial fibrillation: Atrial fibrillation  Need for prophylactic measure: Need for prophylactic measure  Depression: Depression  DVT (deep venous thrombosis): DVT (deep venous thrombosis)  Obesity: Obesity  Hyperlipidemia: Hyperlipidemia  HTN (hypertension): HTN (hypertension)  Edema of lower extremity: Edema of lower extremity  Back pain: Back pain

## 2018-07-05 NOTE — PROGRESS NOTE ADULT - ASSESSMENT
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
72F with DDD and low back pain    1. Pain control  2. WBAT  3. PT/OOB  4. Eliquis for anticoagulation  5. FU labs  6. Medical management  7. Discuss with attending
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 Patient is on Day 3 of Ceftriaxone due to uncomplicated UTI with growth of Enterobacter with resistance to penicillins. Started on ciprofloxacin 7/3.   Awaiting for discharge to [facility] .
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 Patient received 3days of Ceftriaxone for  UTI with growth of Enterobacter in urine which was found to be resistant to ceftriaxone and was changed to Ciprofloxacin on 7/3/18.   Awaiting for discharge to [facility] .
72F w/ hx of DVT, chronic venous stasis, admitted for back pain    - no acute vascular surgical intervention needed  -will need mindi le unna boot rx at d/c as outpt
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 Patient is on Day 3 of Ceftriaxone due to uncomplicated UTI with growth of Enterobacter with sensitivity pending.   Awaiting for discharge to [facility] .
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 Patient is on Day 2/3 of Ceftriaxone due to uncomplicated UTI.

## 2018-07-05 NOTE — PROGRESS NOTE ADULT - PROBLEM SELECTOR PLAN 1
Enterobacter in urine is resistant to Ceftriaxone and was changed to Cipro.  Patient so far has received 3 doses of IV  cipro/ will change to oral Cipro and ECG is ordered / if QTC is <500, patient will complete the TX with Ciprofloxacin for a total of 3 days ( and will be d/c on Cipro for 3 more doses)   Will change to oral cipro 500 mg oral every 12 hours for 3 more doses ( CCr >100)   If QTC >500 call MD   case is discussed with NP   ANticipate D/C today   s/p ceftriaxon X 3 days.    Pt had only Malodorous urine with increased urination with no fever nor sepsis.    No su. Enterobacter in urine is resistant to Ceftriaxone and was changed to Cipro.  Patient so far has received 3 doses of IV  cipro/ will change to oral Cipro and ECG is ordered / if QTC is <500, patient will complete the TX with Ciprofloxacin for a total of 3 days ( and will be d/c on Cipro for 3 more doses)   Will change to oral cipro 500 mg oral every 12 hours for 3 more doses ( CCr >100)   If QTC >500 call MD   case is discussed with NP   ANticipate D/C today   s/p ceftriaxon X 3 days.    Pt had only Malodorous urine with increased urination with no fever nor sepsis.    No su.  D/C time 45 mns

## 2018-07-05 NOTE — PROGRESS NOTE ADULT - PROVIDER SPECIALTY LIST ADULT
Hospitalist
Orthopedics
Vascular Surgery
Hospitalist

## 2018-07-05 NOTE — PROGRESS NOTE ADULT - PROBLEM SELECTOR PLAN 2
COnt Metoprolol/Lasix   Monitor BP
F/UP Urinalysis once collected and done   cont to monitor for now
completed 3 days ceftriaxone, however Enterobacter organism returned resistance. However is sensitive to floroquinolones and now on cipro  -continue ciprofloxacin 500mg bid to complete 3 day course, can switch to PO on discharge
cont Atorvastatin
COnt wound care
- Patient endorsed LE edema after surgeries.   - Last echo 02/18 showed EF of 60-65% with normal LV function.   - C/w home lasix   - Vascular follow up for unna boot placement.  --legs with b/l erythema likely secondary to venous stasis. Skin breakdown not appearing infected.
as above
Cont ceftriaxone D3/3 for uncomplicated UTI / please f/up sensitivity of Enterobacter organism and if sensitive and no issues , will d/c to rehab ( I have called Micro lab and will have sensitivity tonight)   Pt had only Malodorous urine with increased urination with no fever nor sepsis.    No su
- Patient endorsed LE edema after surgeries.   - Last echo 02/18 showed EF of 60-65% with normal LV function.   - C/w home lasix   - Vascular follow up for unna boot placement.  - Pt with prior treatment with keflex for ?cellulitis by vascular  - ID recs pending

## 2018-07-05 NOTE — PROGRESS NOTE ADULT - PROBLEM SELECTOR PLAN 4
COnt Metoprolol/ Apixaban.
cont ceftriaxone D2/3 for uncomplicated UTI   F/UP UCX
COnt Metoprolol/ Apixaban.
COnt Metoprolol/ Apixaban.
Rate controlled   COnt Metoprolol/ Apixaban
COnt Metoprolol/Lasix   Monitor BP/ stable for now
- C/w atorvastatin   - DASH diet
- C/w atorvastatin   - DASH diet

## 2018-07-05 NOTE — PROGRESS NOTE ADULT - PROBLEM SELECTOR PLAN 6
- History of DVT/PE with IVC filter  - Found to have Focal, partially occlusive thrombus in the left popliteal vein, improved compared to prior study, likely scarring related to previous DVT. No new   acute DVT.  - c/w apixaban
Cont Lasix   Patient was seen by the Vascular team , no acute vascular surgical intervention is recommended  WOund care is on the case / cont wound care   No acute arterial disease noted from Doppler
COnt Miralax   Dulcolax added
Cont Lasix   Patient was seen by the Vascular team , no acute vascular surgical intervention is recommended  WOund care is on the case / cont wound care   No acute arterial disease noted from Doppler
Cont Lasix   Patient was seen by the Vascular team , no acute vascular surgical intervention is recommended .    will need mindi le unna boot rx at d/c as outpt as per Orthopedic   Pt was seen by ID team and recommended continuing to observe off of antibiotics.    Elevate legs
Cont Lasix   Patient was seen by the Vascular team , no acute vascular surgical intervention is recommended .    will need mindi le unna boot rx at d/c as outpt as per Orthopedic   Pt was seen by ID team and recommended continuing to observe off of antibiotics.    Elevate legs.
Cont Lasix   Patient was seen by the Vascular team , no acute vascular surgical intervention is recommended  WOund care is on the case / cont wound care   No acute arterial disease noted from arterial Doppler.
- History of DVT/PE with IVC filter  - c/w apixaban

## 2018-07-05 NOTE — PROGRESS NOTE ADULT - PROBLEM SELECTOR PLAN 3
COnt Miralax/ Dulcolax   She moved her BM
COnt Metoprolol/ Apixaban
Bp is currently controlled   COnt Metoprolol/Lasix
Bp is elevated / could be due to pain  / will change pain meds and repeat BP   COnt Metoprolol/Lasix for now and if BP remain elevated , will increase metoprolol since her heart rate tend to also be elevated
COnt Miralax/ Dulcolax
with sciatica presence unspecified.  Plan: MRI with Degenerative disc disease L2-3 with left-sided disc osteophyte complex   which extends into the left neural foramen. Far lateral left-sided disc   herniation L3-4 extending into the neural foramen.   Edema in the sacral alae bilaterally left greater than right suspicious   for sacral insufficiency fractures which appear new   -pain is better controlled with Percocet/ Continue with lidocaine patch and Gabapentin and will be d/c to [facility] with this pain medication regimen   - Miralax/ Dulcolax  for constipation.  -Ortho/spine recs appreciated   -PT is waiting for d/c to [facility].
- c/w metoprolol and lasix (home medication)  - vital signs per routine
as above
- c/w metoprolol and lasix

## 2018-07-05 NOTE — PROGRESS NOTE ADULT - PROBLEM SELECTOR PROBLEM 1
Acute low back pain, unspecified back pain laterality, with sciatica presence unspecified
Back pain
Acute cystitis without hematuria
Edema of lower extremity
Acute low back pain, unspecified back pain laterality, with sciatica presence unspecified
Back pain

## 2018-07-05 NOTE — PROGRESS NOTE ADULT - PROBLEM SELECTOR PLAN 5
COnt Metoprolol/Lasix   Monitor BP.
Cont Lasix   Patient was seen by the Vascular team , no acute vascular surgical intervention is recommended  WOund care consult was called
COnt Metoprolol/Lasix   Monitor BP.
Cont Apixaban   Doppler of the lower extremity with no new DVT
Cont Apixaban   Doppler of the lower extremity with no new DVT.
COnt Metoprolol/ Apixaban.   No bleed
- DASH diet   - Weight loss counseling.
- DASH diet   - Weight loss counseling.

## 2018-07-31 ENCOUNTER — RX RENEWAL (OUTPATIENT)
Age: 73
End: 2018-07-31

## 2018-08-28 ENCOUNTER — APPOINTMENT (OUTPATIENT)
Dept: INTERNAL MEDICINE | Facility: CLINIC | Age: 73
End: 2018-08-28
Payer: MEDICARE

## 2018-08-28 VITALS
OXYGEN SATURATION: 96 % | DIASTOLIC BLOOD PRESSURE: 80 MMHG | HEART RATE: 84 BPM | SYSTOLIC BLOOD PRESSURE: 120 MMHG | TEMPERATURE: 99.4 F

## 2018-08-28 PROCEDURE — 36415 COLL VENOUS BLD VENIPUNCTURE: CPT

## 2018-08-28 PROCEDURE — 99214 OFFICE O/P EST MOD 30 MIN: CPT | Mod: 25

## 2018-08-28 RX ORDER — TRAMADOL HYDROCHLORIDE 50 MG/1
50 TABLET, COATED ORAL
Qty: 90 | Refills: 0 | Status: DISCONTINUED | COMMUNITY
Start: 2018-04-13 | End: 2018-08-28

## 2018-08-28 RX ORDER — CEPHALEXIN 500 MG/1
500 CAPSULE ORAL
Qty: 40 | Refills: 1 | Status: DISCONTINUED | COMMUNITY
Start: 2018-06-06 | End: 2018-08-28

## 2018-08-28 RX ORDER — METOPROLOL SUCCINATE 25 MG/1
25 TABLET, EXTENDED RELEASE ORAL
Qty: 90 | Refills: 3 | Status: DISCONTINUED | COMMUNITY
Start: 2018-03-02 | End: 2018-08-28

## 2018-08-28 NOTE — ASSESSMENT
[FreeTextEntry1] : 1) HTN / HL \par - on meds \par - ct \par - check BMP / LFT \par \par 2) OA ( post knee , hip replacement) \par - back pain w/ HNP \par - Needs pain management \par \par 3) AFib / DVT /PE \par  - on OAC \par - rate controlled as far as A Fib is concerned \par \par 4) MGUS \par - follow w/ hematology \par \par 5) numbness R hand ulnar part- ? ulnar nerve compression \par - refuses evaluation with NCS- check TSH / B12

## 2018-08-28 NOTE — HISTORY OF PRESENT ILLNESS
[FreeTextEntry1] : pt is here for f/u after hospital admission for worsening lymphedema with skin breakdown \par she was managed with elevation / wrapping / diuretics with partial improvement \par \par the pt has HTN / HL \par also has ch back pain with L 3-4 disc herniation \par \par h/o  DVT / PE - On elequis \par MGUS - following with heme\par \par \par OP - on fosamax / due for BMD testing \par \par pt has numbness in the medial 2 fingers of the R hand \par

## 2018-08-28 NOTE — PHYSICAL EXAM
[No Acute Distress] : no acute distress [Well Nourished] : well nourished [Normal Outer Ear/Nose] : the outer ears and nose were normal in appearance [Normal Oropharynx] : the oropharynx was normal [No JVD] : no jugular venous distention [Supple] : supple [No Lymphadenopathy] : no lymphadenopathy [No Respiratory Distress] : no respiratory distress  [Clear to Auscultation] : lungs were clear to auscultation bilaterally [No Accessory Muscle Use] : no accessory muscle use [Normal Rate] : normal rate  [Regular Rhythm] : with a regular rhythm [Normal S1, S2] : normal S1 and S2 [Soft] : abdomen soft [Non Tender] : non-tender [No HSM] : no HSM [Normal Bowel Sounds] : normal bowel sounds [de-identified] : extreme edema both legs

## 2018-08-29 LAB
ALBUMIN SERPL ELPH-MCNC: 4 G/DL
ALP BLD-CCNC: 127 U/L
ALT SERPL-CCNC: 10 U/L
ANION GAP SERPL CALC-SCNC: 13 MMOL/L
AST SERPL-CCNC: 14 U/L
BILIRUB SERPL-MCNC: 0.7 MG/DL
BUN SERPL-MCNC: 10 MG/DL
CALCIUM SERPL-MCNC: 9.1 MG/DL
CHLORIDE SERPL-SCNC: 107 MMOL/L
CO2 SERPL-SCNC: 23 MMOL/L
CREAT SERPL-MCNC: 0.64 MG/DL
GLUCOSE SERPL-MCNC: 95 MG/DL
HBA1C MFR BLD HPLC: 5.8 %
POTASSIUM SERPL-SCNC: 4.6 MMOL/L
PROT SERPL-MCNC: 6.4 G/DL
SODIUM SERPL-SCNC: 143 MMOL/L
TSH SERPL-ACNC: 2.97 UIU/ML

## 2018-09-04 ENCOUNTER — APPOINTMENT (OUTPATIENT)
Dept: VASCULAR SURGERY | Facility: CLINIC | Age: 73
End: 2018-09-04
Payer: MEDICARE

## 2018-09-04 VITALS
HEIGHT: 65 IN | SYSTOLIC BLOOD PRESSURE: 162 MMHG | DIASTOLIC BLOOD PRESSURE: 83 MMHG | HEART RATE: 90 BPM | TEMPERATURE: 97.9 F

## 2018-09-04 DIAGNOSIS — L03.116 CELLULITIS OF LEFT LOWER LIMB: ICD-10-CM

## 2018-09-04 PROCEDURE — 99214 OFFICE O/P EST MOD 30 MIN: CPT | Mod: 25

## 2018-09-04 PROCEDURE — 29580 STRAPPING UNNA BOOT: CPT | Mod: 50

## 2018-09-05 ENCOUNTER — APPOINTMENT (OUTPATIENT)
Dept: VASCULAR SURGERY | Facility: CLINIC | Age: 73
End: 2018-09-05

## 2018-09-25 ENCOUNTER — APPOINTMENT (OUTPATIENT)
Dept: VASCULAR SURGERY | Facility: CLINIC | Age: 73
End: 2018-09-25
Payer: MEDICARE

## 2018-09-25 VITALS
WEIGHT: 250 LBS | HEIGHT: 65 IN | BODY MASS INDEX: 41.65 KG/M2 | SYSTOLIC BLOOD PRESSURE: 147 MMHG | HEART RATE: 75 BPM | DIASTOLIC BLOOD PRESSURE: 82 MMHG | TEMPERATURE: 97.7 F

## 2018-09-25 PROCEDURE — 99214 OFFICE O/P EST MOD 30 MIN: CPT | Mod: 25

## 2018-09-25 PROCEDURE — 29580 STRAPPING UNNA BOOT: CPT | Mod: 50

## 2018-11-13 ENCOUNTER — APPOINTMENT (OUTPATIENT)
Dept: INTERNAL MEDICINE | Facility: CLINIC | Age: 73
End: 2018-11-13
Payer: MEDICARE

## 2018-11-13 VITALS — DIASTOLIC BLOOD PRESSURE: 80 MMHG | SYSTOLIC BLOOD PRESSURE: 145 MMHG | HEART RATE: 72 BPM | OXYGEN SATURATION: 96 %

## 2018-11-13 DIAGNOSIS — M51.36 OTHER INTERVERTEBRAL DISC DEGENERATION, LUMBAR REGION: ICD-10-CM

## 2018-11-13 DIAGNOSIS — Z82.62 FAMILY HISTORY OF OSTEOPOROSIS: ICD-10-CM

## 2018-11-13 PROCEDURE — 36415 COLL VENOUS BLD VENIPUNCTURE: CPT

## 2018-11-13 PROCEDURE — 90662 IIV NO PRSV INCREASED AG IM: CPT

## 2018-11-13 PROCEDURE — G0008: CPT

## 2018-11-13 PROCEDURE — 99214 OFFICE O/P EST MOD 30 MIN: CPT | Mod: 25

## 2018-11-13 RX ORDER — AMOXICILLIN AND CLAVULANATE POTASSIUM 500; 125 MG/1; MG/1
500-125 TABLET, FILM COATED ORAL
Qty: 14 | Refills: 1 | Status: DISCONTINUED | COMMUNITY
Start: 2018-09-04 | End: 2018-11-13

## 2018-11-13 NOTE — ASSESSMENT
[FreeTextEntry1] : 1) HTN \par - fair control/ c tmeds / low salt diet\par \par 2) HL \par - ct meds / check lipid and LFT \par \par 3) ch venous stasis - ct using UNNA boot/ elevation / low salt diet\par \par 4 back pain / knee pain / \par - ct follow up with pain management\par - HHA ad \par \par 5) DVT / PE \par on long term  med \par A Fib controlled \par \par flu shot \par \par trazodone for insomnia

## 2018-11-13 NOTE — HISTORY OF PRESENT ILLNESS
[FreeTextEntry1] : f/u HTN / HL / OP / LBP/ OA - knees / DVT / PE  [de-identified] : taking meds \par no AE \par She is pretty much bound to the chair and bed\par needs help transfering \par needs help with showering / changing / all IADL \par she has had no falls\par ct to have severe back and knee pain - uses walker \par \par on OAC \par Rate controlled for A Fib

## 2018-11-13 NOTE — PHYSICAL EXAM
[No Acute Distress] : no acute distress [Well Nourished] : well nourished [Normal Outer Ear/Nose] : the outer ears and nose were normal in appearance [Normal Oropharynx] : the oropharynx was normal [No JVD] : no jugular venous distention [Supple] : supple [No Respiratory Distress] : no respiratory distress  [Clear to Auscultation] : lungs were clear to auscultation bilaterally [No Accessory Muscle Use] : no accessory muscle use [Normal Rate] : normal rate  [Regular Rhythm] : with a regular rhythm [Normal S1, S2] : normal S1 and S2 [Soft] : abdomen soft [Non Tender] : non-tender [No HSM] : no HSM [Normal Bowel Sounds] : normal bowel sounds

## 2018-11-14 LAB
CHOLEST SERPL-MCNC: 232 MG/DL
CHOLEST/HDLC SERPL: 3.4 RATIO
HDLC SERPL-MCNC: 69 MG/DL
LDLC SERPL CALC-MCNC: 132 MG/DL
TRIGL SERPL-MCNC: 157 MG/DL

## 2018-11-28 ENCOUNTER — RX RENEWAL (OUTPATIENT)
Age: 73
End: 2018-11-28

## 2018-11-28 ENCOUNTER — APPOINTMENT (OUTPATIENT)
Dept: VASCULAR SURGERY | Facility: CLINIC | Age: 73
End: 2018-11-28
Payer: MEDICARE

## 2018-11-28 VITALS
HEART RATE: 71 BPM | HEIGHT: 65 IN | WEIGHT: 250 LBS | BODY MASS INDEX: 41.65 KG/M2 | DIASTOLIC BLOOD PRESSURE: 85 MMHG | TEMPERATURE: 97.8 F | SYSTOLIC BLOOD PRESSURE: 164 MMHG

## 2018-11-28 PROCEDURE — 29580 STRAPPING UNNA BOOT: CPT | Mod: 50

## 2018-11-28 PROCEDURE — 99214 OFFICE O/P EST MOD 30 MIN: CPT | Mod: 25

## 2019-01-17 ENCOUNTER — APPOINTMENT (OUTPATIENT)
Dept: INTERNAL MEDICINE | Facility: CLINIC | Age: 74
End: 2019-01-17
Payer: MEDICARE

## 2019-01-17 VITALS
DIASTOLIC BLOOD PRESSURE: 90 MMHG | HEART RATE: 97 BPM | OXYGEN SATURATION: 97 % | SYSTOLIC BLOOD PRESSURE: 175 MMHG | TEMPERATURE: 97.9 F

## 2019-01-17 DIAGNOSIS — M19.90 UNSPECIFIED OSTEOARTHRITIS, UNSPECIFIED SITE: ICD-10-CM

## 2019-01-17 PROCEDURE — 99213 OFFICE O/P EST LOW 20 MIN: CPT

## 2019-01-17 NOTE — ASSESSMENT
[FreeTextEntry1] : 1) HTN \par - inc the Toprol to 50 \par - f/ui n 3 month \par \par 2) forms filled - needs help w/ ADL

## 2019-01-17 NOTE — PHYSICAL EXAM
[No Acute Distress] : no acute distress [Well Nourished] : well nourished [Well Developed] : well developed [Supple] : supple [No Respiratory Distress] : no respiratory distress  [Clear to Auscultation] : lungs were clear to auscultation bilaterally [No Accessory Muscle Use] : no accessory muscle use [Normal Rate] : normal rate  [Regular Rhythm] : with a regular rhythm [de-identified] : (+) B/L LYMPHEDEMA [de-identified] : USES WALKER

## 2019-01-17 NOTE — REVIEW OF SYSTEMS
[Fatigue] : fatigue [Joint Pain] : joint pain [Joint Stiffness] : joint stiffness [Joint Swelling] : joint swelling [Negative] : Gastrointestinal

## 2019-01-17 NOTE — HISTORY OF PRESENT ILLNESS
[FreeTextEntry1] : f/u HTN , OA\par  [de-identified] : she reports  that she has been started on Toprol 25 for HTN \par Taking meds \par no AE \par \par She uses walker / very limited in terms of ADL / IADL \par - she denies any chest pain or SOB

## 2019-01-18 ENCOUNTER — MEDICATION RENEWAL (OUTPATIENT)
Age: 74
End: 2019-01-18

## 2019-01-29 ENCOUNTER — APPOINTMENT (OUTPATIENT)
Dept: INTERNAL MEDICINE | Facility: CLINIC | Age: 74
End: 2019-01-29

## 2019-01-29 ENCOUNTER — APPOINTMENT (OUTPATIENT)
Dept: VASCULAR SURGERY | Facility: CLINIC | Age: 74
End: 2019-01-29
Payer: MEDICARE

## 2019-01-29 VITALS
HEIGHT: 64 IN | TEMPERATURE: 98.1 F | DIASTOLIC BLOOD PRESSURE: 82 MMHG | SYSTOLIC BLOOD PRESSURE: 142 MMHG | HEART RATE: 64 BPM

## 2019-01-29 PROCEDURE — 99214 OFFICE O/P EST MOD 30 MIN: CPT

## 2019-01-29 NOTE — HISTORY OF PRESENT ILLNESS
[FreeTextEntry1] : pt returns w c/o mindi le pain swelling and discomfort \par intensity mild to moderate since last ov \par pt is compliant w comp stockings and lymphedema pump

## 2019-01-29 NOTE — ASSESSMENT
[Arterial/Venous Disease] : arterial/venous disease [Other: _____] : [unfilled] [Foot care/Footwear] : foot care/footwear [FreeTextEntry1] : Impression  post dvt syndrome and venous insuff w recurrent  sx\par \par Med Conserv managemnt leg elevation , comp stockings\par continue lymphedema pump use \par ov 2-3 mo to re eval \par \par Varicose veins are enlarged, twisted veins. Varicose veins are caused by increased blood pressure in the veins.  The blood moves towards the heart by 1-way valves in the veins. When the valves become weakened or damaged, blood can collect in the veins and pool in your lower legs (ankles). This causes the veins to become enlarged and incompetent with reflux. Sitting or standing for long periods can cause blood to pool in the leg veins, increasing the pressure within the veins. \par Risk factors for varicose veins or venous disease may include:  obesity, older age, standing or sitting for prolonged periods of time for several years, being female, pregnancy, taking oral contraceptive pills or hormone replacement, being inactive, and/or smoking. \par The most common symptoms of varicose veins are sensations in the legs, such as a heavy feeling, burning, and/or aching. However, each individual may experience symptoms differently.  Other symptoms may include:  color changes in the skin, sores on the legs, or rash.  Severe varicose veins or venous disease may eventually produce long-term mild swelling that can result in more serious skin and tissue problems, such as ulcers and non-healing sores.\par Varicose veins and venous disease are diagnosed by a complete medical history, physical examination, and diagnostic studies for varicose veins including duplex ultrasound and color-flow imaging.  \par Medical treatment for varicose veins and venous disease include:  compression stockings, sclerotherapy, endovenous ablation and/or surgical treatment with microphlebectomy.  \par \par

## 2019-01-29 NOTE — DATA REVIEWED
[FreeTextEntry1] : 7/26/2016 Venous Duplex  madhu le no  acute dvt svt\par \par 7/26/2016 Abd Venous Duplex  patent IVC and madhu iliac veins \par \par 3/28/2017 Venous Duplex  madhu le no acute dvt svt \par \par Feb 2018 LLE venous Duplex reviewed  sig for acute dvt from gastroc v to cfv \par \par 3/16/2018 venous duplex RLE no acute dvt svt LLE sig for subacute dvt of  per v, ptv, gastroc v, pop v ,  sfv\par \par 3/28/2018 Abd venous Duplex  patent ivc and madhu iliac veins  no sig pathology\par \par 3/28/2016 LLE Venous duplex  no acute dvt svt sig for chronic  part recannaliz   sfv dvt, pop v and ptv sig for edema \par \par 6/27/2018 Madhu le venous duplex  pt unable to complete due to severe back pain \par \par

## 2019-01-29 NOTE — PHYSICAL EXAM
[1+] : left 1+ [0] : right 0 [Ankle Swelling (On Exam)] : present [Ankle Swelling Bilaterally] : severe [Varicose Veins Of Lower Extremities] : bilaterally [] : bilaterally [Ankle Swelling On The Left] : moderate [No HSM] : no hepatosplenomegaly [No Rash or Lesion] : No rash or lesion [Alert] : alert [Oriented to Person] : oriented to person [Oriented to Place] : oriented to place [Oriented to Time] : oriented to time [Calm] : calm [JVD] : no jugular venous distention  [2+] : left 2+ [Abdomen Masses] : No abdominal masses [Tender] : was nontender [Stool Sample Taken] : No stool obtained  on rectal exam [de-identified] : nad [de-identified] : wnl [FreeTextEntry1] : unable to palpate mindi pedal pulses due to edema\par sev venous insuff w  sev edema and multiple areas mindi shin of pre ulcer skin changes , left mid shin eschar 1cm [de-identified] : obese  [de-identified] : wnl [de-identified] : difficulty ambulating  due to low back pain  [de-identified] : cn 2-12 mindi grossly intact [de-identified] : cooperative

## 2019-02-05 NOTE — PROGRESS NOTE ADULT - PROBLEM SELECTOR PLAN 7
TB test done: 2/5/2019 at 1:02 PM.  Return in 48-72 hours for TB reading. We are unable to do the reading if outside that timeframe and you will have to have the test placed again. Return 02/7/19 1:00pm-630 pm or 02/8/19 9am-1:00 pm.      MANTOUX TUBERCULIN (a.k.a. TB) SKIN TEST      What is Tuberculosis/TB?  Tuberculosis/TB is an infectious disease, which is spread through the air by tiny germs when people cough, sneeze, speak or sing. TB germs are very small and can remain in the air for a long period of time. TB germs most oft  en settle in your lungs, but may also settle in other organs of your body. TB germs can be present in your body without making you ill. This is called TB INFECTION. TB infection cannot be spread to other people. When your  body’s defenses become weak and can no longer control the TB germs they multiply, this is called TB DISEASE. It can take month(s) to year(s) for TB infection to become TB disease. The TB SKIN TEST can show if a person has  been “infected” by TB germs.    How is the Mantoux Tuberculin/TB skin test given?  A very small amount of a product called Purified Protein Derivative (PPD) is injected just under the top layer of the skin on the forearm. Persons who have been infected by the TB germs usually react to the test by developing swelling at the injection site. The skin test results must be read 48 to 72 hours after given. Failure to return within this time frame means the test will need to be repeated.    Side effects…  Side effects from a skin test are rare and may include itching and discomfort at the injection site and very rarely the possibility of a blister, ulceration or necrosis (dead tissue) at the site if the injection.  
- c/w sertraline
- c/w sertraline

## 2019-03-07 ENCOUNTER — MEDICATION RENEWAL (OUTPATIENT)
Age: 74
End: 2019-03-07

## 2019-03-07 NOTE — PHYSICAL THERAPY INITIAL EVALUATION ADULT - LEVEL OF INDEPENDENCE: SIT/SUPINE, REHAB EVAL
AVS printed and sent with the pt back to the [facility]. Pt escorted by EMS back to the facility. VSS, no signs and symptoms of distress, no pain reported at this time.    to (R) LE/minimum assist (75% patients effort)/contact guard

## 2019-03-15 ENCOUNTER — APPOINTMENT (OUTPATIENT)
Dept: INTERNAL MEDICINE | Facility: CLINIC | Age: 74
End: 2019-03-15
Payer: MEDICARE

## 2019-03-15 ENCOUNTER — LABORATORY RESULT (OUTPATIENT)
Age: 74
End: 2019-03-15

## 2019-03-15 VITALS
DIASTOLIC BLOOD PRESSURE: 80 MMHG | OXYGEN SATURATION: 96 % | RESPIRATION RATE: 16 BRPM | HEART RATE: 69 BPM | TEMPERATURE: 97.6 F | SYSTOLIC BLOOD PRESSURE: 140 MMHG

## 2019-03-15 LAB
BILIRUB UR QL STRIP: NEGATIVE
CLARITY UR: CLEAR
COLLECTION METHOD: NORMAL
GLUCOSE UR-MCNC: NEGATIVE
HCG UR QL: 0.2 EU/DL
HGB UR QL STRIP.AUTO: NEGATIVE
KETONES UR-MCNC: NEGATIVE
LEUKOCYTE ESTERASE UR QL STRIP: NORMAL
NITRITE UR QL STRIP: NEGATIVE
PH UR STRIP: 5.5
PROT UR STRIP-MCNC: NEGATIVE
SP GR UR STRIP: 1.01

## 2019-03-15 PROCEDURE — 81003 URINALYSIS AUTO W/O SCOPE: CPT | Mod: QW

## 2019-03-15 PROCEDURE — 99214 OFFICE O/P EST MOD 30 MIN: CPT | Mod: 25,Q5

## 2019-03-25 NOTE — PHYSICAL EXAM
[No Acute Distress] : no acute distress [No Respiratory Distress] : no respiratory distress  [Clear to Auscultation] : lungs were clear to auscultation bilaterally [Normal Rate] : normal rate  [Regular Rhythm] : with a regular rhythm [Normal S1, S2] : normal S1 and S2 [No Murmur] : no murmur heard

## 2019-03-25 NOTE — ASSESSMENT
[FreeTextEntry1] : 74 y/o F with HTN, HL, depression, DJD, chronic lymphedema here for acute visit for several months of urinary frequency.\par Suspect likely overactive bladder with some mixed urge and stress incontinence.\par - Discuss Kegel exercises\par - No fluids before sleep\par - UA\par - Can consider oxybutynin or tolterodine but must use with caution given age

## 2019-03-25 NOTE — HISTORY OF PRESENT ILLNESS
[FreeTextEntry8] : Here for acute visit for chronic urinary frequency.\par \par No dysuria. Has urinary frequency 10x during the day. No gross hematuria.\par Sometimes when sitting, will have some incontinence. Worse when laughing or sneezing.\par Has been going on for months.\par no blood in the urine. \par No stool incontinence. No constipation. \par No fevers.

## 2019-03-25 NOTE — REVIEW OF SYSTEMS
[Frequency] : frequency [Negative] : Respiratory [Dysuria] : no dysuria [Incontinence] : no incontinence [Nocturia] : no nocturia [Hematuria] : no hematuria

## 2019-03-26 LAB
APPEARANCE: ABNORMAL
BILIRUBIN URINE: NEGATIVE
BLOOD URINE: NEGATIVE
COLOR: NORMAL
GLUCOSE QUALITATIVE U: NEGATIVE
KETONES URINE: NEGATIVE
LEUKOCYTE ESTERASE URINE: ABNORMAL
NITRITE URINE: NEGATIVE
PH URINE: 6
PROTEIN URINE: NEGATIVE
SPECIFIC GRAVITY URINE: 1.01
UROBILINOGEN URINE: NORMAL

## 2019-04-04 ENCOUNTER — APPOINTMENT (OUTPATIENT)
Dept: VASCULAR SURGERY | Facility: CLINIC | Age: 74
End: 2019-04-04
Payer: MEDICARE

## 2019-04-04 VITALS
HEIGHT: 64 IN | WEIGHT: 250 LBS | HEART RATE: 101 BPM | TEMPERATURE: 98 F | DIASTOLIC BLOOD PRESSURE: 88 MMHG | SYSTOLIC BLOOD PRESSURE: 142 MMHG | BODY MASS INDEX: 42.68 KG/M2

## 2019-04-04 PROCEDURE — 99213 OFFICE O/P EST LOW 20 MIN: CPT

## 2019-04-04 NOTE — DATA REVIEWED
[FreeTextEntry1] : 7/26/2016 Venous Duplex  madhu le no  acute dvt svt\par \par 7/26/2016 Abd Venous Duplex  patent IVC and madhu iliac veins \par \par 3/28/2017 Venous Duplex  madhu le no acute dvt svt \par \par Feb 2018 LLE venous Duplex reviewed  sig for acute dvt from gastroc v to cfv \par \par 3/16/2018 venous duplex RLE no acute dvt svt LLE sig for subacute dvt of  per v, ptv, gastroc v, pop v ,  sfv\par \par 3/28/2018 Abd venous Duplex  patent ivc and madhu iliac veins  no sig pathology\par \par 3/28/2016 LLE Venous duplex  no acute dvt svt sig for chronic  part recannaliz   sfv dvt, pop v and ptv sig for edema \par \par 6/27/2018 Madhu le venous duplex  pt unable to complete due to severe back pain \par \par 4/4/19 Venous Duplex pt declined\par \par

## 2019-04-04 NOTE — HISTORY OF PRESENT ILLNESS
[FreeTextEntry1] : 72 y/o f w/ history of lymphedema and post phlebitic syndrome presents today w/ c/o of bilateral leg swelling right leg significantly worse than left, which has gotten worse in the last 2 weeks. She states she has not used the lymphedema pump in quite a while and now her legs are too swollen for the sleeves. She was wrapping her legs with ace bandages from the ankle to calf without improvement. She attempted compression stockings in the past but unable to comply d/t discomfort and the inability to apply them. She also reports going to [facility] a few times in the past. Denies trauma or injury. Denies open wounds. No fever or chills.

## 2019-04-04 NOTE — REASON FOR VISIT
[Follow-Up: _____] : a [unfilled] follow-up visit [Other: _____] : [unfilled] [FreeTextEntry1] : my legs are swollen

## 2019-04-04 NOTE — PHYSICAL EXAM
[1+] : left 1+ [Ankle Swelling (On Exam)] : present [Varicose Veins Of Lower Extremities] : bilaterally [Ankle Swelling On The Left] : moderate [] : bilaterally [No Rash or Lesion] : No rash or lesion [Alert] : alert [Oriented to Person] : oriented to person [Oriented to Place] : oriented to place [Oriented to Time] : oriented to time [Calm] : calm [0] : left 0 [Ankle Swelling Bilaterally] : severe [JVD] : no jugular venous distention  [Abdomen Masses] : No abdominal masses [Tender] : was nontender [Stool Sample Taken] : No stool obtained  on rectal exam [de-identified] : nad [de-identified] : adelinal [FreeTextEntry1] : unable to palpate bilateral pedal pulses due to edema\par Right lower extremity severe edema worse than left extending to distal thigh. \par sev venous insuff w/ severe chronic skin changes and xerosis. No open wounds or drainage. No s/s of infection [de-identified] : obese  [de-identified] : audrey [de-identified] : Difficulty w/ bilateral le ROM d/t back pain and obesity   [de-identified] : cooperative

## 2019-04-04 NOTE — ASSESSMENT
[Arterial/Venous Disease] : arterial/venous disease [Other: _____] : [unfilled] [Foot care/Footwear] : foot care/footwear [FreeTextEntry1] : Impression: Lymphedema, post phlebitic syndrome and venous insuff \par \par Medical Conservative management leg elevation, diet and weight loss, skin moisturizer, comp stockings (pt refuses)\par Recc applying ace bandage from toes to below knee\par Referral for STARS Lymphedema therapy on bilateral lower extremities\par Restart lymphedema pump once swelling improves \par RTO in 3 mo to re eval \par \par

## 2019-04-11 NOTE — H&P ADULT - PROBLEM/PLAN-8
DISPLAY PLAN FREE TEXT Implemented All Universal Safety Interventions:  [place] to call system. Call bell, personal items and telephone within reach. Instruct patient to call for assistance. Room bathroom lighting operational. Non-slip footwear when patient is off stretcher. Physically safe environment: no spills, clutter or unnecessary equipment. Stretcher in lowest position, wheels locked, appropriate side rails in place.

## 2019-04-16 ENCOUNTER — MEDICATION RENEWAL (OUTPATIENT)
Age: 74
End: 2019-04-16

## 2019-04-16 ENCOUNTER — RX RENEWAL (OUTPATIENT)
Age: 74
End: 2019-04-16

## 2019-04-24 ENCOUNTER — RX RENEWAL (OUTPATIENT)
Age: 74
End: 2019-04-24

## 2019-04-30 ENCOUNTER — APPOINTMENT (OUTPATIENT)
Dept: VASCULAR SURGERY | Facility: CLINIC | Age: 74
End: 2019-04-30
Payer: MEDICARE

## 2019-04-30 ENCOUNTER — OTHER (OUTPATIENT)
Age: 74
End: 2019-04-30

## 2019-04-30 VITALS
BODY MASS INDEX: 42.68 KG/M2 | SYSTOLIC BLOOD PRESSURE: 130 MMHG | WEIGHT: 250 LBS | DIASTOLIC BLOOD PRESSURE: 83 MMHG | HEART RATE: 75 BPM | HEIGHT: 64 IN

## 2019-04-30 PROCEDURE — 29580 STRAPPING UNNA BOOT: CPT | Mod: 50

## 2019-04-30 PROCEDURE — 99214 OFFICE O/P EST MOD 30 MIN: CPT | Mod: 25

## 2019-04-30 NOTE — PROCEDURE
[FreeTextEntry1] : Madhu Unna boot applied from toes to knee  w/o complications\par Pt karmen procedure well\par Pt is not allergic to the unna boot\par

## 2019-04-30 NOTE — ASSESSMENT
[Arterial/Venous Disease] : arterial/venous disease [Other: _____] : [unfilled] [Foot care/Footwear] : foot care/footwear [FreeTextEntry1] : Impression: Lymphedema, post phlebitic syndrome and venous insuff w worsening sx \par \par Medical Conservative management leg elevation, diet and weight loss, skin moisturizer, comp stockings \par will restart mindi le unna boot rx to allow pt to refit into comp stockings and lymphedema pump and to better perform pt/ot \par Referral for STARS Lymphedema therapy on bilateral lower extremities\par Restart lymphedema pump once swelling improves \par \par

## 2019-04-30 NOTE — PHYSICAL EXAM
[1+] : left 1+ [0] : left 0 [Ankle Swelling (On Exam)] : present [Varicose Veins Of Lower Extremities] : bilaterally [Ankle Swelling On The Left] : moderate [] : bilaterally [Ankle Swelling Bilaterally] : severe [No Rash or Lesion] : No rash or lesion [Alert] : alert [Oriented to Person] : oriented to person [Oriented to Place] : oriented to place [Oriented to Time] : oriented to time [Calm] : calm [JVD] : no jugular venous distention  [Abdomen Masses] : No abdominal masses [Tender] : was nontender [Stool Sample Taken] : No stool obtained  on rectal exam [de-identified] : nad [de-identified] : adelinal [FreeTextEntry1] : unable to palpate bilateral pedal pulses due to edema\par Madhu  lower extremity severe edema mid thigh to toes \par sev venous insuff w/ severe chronic skin changes and xerosis. No open wounds or drainage. No s/s of infection [de-identified] : obese  [de-identified] : audrey [de-identified] : Difficulty w/ bilateral le ROM d/t back pain and obesity   [de-identified] : cooperative

## 2019-04-30 NOTE — HISTORY OF PRESENT ILLNESS
[FreeTextEntry1] : 72 y/o f w/ history of lymphedema and post phlebitic syndrome presents today w/ c/o of bilateral leg swelling right leg significantly worse than left, which has gotten worse in the last 2 weeks. She states she has not used the lymphedema pump in quite a while and now her legs are too swollen for the sleeves. She was wrapping her legs with ace bandages from the ankle to calf without improvement. She attempted compression stockings in the past but unable to comply d/t discomfort and the inability to apply them. She also reports going to [facility] center a few times in the past. Denies trauma or injury. Denies open wounds. No fever or chills.  [de-identified] : pt states that at this time unable to fit into comp stockings or use lymphedema pump\par pt c/o mindi le swelling and discomfort\par intensity moderate since last ov

## 2019-05-03 ENCOUNTER — APPOINTMENT (OUTPATIENT)
Dept: VASCULAR SURGERY | Facility: CLINIC | Age: 74
End: 2019-05-03
Payer: MEDICARE

## 2019-05-03 VITALS
HEART RATE: 101 BPM | TEMPERATURE: 97.5 F | WEIGHT: 250 LBS | SYSTOLIC BLOOD PRESSURE: 146 MMHG | HEIGHT: 64 IN | BODY MASS INDEX: 42.68 KG/M2 | DIASTOLIC BLOOD PRESSURE: 77 MMHG

## 2019-05-03 DIAGNOSIS — I83.899 VARICOSE VEINS OF UNSPECIFIED LOWER EXTREMITY WITH OTHER COMPLICATIONS: ICD-10-CM

## 2019-05-03 PROCEDURE — 99214 OFFICE O/P EST MOD 30 MIN: CPT | Mod: 25

## 2019-05-03 PROCEDURE — 29580 STRAPPING UNNA BOOT: CPT | Mod: 50

## 2019-05-03 NOTE — HISTORY OF PRESENT ILLNESS
[FreeTextEntry1] : 74 y/o f w/ history of lymphedema and post phlebitic syndrome presents today w/ c/o of bilateral leg swelling right leg significantly worse than left, which has gotten worse in the last 2 weeks. She states she has not used the lymphedema pump in quite a while and now her legs are too swollen for the sleeves. She was wrapping her legs with ace bandages from the ankle to calf without improvement. She attempted compression stockings in the past but unable to comply d/t discomfort and the inability to apply them. She also reports going to [facility] center a few times in the past. Denies trauma or injury. Denies open wounds. No fever or chills.  [de-identified] : pt states both legs are feeling better w mindi le unna boots \par pt c/o mindi le swelling and discomfort\par intensity mild to moderate since last ov

## 2019-05-03 NOTE — PHYSICAL EXAM
[JVD] : no jugular venous distention  [1+] : left 1+ [Ankle Swelling (On Exam)] : present [0] : left 0 [Varicose Veins Of Lower Extremities] : bilaterally [] : present [Ankle Swelling On The Left] : moderate [Abdomen Masses] : No abdominal masses [Ankle Swelling Bilaterally] : severe [Stool Sample Taken] : No stool obtained  on rectal exam [Tender] : was nontender [No Rash or Lesion] : No rash or lesion [Alert] : alert [Oriented to Time] : oriented to time [Oriented to Place] : oriented to place [Oriented to Person] : oriented to person [Calm] : calm [de-identified] : nad [de-identified] : adelinal [FreeTextEntry1] : unable to palpate bilateral pedal pulses due to edema\par Madhu  lower extremity severe edema mid thigh to toes \par sev venous insuff w/ severe chronic skin changes and xerosis. No open wounds or drainage. No s/s of infection [de-identified] : audrey [de-identified] : Difficulty w/ bilateral le ROM d/t back pain and obesity   [de-identified] : obese  [de-identified] : cooperative

## 2019-05-03 NOTE — ASSESSMENT
[FreeTextEntry1] : Impression: Lymphedema, post phlebitic syndrome and venous insuff w slight clinical improvement \par \par Medical Conservative management leg elevation, diet and weight loss, skin moisturizer, comp stockings \par continue  mindi le unna boot rx to allow pt to refit into comp stockings and lymphedema pump and to better perform pt/ot \par Referral for STARS Lymphedema therapy on bilateral lower extremities\par Restart lymphedema pump once swelling improves \par rto on tues for mindi le unna boot rx \par  [Foot care/Footwear] : foot care/footwear [Arterial/Venous Disease] : arterial/venous disease [Other: _____] : [unfilled]

## 2019-05-07 ENCOUNTER — APPOINTMENT (OUTPATIENT)
Dept: VASCULAR SURGERY | Facility: CLINIC | Age: 74
End: 2019-05-07

## 2019-05-10 ENCOUNTER — APPOINTMENT (OUTPATIENT)
Dept: VASCULAR SURGERY | Facility: CLINIC | Age: 74
End: 2019-05-10
Payer: MEDICARE

## 2019-05-10 VITALS
HEART RATE: 66 BPM | WEIGHT: 250 LBS | SYSTOLIC BLOOD PRESSURE: 136 MMHG | HEIGHT: 64 IN | BODY MASS INDEX: 42.68 KG/M2 | DIASTOLIC BLOOD PRESSURE: 79 MMHG

## 2019-05-10 DIAGNOSIS — L03.116 CELLULITIS OF LEFT LOWER LIMB: ICD-10-CM

## 2019-05-10 PROCEDURE — 29580 STRAPPING UNNA BOOT: CPT | Mod: 50

## 2019-05-10 PROCEDURE — 99214 OFFICE O/P EST MOD 30 MIN: CPT | Mod: 25

## 2019-05-10 NOTE — PHYSICAL EXAM
[1+] : left 1+ [Ankle Swelling (On Exam)] : present [0] : left 0 [Varicose Veins Of Lower Extremities] : present [] : bilaterally [Ankle Swelling On The Left] : moderate [Ankle Swelling Bilaterally] : severe [No Rash or Lesion] : No rash or lesion [Alert] : alert [Oriented to Person] : oriented to person [Oriented to Place] : oriented to place [Oriented to Time] : oriented to time [Calm] : calm [JVD] : no jugular venous distention  [Abdomen Masses] : No abdominal masses [Stool Sample Taken] : No stool obtained  on rectal exam [Tender] : was nontender [de-identified] : nad [de-identified] : adelinal [FreeTextEntry1] : unable to palpate bilateral pedal pulses due to edema\par Madhu  lower extremity severe edema mid thigh to toes \par sev venous insuff w/ severe chronic skin changes and xerosis madhu shins multiple blisters 3cm x 5cm w clear fluid  and diffuse shin cellulitisNo open wounds or drainage. No s/s of infection [de-identified] : obese  [de-identified] : cooperative  [de-identified] : Difficulty w/ bilateral le ROM d/t back pain and obesity   [de-identified] : audrey

## 2019-05-10 NOTE — ASSESSMENT
[Arterial/Venous Disease] : arterial/venous disease [Other: _____] : [unfilled] [Foot care/Footwear] : foot care/footwear [Medication Management] : medication management [FreeTextEntry1] : Impression: Lymphedema, post phlebitic syndrome and venous insuff w worsening sx and new blisters \par \par Medical Conservative management leg elevation, diet and weight loss\par continue  mindi le unna boot rx to allow pt to refit into comp stockings and lymphedema pump and to better perform pt/ot \par Referral for [facility] Lymphedema therapy on bilateral lower extremities\par Restart lymphedema pump once swelling improves \par start  augmentin 875 bid 10 days\par ov in 3 weeks  to re eval w Dr Christina DUBON\par \par Varicose veins are enlarged, twisted veins. Varicose veins are caused by increased blood pressure in the veins.  The blood moves towards the heart by 1-way valves in the veins. When the valves become weakened or damaged, blood can collect in the veins and pool in your lower legs (ankles). This causes the veins to become enlarged and incompetent with reflux. Sitting or standing for long periods can cause blood to pool in the leg veins, increasing the pressure within the veins. \par Risk factors for varicose veins or venous disease may include:  obesity, older age, standing or sitting for prolonged periods of time for several years, being female, pregnancy, taking oral contraceptive pills or hormone replacement, being inactive, and/or smoking. \par The most common symptoms of varicose veins are sensations in the legs, such as a heavy feeling, burning, and/or aching. However, each individual may experience symptoms differently.  Other symptoms may include:  color changes in the skin, sores on the legs, or rash.  Severe varicose veins or venous disease may eventually produce long-term mild swelling that can result in more serious skin and tissue problems, such as ulcers and non-healing sores.\par Varicose veins and venous disease are diagnosed by a complete medical history, physical examination, and diagnostic studies for varicose veins including duplex ultrasound and color-flow imaging.  \par Medical treatment for varicose veins and venous disease include:  compression stockings, sclerotherapy, endovenous ablation and/or surgical treatment with microphlebectomy.  \par \par  [Ulcer Care] : ulcer care

## 2019-05-10 NOTE — HISTORY OF PRESENT ILLNESS
[FreeTextEntry1] : 72 y/o f w/ history of lymphedema and post phlebitic syndrome presents today w/ c/o of bilateral leg swelling right leg significantly worse than left, which has gotten worse in the last 2 weeks. She states she has not used the lymphedema pump in quite a while and now her legs are too swollen for the sleeves. She was wrapping her legs with ace bandages from the ankle to calf without improvement. She attempted compression stockings in the past but unable to comply d/t discomfort and the inability to apply them. She also reports going to [facility] a few times in the past. Denies trauma or injury. Denies open wounds. No fever or chills.  [de-identified] : pt states both legs are worse and now has blisters \par pt c/o mindi le swelling and discomfort\par intensity moderate since last ov

## 2019-05-14 ENCOUNTER — APPOINTMENT (OUTPATIENT)
Dept: VASCULAR SURGERY | Facility: CLINIC | Age: 74
End: 2019-05-14

## 2019-05-14 ENCOUNTER — APPOINTMENT (OUTPATIENT)
Dept: CARDIOLOGY | Facility: CLINIC | Age: 74
End: 2019-05-14

## 2019-05-21 ENCOUNTER — APPOINTMENT (OUTPATIENT)
Dept: VASCULAR SURGERY | Facility: CLINIC | Age: 74
End: 2019-05-21

## 2019-05-28 ENCOUNTER — APPOINTMENT (OUTPATIENT)
Dept: VASCULAR SURGERY | Facility: CLINIC | Age: 74
End: 2019-05-28

## 2019-05-29 ENCOUNTER — APPOINTMENT (OUTPATIENT)
Dept: VASCULAR SURGERY | Facility: CLINIC | Age: 74
End: 2019-05-29
Payer: MEDICARE

## 2019-05-29 VITALS
HEIGHT: 64 IN | TEMPERATURE: 97.7 F | WEIGHT: 250 LBS | BODY MASS INDEX: 42.68 KG/M2 | HEART RATE: 83 BPM | DIASTOLIC BLOOD PRESSURE: 79 MMHG | SYSTOLIC BLOOD PRESSURE: 138 MMHG

## 2019-05-29 PROCEDURE — 93971 EXTREMITY STUDY: CPT | Mod: RT

## 2019-05-29 PROCEDURE — 29580 STRAPPING UNNA BOOT: CPT | Mod: 50

## 2019-05-29 PROCEDURE — 99214 OFFICE O/P EST MOD 30 MIN: CPT | Mod: 25

## 2019-05-29 NOTE — PHYSICAL EXAM
[1+] : left 1+ [0] : right 0 [Ankle Swelling (On Exam)] : present [Varicose Veins Of Lower Extremities] : bilaterally [Ankle Swelling On The Left] : moderate [] : bilaterally [Ankle Swelling Bilaterally] : severe [No Rash or Lesion] : No rash or lesion [Alert] : alert [Oriented to Person] : oriented to person [Oriented to Place] : oriented to place [Oriented to Time] : oriented to time [Calm] : calm [JVD] : no jugular venous distention  [Abdomen Masses] : No abdominal masses [Tender] : was nontender [Stool Sample Taken] : No stool obtained  on rectal exam [de-identified] : nad [de-identified] : adelinal [de-identified] : obese  [de-identified] : audrey [FreeTextEntry1] : unable to palpate bilateral pedal pulses due to edema\par Madhu  lower extremity severe edema mid thigh to toes \par sev venous insuff w/ severe chronic skin changes and xerosis madhu shins multiple blisters 1mm size \par No open wounds or drainage. No s/s of infection\par mild rt calf  discomfort w exam  [de-identified] : cooperative  [de-identified] : Difficulty w/ bilateral le ROM d/t back pain and obesity

## 2019-05-29 NOTE — ASSESSMENT
[Arterial/Venous Disease] : arterial/venous disease [Medication Management] : medication management [Other: _____] : [unfilled] [Foot care/Footwear] : foot care/footwear [Ulcer Care] : ulcer care [FreeTextEntry1] : Impression: Lymphedema, post phlebitic syndrome and venous insuff w sx\par \par Medical Conservative management leg elevation, diet and weight loss\par continue  mindi le unna boot rx to allow pt to refit into comp stockings and lymphedema pump and to better perform pt/ot \par Referral for [facility] Lymphedema therapy on bilateral lower extremities\par Restart lymphedema pump once swelling improves \par ov in 4 weeks  to re eval \par \par Varicose veins are enlarged, twisted veins. Varicose veins are caused by increased blood pressure in the veins.  The blood moves towards the heart by 1-way valves in the veins. When the valves become weakened or damaged, blood can collect in the veins and pool in your lower legs (ankles). This causes the veins to become enlarged and incompetent with reflux. Sitting or standing for long periods can cause blood to pool in the leg veins, increasing the pressure within the veins. \par Risk factors for varicose veins or venous disease may include:  obesity, older age, standing or sitting for prolonged periods of time for several years, being female, pregnancy, taking oral contraceptive pills or hormone replacement, being inactive, and/or smoking. \par The most common symptoms of varicose veins are sensations in the legs, such as a heavy feeling, burning, and/or aching. However, each individual may experience symptoms differently.  Other symptoms may include:  color changes in the skin, sores on the legs, or rash.  Severe varicose veins or venous disease may eventually produce long-term mild swelling that can result in more serious skin and tissue problems, such as ulcers and non-healing sores.\par Varicose veins and venous disease are diagnosed by a complete medical history, physical examination, and diagnostic studies for varicose veins including duplex ultrasound and color-flow imaging.  \par Medical treatment for varicose veins and venous disease include:  compression stockings, sclerotherapy, endovenous ablation and/or surgical treatment with microphlebectomy.  \par \par

## 2019-05-29 NOTE — HISTORY OF PRESENT ILLNESS
[FreeTextEntry1] : 72 y/o f w/ history of lymphedema and post phlebitic syndrome presents today w/ c/o of bilateral leg swelling right leg significantly worse than left, which has gotten worse in the last 2 weeks. She states she has not used the lymphedema pump in quite a while and now her legs are too swollen for the sleeves. She was wrapping her legs with ace bandages from the ankle to calf without improvement. She attempted compression stockings in the past but unable to comply d/t discomfort and the inability to apply them. She also reports going to [facility] center a few times in the past. Denies trauma or injury. Denies open wounds. No fever or chills.  [de-identified] : pt has completed po abx\par pt c/o new rt calf pain  onset sev days ago\par intensity mild to mod\par pt states that she does not elevate both limbs  augusto also when she sleeps \par pt sleeps in a chair w/o leg elevation

## 2019-05-29 NOTE — DATA REVIEWED
[FreeTextEntry1] : 7/26/2016 Venous Duplex  madhu le no  acute dvt svt\par \par 7/26/2016 Abd Venous Duplex  patent IVC and madhu iliac veins \par \par 3/28/2017 Venous Duplex  madhu le no acute dvt svt \par \par Feb 2018 LLE venous Duplex reviewed  sig for acute dvt from gastroc v to cfv \par \par 3/16/2018 venous duplex RLE no acute dvt svt LLE sig for subacute dvt of  per v, ptv, gastroc v, pop v ,  sfv\par \par 3/28/2018 Abd venous Duplex  patent ivc and madhu iliac veins  no sig pathology\par \par 3/28/2016 LLE Venous duplex  no acute dvt svt sig for chronic  part recannaliz   sfv dvt, pop v and ptv sig for edema \par \par 6/27/2018 Madhu le venous duplex  pt unable to complete due to severe back pain \par \par 4/4/19 Venous Duplex pt declined\par \par 5/29/2019 RLE venous duplex no acute dvt svt  sig for heavy edema \par

## 2019-06-03 ENCOUNTER — MEDICATION RENEWAL (OUTPATIENT)
Age: 74
End: 2019-06-03

## 2019-07-03 ENCOUNTER — APPOINTMENT (OUTPATIENT)
Dept: VASCULAR SURGERY | Facility: CLINIC | Age: 74
End: 2019-07-03
Payer: MEDICARE

## 2019-07-03 VITALS
BODY MASS INDEX: 4.27 KG/M2 | TEMPERATURE: 98 F | WEIGHT: 25 LBS | HEART RATE: 80 BPM | SYSTOLIC BLOOD PRESSURE: 171 MMHG | DIASTOLIC BLOOD PRESSURE: 103 MMHG | HEIGHT: 64 IN

## 2019-07-03 PROCEDURE — 99214 OFFICE O/P EST MOD 30 MIN: CPT | Mod: 25

## 2019-07-03 PROCEDURE — 29580 STRAPPING UNNA BOOT: CPT | Mod: 50

## 2019-07-03 NOTE — ASSESSMENT
[Arterial/Venous Disease] : arterial/venous disease [Medication Management] : medication management [Foot care/Footwear] : foot care/footwear [Other: _____] : [unfilled] [Ulcer Care] : ulcer care [FreeTextEntry1] : Impression: Lymphedema, post phlebitic syndrome and venous insuff w sx\par \par Medical Conservative management leg elevation, diet and weight loss\par continue  mindi le unna boot rx to allow pt to refit into comp stockings and lymphedema pump and to better perform pt/ot \par Referral for [facility] Lymphedema therapy on bilateral lower extremities\par advised pt to d/c mindi le unna boots on fri/sat and then restart comp stockings \par Restart lymphedema pump once swelling improves \par ov in 2mo  to re eval \par \par Varicose veins are enlarged, twisted veins. Varicose veins are caused by increased blood pressure in the veins.  The blood moves towards the heart by 1-way valves in the veins. When the valves become weakened or damaged, blood can collect in the veins and pool in your lower legs (ankles). This causes the veins to become enlarged and incompetent with reflux. Sitting or standing for long periods can cause blood to pool in the leg veins, increasing the pressure within the veins. \par Risk factors for varicose veins or venous disease may include:  obesity, older age, standing or sitting for prolonged periods of time for several years, being female, pregnancy, taking oral contraceptive pills or hormone replacement, being inactive, and/or smoking. \par The most common symptoms of varicose veins are sensations in the legs, such as a heavy feeling, burning, and/or aching. However, each individual may experience symptoms differently.  Other symptoms may include:  color changes in the skin, sores on the legs, or rash.  Severe varicose veins or venous disease may eventually produce long-term mild swelling that can result in more serious skin and tissue problems, such as ulcers and non-healing sores.\par Varicose veins and venous disease are diagnosed by a complete medical history, physical examination, and diagnostic studies for varicose veins including duplex ultrasound and color-flow imaging.  \par Medical treatment for varicose veins and venous disease include:  compression stockings, sclerotherapy, endovenous ablation and/or surgical treatment with microphlebectomy.  \par \par

## 2019-07-03 NOTE — HISTORY OF PRESENT ILLNESS
[FreeTextEntry1] : 74 y/o f w/ history of lymphedema and post phlebitic syndrome presents today w/ c/o of bilateral leg swelling right leg significantly worse than left, which has gotten worse in the last 2 weeks. She states she has not used the lymphedema pump in quite a while and now her legs are too swollen for the sleeves. She was wrapping her legs with ace bandages from the ankle to calf without improvement. She attempted compression stockings in the past but unable to comply d/t discomfort and the inability to apply them. She also reports going to [facility] a few times in the past. Denies trauma or injury. Denies open wounds. No fever or chills.  [de-identified] : pt states that she obtained a  reclining chair and now spents her rest time and sleeping time w legs reclined and both legs are feeling much better\par intenity mild to mod since last ov \par pt  wants to know if she should f/u at lymphedema ctr

## 2019-07-03 NOTE — PHYSICAL EXAM
[1+] : left 1+ [0] : left 0 [Ankle Swelling (On Exam)] : present [Varicose Veins Of Lower Extremities] : bilaterally [Ankle Swelling On The Left] : moderate [] : bilaterally [Ankle Swelling Bilaterally] : severe [No Rash or Lesion] : No rash or lesion [Alert] : alert [Oriented to Place] : oriented to place [Oriented to Person] : oriented to person [Oriented to Time] : oriented to time [Calm] : calm [JVD] : no jugular venous distention  [Abdomen Masses] : No abdominal masses [Tender] : was nontender [Stool Sample Taken] : No stool obtained  on rectal exam [de-identified] : adelinal [de-identified] : nad [FreeTextEntry1] : unable to palpate bilateral pedal pulses due to edema\par Madhu  lower extremity mod to severe edema mid thigh to toes overall some improvement \par sev venous insuff w/ severe chronic skin changes and xerosis madhu shins multiple blisters 1mm size \par No open wounds or drainage. No s/s of infection\par mild rt calf  discomfort w exam  [de-identified] : Difficulty w/ bilateral le ROM d/t back pain and obesity   [de-identified] : obese  [de-identified] : audrey [de-identified] : cooperative

## 2019-07-11 NOTE — H&P PST ADULT - PSYCHIATRIC
Discharged details… Affect and characteristics of appearance, verbalizations, behaviors are appropriate

## 2019-07-23 ENCOUNTER — RX RENEWAL (OUTPATIENT)
Age: 74
End: 2019-07-23

## 2019-09-02 PROBLEM — Z09 FOLLOW UP: Status: ACTIVE | Noted: 2018-01-26

## 2019-09-04 ENCOUNTER — APPOINTMENT (OUTPATIENT)
Dept: VASCULAR SURGERY | Facility: CLINIC | Age: 74
End: 2019-09-04
Payer: MEDICARE

## 2019-09-04 VITALS
HEART RATE: 76 BPM | DIASTOLIC BLOOD PRESSURE: 84 MMHG | BODY MASS INDEX: 32.44 KG/M2 | SYSTOLIC BLOOD PRESSURE: 153 MMHG | HEIGHT: 64 IN | TEMPERATURE: 98 F | WEIGHT: 190 LBS

## 2019-09-04 PROCEDURE — 99214 OFFICE O/P EST MOD 30 MIN: CPT

## 2019-09-04 NOTE — PHYSICAL EXAM
[1+] : left 1+ [0] : left 0 [Ankle Swelling (On Exam)] : present [Varicose Veins Of Lower Extremities] : bilaterally [Ankle Swelling On The Left] : moderate [] : bilaterally [Ankle Swelling Bilaterally] : severe [No Rash or Lesion] : No rash or lesion [Alert] : alert [Oriented to Person] : oriented to person [Oriented to Place] : oriented to place [Oriented to Time] : oriented to time [Calm] : calm [JVD] : no jugular venous distention  [Abdomen Masses] : No abdominal masses [Tender] : was nontender [Stool Sample Taken] : No stool obtained  on rectal exam [de-identified] : nad [de-identified] : adelinal [de-identified] : obese  [FreeTextEntry1] : unable to palpate bilateral pedal pulses due to edema\par Madhu  lower extremity mod to severe edema mid thigh to toes overall some improvement \par sev venous insuff w/ severe chronic skin changes and xerosis \par No open wounds or drainage. No s/s of infection [de-identified] : audrey [de-identified] : Difficulty w/ bilateral le ROM d/t back pain and obesity   [de-identified] : cooperative

## 2019-09-04 NOTE — ASSESSMENT
[Arterial/Venous Disease] : arterial/venous disease [Medication Management] : medication management [Other: _____] : [unfilled] [Foot care/Footwear] : foot care/footwear [FreeTextEntry1] : Impression: Lymphedema, post phlebitic syndrome and venous insuff w sx\par \par Medical Conservative management leg elevation, diet and weight loss\par continue w ace wraps till ble to karmen comp stockings\par continue lymphedema pump \par ov in 3-4mo  to re eval \par \par Varicose veins are enlarged, twisted veins. Varicose veins are caused by increased blood pressure in the veins.  The blood moves towards the heart by 1-way valves in the veins. When the valves become weakened or damaged, blood can collect in the veins and pool in your lower legs (ankles). This causes the veins to become enlarged and incompetent with reflux. Sitting or standing for long periods can cause blood to pool in the leg veins, increasing the pressure within the veins. \par Risk factors for varicose veins or venous disease may include:  obesity, older age, standing or sitting for prolonged periods of time for several years, being female, pregnancy, taking oral contraceptive pills or hormone replacement, being inactive, and/or smoking. \par The most common symptoms of varicose veins are sensations in the legs, such as a heavy feeling, burning, and/or aching. However, each individual may experience symptoms differently.  Other symptoms may include:  color changes in the skin, sores on the legs, or rash.  Severe varicose veins or venous disease may eventually produce long-term mild swelling that can result in more serious skin and tissue problems, such as ulcers and non-healing sores.\par Varicose veins and venous disease are diagnosed by a complete medical history, physical examination, and diagnostic studies for varicose veins including duplex ultrasound and color-flow imaging.  \par Medical treatment for varicose veins and venous disease include:  compression stockings, sclerotherapy, endovenous ablation and/or surgical treatment with microphlebectomy.  \par \par

## 2019-09-04 NOTE — HISTORY OF PRESENT ILLNESS
[de-identified] : pt states that she did not f/u at lymphedema ctr but is continuing  to to use lymphedema pump and ace wraps \par pt states both legs are less swollen and w less discomfort  [FreeTextEntry1] : 72 y/o f w/ history of lymphedema and post phlebitic syndrome presents today w/ c/o of bilateral leg swelling right leg significantly worse than left, which has gotten worse in the last 2 weeks. She states she has not used the lymphedema pump in quite a while and now her legs are too swollen for the sleeves. She was wrapping her legs with ace bandages from the ankle to calf without improvement. She attempted compression stockings in the past but unable to comply d/t discomfort and the inability to apply them. She also reports going to [facility] center a few times in the past. Denies trauma or injury. Denies open wounds. No fever or chills.

## 2019-09-24 ENCOUNTER — RX RENEWAL (OUTPATIENT)
Age: 74
End: 2019-09-24

## 2019-10-01 ENCOUNTER — RX RENEWAL (OUTPATIENT)
Age: 74
End: 2019-10-01

## 2019-10-18 ENCOUNTER — LABORATORY RESULT (OUTPATIENT)
Age: 74
End: 2019-10-18

## 2019-10-18 ENCOUNTER — APPOINTMENT (OUTPATIENT)
Dept: INTERNAL MEDICINE | Facility: CLINIC | Age: 74
End: 2019-10-18
Payer: MEDICARE

## 2019-10-18 VITALS
HEIGHT: 64 IN | HEART RATE: 76 BPM | WEIGHT: 190 LBS | TEMPERATURE: 98.1 F | DIASTOLIC BLOOD PRESSURE: 70 MMHG | SYSTOLIC BLOOD PRESSURE: 122 MMHG | BODY MASS INDEX: 32.44 KG/M2 | OXYGEN SATURATION: 96 %

## 2019-10-18 DIAGNOSIS — Z00.00 ENCOUNTER FOR GENERAL ADULT MEDICAL EXAMINATION W/OUT ABNORMAL FINDINGS: ICD-10-CM

## 2019-10-18 DIAGNOSIS — Z82.61 FAMILY HISTORY OF ARTHRITIS: ICD-10-CM

## 2019-10-18 DIAGNOSIS — N32.81 OVERACTIVE BLADDER: ICD-10-CM

## 2019-10-18 PROCEDURE — 90662 IIV NO PRSV INCREASED AG IM: CPT

## 2019-10-18 PROCEDURE — 90670 PCV13 VACCINE IM: CPT

## 2019-10-18 PROCEDURE — G0009: CPT

## 2019-10-18 PROCEDURE — G0439: CPT | Mod: 25

## 2019-10-18 PROCEDURE — 90472 IMMUNIZATION ADMIN EACH ADD: CPT

## 2019-10-18 PROCEDURE — 36415 COLL VENOUS BLD VENIPUNCTURE: CPT

## 2019-10-18 RX ORDER — AMOXICILLIN AND CLAVULANATE POTASSIUM 875; 125 MG/1; MG/1
875-125 TABLET, COATED ORAL
Qty: 20 | Refills: 1 | Status: DISCONTINUED | COMMUNITY
Start: 2019-05-10 | End: 2019-10-18

## 2019-10-18 NOTE — PHYSICAL EXAM
[Normal Rate] : normal rate  [Normal S1, S2] : normal S1 and S2 [Normal] : affect was normal and insight and judgment were intact [de-identified] : (+) lymphedema

## 2019-10-18 NOTE — REVIEW OF SYSTEMS
[Vision Problems] : vision problems [Back Pain] : back pain [Joint Pain] : joint pain [Anxiety] : anxiety [Negative] : Integumentary

## 2019-10-18 NOTE — HEALTH RISK ASSESSMENT
[Good] : ~his/her~  mood as  good [Fair] : ~his/her~ current health as fair  [] : No [No] : No [0] : 1) Little interest or pleasure doing things: Not at all (0) [No falls in past year] : Patient reported no falls in the past year [1] : 2) Feeling down, depressed, or hopeless for several days (1) [de-identified] : low salt  [de-identified] : none  [Change in mental status noted] : No change in mental status noted [Transportation] : transportation [Retired] : retired [Alone] : lives alone [] :  [Feels Safe at Home] : Feels safe at home [Sexually Active] : not sexually active [Reports changes in hearing] : Reports no changes in hearing [Reports changes in vision] : Reports changes in vision [Reports changes in dental health] : Reports no changes in dental health [Smoke Detector] : smoke detector [Safety elements used in home] : safety elements used in home [Sunscreen] : uses sunscreen [Seat Belt] :  uses seat belt [MammogramDate] : 2015 [ColonoscopyDate] : 10 years ago  [BoneDensityDate] : 2018 [de-identified] : needs help with showering  [de-identified] : needs help  [Name: ___] : Health Care Proxy's Name: [unfilled]  [Relationship: ___] : Relationship: [unfilled] [AdvancecareDate] : 10/18/2019

## 2019-10-18 NOTE — ASSESSMENT
[FreeTextEntry1] : wellness check \par \par lives alone , has HHA for 4 hrs\par needs help w/ all IADL , and some ADL like showering \par no falls, (+) OP on meds \par - mammo- due, refuses colonoscopy - (+) comorbidities , agree , will order FOBT \par - Flu shot Prevnar \par - BMD - next year \par \par

## 2019-10-19 LAB
25(OH)D3 SERPL-MCNC: 13 NG/ML
ALBUMIN SERPL ELPH-MCNC: 4.6 G/DL
ALP BLD-CCNC: 94 U/L
ALT SERPL-CCNC: 13 U/L
ANION GAP SERPL CALC-SCNC: 13 MMOL/L
AST SERPL-CCNC: 19 U/L
BASOPHILS # BLD AUTO: 0.03 K/UL
BASOPHILS NFR BLD AUTO: 0.5 %
BILIRUB SERPL-MCNC: 1.1 MG/DL
BUN SERPL-MCNC: 14 MG/DL
CALCIUM SERPL-MCNC: 9.6 MG/DL
CHLORIDE SERPL-SCNC: 105 MMOL/L
CHOLEST SERPL-MCNC: 241 MG/DL
CHOLEST/HDLC SERPL: 3.8 RATIO
CO2 SERPL-SCNC: 24 MMOL/L
CREAT SERPL-MCNC: 0.69 MG/DL
EOSINOPHIL # BLD AUTO: 0.1 K/UL
EOSINOPHIL NFR BLD AUTO: 1.6 %
ESTIMATED AVERAGE GLUCOSE: 114 MG/DL
GLUCOSE SERPL-MCNC: 101 MG/DL
HBA1C MFR BLD HPLC: 5.6 %
HCT VFR BLD CALC: 45 %
HDLC SERPL-MCNC: 64 MG/DL
HGB BLD-MCNC: 14.2 G/DL
IMM GRANULOCYTES NFR BLD AUTO: 0.3 %
LDLC SERPL CALC-MCNC: 155 MG/DL
LYMPHOCYTES # BLD AUTO: 1.47 K/UL
LYMPHOCYTES NFR BLD AUTO: 23.1 %
MAN DIFF?: NORMAL
MCHC RBC-ENTMCNC: 29.7 PG
MCHC RBC-ENTMCNC: 31.6 GM/DL
MCV RBC AUTO: 94.1 FL
MONOCYTES # BLD AUTO: 0.64 K/UL
MONOCYTES NFR BLD AUTO: 10 %
NEUTROPHILS # BLD AUTO: 4.11 K/UL
NEUTROPHILS NFR BLD AUTO: 64.5 %
PLATELET # BLD AUTO: 182 K/UL
POTASSIUM SERPL-SCNC: 5 MMOL/L
PROT SERPL-MCNC: 7 G/DL
RBC # BLD: 4.78 M/UL
RBC # FLD: 14.6 %
SODIUM SERPL-SCNC: 142 MMOL/L
TRIGL SERPL-MCNC: 110 MG/DL
TSH SERPL-ACNC: 4.48 UIU/ML
WBC # FLD AUTO: 6.37 K/UL

## 2019-11-02 ENCOUNTER — RX RENEWAL (OUTPATIENT)
Age: 74
End: 2019-11-02

## 2019-11-08 ENCOUNTER — NON-APPOINTMENT (OUTPATIENT)
Age: 74
End: 2019-11-08

## 2019-11-08 ENCOUNTER — APPOINTMENT (OUTPATIENT)
Dept: CARDIOLOGY | Facility: CLINIC | Age: 74
End: 2019-11-08
Payer: MEDICARE

## 2019-11-08 VITALS
HEART RATE: 90 BPM | HEIGHT: 64 IN | WEIGHT: 190 LBS | OXYGEN SATURATION: 98 % | DIASTOLIC BLOOD PRESSURE: 82 MMHG | BODY MASS INDEX: 32.44 KG/M2 | SYSTOLIC BLOOD PRESSURE: 157 MMHG

## 2019-11-08 PROCEDURE — 93000 ELECTROCARDIOGRAM COMPLETE: CPT

## 2019-11-08 PROCEDURE — 99215 OFFICE O/P EST HI 40 MIN: CPT

## 2019-11-08 NOTE — HISTORY OF PRESENT ILLNESS
[FreeTextEntry1] : Here for followup, no complaints. She has not been seen since March 2018. Here for preop eval prior to cataract surgery. \par 1. AFIB- Remains on Eliquis 5 mg BID, On Metoprolol 50 mg BID. EKG reviewed and AF with rates in the 90s. 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 Preop for surgery- Patient for cataract surgery, may proceed without further cardiac workup.

## 2019-11-08 NOTE — PHYSICAL EXAM
[General Appearance - Well Developed] : well developed [Normal Appearance] : normal appearance [Well Groomed] : well groomed [General Appearance - In No Acute Distress] : no acute distress [General Appearance - Well Nourished] : well nourished [No Deformities] : no deformities [Eyelids - No Xanthelasma] : the eyelids demonstrated no xanthelasmas [Normal Conjunctiva] : the conjunctiva exhibited no abnormalities [Normal Oral Mucosa] : normal oral mucosa [No Oral Pallor] : no oral pallor [No Oral Cyanosis] : no oral cyanosis [Normal Jugular Venous V Waves Present] : normal jugular venous V waves present [Normal Jugular Venous A Waves Present] : normal jugular venous A waves present [No Jugular Venous Starr A Waves] : no jugular venous starr A waves [Respiration, Rhythm And Depth] : normal respiratory rhythm and effort [Auscultation Breath Sounds / Voice Sounds] : lungs were clear to auscultation bilaterally [Exaggerated Use Of Accessory Muscles For Inspiration] : no accessory muscle use [Heart Rate And Rhythm] : heart rate and rhythm were normal [Heart Sounds] : normal S1 and S2 [Murmurs] : no murmurs present [Abdomen Soft] : soft [Abdomen Tenderness] : non-tender [Abdomen Mass (___ Cm)] : no abdominal mass palpated [] : no hepato-splenomegaly [Gait - Sufficient For Exercise Testing] : the gait was sufficient for exercise testing [Abnormal Walk] : normal gait [FreeTextEntry1] : bilateral lymphedema

## 2019-11-08 NOTE — DISCUSSION/SUMMARY
[FreeTextEntry1] : 74 year old woman with known AF here for followup prior to cataract surgery.\par \par 1. AFIB- Remains on Eliquis 5 mg BID, On Metoprolol 50 mg BID. EKG reviewed and AF with rates in the 90s. 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 Preop for surgery- Patient for cataract surgery, may proceed without further cardiac workup.\par 4. FU in 6 months

## 2019-11-26 ENCOUNTER — APPOINTMENT (OUTPATIENT)
Dept: INTERNAL MEDICINE | Facility: CLINIC | Age: 74
End: 2019-11-26

## 2019-12-18 ENCOUNTER — APPOINTMENT (OUTPATIENT)
Dept: VASCULAR SURGERY | Facility: CLINIC | Age: 74
End: 2019-12-18
Payer: MEDICARE

## 2019-12-18 VITALS
DIASTOLIC BLOOD PRESSURE: 84 MMHG | BODY MASS INDEX: 28.14 KG/M2 | HEIGHT: 69 IN | HEART RATE: 84 BPM | SYSTOLIC BLOOD PRESSURE: 163 MMHG | WEIGHT: 190 LBS

## 2019-12-18 PROCEDURE — 99213 OFFICE O/P EST LOW 20 MIN: CPT

## 2019-12-18 NOTE — ASSESSMENT
[Arterial/Venous Disease] : arterial/venous disease [Foot care/Footwear] : foot care/footwear [Other: _____] : [unfilled] [Medication Management] : medication management [FreeTextEntry1] : Impression: Lymphedema, post phlebitic syndrome and venous insufficiency\par \par Medical Conservative management leg elevation, diet and weight loss, compression therapy\par Recc bilateral unna boot to control edema before custom compression stocking measurements. Pt does not want to return to office w/ the upcoming holidays and prefers ace bandage to remove herself at home\par Restart lymphedema pump at home\par f/u w/ lymphedema center regarding insurance status\par RTO after holidays to discuss readiness for custom knee high compression stockings w/ zipper. Bilateral unna boots are recommended prior to compression stockings measurement for optimal edema control\par

## 2019-12-18 NOTE — PHYSICAL EXAM
[0] : left 0 [Ankle Swelling (On Exam)] : present [Varicose Veins Of Lower Extremities] : bilaterally [Ankle Swelling On The Left] : moderate [Ankle Swelling Bilaterally] : severe [] : bilaterally [No Rash or Lesion] : No rash or lesion [Oriented to Place] : oriented to place [Alert] : alert [Oriented to Person] : oriented to person [Calm] : calm [Oriented to Time] : oriented to time [JVD] : no jugular venous distention  [Abdomen Masses] : No abdominal masses [Tender] : was nontender [Stool Sample Taken] : No stool obtained  on rectal exam [de-identified] : nad [de-identified] : adelinal [FreeTextEntry1] : unable to palpate bilateral pedal pulses due to edema. Feet appear warm w/ good cap refill. \par Bilateral lower extremity severe edema mid thigh to toes. Severe venous insufficiency w/ severe chronic skin changes and xerosis. No open wounds or drainage. No s/s of infection. \par  [de-identified] : obese  [de-identified] : audrey [de-identified] : Difficulty w/ bilateral le ROM d/t back pain, obesity and lymphedema. Uses rolling walker to ambulate  [de-identified] : cooperative

## 2019-12-18 NOTE — HISTORY OF PRESENT ILLNESS
[FreeTextEntry1] : 73 y/o f w/ history of lymphedema and post phlebitic syndrome presents today w/ c/o of bilateral leg swelling right leg significantly worse than left, which has gotten worse in the last 2 weeks. She states she has not used the lymphedema pump in quite a while and now her legs are too swollen for the sleeves. She was wrapping her legs with ace bandages from the ankle to calf without improvement. She attempted compression stockings in the past but unable to comply d/t discomfort and the inability to apply them. She also reports going to \[facility]\"" Lymphedema center a few times in the past. Denies trauma or injury. Denies open wounds. No fever or chills.  [de-identified] : pt here for f/u reporting bilateral leg swelling slightly worse from previous visit. She admits to not using the lymphedema pumps. She says she has been wrapping her legs herself w/ ace bandages but they are old w/ multiple uses. She states that she did not f/u at lymphedema center d/t an insurance issue which is ongoing. She denies pain but has discomfort d/t leg heaviness. Ambulates w/ a rolling walker. Denies rest pain or claudication.

## 2020-01-08 ENCOUNTER — APPOINTMENT (OUTPATIENT)
Dept: VASCULAR SURGERY | Facility: CLINIC | Age: 75
End: 2020-01-08
Payer: MEDICARE

## 2020-01-08 VITALS
DIASTOLIC BLOOD PRESSURE: 84 MMHG | TEMPERATURE: 97.8 F | WEIGHT: 190 LBS | SYSTOLIC BLOOD PRESSURE: 140 MMHG | HEART RATE: 85 BPM | HEIGHT: 69 IN | BODY MASS INDEX: 28.14 KG/M2

## 2020-01-08 PROCEDURE — 99214 OFFICE O/P EST MOD 30 MIN: CPT | Mod: 25

## 2020-01-08 PROCEDURE — 29580 STRAPPING UNNA BOOT: CPT | Mod: 50

## 2020-01-08 NOTE — HISTORY OF PRESENT ILLNESS
[FreeTextEntry1] : 73 y/o f w/ history of lymphedema and post phlebitic syndrome presents today w/ c/o of bilateral leg swelling right leg significantly worse than left, which has gotten worse in the last 2 weeks. She states she has not used the lymphedema pump in quite a while and now her legs are too swollen for the sleeves. She was wrapping her legs with ace bandages from the ankle to calf without improvement. She attempted compression stockings in the past but unable to comply d/t discomfort and the inability to apply them. She also reports going to [facility] Lymphedema center a few times in the past. Denies trauma or injury. Denies open wounds. No fever or chills.  [de-identified] : pt here for f/u reporting bilateral leg swelling slightly worse from previous visit.\par pt states that she also received additional  spine injections and she temp d/c unna boot rx\par and now she is being denied this and is in the process of appealing this\par pt c/o mnidi le re swelling w mod discomfort and weeping skin \par onset sev days ago

## 2020-01-08 NOTE — PHYSICAL EXAM
[JVD] : no jugular venous distention  [0] : left 0 [Ankle Swelling (On Exam)] : present [Ankle Swelling On The Left] : moderate [Varicose Veins Of Lower Extremities] : bilaterally [] : bilaterally [Ankle Swelling Bilaterally] : severe [Abdomen Masses] : No abdominal masses [Tender] : was nontender [No Rash or Lesion] : No rash or lesion [Stool Sample Taken] : No stool obtained  on rectal exam [Oriented to Place] : oriented to place [Oriented to Person] : oriented to person [Alert] : alert [Oriented to Time] : oriented to time [Calm] : calm [de-identified] : adelinal [de-identified] : nad [FreeTextEntry1] : unable to palpate bilateral pedal pulses due to edema. Feet appear warm w/ good cap refill. \par Bilateral lower extremity severe edema mid thigh to toes. Severe venous insufficiency w/ severe chronic skin changes and xerosis. No open wounds. No s/s of infection. \par severe pre ulcer skin changes \par  [de-identified] : obese  [de-identified] : Difficulty w/ bilateral le ROM d/t back pain, obesity and lymphedema. Uses rolling walker to ambulate  [de-identified] : cooperative  [de-identified] : audrey

## 2020-01-08 NOTE — ASSESSMENT
[Arterial/Venous Disease] : arterial/venous disease [FreeTextEntry1] : Impression: Lymphedema, post phlebitic syndrome and venous insufficiency w recurrent pre ulcer lesions \par \par Medical Conservative management leg elevation, diet and weight loss, compression therapy\par Restart bilateral unna boot to control edema before custom compression stocking measurements. \par Restart lymphedema pump at home once  pt resumes comp stockings rx \par continue mindi le unna boot rx in the office 2x/week till healed then will resume comp stockings rx \par  [Medication Management] : medication management [Foot care/Footwear] : foot care/footwear

## 2020-01-10 ENCOUNTER — APPOINTMENT (OUTPATIENT)
Dept: VASCULAR SURGERY | Facility: CLINIC | Age: 75
End: 2020-01-10
Payer: MEDICARE

## 2020-01-10 VITALS
HEIGHT: 69 IN | SYSTOLIC BLOOD PRESSURE: 161 MMHG | TEMPERATURE: 98 F | HEART RATE: 98 BPM | DIASTOLIC BLOOD PRESSURE: 94 MMHG

## 2020-01-10 PROCEDURE — 29580 STRAPPING UNNA BOOT: CPT | Mod: 50

## 2020-01-10 PROCEDURE — 99214 OFFICE O/P EST MOD 30 MIN: CPT | Mod: 25

## 2020-01-10 NOTE — PHYSICAL EXAM
[JVD] : no jugular venous distention  [0] : left 0 [Ankle Swelling (On Exam)] : present [Varicose Veins Of Lower Extremities] : bilaterally [Ankle Swelling On The Left] : moderate [Ankle Swelling Bilaterally] : severe [] : bilaterally [Tender] : was nontender [Abdomen Masses] : No abdominal masses [Stool Sample Taken] : No stool obtained  on rectal exam [No Rash or Lesion] : No rash or lesion [Alert] : alert [Oriented to Person] : oriented to person [Oriented to Place] : oriented to place [Calm] : calm [Oriented to Time] : oriented to time [de-identified] : nad [de-identified] : adelinal [FreeTextEntry1] : unable to palpate bilateral pedal pulses due to edema. Feet appear warm w/ good cap refill. \par Bilateral lower extremity severe edema mid thigh to toes. Severe venous insufficiency w/ severe chronic skin changes and xerosis. No open wounds. No s/s of infection. \par severe pre ulcer skin changes \par  [de-identified] : audrey [de-identified] : Difficulty w/ bilateral le ROM d/t back pain, obesity and lymphedema. Uses rolling walker to ambulate  [de-identified] : obese  [de-identified] : cooperative

## 2020-01-10 NOTE — HISTORY REVIEWED
[History reviewed] : History reviewed. [Medications and Allergies reviewed] : Medications and allergies reviewed. Principal Discharge DX:	Term birth of female   Goal:	-Diet: Breast milk or Formula PO ad alyssa

## 2020-01-10 NOTE — ASSESSMENT
[FreeTextEntry1] : Impression: Lymphedema, post phlebitic syndrome and venous insufficiency w recurrent pre ulcer lesions \par \par Medical Conservative management leg elevation, diet and weight loss, compression therapy\par continue bilateral unna boot to control edema before custom compression stocking measurements. \par Restart lymphedema pump at home once  pt resumes comp stockings rx \par continue mindi le unna boot rx in the office 2x/week till healed then will resume comp stockings rx \par  [Arterial/Venous Disease] : arterial/venous disease [Medication Management] : medication management [Other: _____] : [unfilled] [Foot care/Footwear] : foot care/footwear

## 2020-01-10 NOTE — HISTORY OF PRESENT ILLNESS
[FreeTextEntry1] : 75 y/o f w/ history of lymphedema and post phlebitic syndrome presents today w/ c/o of bilateral leg swelling right leg significantly worse than left, which has gotten worse in the last 2 weeks. She states she has not used the lymphedema pump in quite a while and now her legs are too swollen for the sleeves. She was wrapping her legs with ace bandages from the ankle to calf without improvement. She attempted compression stockings in the past but unable to comply d/t discomfort and the inability to apply them. She also reports going to [facility] Lymphedema center a few times in the past. Denies trauma or injury. Denies open wounds. No fever or chills.  [de-identified] : pt is now receiving mindi le unna boot rx in the ovvice 2x/week\par pt states both legs are feeling better  since last ov\par discomfort now mild to mod

## 2020-01-14 ENCOUNTER — APPOINTMENT (OUTPATIENT)
Dept: VASCULAR SURGERY | Facility: CLINIC | Age: 75
End: 2020-01-14
Payer: MEDICARE

## 2020-01-14 VITALS
HEIGHT: 64 IN | TEMPERATURE: 98.7 F | HEART RATE: 87 BPM | DIASTOLIC BLOOD PRESSURE: 92 MMHG | WEIGHT: 180 LBS | BODY MASS INDEX: 30.73 KG/M2 | SYSTOLIC BLOOD PRESSURE: 160 MMHG

## 2020-01-14 DIAGNOSIS — I83.813 VARICOSE VEINS OF BILATERAL LOWER EXTREMITIES WITH PAIN: ICD-10-CM

## 2020-01-14 PROCEDURE — 29580 STRAPPING UNNA BOOT: CPT | Mod: 50

## 2020-01-14 NOTE — PHYSICAL EXAM
[Ankle Swelling (On Exam)] : present [0] : left 0 [Varicose Veins Of Lower Extremities] : bilaterally [Ankle Swelling On The Left] : moderate [] : bilaterally [Ankle Swelling Bilaterally] : severe [No Rash or Lesion] : No rash or lesion [Alert] : alert [Oriented to Person] : oriented to person [Oriented to Time] : oriented to time [Oriented to Place] : oriented to place [Calm] : calm [JVD] : no jugular venous distention  [Abdomen Masses] : No abdominal masses [Tender] : was nontender [Stool Sample Taken] : No stool obtained  on rectal exam [de-identified] : well in nad [de-identified] : adelinal [de-identified] : obese  [FreeTextEntry1] : unable to palpate bilateral pedal pulses due to edema. Feet appear warm w/ good cap refill. \par Bilateral lower extremity severe edema mid thigh to toes. Severe venous insufficiency, lymphedema w/ severe chronic skin changes and xerosis. No open wounds. No s/s of infection. \par severe pre ulcer skin changes \par  [de-identified] : audrey [de-identified] : Difficulty w/ bilateral le ROM d/t back pain, obesity and lymphedema. Uses rolling walker to ambulate  [de-identified] : cooperative

## 2020-01-14 NOTE — PROCEDURE
[FreeTextEntry1] : Madhu Unna boot re applied from toes to knee  w/o complications\par Pt karmen procedure well\par Pt is not allergic to the unna boot\par

## 2020-01-14 NOTE — ASSESSMENT
[Arterial/Venous Disease] : arterial/venous disease [Other: _____] : [unfilled] [Medication Management] : medication management [Foot care/Footwear] : foot care/footwear [FreeTextEntry1] : Impression: Lymphedema, post phlebitic syndrome and venous insufficiency w/ chronic edema and recurrent pre ulcer lesions \par \par Medical Conservative management leg elevation, diet and weight loss, compression therapy\par continue bilateral unna boot to control edema before custom compression stocking measurements. \par Restart lymphedema pump at home once  pt resumes comp stockings rx \par continue mindi le unna boot rx in the office 2x/week till healed then will resume comp stockings rx \par

## 2020-01-14 NOTE — HISTORY OF PRESENT ILLNESS
[FreeTextEntry1] : 73 y/o f w/ history of lymphedema and post phlebitic syndrome presents today w/ c/o of bilateral leg swelling right leg significantly worse than left, which has gotten worse in the last 2 weeks. She states she has not used the lymphedema pump in quite a while and now her legs are too swollen for the sleeves. She was wrapping her legs with ace bandages from the ankle to calf without improvement. She attempted compression stockings in the past but unable to comply d/t discomfort and the inability to apply them. She also reports going to \[facility]\"" Lymphedema center a few times in the past. Denies trauma or injury. Denies open wounds. No fever or chills.  [de-identified] : pt is now receiving mindi le unna boot rx in the office 2x/week w/out complications \par pt states both legs are feeling better  since last ov\par discomfort now mild

## 2020-01-17 ENCOUNTER — APPOINTMENT (OUTPATIENT)
Dept: VASCULAR SURGERY | Facility: CLINIC | Age: 75
End: 2020-01-17
Payer: MEDICARE

## 2020-01-17 VITALS
HEIGHT: 64 IN | SYSTOLIC BLOOD PRESSURE: 153 MMHG | TEMPERATURE: 97.9 F | BODY MASS INDEX: 30.73 KG/M2 | HEART RATE: 56 BPM | DIASTOLIC BLOOD PRESSURE: 82 MMHG | WEIGHT: 180 LBS

## 2020-01-17 PROCEDURE — 29580 STRAPPING UNNA BOOT: CPT | Mod: 50

## 2020-01-17 PROCEDURE — 99214 OFFICE O/P EST MOD 30 MIN: CPT | Mod: 25

## 2020-01-17 NOTE — HISTORY OF PRESENT ILLNESS
[FreeTextEntry1] : 73 y/o f w/ history of lymphedema and post phlebitic syndrome presents today w/ c/o of bilateral leg swelling right leg significantly worse than left, which has gotten worse in the last 2 weeks. She states she has not used the lymphedema pump in quite a while and now her legs are too swollen for the sleeves. She was wrapping her legs with ace bandages from the ankle to calf without improvement. She attempted compression stockings in the past but unable to comply d/t discomfort and the inability to apply them. She also reports going to [facility] Lymphedema center a few times in the past. Denies trauma or injury. Denies open wounds. No fever or chills.  [de-identified] : pt is now receiving mindi le unna boot rx in the office 2x/week w/out complications \par pt states both legs are feeling better  since last ov\par discomfort now mild

## 2020-01-17 NOTE — PHYSICAL EXAM
[Ankle Swelling (On Exam)] : present [0] : left 0 [Ankle Swelling On The Left] : moderate [Varicose Veins Of Lower Extremities] : bilaterally [] : bilaterally [Ankle Swelling Bilaterally] : severe [No Rash or Lesion] : No rash or lesion [Alert] : alert [Oriented to Person] : oriented to person [Oriented to Place] : oriented to place [Oriented to Time] : oriented to time [Calm] : calm [JVD] : no jugular venous distention  [Tender] : was nontender [Abdomen Masses] : No abdominal masses [Stool Sample Taken] : No stool obtained  on rectal exam [de-identified] : adelinal [de-identified] : well in nad [FreeTextEntry1] : unable to palpate bilateral pedal pulses due to edema. Feet appear warm w/ good cap refill. \par Bilateral lower extremity mod to severe edema mid thigh to toes. Severe venous insufficiency, lymphedema w/ severe chronic skin changes and xerosis. No open wounds. No s/s of infection. \par severe pre ulcer skin changes \par  [de-identified] : obese  [de-identified] : audrey [de-identified] : Difficulty w/ bilateral le ROM d/t back pain, obesity and lymphedema. Uses rolling walker to ambulate  [de-identified] : cooperative

## 2020-01-17 NOTE — ASSESSMENT
[Arterial/Venous Disease] : arterial/venous disease [Medication Management] : medication management [Other: _____] : [unfilled] [Foot care/Footwear] : foot care/footwear [FreeTextEntry1] : Impression: Lymphedema, post phlebitic syndrome and venous insufficiency w/ chronic edema and recurrent pre ulcer lesions w some clinical improvement in mindi le edema w  2x/week unna boot rx \par \par Medical Conservative management leg elevation, diet and weight loss, compression therapy\par continue bilateral unna boot to control edema before custom compression stocking measurements. \par Restart lymphedema pump at home once  pt resumes comp stockings rx \par continue mindi le unna boot rx in the office 2x/week till healed then will resume comp stockings rx \par

## 2020-01-21 ENCOUNTER — APPOINTMENT (OUTPATIENT)
Dept: VASCULAR SURGERY | Facility: CLINIC | Age: 75
End: 2020-01-21
Payer: MEDICARE

## 2020-01-21 VITALS
HEART RATE: 81 BPM | BODY MASS INDEX: 30.73 KG/M2 | HEIGHT: 64 IN | SYSTOLIC BLOOD PRESSURE: 151 MMHG | DIASTOLIC BLOOD PRESSURE: 92 MMHG | WEIGHT: 180 LBS | TEMPERATURE: 97.7 F

## 2020-01-21 PROCEDURE — 29580 STRAPPING UNNA BOOT: CPT | Mod: 50

## 2020-01-21 NOTE — HISTORY OF PRESENT ILLNESS
[de-identified] : pt continues to receive mindi le unna boot rx in the office 2x/week w/out complications \par pt states both legs are feeling better since the last visit\par She denies pain  [FreeTextEntry1] : 75 y/o f w/ history of lymphedema and post phlebitic syndrome presents today w/ c/o of bilateral leg swelling right leg significantly worse than left, which has gotten worse in the last 2 weeks. She states she has not used the lymphedema pump in quite a while and now her legs are too swollen for the sleeves. She was wrapping her legs with ace bandages from the ankle to calf without improvement. She attempted compression stockings in the past but unable to comply d/t discomfort and the inability to apply them. She also reports going to [facility] Lymphedema center a few times in the past. Denies trauma or injury. Denies open wounds. No fever or chills.

## 2020-01-21 NOTE — ASSESSMENT
[Arterial/Venous Disease] : arterial/venous disease [Medication Management] : medication management [Other: _____] : [unfilled] [Foot care/Footwear] : foot care/footwear [FreeTextEntry1] : Impression: Lymphedema, post phlebitic syndrome and venous insufficiency w/ chronic edema and recurrent pre ulcer lesions \par \par Medical Conservative management leg elevation, diet and weight loss, compression therapy\par Continue bilateral unna boot to control edema before custom compression stocking measurements. \par Restart lymphedema pump at home once pt resumes comp stockings rx \par Continue mindi le unna boot rx in the office 2x/week till healed then will resume comp stockings rx \par

## 2020-01-21 NOTE — PHYSICAL EXAM
[Ankle Swelling (On Exam)] : present [0] : left 0 [Varicose Veins Of Lower Extremities] : bilaterally [Ankle Swelling On The Left] : moderate [] : bilaterally [Ankle Swelling Bilaterally] : severe [No Rash or Lesion] : No rash or lesion [Alert] : alert [Oriented to Place] : oriented to place [Oriented to Person] : oriented to person [Calm] : calm [Oriented to Time] : oriented to time [Tender] : was nontender [Abdomen Masses] : No abdominal masses [JVD] : no jugular venous distention  [Stool Sample Taken] : No stool obtained  on rectal exam [de-identified] : well in nad [de-identified] : adelinal [FreeTextEntry1] : unable to palpate bilateral pedal pulses due to edema. Feet appear warm w/ good cap refill. \par Bilateral lower extremity severe edema mid thigh to ankles. Severe venous insufficiency, lymphedema w/ severe chronic skin changes and xerosis. No open wounds. No s/s of infection. \par severe pre ulcerative skin changes \par  [de-identified] : obese  [de-identified] : Difficulty w/ bilateral le ROM d/t back pain, obesity and lymphedema. Uses rolling walker to ambulate  [de-identified] : cooperative  [de-identified] : audrey

## 2020-01-24 ENCOUNTER — APPOINTMENT (OUTPATIENT)
Dept: VASCULAR SURGERY | Facility: CLINIC | Age: 75
End: 2020-01-24
Payer: MEDICARE

## 2020-01-24 VITALS
HEART RATE: 70 BPM | SYSTOLIC BLOOD PRESSURE: 141 MMHG | BODY MASS INDEX: 30.73 KG/M2 | DIASTOLIC BLOOD PRESSURE: 77 MMHG | WEIGHT: 180 LBS | HEIGHT: 64 IN

## 2020-01-24 PROCEDURE — 29580 STRAPPING UNNA BOOT: CPT | Mod: 50

## 2020-01-24 PROCEDURE — 99214 OFFICE O/P EST MOD 30 MIN: CPT | Mod: 25

## 2020-01-24 NOTE — HISTORY OF PRESENT ILLNESS
[FreeTextEntry1] : 73 y/o f w/ history of lymphedema and post phlebitic syndrome presents today w/ c/o of bilateral leg swelling right leg significantly worse than left, which has gotten worse in the last 2 weeks. She states she has not used the lymphedema pump in quite a while and now her legs are too swollen for the sleeves. She was wrapping her legs with ace bandages from the ankle to calf without improvement. She attempted compression stockings in the past but unable to comply d/t discomfort and the inability to apply them. She also reports going to [facility] Lymphedema center a few times in the past. Denies trauma or injury. Denies open wounds. No fever or chills.  [de-identified] : pt continues to receive mindi le unna boot rx in the office 2x/week w/out complications \par pt states both legs are feeling better since the last visit\par She denies pain

## 2020-01-24 NOTE — PHYSICAL EXAM
[0] : left 0 [Ankle Swelling (On Exam)] : present [Varicose Veins Of Lower Extremities] : bilaterally [Ankle Swelling On The Left] : moderate [Ankle Swelling Bilaterally] : severe [] : bilaterally [No Rash or Lesion] : No rash or lesion [Alert] : alert [Oriented to Place] : oriented to place [Oriented to Person] : oriented to person [Oriented to Time] : oriented to time [Calm] : calm [JVD] : no jugular venous distention  [Abdomen Masses] : No abdominal masses [Tender] : was nontender [de-identified] : well in nad [Stool Sample Taken] : No stool obtained  on rectal exam [de-identified] : adelinal [FreeTextEntry1] : unable to palpate bilateral pedal pulses due to edema. Feet appear warm w/ good cap refill. \par Bilateral lower extremity severe edema mid thigh to ankles. Severe venous insufficiency, lymphedema w/ severe chronic skin changes and xerosis. No open wounds. No s/s of infection. \par severe pre ulcerative skin changes \par  [de-identified] : obese  [de-identified] : Difficulty w/ bilateral le ROM d/t back pain, obesity and lymphedema. Uses rolling walker to ambulate  [de-identified] : audrey [de-identified] : cooperative

## 2020-01-24 NOTE — ASSESSMENT
[Arterial/Venous Disease] : arterial/venous disease [Medication Management] : medication management [Other: _____] : [unfilled] [Foot care/Footwear] : foot care/footwear [FreeTextEntry1] : Impression: Lymphedema, post phlebitic syndrome and venous insufficiency w/ chronic edema and recurrent pre ulcer lesions \par \par Medical Conservative management leg elevation, diet and weight loss, compression therapy\par Continue bilateral unna boot to control edema before custom compression stocking measurements for an additional   3 sessions \par Restart lymphedema pump at home once pt resumes comp stockings rx \par Continue mindi le unna boot rx in the office 2x/week till healed then will resume comp stockings rx \par

## 2020-01-28 ENCOUNTER — APPOINTMENT (OUTPATIENT)
Dept: VASCULAR SURGERY | Facility: CLINIC | Age: 75
End: 2020-01-28
Payer: MEDICARE

## 2020-01-28 VITALS
HEART RATE: 92 BPM | HEIGHT: 64 IN | TEMPERATURE: 98 F | BODY MASS INDEX: 32.44 KG/M2 | DIASTOLIC BLOOD PRESSURE: 83 MMHG | SYSTOLIC BLOOD PRESSURE: 144 MMHG | WEIGHT: 190 LBS

## 2020-01-28 PROCEDURE — 29580 STRAPPING UNNA BOOT: CPT | Mod: 50

## 2020-01-28 PROCEDURE — 99214 OFFICE O/P EST MOD 30 MIN: CPT | Mod: 25

## 2020-01-28 NOTE — HISTORY OF PRESENT ILLNESS
[FreeTextEntry1] : 75 y/o f w/ history of lymphedema and post phlebitic syndrome presents today w/ c/o of bilateral leg swelling right leg significantly worse than left, which has gotten worse in the last 2 weeks. She states she has not used the lymphedema pump in quite a while and now her legs are too swollen for the sleeves. She was wrapping her legs with ace bandages from the ankle to calf without improvement. She attempted compression stockings in the past but unable to comply d/t discomfort and the inability to apply them. She also reports going to [facility] Lymphedema center a few times in the past. Denies trauma or injury. Denies open wounds. No fever or chills.  [de-identified] : pt continues to receive mindi le unna boot rx in the office 2x/week w/out complications \par pt states both legs are feeling better since the last visit\par mild to mod discomfort remain but overall feeling better

## 2020-01-28 NOTE — PHYSICAL EXAM
[JVD] : no jugular venous distention  [0] : left 0 [Ankle Swelling (On Exam)] : present [Varicose Veins Of Lower Extremities] : bilaterally [] : present [Ankle Swelling On The Left] : moderate [Ankle Swelling Bilaterally] : severe [Abdomen Masses] : No abdominal masses [Tender] : was nontender [Stool Sample Taken] : No stool obtained  on rectal exam [No Rash or Lesion] : No rash or lesion [Alert] : alert [Oriented to Person] : oriented to person [Oriented to Place] : oriented to place [Oriented to Time] : oriented to time [Calm] : calm [de-identified] : well in nad [de-identified] : adelinal [FreeTextEntry1] : unable to palpate bilateral pedal pulses due to edema. Feet appear warm w/ good cap refill. \par Bilateral lower extremity severe edema mid thigh to ankles. Severe venous insufficiency, lymphedema w/ severe chronic skin changes and xerosis. No open wounds. No s/s of infection. \par severe pre ulcerative skin changes \par  [de-identified] : obese  [de-identified] : audrey [de-identified] : Difficulty w/ bilateral le ROM d/t back pain, obesity and lymphedema. Uses rolling walker to ambulate  [de-identified] : cooperative

## 2020-01-28 NOTE — ASSESSMENT
[FreeTextEntry1] : Impression: Lymphedema, post phlebitic syndrome and venous insufficiency w/ chronic edema and recurrent pre ulcer lesions \par \par Medical Conservative management leg elevation, diet and weight loss, compression therapy\par Continue bilateral unna boot to control edema before custom compression stocking measurements for an additional   3 sessions \par Restart lymphedema pump at home once pt resumes comp stockings rx \par Continue mindi le unna boot rx in the office 2x/week till healed then will resume comp stockings rx \par  [Arterial/Venous Disease] : arterial/venous disease [Medication Management] : medication management [Other: _____] : [unfilled] [Foot care/Footwear] : foot care/footwear

## 2020-01-31 ENCOUNTER — APPOINTMENT (OUTPATIENT)
Dept: VASCULAR SURGERY | Facility: CLINIC | Age: 75
End: 2020-01-31
Payer: MEDICARE

## 2020-01-31 VITALS
BODY MASS INDEX: 32.44 KG/M2 | DIASTOLIC BLOOD PRESSURE: 80 MMHG | WEIGHT: 190 LBS | HEIGHT: 64 IN | HEART RATE: 73 BPM | TEMPERATURE: 97.5 F | SYSTOLIC BLOOD PRESSURE: 138 MMHG

## 2020-01-31 DIAGNOSIS — I83.899 VARICOSE VEINS OF UNSPECIFIED LOWER EXTREMITY WITH OTHER COMPLICATIONS: ICD-10-CM

## 2020-01-31 PROCEDURE — 29580 STRAPPING UNNA BOOT: CPT | Mod: 50

## 2020-01-31 PROCEDURE — 99214 OFFICE O/P EST MOD 30 MIN: CPT | Mod: 25

## 2020-01-31 NOTE — HISTORY OF PRESENT ILLNESS
[de-identified] : pt continues to receive mindi le unna boot rx in the office 2x/week w/out complications \par pt states both legs are feeling better since the last visit\par mild to mod discomfort remain but overall feeling better  [FreeTextEntry1] : 75 y/o f w/ history of lymphedema and post phlebitic syndrome presents today w/ c/o of bilateral leg swelling right leg significantly worse than left, which has gotten worse in the last 2 weeks. She states she has not used the lymphedema pump in quite a while and now her legs are too swollen for the sleeves. She was wrapping her legs with ace bandages from the ankle to calf without improvement. She attempted compression stockings in the past but unable to comply d/t discomfort and the inability to apply them. She also reports going to [facility] Lymphedema center a few times in the past. Denies trauma or injury. Denies open wounds. No fever or chills.

## 2020-01-31 NOTE — PHYSICAL EXAM
[0] : left 0 [Ankle Swelling (On Exam)] : present [Ankle Swelling On The Left] : moderate [Varicose Veins Of Lower Extremities] : bilaterally [Ankle Swelling Bilaterally] : severe [] : present [Alert] : alert [No Rash or Lesion] : No rash or lesion [Oriented to Person] : oriented to person [Oriented to Place] : oriented to place [Calm] : calm [Oriented to Time] : oriented to time [Abdomen Masses] : No abdominal masses [JVD] : no jugular venous distention  [Tender] : was nontender [Stool Sample Taken] : No stool obtained  on rectal exam [FreeTextEntry1] : unable to palpate bilateral pedal pulses due to edema. Feet appear warm w/ good cap refill. \par Bilateral lower extremity severe edema mid thigh to ankles. Severe venous insufficiency, lymphedema w/ severe chronic skin changes and xerosis. No open wounds. No s/s of infection. \par severe pre ulcerative skin changes \par  [de-identified] : well in nad [de-identified] : adelinal [de-identified] : obese  [de-identified] : audrey [de-identified] : Difficulty w/ bilateral le ROM d/t back pain, obesity and lymphedema. Uses rolling walker to ambulate  [de-identified] : cooperative

## 2020-02-04 ENCOUNTER — APPOINTMENT (OUTPATIENT)
Dept: VASCULAR SURGERY | Facility: CLINIC | Age: 75
End: 2020-02-04
Payer: MEDICARE

## 2020-02-04 ENCOUNTER — APPOINTMENT (OUTPATIENT)
Dept: VASCULAR SURGERY | Facility: CLINIC | Age: 75
End: 2020-02-04

## 2020-02-04 VITALS
TEMPERATURE: 97.4 F | SYSTOLIC BLOOD PRESSURE: 158 MMHG | HEART RATE: 90 BPM | WEIGHT: 190 LBS | DIASTOLIC BLOOD PRESSURE: 86 MMHG | HEIGHT: 64 IN | BODY MASS INDEX: 32.44 KG/M2

## 2020-02-04 PROCEDURE — 29580 STRAPPING UNNA BOOT: CPT | Mod: 50

## 2020-02-04 NOTE — ASSESSMENT
[FreeTextEntry1] : bl LE swelling, stable\par  cont unna boots\par bl unna boots applied in the office\par f/up next week\par

## 2020-02-04 NOTE — PHYSICAL EXAM
[2+] : left 2+ [Ankle Swelling (On Exam)] : present [Ankle Swelling On The Left] : moderate [Abdomen Tenderness] : ~T ~M No abdominal tenderness [No Rash or Lesion] : No rash or lesion [Alert] : alert [Oriented to Place] : oriented to place [Oriented to Person] : oriented to person [Oriented to Time] : oriented to time [Calm] : calm [de-identified] : NAD

## 2020-02-07 ENCOUNTER — APPOINTMENT (OUTPATIENT)
Dept: VASCULAR SURGERY | Facility: CLINIC | Age: 75
End: 2020-02-07
Payer: MEDICARE

## 2020-02-07 VITALS
SYSTOLIC BLOOD PRESSURE: 145 MMHG | HEART RATE: 77 BPM | BODY MASS INDEX: 32.44 KG/M2 | HEIGHT: 64 IN | DIASTOLIC BLOOD PRESSURE: 88 MMHG | TEMPERATURE: 97.4 F | WEIGHT: 190 LBS

## 2020-02-07 PROCEDURE — 29580 STRAPPING UNNA BOOT: CPT | Mod: 50

## 2020-02-07 PROCEDURE — 99214 OFFICE O/P EST MOD 30 MIN: CPT | Mod: 25

## 2020-02-07 NOTE — HISTORY OF PRESENT ILLNESS
[FreeTextEntry1] : 75 y/o f w/ history of lymphedema and post phlebitic syndrome presents today w/ c/o of bilateral leg swelling right leg significantly worse than left, which has gotten worse in the last 2 weeks. She states she has not used the lymphedema pump in quite a while and now her legs are too swollen for the sleeves. She was wrapping her legs with ace bandages from the ankle to calf without improvement. She attempted compression stockings in the past but unable to comply d/t discomfort and the inability to apply them. She also reports going to [facility] a few times in the past. Denies trauma or injury. Denies open wounds. No fever or chills.  [de-identified] : pt continues to receive mindi le unna boot rx in the office 2x/week w/out complications \par pt states both legs are feeling better since the last visit\par mild to mod discomfort remain but overall feeling better

## 2020-02-07 NOTE — PHYSICAL EXAM
[2+] : left 2+ [Ankle Swelling (On Exam)] : present [No Rash or Lesion] : No rash or lesion [Alert] : alert [Oriented to Person] : oriented to person [Oriented to Place] : oriented to place [Oriented to Time] : oriented to time [Calm] : calm [JVD] : no jugular venous distention  [0] : left 0 [Varicose Veins Of Lower Extremities] : present [Ankle Swelling Bilaterally] : severe [] : bilaterally [Ankle Swelling On The Left] : moderate [Abdomen Masses] : No abdominal masses [Stool Sample Taken] : No stool obtained  on rectal exam [Abdomen Tenderness] : ~T ~M No abdominal tenderness [Tender] : was nontender [FreeTextEntry1] : unable to palpate bilateral pedal pulses due to edema. Feet appear warm w/ good cap refill. \par Bilateral lower extremity severe edema mid thigh to ankles. Severe venous insufficiency, lymphedema w/ severe chronic skin changes and xerosis. No open wounds. No s/s of infection. \par severe pre ulcerative skin changes \par  [de-identified] : well in nad [de-identified] : adelinal [de-identified] : Difficulty w/ bilateral le ROM d/t back pain, obesity and lymphedema. Uses rolling walker to ambulate  [de-identified] : obese  [de-identified] : audrey [de-identified] : cooperative

## 2020-02-07 NOTE — ASSESSMENT
[FreeTextEntry1] : Impression: Lymphedema, post phlebitic syndrome and venous insufficiency w/ chronic edema and recurrent pre ulcer lesions \par \par Medical Conservative management leg elevation, diet and weight loss, compression therapy\par Continue bilateral unna boot to control edema before custom compression stocking measurements for an additional   1 session then restart comp stockings rx \par Restart lymphedema pump at home once pt resumes comp stockings rx \par  [Other: _____] : [unfilled] [Arterial/Venous Disease] : arterial/venous disease [Medication Management] : medication management [Foot care/Footwear] : foot care/footwear

## 2020-03-03 ENCOUNTER — RX RENEWAL (OUTPATIENT)
Age: 75
End: 2020-03-03

## 2020-04-07 ENCOUNTER — RX RENEWAL (OUTPATIENT)
Age: 75
End: 2020-04-07

## 2020-04-23 ENCOUNTER — APPOINTMENT (OUTPATIENT)
Dept: INTERNAL MEDICINE | Facility: CLINIC | Age: 75
End: 2020-04-23

## 2020-05-08 ENCOUNTER — APPOINTMENT (OUTPATIENT)
Dept: CARDIOLOGY | Facility: CLINIC | Age: 75
End: 2020-05-08

## 2020-05-22 ENCOUNTER — APPOINTMENT (OUTPATIENT)
Dept: INTERNAL MEDICINE | Facility: CLINIC | Age: 75
End: 2020-05-22
Payer: MEDICARE

## 2020-05-22 ENCOUNTER — APPOINTMENT (OUTPATIENT)
Dept: PULMONOLOGY | Facility: CLINIC | Age: 75
End: 2020-05-22
Payer: MEDICARE

## 2020-05-22 DIAGNOSIS — Z86.711 PERSONAL HISTORY OF PULMONARY EMBOLISM: ICD-10-CM

## 2020-05-22 DIAGNOSIS — R53.83 OTHER MALAISE: ICD-10-CM

## 2020-05-22 DIAGNOSIS — R53.81 OTHER MALAISE: ICD-10-CM

## 2020-05-22 PROCEDURE — 99213 OFFICE O/P EST LOW 20 MIN: CPT | Mod: 95

## 2020-05-22 PROCEDURE — 99214 OFFICE O/P EST MOD 30 MIN: CPT | Mod: 95

## 2020-05-22 NOTE — ASSESSMENT
[FreeTextEntry1] : 1) malaise / fatigue \par - resolved \par - ? viral infection \par - check labs\par \par 2) SOB \par - use inhaler as advised \par - f/u cards/ pulmo \par \par \par f/u in 2 week

## 2020-05-22 NOTE — HISTORY OF PRESENT ILLNESS
[Home] : at home, [unfilled] , at the time of the visit. [Other Location: e.g. Home (Enter Location, City,State)___] : at [unfilled] [Other:____] : [unfilled] [Verbal consent obtained from patient] : the patient, [unfilled] [Former] : former [TextBox_4] : Patient with hx of lymphedema, dvt and PE in past times 3.\par \par The past few days she has been very fatigued, lethargic, difficult to wake up, in addition to shortness of breath. Since she last saw vascular doctor who wraps her legs for lymphtedema, LE edema has worsened, is gaining weight.\par \par She is compliant with eliquis. No percocet. Sees a cardiologist for CHF, last echo was in 2/2018 demonstrating normal LV function.\par \par Her daughter states that she is non-compliant at times with getting to her to go for tests and f/u visits.

## 2020-05-22 NOTE — REVIEW OF SYSTEMS
[Back Pain] : ~T back pain [As Noted in HPI] : as noted in HPI [Negative] : Psychiatric [Fatigue] : fatigue [Edema] : ~T edema was present [FreeTextEntry9] : atrial fibrillation

## 2020-05-22 NOTE — ASSESSMENT
[FreeTextEntry1] : 1. Dyspnea: Will order CXR and V/Q scan. Appointment Tuesday with cardiologist Dr. Hamm, would recommend repeat echocardiogram. Former smoker, will start on Anoro for possible COPD component. Follow-up via telehealth in 1 week to discuss results and next steps.\par \par I, Petros Pedro NP, am scribing for and in the presence of Dr. Joes Eric, the following sections HISTORY OF PRESENT ILLNESS, PAST MEDICAL/FAMILY/SOCIAL HISTORY; REVIEW OF SYSTEMS; VITAL SIGNS; PHYSICAL EXAM; DISPOSITION.

## 2020-05-22 NOTE — HISTORY OF PRESENT ILLNESS
[Home] : at home, [unfilled] , at the time of the visit. [Medical Office: ([facility])___] : at the medical office located in  [Family Member] : family member [Verbal consent obtained from patient] : the patient, [unfilled] [FreeTextEntry8] : pt was seen with her daughter Jacqueline \par she reports that she felt weak , tired and lethargic and had some wheezing on 5/18, 5/19 and 5/20 \par since yesterday the symptoms have resolved \par she reports that she now feels 100% better\par no exposure to sick contact , no trauma or fall, no new meds \par her legs have been more swollen bc she  has not been going to get then wrapped for the lymphedema\par no rash \par \par saw pulmo- started on inhaler \par cardio appt pending with Dr Lyles\par

## 2020-05-26 ENCOUNTER — NON-APPOINTMENT (OUTPATIENT)
Age: 75
End: 2020-05-26

## 2020-05-26 ENCOUNTER — APPOINTMENT (OUTPATIENT)
Dept: CARDIOLOGY | Facility: CLINIC | Age: 75
End: 2020-05-26
Payer: MEDICARE

## 2020-05-26 VITALS
HEART RATE: 86 BPM | DIASTOLIC BLOOD PRESSURE: 93 MMHG | SYSTOLIC BLOOD PRESSURE: 172 MMHG | TEMPERATURE: 98.6 F | OXYGEN SATURATION: 96 % | HEIGHT: 64 IN

## 2020-05-26 PROCEDURE — 99215 OFFICE O/P EST HI 40 MIN: CPT

## 2020-05-26 PROCEDURE — 93000 ELECTROCARDIOGRAM COMPLETE: CPT

## 2020-05-26 NOTE — DISCUSSION/SUMMARY
[FreeTextEntry1] : 74 year old woman with AF, history of DVT/PE on Eliquis, AF, chronic LE lymphedema who comes in for followup. Complains of increasing edema and dyspnea. Refuses to go to ED.\par \par 1. AF- on Eliquis 5 mg BID, Metoprolol 50 mg BID and EKG reviewed and no changes. Continue present meds\par 2. DVT/PE- On Eliquis as above, continue present meds\par 3. Dyspnea- Likely multifactorial, will increase her lasix to BID x 2 days and reassess breathing. Daughter will call on thursday

## 2020-05-26 NOTE — PHYSICAL EXAM
[General Appearance - Well Developed] : well developed [Normal Appearance] : normal appearance [Well Groomed] : well groomed [General Appearance - Well Nourished] : well nourished [General Appearance - In No Acute Distress] : no acute distress [No Deformities] : no deformities [Normal Conjunctiva] : the conjunctiva exhibited no abnormalities [Eyelids - No Xanthelasma] : the eyelids demonstrated no xanthelasmas [Normal Oral Mucosa] : normal oral mucosa [No Oral Cyanosis] : no oral cyanosis [No Oral Pallor] : no oral pallor [Normal Jugular Venous V Waves Present] : normal jugular venous V waves present [Normal Jugular Venous A Waves Present] : normal jugular venous A waves present [No Jugular Venous Starr A Waves] : no jugular venous starr A waves [Respiration, Rhythm And Depth] : normal respiratory rhythm and effort [Exaggerated Use Of Accessory Muscles For Inspiration] : no accessory muscle use [Heart Rate And Rhythm] : heart rate and rhythm were normal [Heart Sounds] : normal S1 and S2 [Murmurs] : no murmurs present [] : no hepato-splenomegaly [Abdomen Soft] : soft [Abdomen Tenderness] : non-tender [Abnormal Walk] : normal gait [Abdomen Mass (___ Cm)] : no abdominal mass palpated [Gait - Sufficient For Exercise Testing] : the gait was sufficient for exercise testing [FreeTextEntry1] : lower extremity skin consistent with chronic lymphedema

## 2020-05-26 NOTE — HISTORY OF PRESENT ILLNESS
[FreeTextEntry1] : Here for followup. Accompanied by her daughter. She has not had her legs wrapped for her edema since the pandemic began and they are very enlarged and painful. On top of this, and perhaps secondary to this, she has dyspnea with minimal exertion. No sick contacts. No cough. No fever. \par Notes reviewed and seen by both PCP and Pulm for shortness of breath. She ahd an echo in Feb 2018 that was normal.\par \par 1. AF- on Eliquis 5 mg BID, Metoprolol 50 mg BID and EKG reviewed and no changes. Continue present meds\par 2. DVT/PE- On Eliquis as above, continue present meds\par 3. Dyspnea- Likely multifactorial, will increase her lasix to BID x 2 days and reassess breathing. Daughter will call on thursday\par

## 2020-05-27 ENCOUNTER — LABORATORY RESULT (OUTPATIENT)
Age: 75
End: 2020-05-27

## 2020-05-28 ENCOUNTER — RX RENEWAL (OUTPATIENT)
Age: 75
End: 2020-05-28

## 2020-06-03 ENCOUNTER — RX RENEWAL (OUTPATIENT)
Age: 75
End: 2020-06-03

## 2020-06-08 ENCOUNTER — NON-APPOINTMENT (OUTPATIENT)
Age: 75
End: 2020-06-08

## 2020-06-08 ENCOUNTER — APPOINTMENT (OUTPATIENT)
Dept: CARDIOLOGY | Facility: CLINIC | Age: 75
End: 2020-06-08
Payer: MEDICARE

## 2020-06-08 VITALS
TEMPERATURE: 97.5 F | SYSTOLIC BLOOD PRESSURE: 126 MMHG | RESPIRATION RATE: 18 BRPM | HEART RATE: 71 BPM | OXYGEN SATURATION: 97 % | BODY MASS INDEX: 44.29 KG/M2 | DIASTOLIC BLOOD PRESSURE: 77 MMHG | WEIGHT: 258 LBS

## 2020-06-08 PROCEDURE — 99214 OFFICE O/P EST MOD 30 MIN: CPT

## 2020-06-08 PROCEDURE — 93000 ELECTROCARDIOGRAM COMPLETE: CPT

## 2020-06-08 NOTE — PHYSICAL EXAM
[General Appearance - Well Developed] : well developed [Normal Appearance] : normal appearance [Well Groomed] : well groomed [General Appearance - Well Nourished] : well nourished [No Deformities] : no deformities [General Appearance - In No Acute Distress] : no acute distress [Normal Conjunctiva] : the conjunctiva exhibited no abnormalities [Eyelids - No Xanthelasma] : the eyelids demonstrated no xanthelasmas [No Oral Pallor] : no oral pallor [Normal Oral Mucosa] : normal oral mucosa [No Oral Cyanosis] : no oral cyanosis [Normal Jugular Venous V Waves Present] : normal jugular venous V waves present [Normal Jugular Venous A Waves Present] : normal jugular venous A waves present [No Jugular Venous Starr A Waves] : no jugular venous starr A waves [Respiration, Rhythm And Depth] : normal respiratory rhythm and effort [Exaggerated Use Of Accessory Muscles For Inspiration] : no accessory muscle use [Heart Rate And Rhythm] : heart rate and rhythm were normal [Heart Sounds] : normal S1 and S2 [Murmurs] : no murmurs present [Abdomen Soft] : soft [Abdomen Tenderness] : non-tender [Abdomen Mass (___ Cm)] : no abdominal mass palpated [] : no hepato-splenomegaly [Abnormal Walk] : normal gait [Gait - Sufficient For Exercise Testing] : the gait was sufficient for exercise testing [FreeTextEntry1] : lower extremity skin consistent with chronic lymphedema

## 2020-06-08 NOTE — DISCUSSION/SUMMARY
[FreeTextEntry1] : 74 year old woman with AF/DVT/PE?chronic edema and HTN here for followup\par \par 1. AF- On Eliquis 5 mg BID, Metoprolol 50 mg BID. EKG without changes. Continue present meds\par 2. DVT/PE- On Eliquis as above\par 3. Dyspnea- improved with resuming lasix, continue present meds\par 4. FU in 3-4 months

## 2020-06-08 NOTE — HISTORY OF PRESENT ILLNESS
[FreeTextEntry1] : Here for followup. Accompanied by her daughter. Last seen 5/26/2020 for worsening edema. She was not taking her lasix nor having her legs wrapped. \par 1. AF- On Eliquis 5 mg BID, Metoprolol 50 mg BID. EKG without changes. Continue present meds\par 2. DVT/PE- On Eliquis as above\par 3. Dyspnea- improved with resuming lasix, continue present meds

## 2020-06-10 ENCOUNTER — APPOINTMENT (OUTPATIENT)
Dept: INTERNAL MEDICINE | Facility: CLINIC | Age: 75
End: 2020-06-10
Payer: MEDICARE

## 2020-06-10 DIAGNOSIS — M81.0 AGE-RELATED OSTEOPOROSIS W/OUT CURRENT PATHOLOGICAL FRACTURE: ICD-10-CM

## 2020-06-10 PROCEDURE — 99442: CPT

## 2020-06-10 NOTE — ASSESSMENT
[FreeTextEntry1] : 1) HTN / A fib \par - ct meds \par - daily weights \par - low salt diet stressed \par - f/u in 10 week\par \par 2) OP\par ct meds \par home safety discussed \par avoid napping in the day , avoid sedatives bc risk of fall \par \par

## 2020-06-10 NOTE — HISTORY OF PRESENT ILLNESS
[Home] : at home, [unfilled] , at the time of the visit. [Medical Office: ([facility])___] : at the medical office located in  [Verbal consent obtained from patient] : the patient, [unfilled] [FreeTextEntry1] : f/u telephonic bc she has issues with Wifi \par \par the pt saw cardio recently \par significant improvement in breathing and leg edema with the inc indiuretic \par she reports no other complaints\par visit was conducted  w/ daughter present \par no falls\par \par taking all meds \par \par napping during the day , not sleeping well at night  \par labs reviewed - essentially nl \par \par reviewed pulmo note \par pt was adv to get CXR and V/Q scan \par \par ct to take OAC

## 2020-06-30 ENCOUNTER — APPOINTMENT (OUTPATIENT)
Dept: VASCULAR SURGERY | Facility: CLINIC | Age: 75
End: 2020-06-30
Payer: MEDICARE

## 2020-06-30 VITALS
SYSTOLIC BLOOD PRESSURE: 130 MMHG | HEIGHT: 64 IN | DIASTOLIC BLOOD PRESSURE: 83 MMHG | HEART RATE: 97 BPM | TEMPERATURE: 98.6 F | BODY MASS INDEX: 44.05 KG/M2 | WEIGHT: 258 LBS

## 2020-06-30 PROCEDURE — 29580 STRAPPING UNNA BOOT: CPT | Mod: 50

## 2020-06-30 PROCEDURE — 99214 OFFICE O/P EST MOD 30 MIN: CPT | Mod: 25

## 2020-06-30 NOTE — REVIEW OF SYSTEMS
[As Noted in HPI] : as noted in HPI [Limb Pain] : limb pain [Limb Swelling] : limb swelling [Negative] : Heme/Lymph

## 2020-06-30 NOTE — HISTORY OF PRESENT ILLNESS
[FreeTextEntry1] : 75 y/o f w/ history of lymphedema and post phlebitic syndrome presents today w/ c/o of bilateral leg swelling right leg significantly worse than left, which has gotten worse in the last 2 weeks. She states she has not used the lymphedema pump in quite a while and now her legs are too swollen for the sleeves. She was wrapping her legs with ace bandages from the ankle to calf without improvement. She attempted compression stockings in the past but unable to comply d/t discomfort and the inability to apply them. She also reports going to [facility] a few times in the past. Denies trauma or injury. Denies open wounds. No fever or chills.  [de-identified] : pt states she stopped  mindi le unna boot in march 202 and has been using mindi le ace wraps and lymphedema pump\par pt reports  mindi le worsening swelling and discomfort

## 2020-06-30 NOTE — ASSESSMENT
[Arterial/Venous Disease] : arterial/venous disease [Medication Management] : medication management [Other: _____] : [unfilled] [Foot care/Footwear] : foot care/footwear [FreeTextEntry1] : Impression: Lymphedema, post phlebitic syndrome and venous insufficiency w/ chronic edema and recurrent pre ulcer lesions \par \par Medical Conservative management leg elevation, diet and weight loss, compression therapy\par restart mindi le unna boot for 3 weeks then restart  ace wraps and transition to comp stockings w continue lymphedema pump use\par ov 2-3 mo to re eval

## 2020-06-30 NOTE — PHYSICAL EXAM
[2+] : left 2+ [Ankle Swelling (On Exam)] : present [0] : left 0 [Varicose Veins Of Lower Extremities] : present [] : bilaterally [Ankle Swelling On The Left] : moderate [Ankle Swelling Bilaterally] : severe [No Rash or Lesion] : No rash or lesion [Oriented to Person] : oriented to person [Alert] : alert [Oriented to Place] : oriented to place [Oriented to Time] : oriented to time [Calm] : calm [JVD] : no jugular venous distention  [Abdomen Masses] : No abdominal masses [Abdomen Tenderness] : ~T ~M No abdominal tenderness [Tender] : was nontender [de-identified] : well in nad [Stool Sample Taken] : No stool obtained  on rectal exam [de-identified] : adelinal [de-identified] : obese  [FreeTextEntry1] : unable to palpate bilateral pedal pulses due to edema. Feet appear warm w/ good cap refill. \par Bilateral lower extremity severe edema mid thigh to ankles. Severe venous insufficiency, lymphedema w/ severe chronic skin changes and xerosis. No s/s of infection. \par severe pre ulcerative skin changes w pre ulcer skin lesions . \par  [de-identified] : Difficulty w/ bilateral le ROM d/t back pain, obesity and lymphedema. Uses rolling walker to ambulate  [de-identified] : audrey [de-identified] : cooperative

## 2020-07-13 ENCOUNTER — APPOINTMENT (OUTPATIENT)
Dept: PULMONOLOGY | Facility: CLINIC | Age: 75
End: 2020-07-13
Payer: MEDICARE

## 2020-07-13 VITALS
RESPIRATION RATE: 18 BRPM | TEMPERATURE: 98.9 F | SYSTOLIC BLOOD PRESSURE: 146 MMHG | HEART RATE: 101 BPM | HEIGHT: 64 IN | DIASTOLIC BLOOD PRESSURE: 76 MMHG | OXYGEN SATURATION: 94 %

## 2020-07-13 DIAGNOSIS — Z87.891 PERSONAL HISTORY OF NICOTINE DEPENDENCE: ICD-10-CM

## 2020-07-13 PROCEDURE — 99214 OFFICE O/P EST MOD 30 MIN: CPT

## 2020-07-13 NOTE — ASSESSMENT
[FreeTextEntry1] : 1. Dyspnea: Discussed how to obtain V/Q scan and PFT. Will schedule along with f/u visit. ?COPD possible given past smoking hx, however with improvement in symptoms following improved fluid management, primary etiology likely cardiac.\par \par I, Petros Pedro NP, am scribing for and in the presence of Dr. Jose Eric, the following sections HISTORY OF PRESENT ILLNESS, PAST MEDICAL/FAMILY/SOCIAL HISTORY; REVIEW OF SYSTEMS; VITAL SIGNS; PHYSICAL EXAM; DISPOSITION.

## 2020-07-13 NOTE — REASON FOR VISIT
[Acute] : an acute visit [Shortness of Breath] : shortness of breath [Follow-Up] : a follow-up visit

## 2020-07-13 NOTE — REVIEW OF SYSTEMS
[Fatigue] : fatigue [As Noted in HPI] : as noted in HPI [Back Pain] : ~T back pain [Edema] : ~T edema was present [Negative] : Psychiatric [FreeTextEntry9] : atrial fibrillation

## 2020-07-13 NOTE — PHYSICAL EXAM
[No Acute Distress] : no acute distress [Normal Appearance] : normal appearance [Normal Oropharynx] : normal oropharynx [No Neck Mass] : no neck mass [Normal S1, S2] : normal s1, s2 [Normal Rate/Rhythm] : normal rate/rhythm [No Murmurs] : no murmurs [No Resp Distress] : no resp distress [Clear to Auscultation Bilaterally] : clear to auscultation bilaterally [No Abnormalities] : no abnormalities [Benign] : benign [No Clubbing] : no clubbing [No Cyanosis] : no cyanosis [Normal Gait] : normal gait [No Edema] : no edema [FROM] : FROM [Normal Color/ Pigmentation] : normal color/ pigmentation [No Focal Deficits] : no focal deficits [Normal Affect] : normal affect [Oriented x3] : oriented x3

## 2020-07-13 NOTE — HISTORY OF PRESENT ILLNESS
[Former] : former [TextBox_4] : Patient with hx of lymphedema, dvt and PE in past times 3.\par \par She is compliant with eliquis. No percocet. Sees a cardiologist for CHF, last echo was in 2/2018 demonstrating normal LV function.\par \par Since last visit, V/Q scan was approved by insurance but not performed. PFT also approved, but not yet performed.\par \par Symptomatically improved. Had seen cardiologist Dr. Hamm after last visit and found to be fluid overloaded. Advised to go to ER, however refused and doubled furosemide dosing. Since then has been compliant and is now much improved. Legs being wrapped by vascular for BLE edema.

## 2020-07-15 DIAGNOSIS — Z01.818 ENCOUNTER FOR OTHER PREPROCEDURAL EXAMINATION: ICD-10-CM

## 2020-07-16 ENCOUNTER — APPOINTMENT (OUTPATIENT)
Dept: DISASTER EMERGENCY | Facility: CLINIC | Age: 75
End: 2020-07-16

## 2020-07-17 LAB — SARS-COV-2 N GENE NPH QL NAA+PROBE: NOT DETECTED

## 2020-07-21 ENCOUNTER — APPOINTMENT (OUTPATIENT)
Dept: PULMONOLOGY | Facility: CLINIC | Age: 75
End: 2020-07-21
Payer: MEDICARE

## 2020-07-21 VITALS
HEIGHT: 65 IN | BODY MASS INDEX: 42.99 KG/M2 | OXYGEN SATURATION: 99 % | WEIGHT: 258 LBS | TEMPERATURE: 98.8 F | HEART RATE: 84 BPM

## 2020-07-21 PROCEDURE — 94726 PLETHYSMOGRAPHY LUNG VOLUMES: CPT

## 2020-07-21 PROCEDURE — 94010 BREATHING CAPACITY TEST: CPT

## 2020-07-21 PROCEDURE — 94729 DIFFUSING CAPACITY: CPT

## 2020-07-24 ENCOUNTER — NON-APPOINTMENT (OUTPATIENT)
Age: 75
End: 2020-07-24

## 2020-07-25 ENCOUNTER — RX RENEWAL (OUTPATIENT)
Age: 75
End: 2020-07-25

## 2020-09-01 ENCOUNTER — APPOINTMENT (OUTPATIENT)
Dept: VASCULAR SURGERY | Facility: CLINIC | Age: 75
End: 2020-09-01

## 2020-09-01 NOTE — PHYSICAL EXAM
[JVD] : no jugular venous distention  [2+] : left 2+ [0] : left 0 [Ankle Swelling (On Exam)] : present [Varicose Veins Of Lower Extremities] : bilaterally [Ankle Swelling On The Left] : moderate [] : bilaterally [Ankle Swelling Bilaterally] : severe [Abdomen Masses] : No abdominal masses [Abdomen Tenderness] : ~T ~M No abdominal tenderness [Tender] : was nontender [Stool Sample Taken] : No stool obtained  on rectal exam [No Rash or Lesion] : No rash or lesion [Alert] : alert [Oriented to Person] : oriented to person [Oriented to Place] : oriented to place [Oriented to Time] : oriented to time [Calm] : calm [de-identified] : well in nad [de-identified] : adelinal [FreeTextEntry1] : unable to palpate bilateral pedal pulses due to edema. Feet appear warm w/ good cap refill. \par Bilateral lower extremity severe edema mid thigh to ankles. Severe venous insufficiency, lymphedema w/ severe chronic skin changes and xerosis. No s/s of infection. \par severe pre ulcerative skin changes w pre ulcer skin lesions . \par  [de-identified] : obese  [de-identified] : audrey [de-identified] : Difficulty w/ bilateral le ROM d/t back pain, obesity and lymphedema. Uses rolling walker to ambulate  [de-identified] : cooperative

## 2020-09-01 NOTE — ASSESSMENT
[FreeTextEntry1] : Impression: Lymphedema, post phlebitic syndrome and venous insufficiency w/ chronic edema and recurrent pre ulcer lesions \par \par Medical Conservative management leg elevation, diet and weight loss, compression therapy\par restart mindi le unna boot for 3 weeks then restart  ace wraps and transition to comp stockings w continue lymphedema pump use\par ov 2-3 mo to re eval  [Arterial/Venous Disease] : arterial/venous disease [Medication Management] : medication management [Other: _____] : [unfilled] [Foot care/Footwear] : foot care/footwear

## 2020-09-01 NOTE — HISTORY OF PRESENT ILLNESS
[FreeTextEntry1] : 75 y/o f w/ history of lymphedema and post phlebitic syndrome presents today w/ c/o of bilateral leg swelling right leg significantly worse than left, which has gotten worse in the last 2 weeks. She states she has not used the lymphedema pump in quite a while and now her legs are too swollen for the sleeves. She was wrapping her legs with ace bandages from the ankle to calf without improvement. She attempted compression stockings in the past but unable to comply d/t discomfort and the inability to apply them. She also reports going to [facility] center a few times in the past. Denies trauma or injury. Denies open wounds. No fever or chills.  [de-identified] : pt states she stopped  mindi le unna boot in march 202 and has been using mindi le ace wraps and lymphedema pump\par pt reports  mindi le worsening swelling and discomfort

## 2020-10-23 ENCOUNTER — RX RENEWAL (OUTPATIENT)
Age: 75
End: 2020-10-23

## 2020-11-19 ENCOUNTER — RX RENEWAL (OUTPATIENT)
Age: 75
End: 2020-11-19

## 2020-12-22 ENCOUNTER — RX RENEWAL (OUTPATIENT)
Age: 75
End: 2020-12-22

## 2021-01-21 ENCOUNTER — APPOINTMENT (OUTPATIENT)
Dept: INTERNAL MEDICINE | Facility: CLINIC | Age: 76
End: 2021-01-21
Payer: MEDICARE

## 2021-01-21 PROCEDURE — 99442: CPT

## 2021-01-21 NOTE — ASSESSMENT
[FreeTextEntry1] : 1) AFib\par - stable \par - no chest pain , plapitatios\par - ct meds , BB , OAC\par \par 2) HTN / HL \par - ct meds \par - check lipid / CMP\par \par 3) OP\par - ct meds \par - home safety\par - may need wheelchair bc poor mobility- will need face - to - face visit \par \par 4) MGUS \par - follow up heme- pt saw them 2 yrs ago \par - check CBC / SPEP

## 2021-01-26 ENCOUNTER — LABORATORY RESULT (OUTPATIENT)
Age: 76
End: 2021-01-26

## 2021-01-27 ENCOUNTER — NON-APPOINTMENT (OUTPATIENT)
Age: 76
End: 2021-01-27

## 2021-01-27 NOTE — H&P PST ADULT - PT NEEDS ASSIST
no Consent (Spinal Accessory)/Introductory Paragraph: The rationale for Mohs was explained to the patient and consent was obtained. The risks, benefits and alternatives to therapy were discussed in detail. Specifically, the risks of damage to the spinal accessory nerve, infection, scarring, bleeding, prolonged wound healing, incomplete removal, allergy to anesthesia, and recurrence were addressed. Prior to the procedure, the treatment site was clearly identified and confirmed by the patient. All components of Universal Protocol/PAUSE Rule completed. yes

## 2021-01-29 NOTE — PROGRESS NOTE ADULT - SUBJECTIVE AND OBJECTIVE BOX
OFFICE VISIT    Patient: Errol Arce   : 1949 MRN: [id number]    SUBJECTIVE:  Chief Complaint   Patient presents with   â¢ Eye Problem     left eye started watering on Monday     A 74year old female is here for an evaluation of left eye periorbital cellulitis and conjunctivitis. HISTORY OF PRESENT ILLNESS:  Left eye concerns  Reports itching, redness, stinging sensation, and goopy drainage of left eye since Monday, 2021. Has surrounding redness and swelling. This area is painful to touch. Her vision is slightly blurred felt secondary to the purulent drainage. States her right eye is watery too. Immunosuppressed status  She has immunocompromised status secondary to rheumatoid arthritis. PAST MEDICAL HISTORY:  Past Medical History:   Diagnosis Date   â¢ Cataract    â¢ Closed fracture of base of proximal phalanx of right thumb 2017   â¢ DJD (degenerative joint disease)    â¢ History of CVA (cerebrovascular accident) 2017   â¢ Hyperopia with astigmatism and presbyopia    â¢ Rheumatoid arthritis (CMS/HCC) 2017     MEDICATIONS:  Current Outpatient Medications   Medication Sig   â¢ methotrexate 2.5 MG Tab Take 4 tablets in the am and 4 tablets in the pm once a week on the same day. â¢ etanercept (Enbrel SureClick) 50 MG/ML auto-injector injection Inject one pen into the skin once weekly   â¢ folic acid (FOLATE) 1 MG tablet Take 1 tablet by mouth daily. â¢ ASPIRIN 81 PO Take 81 mg by mouth daily. â¢ acetaminophen (TYLENOL) 650 MG CR tablet Take 1,300 mg by mouth every 8 hours as needed for Pain. â¢ triamcinolone (ARISTOCORT) 0.1 % ointment Apply topically 2 times daily. â¢ rosuvastatin (CRESTOR) 40 MG tablet Take 1 tablet by mouth daily. â¢ clopidogrel (PLAVIX) 75 MG tablet Take 1 tablet by mouth daily. â¢ furosemide (LASIX) 40 MG tablet Take 1.5 tablets by mouth daily. â¢ Coenzyme Q10 100 MG Cap Take 1 capsule by mouth daily.    â¢ Cholecalciferol (VITAMIN D3) 5000 units capsule Patient is a 72y old  Female who presents with a chief complaint of Back pain (28 Jun 2018 03:57)      SUBJECTIVE / OVERNIGHT EVENTS: Feels ok, pain in back is better, no chills, no cp, sob, had bm     MEDICATIONS  (STANDING):  apixaban 5 milliGRAM(s) Oral every 12 hours  atorvastatin 40 milliGRAM(s) Oral at bedtime  furosemide    Tablet 40 milliGRAM(s) Oral daily  gabapentin 100 milliGRAM(s) Oral three times a day  lidocaine   Patch 1 Patch Transdermal daily  metoprolol succinate ER 25 milliGRAM(s) Oral daily  sertraline 75 milliGRAM(s) Oral daily    MEDICATIONS  (PRN):  acetaminophen    Suspension. 650 milliGRAM(s) Oral every 6 hours PRN Mild Pain (1 - 3)  oxyCODONE    IR 2.5 milliGRAM(s) Oral every 6 hours PRN Moderate Pain (4 - 6)  oxyCODONE    IR 5 milliGRAM(s) Oral every 6 hours PRN Severe Pain (7 - 10)  polyethylene glycol 3350 17 Gram(s) Oral daily PRN Constipation        CAPILLARY BLOOD GLUCOSE        I&O's Summary    28 Jun 2018 07:01  -  29 Jun 2018 07:00  --------------------------------------------------------  IN: 1060 mL / OUT: 650 mL / NET: 410 mL    29 Jun 2018 07:01  -  29 Jun 2018 12:08  --------------------------------------------------------  IN: 240 mL / OUT: 300 mL / NET: -60 mL        PHYSICAL EXAM:  GENERAL: NAD, well-developed  HEAD:  Atraumatic, Normocephalic  EYES: conjunctiva and sclera clear  NECK: No JVD  CHEST/LUNG: Clear to auscultation bilaterally; No wheeze  HEART: Regular rate and rhythm;S1S2  ABDOMEN: Soft, Nontender, Nondistended; Bowel sounds present  EXTREMITIES:  +2 LE lymphedema, chronic stasis changes  PSYCH: AAOx3        LABS:                        12.8   6.1   )-----------( 154      ( 27 Jun 2018 18:42 )             36.9     06-27    143  |  105  |  10  ----------------------------<  95  4.1   |  25  |  0.54    Ca    9.5      27 Jun 2018 18:43    TPro  7.3  /  Alb  4.2  /  TBili  2.0<H>  /  DBili  x   /  AST  36  /  ALT  15  /  AlkPhos  115  06-27              RADIOLOGY & ADDITIONAL TESTS:    Imaging Personally Reviewed:    Consultant(s) Notes Reviewed:  ortho, vascular    Care Discussed with Consultants/Other Providers: â¢ spironolactone (ALDACTONE) 25 MG tablet Take 1 tablet by mouth daily. â¢ sharps container To dispose of used syringes. â¢ Glucosamine 500 MG Cap Take by mouth daily. â¢ KRILL OIL PO Take by mouth daily. â¢ Ascorbic Acid (VITAMIN C) 500 MG tablet Take 500 mg by mouth 2 times daily. â¢ Daily Multiple Vitamins TABS Take 1 tablet by mouth daily. â¢ B Complex-C (B COMPLEX-VITAMIN C) CAPS Take 1 capsule by mouth daily. VITAMIN B COMPLEX WITH C AND MINERALS   â¢ sulfamethoxazole-trimethoprim (Bactrim DS) 800-160 MG per tablet Take 1 tablet by mouth 2 times daily for 7 days. â¢ polymyxin b-trimethoprim (POLYTRIM) 70650-3.1 UNIT/ML-% ophthalmic solution Place 1 drop into left eye every 4 hours. â¢ ferrous sulfate 325 (65 FE) MG tablet Take 1 tablet by mouth 2 times daily (with meals). â¢ Nutritional Supplements (BOOST PO)      No current facility-administered medications for this visit.       ALLERGIES:  ALLERGIES:   Allergen Reactions   â¢ Tocilizumab PRURITUS   â¢ Codeine NAUSEA and Other (See Comments)     INABILITY TO URINATE, FELT LIKE A ZOMBIE     PAST SURGICAL HISTORY:  Past Surgical History:   Procedure Laterality Date   â¢ Appendectomy  01/01/1969   â¢ Laparoscopy,tubal cautery  01/01/1984    Tubal Ligation   â¢ Oophorectomy     â¢ Orif humerus fracture  02/23/2012   â¢ Rotator cuff repair  01/01/2003   â¢ Tubal ligation       FAMILY HISTORY:  Family History   Problem Relation Age of Onset   â¢ Diabetes Mother    â¢ Retinal detachment Father    â¢ Heart disease Father    â¢ Dementia/Alzheimers Father    â¢ Macular degeneration Maternal Aunt    â¢ Osteoarthritis Sister    â¢ Heart disease Brother    â¢ Myocardial Infarction Brother 47   â¢ Hypertension Brother    â¢ Hypertension Sister    â¢ Osteoarthritis Sister    â¢ Rheumatoid Arthritis Sister    â¢ Other Sister         torticollis     SOCIAL HISTORY:  Social History     Tobacco Use   Smoking Status Never Smoker   Smokeless Tobacco Never Used     Social History "    Substance and Sexual Activity   Alcohol Use Yes    Comment: rare     Review Of Systems:  As Per HPI. OBJECTIVE:  Vitals:    01/28/21 1602   BP: 136/76   BP Location: LUE - Left upper extremity   Patient Position: Sitting   Cuff Size: Regular   Pulse: 77   Temp: 98.2 Â°F (36.8 Â°C)   TempSrc: Temporal   SpO2: 97%   Height: 5' 3"" (1.6 m)       Body mass index is 33.19 kg/mÂ². PHYSICAL EXAM  Constitutional: In no acute distress. Well developed. Eyes: Left eye: Significant purulent drainage with crusting along the left eyelid. Erythema over the left upper eyelid and below the lower eyelid extending into the left upper cheek. Tenderness to palpation along the left cheek bone and along the left eyebrow. The right eye is clear, no drainage, no injection. Respiratory: Breathing is nonlabored. Psychiatric: Oriented to time, place, and person. The mood was normal. The affect was normal. The memory was unimpaired. Skin: Skin moisture and turgor normal.    ASSESSMENT AND PLAN:  This is a 74year old female who presents with :  1. Periorbital cellulitis of left eye    2. Acute bacterial conjunctivitis of left eye    3. Immunosuppressed status (CMS/HCC)      Orders Placed This Encounter   â¢ sulfamethoxazole-trimethoprim (Bactrim DS) 800-160 MG per tablet   â¢ polymyxin b-trimethoprim (POLYTRIM) 03148-7.1 UNIT/ML-% ophthalmic solution     Plan:  Periorbital cellulitis of left eye; Acute bacterial conjunctivitis of left eye  Prescribed Bactrim -160 mg two times a day for 7 days. Benefits and potential side effects discussed. Recommended and prescribed Polytrim 1000-0.1% unit/mL ophthalmic solution which can also be used in the left eye every 4 hours. Informed she can also use Polytrim ophthalmic solution in the right eye if she develops redness and purulence in the right eye. Immunosuppressed status (CMS/HCC)  We will monitor her symptoms closely as she is at a high-risk for worsening of infections.   Follow " up in next week for re-evaluation and possibly labs for BMP to monitor kidney function. Refer to orders. Medical compliance with plan discussed and risks of non-compliance reviewed. Patient education completed on disease process, etiology & prognosis. Proper usage and side effects of medications reviewed & discussed. Patient understands and agrees with the plan. Return to clinic as clinically indicated as discussed with patient who verbalized understanding of the plan and is in agreement with the plan. Return for 5 days follow-up periorbital cellulitis. I,  Dr. Juanita Segovia, have created a visit summary document based on the audio recording between Dr. Ivon Gu DO and this patient for the physician to review, edit as needed, and authenticate. Creation Date: 1/29/2021     I have reviewed and edited the visit summary above and attest that it is accurate.

## 2021-02-01 ENCOUNTER — NON-APPOINTMENT (OUTPATIENT)
Age: 76
End: 2021-02-01

## 2021-02-09 ENCOUNTER — APPOINTMENT (OUTPATIENT)
Dept: VASCULAR SURGERY | Facility: CLINIC | Age: 76
End: 2021-02-09
Payer: MEDICARE

## 2021-02-09 VITALS
HEART RATE: 91 BPM | WEIGHT: 2 LBS | SYSTOLIC BLOOD PRESSURE: 131 MMHG | DIASTOLIC BLOOD PRESSURE: 86 MMHG | BODY MASS INDEX: 0.33 KG/M2 | TEMPERATURE: 97.3 F | HEIGHT: 65 IN

## 2021-02-09 PROCEDURE — 99214 OFFICE O/P EST MOD 30 MIN: CPT | Mod: 25

## 2021-02-09 PROCEDURE — 99072 ADDL SUPL MATRL&STAF TM PHE: CPT

## 2021-02-09 PROCEDURE — 29580 STRAPPING UNNA BOOT: CPT | Mod: 50

## 2021-02-09 NOTE — HISTORY OF PRESENT ILLNESS
[FreeTextEntry1] : 75 y/o f w/ history of lymphedema and post phlebitic syndrome presents today w/ c/o of bilateral leg swelling right leg significantly worse than left, which has gotten worse in the last 2 weeks. She states she has not used the lymphedema pump in quite a while and now her legs are too swollen for the sleeves. She was wrapping her legs with ace bandages from the ankle to calf without improvement. She attempted compression stockings in the past but unable to comply d/t discomfort and the inability to apply them. She also reports going to [facility] a few times in the past. Denies trauma or injury. Denies open wounds. No fever or chills.  [de-identified] : pt reports new right  back of calf wound \par pt reports  mindi le worsening swelling and discomfort \par pt is compliant w lymphedema pump and ace wraps

## 2021-02-09 NOTE — ASSESSMENT
[Arterial/Venous Disease] : arterial/venous disease [Medication Management] : medication management [Other: _____] : [unfilled] [Foot care/Footwear] : foot care/footwear [FreeTextEntry1] : Impression: Lymphedema, post phlebitic syndrome and venous insufficiency w/ chronic edema and recurrent pre ulcer lesions \par \par Medical Conservative management leg elevation, diet and weight loss, compression therapy\par restart mindi le unna boot for 4 weeks then restart  ace wraps and transition to comp stockings w continue lymphedema pump use\par 1 mo to re eval  via telehealth\par rto prn\par \par Varicose veins are enlarged, twisted veins. Varicose veins are caused by increased blood pressure in the veins.  The blood moves towards the heart by 1-way valves in the veins. When the valves become weakened or damaged, blood can collect in the veins and pool in your lower legs (ankles). This causes the veins to become enlarged and incompetent with reflux. Sitting or standing for long periods can cause blood to pool in the leg veins, increasing the pressure within the veins. \par Risk factors for varicose veins or venous disease may include:  obesity, older age, standing or sitting for prolonged periods of time for several years, being female, pregnancy, taking oral contraceptive pills or hormone replacement, being inactive, and/or smoking. \par The most common symptoms of varicose veins are sensations in the legs, such as a heavy feeling, burning, and/or aching. However, each individual may experience symptoms differently.  Other symptoms may include:  color changes in the skin, sores on the legs, or rash.  Severe varicose veins or venous disease may eventually produce long-term mild swelling that can result in more serious skin and tissue problems, such as ulcers and non-healing sores.\par Varicose veins and venous disease are diagnosed by a complete medical history, physical examination, and diagnostic studies for varicose veins including duplex ultrasound and color-flow imaging.  \par Medical treatment for varicose veins and venous disease include:  compression stockings, sclerotherapy, endovenous ablation and/or surgical treatment with microphlebectomy.  \par \par

## 2021-02-09 NOTE — PHYSICAL EXAM
[2+] : left 2+ [0] : left 0 [Ankle Swelling (On Exam)] : present [Varicose Veins Of Lower Extremities] : bilaterally [Ankle Swelling On The Left] : moderate [] : present [Ankle Swelling Bilaterally] : severe [No Rash or Lesion] : No rash or lesion [Alert] : alert [Oriented to Person] : oriented to person [Oriented to Place] : oriented to place [Oriented to Time] : oriented to time [Calm] : calm [JVD] : no jugular venous distention  [Abdomen Masses] : No abdominal masses [Abdomen Tenderness] : ~T ~M No abdominal tenderness [Tender] : was nontender [Stool Sample Taken] : No stool obtained  on rectal exam [de-identified] : nad [de-identified] : adelinal [FreeTextEntry1] : unable to palpate bilateral pedal pulses due to edema. Feet w/ good cap refill. \par Bilateral lower extremity severe edema mid thigh to ankles. \par Severe venous insufficiency, lymphedema w/ severe chronic skin changes and xerosis. No s/s of infection. \par severe pre ulcerative skin changes w pre ulcer skin lesions  mindi calf and shins w \par right mid post calf skin breakdown no cellulitis \par  [de-identified] : obese  [de-identified] : audrey [de-identified] : Difficulty w/ bilateral le ROM d/t back pain, obesity and lymphedema. Uses rolling walker to ambulate  [de-identified] : cooperative

## 2021-02-21 ENCOUNTER — INPATIENT (INPATIENT)
Facility: HOSPITAL | Age: 76
LOS: 7 days | Discharge: HOME HEALTH | DRG: 177 | End: 2021-03-01
Attending: HOSPITALIST | Admitting: HOSPITALIST
Payer: COMMERCIAL

## 2021-02-21 VITALS — HEIGHT: 65 IN

## 2021-02-21 DIAGNOSIS — Z95.828 PRESENCE OF OTHER VASCULAR IMPLANTS AND GRAFTS: Chronic | ICD-10-CM

## 2021-02-21 DIAGNOSIS — Z98.890 OTHER SPECIFIED POSTPROCEDURAL STATES: Chronic | ICD-10-CM

## 2021-02-21 DIAGNOSIS — J18.9 PNEUMONIA, UNSPECIFIED ORGANISM: ICD-10-CM

## 2021-02-21 DIAGNOSIS — Z96.651 PRESENCE OF RIGHT ARTIFICIAL KNEE JOINT: Chronic | ICD-10-CM

## 2021-02-21 DIAGNOSIS — Z96.641 PRESENCE OF RIGHT ARTIFICIAL HIP JOINT: Chronic | ICD-10-CM

## 2021-02-21 DIAGNOSIS — Z96.652 PRESENCE OF LEFT ARTIFICIAL KNEE JOINT: Chronic | ICD-10-CM

## 2021-02-21 LAB
ALBUMIN SERPL ELPH-MCNC: 4 G/DL — SIGNIFICANT CHANGE UP (ref 3.3–5)
ALP SERPL-CCNC: 99 U/L — SIGNIFICANT CHANGE UP (ref 40–120)
ALT FLD-CCNC: 24 U/L — SIGNIFICANT CHANGE UP (ref 10–45)
ANION GAP SERPL CALC-SCNC: 16 MMOL/L — SIGNIFICANT CHANGE UP (ref 5–17)
APTT BLD: 30.7 SEC — SIGNIFICANT CHANGE UP (ref 27.5–35.5)
AST SERPL-CCNC: 38 U/L — SIGNIFICANT CHANGE UP (ref 10–40)
BASOPHILS # BLD AUTO: 0.01 K/UL — SIGNIFICANT CHANGE UP (ref 0–0.2)
BASOPHILS NFR BLD AUTO: 0.3 % — SIGNIFICANT CHANGE UP (ref 0–2)
BILIRUB SERPL-MCNC: 0.9 MG/DL — SIGNIFICANT CHANGE UP (ref 0.2–1.2)
BUN SERPL-MCNC: 10 MG/DL — SIGNIFICANT CHANGE UP (ref 7–23)
CALCIUM SERPL-MCNC: 8.7 MG/DL — SIGNIFICANT CHANGE UP (ref 8.4–10.5)
CHLORIDE SERPL-SCNC: 100 MMOL/L — SIGNIFICANT CHANGE UP (ref 96–108)
CO2 SERPL-SCNC: 21 MMOL/L — LOW (ref 22–31)
CREAT SERPL-MCNC: 0.65 MG/DL — SIGNIFICANT CHANGE UP (ref 0.5–1.3)
EOSINOPHIL # BLD AUTO: 0 K/UL — SIGNIFICANT CHANGE UP (ref 0–0.5)
EOSINOPHIL NFR BLD AUTO: 0 % — SIGNIFICANT CHANGE UP (ref 0–6)
GAS PNL BLDV: SIGNIFICANT CHANGE UP
GLUCOSE SERPL-MCNC: 129 MG/DL — HIGH (ref 70–99)
HCT VFR BLD CALC: 44.5 % — SIGNIFICANT CHANGE UP (ref 34.5–45)
HGB BLD-MCNC: 14 G/DL — SIGNIFICANT CHANGE UP (ref 11.5–15.5)
IMM GRANULOCYTES NFR BLD AUTO: 0.3 % — SIGNIFICANT CHANGE UP (ref 0–1.5)
INR BLD: 1.1 RATIO — SIGNIFICANT CHANGE UP (ref 0.88–1.16)
LYMPHOCYTES # BLD AUTO: 0.76 K/UL — LOW (ref 1–3.3)
LYMPHOCYTES # BLD AUTO: 21.3 % — SIGNIFICANT CHANGE UP (ref 13–44)
MCHC RBC-ENTMCNC: 29 PG — SIGNIFICANT CHANGE UP (ref 27–34)
MCHC RBC-ENTMCNC: 31.5 GM/DL — LOW (ref 32–36)
MCV RBC AUTO: 92.3 FL — SIGNIFICANT CHANGE UP (ref 80–100)
MONOCYTES # BLD AUTO: 0.36 K/UL — SIGNIFICANT CHANGE UP (ref 0–0.9)
MONOCYTES NFR BLD AUTO: 10.1 % — SIGNIFICANT CHANGE UP (ref 2–14)
NEUTROPHILS # BLD AUTO: 2.43 K/UL — SIGNIFICANT CHANGE UP (ref 1.8–7.4)
NEUTROPHILS NFR BLD AUTO: 68 % — SIGNIFICANT CHANGE UP (ref 43–77)
NRBC # BLD: 0 /100 WBCS — SIGNIFICANT CHANGE UP (ref 0–0)
NT-PROBNP SERPL-SCNC: 1366 PG/ML — HIGH (ref 0–300)
PLATELET # BLD AUTO: 137 K/UL — LOW (ref 150–400)
POTASSIUM SERPL-MCNC: 3.7 MMOL/L — SIGNIFICANT CHANGE UP (ref 3.5–5.3)
POTASSIUM SERPL-SCNC: 3.7 MMOL/L — SIGNIFICANT CHANGE UP (ref 3.5–5.3)
PROT SERPL-MCNC: 7.9 G/DL — SIGNIFICANT CHANGE UP (ref 6–8.3)
PROTHROM AB SERPL-ACNC: 13.1 SEC — SIGNIFICANT CHANGE UP (ref 10.6–13.6)
RBC # BLD: 4.82 M/UL — SIGNIFICANT CHANGE UP (ref 3.8–5.2)
RBC # FLD: 14.5 % — SIGNIFICANT CHANGE UP (ref 10.3–14.5)
SODIUM SERPL-SCNC: 137 MMOL/L — SIGNIFICANT CHANGE UP (ref 135–145)
TROPONIN T, HIGH SENSITIVITY RESULT: 15 NG/L — SIGNIFICANT CHANGE UP (ref 0–51)
WBC # BLD: 3.57 K/UL — LOW (ref 3.8–10.5)
WBC # FLD AUTO: 3.57 K/UL — LOW (ref 3.8–10.5)

## 2021-02-21 PROCEDURE — 93010 ELECTROCARDIOGRAM REPORT: CPT

## 2021-02-21 PROCEDURE — 71045 X-RAY EXAM CHEST 1 VIEW: CPT | Mod: 26

## 2021-02-21 PROCEDURE — 99291 CRITICAL CARE FIRST HOUR: CPT

## 2021-02-21 RX ORDER — AZITHROMYCIN 500 MG/1
500 TABLET, FILM COATED ORAL ONCE
Refills: 0 | Status: COMPLETED | OUTPATIENT
Start: 2021-02-21 | End: 2021-02-21

## 2021-02-21 RX ORDER — ACETAMINOPHEN 500 MG
650 TABLET ORAL ONCE
Refills: 0 | Status: COMPLETED | OUTPATIENT
Start: 2021-02-21 | End: 2021-02-21

## 2021-02-21 RX ORDER — CEFTRIAXONE 500 MG/1
1000 INJECTION, POWDER, FOR SOLUTION INTRAMUSCULAR; INTRAVENOUS ONCE
Refills: 0 | Status: COMPLETED | OUTPATIENT
Start: 2021-02-21 | End: 2021-02-21

## 2021-02-21 RX ORDER — ALBUTEROL 90 UG/1
2.5 AEROSOL, METERED ORAL ONCE
Refills: 0 | Status: COMPLETED | OUTPATIENT
Start: 2021-02-21 | End: 2021-02-21

## 2021-02-21 RX ADMIN — AZITHROMYCIN 250 MILLIGRAM(S): 500 TABLET, FILM COATED ORAL at 23:13

## 2021-02-21 RX ADMIN — Medication 650 MILLIGRAM(S): at 22:38

## 2021-02-21 RX ADMIN — ALBUTEROL 2.5 MILLIGRAM(S): 90 AEROSOL, METERED ORAL at 23:13

## 2021-02-21 NOTE — ED ADULT NURSE NOTE - NSIMPLEMENTINTERV_GEN_ALL_ED
Implemented All Universal Safety Interventions:  [place] to call system. Call bell, personal items and telephone within reach. Instruct patient to call for assistance. Room bathroom lighting operational. Non-slip footwear when patient is off stretcher. Physically safe environment: no spills, clutter or unnecessary equipment. Stretcher in lowest position, wheels locked, appropriate side rails in place.

## 2021-02-21 NOTE — ED PROVIDER NOTE - OBJECTIVE STATEMENT
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 Patient had recently returned her home O2 because it wasn't working and got increasingly dyspneic today. Called EMS and  was cooperative. In ED when transitioning from EMS O2 to wall O2 here, became hypoxic to mid 80s and became confused. Now back to baseline on BIPAP and comfortable.

## 2021-02-21 NOTE — ED PROVIDER NOTE - PROGRESS NOTE DETAILS
Danny HENDERSON MD PGY3: Patient continues to be comfortable on BIPAP and saturating at 100%. Sleeping comfortably. Signed out to hospitalist. Danny HENDERSON MD PGY3: Patient continues to be comfortable on BIPAP and saturating at 100% with significant FIO2. Still mildly tachypneic. Likely COVID, as PCO2 not significantly elevates pre-bipap and not significant wheezes so less likely COPD exacerbation. Sleeping comfortably. Signed out to hospitalist.

## 2021-02-21 NOTE — ED PROVIDER NOTE - CLINICAL SUMMARY MEDICAL DECISION MAKING FREE TEXT BOX
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 Will obtain CXR, labs, cover for CAP and reassess. 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 Will obtain CXR, labs, cover for CAP and reassess.    Bonnie Grayson MD - Attending Physician: Pt here with acute SOB and increased WOB. Arrived confused, hypoxic, tachypneic. BIPAP immediately placed with rapid improvement in MS not that she is not hypoxic. Found to be febrile - Likely PNA vs COVID, less likely pure COPD exacerbat. To be admitted

## 2021-02-21 NOTE — ED ADULT NURSE REASSESSMENT NOTE - NS ED NURSE REASSESS COMMENT FT1
After BIPAP administration Pt became less anxious and more cooperative with staff, pt turned and repositioned, pt talking in complete sentences, oxygen saturation 100%, pt tolerating BIPAP well, bed locked and in lowest position, call bell within reach and pt instructed on use of call bell, pending RT to administer albuterol Tx After BIPAP administration Pt became less anxious and more cooperative with staff, pt turned and repositioned for comfort, oxygen saturation 100%, pt tolerating BIPAP well, bed locked and in lowest position, call bell within reach and pt instructed on use of call bell, pending RT to administer albuterol Tx

## 2021-02-21 NOTE — ED PROVIDER NOTE - CARE PLAN
Principal Discharge DX:	Pneumonia   Principal Discharge DX:	Acute respiratory failure with hypoxia  Secondary Diagnosis:	Pneumonia  Secondary Diagnosis:	COPD (chronic obstructive pulmonary disease)

## 2021-02-21 NOTE — ED PROVIDER NOTE - PHYSICAL EXAMINATION
Danny HENDERSON MD PGY3:   PHYSICAL EXAM:    GENERAL: Comfortable on BiPAP  HEENT:  Atraumatic, Normocephalic  CHEST/LUNG: Chest rise equal bilaterally. Coarse breath sounds bilaterally. reduced breath sounds in bilateral lung field bases. WOB now comfortable. Sat 100%.   HEART: Regular rate and rhythm. No murmurs or rubs.  ABDOMEN: Soft, Nontender, Nondistended  EXTREMITIES:  2+ Peripheral Pulses.  PSYCH: A&Ox3  SKIN: No obvious rashes or lesions Danny HENDERSON MD PGY3:   PHYSICAL EXAM:    GENERAL: Comfortable on BiPAP  HEENT:  Atraumatic, Normocephalic  CHEST/LUNG: Chest rise equal bilaterally. Coarse breath sounds bilaterally. reduced breath sounds in bilateral lung field bases. WOB now improved on Bipap but still tachypneic. Sat % on 70% FIO2.   HEART: Regular rate and rhythm. No murmurs or rubs.  ABDOMEN: Soft, Nontender, Nondistended  EXTREMITIES:  2+ Peripheral Pulses.  PSYCH: A&Ox3  SKIN: No obvious rashes or lesions

## 2021-02-21 NOTE — ED PROVIDER NOTE - PSH
[place] filter in place  Placed 4/2016 removed 5/2017  H/O knee surgery  2016 - repair right  H/O total knee replacement, right  11/2015, 8/2016  History of hip replacement, total, right    History of total left knee replacement  2007

## 2021-02-21 NOTE — ED ADULT NURSE REASSESSMENT NOTE - NS ED NURSE REASSESS COMMENT FT1
Report received from Stacey RN and HEDY Prieto. VS updated as seen in sunrise. pt placed on prima fit. abx hung as ordered. pt awaiting bed assignment

## 2021-02-21 NOTE — ED ADULT NURSE NOTE - NS ED NURSE REPORT GIVEN TO FT
Normal rate, regular rhythm.  Heart sounds S1, S2.  No murmurs, rubs or gallops.
Report given to HEDY Bernstein. RN aware of PMH, reason for stay, and has no further questions.

## 2021-02-22 DIAGNOSIS — J96.01 ACUTE RESPIRATORY FAILURE WITH HYPOXIA: ICD-10-CM

## 2021-02-22 DIAGNOSIS — U07.1 COVID-19: ICD-10-CM

## 2021-02-22 DIAGNOSIS — J44.9 CHRONIC OBSTRUCTIVE PULMONARY DISEASE, UNSPECIFIED: ICD-10-CM

## 2021-02-22 DIAGNOSIS — I10 ESSENTIAL (PRIMARY) HYPERTENSION: ICD-10-CM

## 2021-02-22 DIAGNOSIS — I89.0 LYMPHEDEMA, NOT ELSEWHERE CLASSIFIED: ICD-10-CM

## 2021-02-22 DIAGNOSIS — R09.89 OTHER SPECIFIED SYMPTOMS AND SIGNS INVOLVING THE CIRCULATORY AND RESPIRATORY SYSTEMS: ICD-10-CM

## 2021-02-22 DIAGNOSIS — I48.0 PAROXYSMAL ATRIAL FIBRILLATION: ICD-10-CM

## 2021-02-22 DIAGNOSIS — Z29.9 ENCOUNTER FOR PROPHYLACTIC MEASURES, UNSPECIFIED: ICD-10-CM

## 2021-02-22 LAB
ALBUMIN SERPL ELPH-MCNC: 3.6 G/DL — SIGNIFICANT CHANGE UP (ref 3.3–5)
ALP SERPL-CCNC: 85 U/L — SIGNIFICANT CHANGE UP (ref 40–120)
ALT FLD-CCNC: 21 U/L — SIGNIFICANT CHANGE UP (ref 10–45)
AST SERPL-CCNC: 31 U/L — SIGNIFICANT CHANGE UP (ref 10–40)
BILIRUB DIRECT SERPL-MCNC: 0.2 MG/DL — SIGNIFICANT CHANGE UP (ref 0–0.2)
BILIRUB INDIRECT FLD-MCNC: 0.4 MG/DL — SIGNIFICANT CHANGE UP (ref 0.2–1)
BILIRUB SERPL-MCNC: 0.6 MG/DL — SIGNIFICANT CHANGE UP (ref 0.2–1.2)
CREAT SERPL-MCNC: 0.64 MG/DL — SIGNIFICANT CHANGE UP (ref 0.5–1.3)
FERRITIN SERPL-MCNC: 296 NG/ML — HIGH (ref 15–150)
PROT SERPL-MCNC: 7.3 G/DL — SIGNIFICANT CHANGE UP (ref 6–8.3)
SARS-COV-2 RNA SPEC QL NAA+PROBE: DETECTED

## 2021-02-22 PROCEDURE — 99223 1ST HOSP IP/OBS HIGH 75: CPT

## 2021-02-22 RX ORDER — SERTRALINE 25 MG/1
100 TABLET, FILM COATED ORAL DAILY
Refills: 0 | Status: DISCONTINUED | OUTPATIENT
Start: 2021-02-22 | End: 2021-03-01

## 2021-02-22 RX ORDER — ALENDRONATE SODIUM 70 MG/1
70 TABLET ORAL
Qty: 0 | Refills: 0 | DISCHARGE

## 2021-02-22 RX ORDER — PANTOPRAZOLE SODIUM 20 MG/1
40 TABLET, DELAYED RELEASE ORAL
Refills: 0 | Status: DISCONTINUED | OUTPATIENT
Start: 2021-02-22 | End: 2021-03-01

## 2021-02-22 RX ORDER — DEXAMETHASONE 0.5 MG/5ML
6 ELIXIR ORAL DAILY
Refills: 0 | Status: DISCONTINUED | OUTPATIENT
Start: 2021-02-22 | End: 2021-03-01

## 2021-02-22 RX ORDER — GABAPENTIN 400 MG/1
1 CAPSULE ORAL
Qty: 0 | Refills: 0 | DISCHARGE

## 2021-02-22 RX ORDER — APIXABAN 2.5 MG/1
5 TABLET, FILM COATED ORAL
Refills: 0 | Status: DISCONTINUED | OUTPATIENT
Start: 2021-02-22 | End: 2021-03-01

## 2021-02-22 RX ORDER — REMDESIVIR 5 MG/ML
100 INJECTION INTRAVENOUS EVERY 24 HOURS
Refills: 0 | Status: COMPLETED | OUTPATIENT
Start: 2021-02-23 | End: 2021-02-26

## 2021-02-22 RX ORDER — REMDESIVIR 5 MG/ML
200 INJECTION INTRAVENOUS EVERY 24 HOURS
Refills: 0 | Status: COMPLETED | OUTPATIENT
Start: 2021-02-22 | End: 2021-02-22

## 2021-02-22 RX ORDER — ALBUTEROL 90 UG/1
2 AEROSOL, METERED ORAL EVERY 4 HOURS
Refills: 0 | Status: DISCONTINUED | OUTPATIENT
Start: 2021-02-22 | End: 2021-03-01

## 2021-02-22 RX ORDER — FUROSEMIDE 40 MG
1 TABLET ORAL
Qty: 0 | Refills: 0 | DISCHARGE

## 2021-02-22 RX ORDER — METOPROLOL TARTRATE 50 MG
50 TABLET ORAL DAILY
Refills: 0 | Status: DISCONTINUED | OUTPATIENT
Start: 2021-02-22 | End: 2021-03-01

## 2021-02-22 RX ORDER — TRAMADOL HYDROCHLORIDE 50 MG/1
25 TABLET ORAL EVERY 6 HOURS
Refills: 0 | Status: DISCONTINUED | OUTPATIENT
Start: 2021-02-22 | End: 2021-02-27

## 2021-02-22 RX ORDER — ACETAMINOPHEN 500 MG
650 TABLET ORAL EVERY 4 HOURS
Refills: 0 | Status: DISCONTINUED | OUTPATIENT
Start: 2021-02-22 | End: 2021-03-01

## 2021-02-22 RX ORDER — APIXABAN 2.5 MG/1
1 TABLET, FILM COATED ORAL
Qty: 0 | Refills: 0 | DISCHARGE

## 2021-02-22 RX ORDER — ACETAMINOPHEN 500 MG
650 TABLET ORAL ONCE
Refills: 0 | Status: COMPLETED | OUTPATIENT
Start: 2021-02-22 | End: 2021-02-22

## 2021-02-22 RX ORDER — REMDESIVIR 5 MG/ML
INJECTION INTRAVENOUS
Refills: 0 | Status: COMPLETED | OUTPATIENT
Start: 2021-02-22 | End: 2021-02-26

## 2021-02-22 RX ORDER — FUROSEMIDE 40 MG
40 TABLET ORAL
Refills: 0 | Status: DISCONTINUED | OUTPATIENT
Start: 2021-02-22 | End: 2021-03-01

## 2021-02-22 RX ORDER — GUAIFENESIN/DEXTROMETHORPHAN 600MG-30MG
10 TABLET, EXTENDED RELEASE 12 HR ORAL EVERY 4 HOURS
Refills: 0 | Status: DISCONTINUED | OUTPATIENT
Start: 2021-02-22 | End: 2021-03-01

## 2021-02-22 RX ORDER — ATORVASTATIN CALCIUM 80 MG/1
1 TABLET, FILM COATED ORAL
Qty: 0 | Refills: 0 | DISCHARGE

## 2021-02-22 RX ORDER — IBUPROFEN 200 MG
400 TABLET ORAL ONCE
Refills: 0 | Status: COMPLETED | OUTPATIENT
Start: 2021-02-22 | End: 2021-02-22

## 2021-02-22 RX ADMIN — Medication 400 MILLIGRAM(S): at 10:27

## 2021-02-22 RX ADMIN — Medication 50 MILLIGRAM(S): at 05:26

## 2021-02-22 RX ADMIN — APIXABAN 5 MILLIGRAM(S): 2.5 TABLET, FILM COATED ORAL at 05:26

## 2021-02-22 RX ADMIN — TRAMADOL HYDROCHLORIDE 25 MILLIGRAM(S): 50 TABLET ORAL at 23:35

## 2021-02-22 RX ADMIN — CEFTRIAXONE 100 MILLIGRAM(S): 500 INJECTION, POWDER, FOR SOLUTION INTRAMUSCULAR; INTRAVENOUS at 00:03

## 2021-02-22 RX ADMIN — Medication 650 MILLIGRAM(S): at 05:25

## 2021-02-22 RX ADMIN — REMDESIVIR 500 MILLIGRAM(S): 5 INJECTION INTRAVENOUS at 12:30

## 2021-02-22 RX ADMIN — SERTRALINE 100 MILLIGRAM(S): 25 TABLET, FILM COATED ORAL at 14:08

## 2021-02-22 RX ADMIN — Medication 40 MILLIGRAM(S): at 05:26

## 2021-02-22 RX ADMIN — Medication 40 MILLIGRAM(S): at 17:51

## 2021-02-22 RX ADMIN — Medication 6 MILLIGRAM(S): at 05:26

## 2021-02-22 RX ADMIN — APIXABAN 5 MILLIGRAM(S): 2.5 TABLET, FILM COATED ORAL at 17:51

## 2021-02-22 NOTE — H&P ADULT - NSHPPHYSICALEXAM_GEN_ALL_CORE
Vital Signs Last 24 Hrs  T(C): 37.2 (22 Feb 2021 00:03), Max: 38.8 (21 Feb 2021 22:15)  T(F): 98.9 (22 Feb 2021 00:03), Max: 101.8 (21 Feb 2021 22:15)  HR: 104 (22 Feb 2021 00:03) (104 - 135)  BP: 121/71 (22 Feb 2021 00:03) (111/61 - 156/90)  BP(mean): 75 (21 Feb 2021 22:30) (75 - 75)  RR: 22 (22 Feb 2021 00:03) (19 - 30)  SpO2: 96% (22 Feb 2021 00:03) (90% - 100%)    PHYSICAL EXAM:  GENERAL: NAD, well-developed, on BiPAP  HEAD:  Atraumatic, normocephalic  EYES: EOMI, conjunctiva and sclera clear  NECK: Supple, no JVD  CHEST/LUNG: bibasilar rales, no wheezing, no tachypnea/increased WOB, on BiPAP  HEART: irregular, not tachycardic; no murmurs  ABDOMEN: Soft, nontender, nondistended; bowel sounds present  EXTREMITIES:  2+ Peripheral Pulses, significant LE edema b/l  PSYCH: calm affect, not anxious  NEUROLOGY: non-focal, AAOx3  SKIN: b/l LE skin erythema/skin changes (chronic)

## 2021-02-22 NOTE — H&P ADULT - PROBLEM SELECTOR PLAN 4
BP soft currently   c/w toprol 25mg daily and lasix 40mg BID  holding lisinopril for now, resume as BP tolerates

## 2021-02-22 NOTE — ED ADULT NURSE REASSESSMENT NOTE - NS ED NURSE REASSESS COMMENT FT1
pt requested to stand due to lymphedema. pt stood at bedside with one person assist. pt placed back in bed with help of 3 people. pt placed in a position of comfort. prima fit in place and connected to suction. bipap in place. cardiac monitor on. awaiting covid bed assignment

## 2021-02-22 NOTE — H&P ADULT - PROBLEM SELECTOR PLAN 1
Hypoxia to 91% with increased WOB on arrival, currently on BiPAP tolerating well with improved sats and no longer tachypneic. Suspect COVID vs COPD/PNA.     - c/w supplemental O2 via BiPAP for now, wean as tolerated  - COVID/COPD/CAP treatment as below  - dimer noted, low suspicion for PE at this time  - monitor O2 sat closely Hypoxia to 91% with increased WOB on arrival, currently on BiPAP tolerating well with improved sats and no longer tachypneic. 2/2 COVID PNA   - c/w supplemental O2 via BiPAP for now, wean as tolerated  - COVID treatment as below  - dimer noted, low suspicion for PE at this time  - monitor O2 sat closely

## 2021-02-22 NOTE — H&P ADULT - PROBLEM SELECTOR PLAN 2
based on imaging and labs, highly suspect COVID PNA  if COVID PCR+, will start - c/w decadron 6mg IVP daily and remdesevir given hypoxia; trend inflamm markers    if COVID negative, will start ceftriaxone/azithromycin for CAP coverage   c/w supplemental o2 as needed based on imaging and labs, highly suspect COVID PNA.   if COVID PCR+, will start - c/w decadron 6mg IVP daily and remdesevir given hypoxia; trend inflamm markers    if COVID negative, will start ceftriaxone/azithromycin for CAP coverage   c/w supplemental o2 as needed COVID+ with CXR showing b/l gg opacities, hypoxic currently on BiPAP - severe COVID illness   - start decadron 6mg IVP daily   - start remdesevir given hypoxia   - d-dimer noted, low suspicion for PE currently   - c/w supplemental O2 as needed   - isolation precautions   - lovenox for VTE ppx   - trend inflamm markers.

## 2021-02-22 NOTE — H&P ADULT - ASSESSMENT
75F with PMH of COPD, HTN, DVT/PE s/p IVC filter, Afib on Eliquis, MGUS, chronic b/l LE lymphedema p/w SOB, a/w hypoxic respiratory failure likely 2/2 COVID PNA vs COPD.

## 2021-02-22 NOTE — H&P ADULT - NSICDXPASTMEDICALHX_GEN_ALL_CORE_FT
PAST MEDICAL HISTORY:  Depression     Edema of lower extremity both for 40 years    HTN (hypertension)     Hyperlipidemia     Knee pain, right     Obesity     Osteoarthritis     PE (pulmonary thromboembolism) DVT/PE 2/2016 - on Xarelto - discontinued 6/2017    Pneumonia 3/2016

## 2021-02-22 NOTE — ED ADULT NURSE REASSESSMENT NOTE - NS ED NURSE REASSESS COMMENT FT1
pt self removed bipap and stated "I need a break". bipap placed on standby. pt placed on 4L nasal cannula. work of breathing improved. pt awaiting bed assignment

## 2021-02-22 NOTE — H&P ADULT - HISTORY OF PRESENT ILLNESS
75F with PMH of COPD, HTN, DVT/PE s/p IVC filter, Afib on Eliquis, MGUS, chronic b/l LE lymphedema p/w SOB. Pt endorsing worsening SOB over past 5 days with associated wheezing; SOB only with exertion, denies any at rest; endorsing minimal dry cough and chills, but no fevers. Denies any CP, palpitations, no nausea/vomiting, no abd pain, no diarrhea, no urinary symptoms, no HA or myalgias; LE edema and skin changes at baseline. Pt does not use any bronchodilators on daily basis and denies being on O2 at home (states she was given O2 briefly years ago after nasal surgery, but not on it daily). Denies having frequent COPD exacerbations. Has HHA at home for 4hrs, does not know of known COVID contacts. 75F with PMH of COPD, HTN, DVT/PE s/p IVC filter, Afib on Eliquis, MGUS, chronic b/l LE lymphedema p/w SOB. Pt endorsing worsening SOB over past 5 days with associated wheezing; SOB only with exertion, denies any at rest; endorsing minimal dry cough and chills, but no fevers. Denies any CP, palpitations, no nausea/vomiting, no abd pain, no diarrhea, no urinary symptoms, no HA or myalgias; LE edema and skin changes at baseline. Pt does not use any bronchodilators on daily basis and denies being on O2 at home (states she was given O2 briefly years ago after nasal surgery, but not on it daily). Denies having frequent COPD exacerbations. Has HHA at home for 4hrs, no known COVID+ contacts. 75F with PMH of COPD, HTN, DVT/PE s/p IVC filter, Afib on Eliquis, MGUS, chronic b/l LE lymphedema p/w SOB. Pt endorsing worsening SOB over past 5 days with associated wheezing; SOB only with exertion, denies any at rest; endorsing minimal dry cough and chills, but no fevers. Denies any CP, palpitations, no nausea/vomiting, no abd pain, no diarrhea, no urinary symptoms, no HA or myalgias; LE edema and skin changes at baseline. Pt does not use any bronchodilators on daily basis and denies being on O2 at home (states she was given O2 briefly years ago after nasal surgery, but not on it daily). Denies having frequent COPD exacerbations. Has HHA at home for 4hrs, no known COVID+ contacts.    Found with hypoxia to 88% by EMS - s/p 2 nebulizer treatments and solu-medrol given en route; s/p ceftriaxone/azithromycin in ED.

## 2021-02-22 NOTE — ED ADULT NURSE REASSESSMENT NOTE - NS ED NURSE REASSESS COMMENT FT1
Pt introduced to oncoming RN, sitting upright resting in bed with 4L NC on. Pt recently removed bipap, admitting NP aware as per report from previous RN. Pt able to speak in complete sentences with ease and reports "I still need a break from the bipap, I don't need it anymore, I feel better". Pt maintaining consistent O2 saturation of 100%. Pt denies needs at this time and updated on plan of care, awaiting admitting bed assignment.

## 2021-02-22 NOTE — H&P ADULT - NSHPLABSRESULTS_GEN_ALL_CORE
Labs, imaging and EKG personally reviewed and interpreted by me. EKG Afib.                           14.0   3.57  )-----------( 137      ( 21 Feb 2021 22:26 )             44.5     02-21    137  |  100  |  10  ----------------------------<  129<H>  3.7   |  21<L>  |  0.65    Ca    8.7      21 Feb 2021 22:26    TPro  7.9  /  Alb  4.0  /  TBili  0.9  /  DBili  x   /  AST  38  /  ALT  24  /  AlkPhos  99  02-21        PT/INR - ( 21 Feb 2021 22:26 )   PT: 13.1 sec;   INR: 1.10 ratio     PTT - ( 21 Feb 2021 22:26 )  PTT:30.7 sec      < from: Xray Chest 1 View- PORTABLE-Urgent (02.21.21 @ 22:40) >    FINDINGS:  The heart is enlarged.    Bilateral hilar and patchy opacities.    Degenerative changes spine.    IMPRESSION: Bilateral hilar and patchy opacities, which may represent pulmonary edema and/or infection.    < end of copied text >

## 2021-02-22 NOTE — H&P ADULT - PROBLEM SELECTOR PLAN 3
hx of COPD, not on home O2, not on home bronchodilators   hypoxia possibly 2/2 COPD as pt endorsing wheezing at home; none noted on exam and VBG without hypercapnia   await COVID PCR - if negative, will start solu-medrol 20mg q8h and ceftriaxone/azithromycin for COPD flare hx of COPD, not on home O2, not on home bronchodilators. s/p nebulizers and solumedrol by EMS en route.   hypoxia possibly 2/2 COPD as pt endorsing wheezing at home; none noted on exam and VBG without hypercapnia   await COVID PCR - if negative, will start solu-medrol 20mg q8h and ceftriaxone/azithromycin for COPD flare

## 2021-02-22 NOTE — PHYSICAL THERAPY INITIAL EVALUATION ADULT - PERTINENT HX OF CURRENT PROBLEM, REHAB EVAL
yes
76 y/o F w/ PMH of COPD, HTN, DVT/PE s/p IVC filter, Afib on Eliquis, MGUS, chronic b/l LE lymphedema p/w SOB, a/w hypoxic respiratory failure likely 2/2 COVID PNA vs COPD.

## 2021-02-22 NOTE — PROGRESS NOTE ADULT - SUBJECTIVE AND OBJECTIVE BOX
Patient is a 75y old  Female who presents with a chief complaint of SOB (22 Feb 2021 01:50)      SUBJECTIVE / OVERNIGHT EVENTS:   Patient with Rt HIP pain that has been worse for the past few weeks , rated as 7/10 with no radiation and worse with motion and not alleviated with the motrim       ADDITIONAL REVIEW OF SYSTEMS: Negative except for above    MEDICATIONS  (STANDING):  apixaban 5 milliGRAM(s) Oral two times a day  dexAMETHasone  Injectable 6 milliGRAM(s) IV Push daily  furosemide    Tablet 40 milliGRAM(s) Oral two times a day  metoprolol succinate ER 50 milliGRAM(s) Oral daily  pantoprazole    Tablet 40 milliGRAM(s) Oral before breakfast  remdesivir  IVPB   IV Intermittent   sertraline 100 milliGRAM(s) Oral daily    MEDICATIONS  (PRN):  acetaminophen   Tablet .. 650 milliGRAM(s) Oral every 4 hours PRN Temp greater or equal to 38C (100.4F)  ALBUTerol    90 MICROgram(s) HFA Inhaler 2 Puff(s) Inhalation every 4 hours PRN Shortness of Breath and/or Wheezing  guaifenesin/dextromethorphan  Syrup 10 milliLiter(s) Oral every 4 hours PRN Cough  traMADol 25 milliGRAM(s) Oral every 6 hours PRN Severe Pain (7 - 10)      CAPILLARY BLOOD GLUCOSE      POCT Blood Glucose.: 123 mg/dL (21 Feb 2021 22:21)    I&O's Summary    21 Feb 2021 07:01  -  22 Feb 2021 07:00  --------------------------------------------------------  IN: 120 mL / OUT: 0 mL / NET: 120 mL    22 Feb 2021 07:01  -  22 Feb 2021 20:41  --------------------------------------------------------  IN: 360 mL / OUT: 201 mL / NET: 159 mL        PHYSICAL EXAM:  Vital Signs Last 24 Hrs  T(C): 36.6 (22 Feb 2021 14:31), Max: 38.8 (21 Feb 2021 22:15)  T(F): 97.9 (22 Feb 2021 14:31), Max: 101.8 (21 Feb 2021 22:15)  HR: 70 (22 Feb 2021 17:09) (70 - 135)  BP: 133/74 (22 Feb 2021 17:09) (104/64 - 156/90)  BP(mean): 75 (21 Feb 2021 22:30) (75 - 75)  RR: 18 (22 Feb 2021 14:31) (18 - 30)  SpO2: 96% (22 Feb 2021 17:09) (90% - 100%)    PHYSICAL EXAM:  GENERAL: NAD, obese   HEAD:  Atraumatic, Normocephalic  EYES:  conjunctiva and sclera clear  NECK: Supple, No JVD  CHEST/LUNG: Clear to auscultation bilaterally; No wheeze  HEART: Regular rate and rhythm; No murmurs, rubs, or gallops  ABDOMEN: Soft, Nontender, Nondistended; Bowel sounds present  EXTREMITIES:  2+ Peripheral Pulses, No clubbing, cyanosis, chronic B/L leg edema       LABS:                        14.0   3.57  )-----------( 137      ( 21 Feb 2021 22:26 )             44.5     02-22    x   |  x   |  x   ----------------------------<  x   x    |  x   |  0.64    Ca    8.7      21 Feb 2021 22:26    TPro  7.3  /  Alb  3.6  /  TBili  0.6  /  DBili  0.2  /  AST  31  /  ALT  21  /  AlkPhos  85  02-22    PT/INR - ( 21 Feb 2021 22:26 )   PT: 13.1 sec;   INR: 1.10 ratio         PTT - ( 21 Feb 2021 22:26 )  PTT:30.7 sec            RADIOLOGY & ADDITIONAL TESTS:    Imaging Personally Reviewed:    Electrocardiogram Personally Reviewed:    COORDINATION OF CARE:  Care Discussed with Consultants/Other Providers [Y/N]:  Prior or Outpatient Records Reviewed [Y/N]:

## 2021-02-22 NOTE — PHYSICAL THERAPY INITIAL EVALUATION ADULT - ADDITIONAL COMMENTS
Pt lives alone in a pvt home has 2 WALKER +BHR, pt was .i w/ all ADLs and functional mobility at baseline uses RW/SC as needed. Pt has HHA 4hr x7days, son is also available to assist as needed.

## 2021-02-22 NOTE — H&P ADULT - NSHPREVIEWOFSYSTEMS_GEN_ALL_CORE
REVIEW OF SYSTEMS:    CONSTITUTIONAL: No fevers, +chills  EYES/ENT: No visual changes;  no throat pain   NECK: No pain or stiffness  RESPIRATORY: +cough, +wheezing, +shortness of breath  CARDIOVASCULAR: No chest pain or palpitations, +chronic LE edema   GASTROINTESTINAL: no nausea, vomiting, no abdominal pain, no BRBPR  GENITOURINARY: no polyuria, no dysuria  NEUROLOGICAL: no numbness, no headaches, no confusion   MUSCULOSKELETAL: +chronic back pain, no weakness   SKIN: No itching, burning, rashes, or lesions   PSYCH: no anxiety, depression  HEME: no gum bleeding, no bruising

## 2021-02-22 NOTE — PROGRESS NOTE ADULT - PROBLEM SELECTOR PLAN 1
Hypoxia to 91% with increased WOB on arrival, currently on BiPAP tolerating well with improved sats and no longer tachypneic. 2/2 COVID PNA   - c/w supplemental O2 on 4 L NC now   - COVID treatment as below  - dimer noted, low suspicion for PE at this time  - monitor O2 sat closely/ titrate down O2 supplement as needed

## 2021-02-22 NOTE — H&P ADULT - NSICDXPASTSURGICALHX_GEN_ALL_CORE_FT
PAST SURGICAL HISTORY:  [place] filter in place Placed 4/2016 removed 5/2017    H/O knee surgery 2016 - repair right    H/O total knee replacement, right 11/2015, 8/2016    History of hip replacement, total, right     History of total left knee replacement 2007

## 2021-02-23 LAB
ALBUMIN SERPL ELPH-MCNC: 3.5 G/DL — SIGNIFICANT CHANGE UP (ref 3.3–5)
ALP SERPL-CCNC: 83 U/L — SIGNIFICANT CHANGE UP (ref 40–120)
ALT FLD-CCNC: 26 U/L — SIGNIFICANT CHANGE UP (ref 10–45)
ANION GAP SERPL CALC-SCNC: 12 MMOL/L — SIGNIFICANT CHANGE UP (ref 5–17)
AST SERPL-CCNC: 37 U/L — SIGNIFICANT CHANGE UP (ref 10–40)
BILIRUB DIRECT SERPL-MCNC: 0.2 MG/DL — SIGNIFICANT CHANGE UP (ref 0–0.2)
BILIRUB INDIRECT FLD-MCNC: 0.4 MG/DL — SIGNIFICANT CHANGE UP (ref 0.2–1)
BILIRUB SERPL-MCNC: 0.6 MG/DL — SIGNIFICANT CHANGE UP (ref 0.2–1.2)
BUN SERPL-MCNC: 21 MG/DL — SIGNIFICANT CHANGE UP (ref 7–23)
CALCIUM SERPL-MCNC: 8.6 MG/DL — SIGNIFICANT CHANGE UP (ref 8.4–10.5)
CHLORIDE SERPL-SCNC: 105 MMOL/L — SIGNIFICANT CHANGE UP (ref 96–108)
CO2 SERPL-SCNC: 25 MMOL/L — SIGNIFICANT CHANGE UP (ref 22–31)
CREAT SERPL-MCNC: 0.72 MG/DL — SIGNIFICANT CHANGE UP (ref 0.5–1.3)
CRP SERPL-MCNC: 4.02 MG/DL — HIGH (ref 0–0.4)
D DIMER BLD IA.RAPID-MCNC: 257 NG/ML DDU — HIGH
FERRITIN SERPL-MCNC: 380 NG/ML — HIGH (ref 15–150)
GLUCOSE SERPL-MCNC: 92 MG/DL — SIGNIFICANT CHANGE UP (ref 70–99)
HCT VFR BLD CALC: 41.2 % — SIGNIFICANT CHANGE UP (ref 34.5–45)
HGB BLD-MCNC: 13.3 G/DL — SIGNIFICANT CHANGE UP (ref 11.5–15.5)
LDH SERPL L TO P-CCNC: 312 U/L — HIGH (ref 50–242)
MCHC RBC-ENTMCNC: 29.9 PG — SIGNIFICANT CHANGE UP (ref 27–34)
MCHC RBC-ENTMCNC: 32.3 GM/DL — SIGNIFICANT CHANGE UP (ref 32–36)
MCV RBC AUTO: 92.6 FL — SIGNIFICANT CHANGE UP (ref 80–100)
NRBC # BLD: 0 /100 WBCS — SIGNIFICANT CHANGE UP (ref 0–0)
PLATELET # BLD AUTO: 144 K/UL — LOW (ref 150–400)
POTASSIUM SERPL-MCNC: 3.2 MMOL/L — LOW (ref 3.5–5.3)
POTASSIUM SERPL-SCNC: 3.2 MMOL/L — LOW (ref 3.5–5.3)
PROT SERPL-MCNC: 6.7 G/DL — SIGNIFICANT CHANGE UP (ref 6–8.3)
RBC # BLD: 4.45 M/UL — SIGNIFICANT CHANGE UP (ref 3.8–5.2)
RBC # FLD: 14.3 % — SIGNIFICANT CHANGE UP (ref 10.3–14.5)
SODIUM SERPL-SCNC: 142 MMOL/L — SIGNIFICANT CHANGE UP (ref 135–145)
WBC # BLD: 6.42 K/UL — SIGNIFICANT CHANGE UP (ref 3.8–10.5)
WBC # FLD AUTO: 6.42 K/UL — SIGNIFICANT CHANGE UP (ref 3.8–10.5)

## 2021-02-23 PROCEDURE — 99233 SBSQ HOSP IP/OBS HIGH 50: CPT

## 2021-02-23 RX ORDER — POTASSIUM CHLORIDE 20 MEQ
40 PACKET (EA) ORAL ONCE
Refills: 0 | Status: COMPLETED | OUTPATIENT
Start: 2021-02-23 | End: 2021-02-23

## 2021-02-23 RX ADMIN — Medication 40 MILLIGRAM(S): at 18:06

## 2021-02-23 RX ADMIN — Medication 40 MILLIGRAM(S): at 07:27

## 2021-02-23 RX ADMIN — Medication 50 MILLIGRAM(S): at 07:27

## 2021-02-23 RX ADMIN — APIXABAN 5 MILLIGRAM(S): 2.5 TABLET, FILM COATED ORAL at 18:06

## 2021-02-23 RX ADMIN — Medication 40 MILLIEQUIVALENT(S): at 10:50

## 2021-02-23 RX ADMIN — PANTOPRAZOLE SODIUM 40 MILLIGRAM(S): 20 TABLET, DELAYED RELEASE ORAL at 07:27

## 2021-02-23 RX ADMIN — SERTRALINE 100 MILLIGRAM(S): 25 TABLET, FILM COATED ORAL at 11:19

## 2021-02-23 RX ADMIN — APIXABAN 5 MILLIGRAM(S): 2.5 TABLET, FILM COATED ORAL at 07:26

## 2021-02-23 RX ADMIN — Medication 6 MILLIGRAM(S): at 07:27

## 2021-02-23 RX ADMIN — REMDESIVIR 500 MILLIGRAM(S): 5 INJECTION INTRAVENOUS at 11:19

## 2021-02-23 NOTE — PROGRESS NOTE ADULT - SUBJECTIVE AND OBJECTIVE BOX
Patient is a 75y old  Female who presents with a chief complaint of SOB (22 Feb 2021 20:40)      SUBJECTIVE / OVERNIGHT EVENTS:   Pain at the Rt hip has improved.  NO cough and no SOB     ADDITIONAL REVIEW OF SYSTEMS: Negative except for above    MEDICATIONS  (STANDING):  apixaban 5 milliGRAM(s) Oral two times a day  dexAMETHasone  Injectable 6 milliGRAM(s) IV Push daily  furosemide    Tablet 40 milliGRAM(s) Oral two times a day  metoprolol succinate ER 50 milliGRAM(s) Oral daily  pantoprazole    Tablet 40 milliGRAM(s) Oral before breakfast  remdesivir  IVPB   IV Intermittent   remdesivir  IVPB 100 milliGRAM(s) IV Intermittent every 24 hours  sertraline 100 milliGRAM(s) Oral daily    MEDICATIONS  (PRN):  acetaminophen   Tablet .. 650 milliGRAM(s) Oral every 4 hours PRN Temp greater or equal to 38C (100.4F)  ALBUTerol    90 MICROgram(s) HFA Inhaler 2 Puff(s) Inhalation every 4 hours PRN Shortness of Breath and/or Wheezing  guaifenesin/dextromethorphan  Syrup 10 milliLiter(s) Oral every 4 hours PRN Cough  traMADol 25 milliGRAM(s) Oral every 6 hours PRN Severe Pain (7 - 10)      CAPILLARY BLOOD GLUCOSE        I&O's Summary    22 Feb 2021 07:01  -  23 Feb 2021 07:00  --------------------------------------------------------  IN: 360 mL / OUT: 401 mL / NET: -41 mL    23 Feb 2021 07:01  -  23 Feb 2021 20:17  --------------------------------------------------------  IN: 720 mL / OUT: 0 mL / NET: 720 mL        PHYSICAL EXAM:  Vital Signs Last 24 Hrs  T(C): 36.8 (23 Feb 2021 13:01), Max: 36.8 (23 Feb 2021 13:01)  T(F): 98.2 (23 Feb 2021 13:01), Max: 98.2 (23 Feb 2021 13:01)  HR: 75 (23 Feb 2021 13:01) (66 - 76)  BP: 126/76 (23 Feb 2021 13:01) (123/80 - 136/81)  BP(mean): --  RR: 18 (23 Feb 2021 13:01) (18 - 18)  SpO2: 96% (23 Feb 2021 13:01) (95% - 98%)    PHYSICAL EXAM:  GENERAL: NAD, obese   HEAD:  Atraumatic, Normocephalic  EYES:  conjunctiva and sclera clear  NECK: Supple, No JVD  CHEST/LUNG: Clear to auscultation bilaterally; No wheeze  HEART: Regular rate and rhythm; No murmurs, rubs, or gallops  ABDOMEN: Soft, Nontender, Nondistended; Bowel sounds present  EXTREMITIES:  2+ Peripheral Pulses, No clubbing, cyanosis, chronic B/L leg edema       LABS:                        13.3   6.42  )-----------( 144      ( 23 Feb 2021 06:58 )             41.2     02-23    142  |  105  |  21  ----------------------------<  92  3.2<L>   |  25  |  0.72    Ca    8.6      23 Feb 2021 07:08    TPro  6.7  /  Alb  3.5  /  TBili  0.6  /  DBili  0.2  /  AST  37  /  ALT  26  /  AlkPhos  83  02-23    PT/INR - ( 21 Feb 2021 22:26 )   PT: 13.1 sec;   INR: 1.10 ratio         PTT - ( 21 Feb 2021 22:26 )  PTT:30.7 sec          Culture - Blood (collected 22 Feb 2021 02:16)  Source: .Blood Blood-Venous  Preliminary Report (23 Feb 2021 03:04):    No growth to date.    Culture - Blood (collected 22 Feb 2021 02:16)  Source: .Blood Blood-Peripheral  Preliminary Report (23 Feb 2021 03:04):    No growth to date.        RADIOLOGY & ADDITIONAL TESTS:    Imaging Personally Reviewed:    Electrocardiogram Personally Reviewed:    COORDINATION OF CARE:  Care Discussed with Consultants/Other Providers [Y/N]:  Prior or Outpatient Records Reviewed [Y/N]:

## 2021-02-24 LAB
ALBUMIN SERPL ELPH-MCNC: 3.6 G/DL — SIGNIFICANT CHANGE UP (ref 3.3–5)
ALP SERPL-CCNC: 86 U/L — SIGNIFICANT CHANGE UP (ref 40–120)
ALT FLD-CCNC: 33 U/L — SIGNIFICANT CHANGE UP (ref 10–45)
ANION GAP SERPL CALC-SCNC: 12 MMOL/L — SIGNIFICANT CHANGE UP (ref 5–17)
AST SERPL-CCNC: 41 U/L — HIGH (ref 10–40)
BILIRUB SERPL-MCNC: 0.9 MG/DL — SIGNIFICANT CHANGE UP (ref 0.2–1.2)
BUN SERPL-MCNC: 22 MG/DL — SIGNIFICANT CHANGE UP (ref 7–23)
CALCIUM SERPL-MCNC: 8.6 MG/DL — SIGNIFICANT CHANGE UP (ref 8.4–10.5)
CHLORIDE SERPL-SCNC: 101 MMOL/L — SIGNIFICANT CHANGE UP (ref 96–108)
CO2 SERPL-SCNC: 27 MMOL/L — SIGNIFICANT CHANGE UP (ref 22–31)
CREAT SERPL-MCNC: 0.79 MG/DL — SIGNIFICANT CHANGE UP (ref 0.5–1.3)
GLUCOSE SERPL-MCNC: 84 MG/DL — SIGNIFICANT CHANGE UP (ref 70–99)
HCT VFR BLD CALC: 42.4 % — SIGNIFICANT CHANGE UP (ref 34.5–45)
HGB BLD-MCNC: 13.4 G/DL — SIGNIFICANT CHANGE UP (ref 11.5–15.5)
MAGNESIUM SERPL-MCNC: 1.9 MG/DL — SIGNIFICANT CHANGE UP (ref 1.6–2.6)
MCHC RBC-ENTMCNC: 29.1 PG — SIGNIFICANT CHANGE UP (ref 27–34)
MCHC RBC-ENTMCNC: 31.6 GM/DL — LOW (ref 32–36)
MCV RBC AUTO: 92 FL — SIGNIFICANT CHANGE UP (ref 80–100)
NRBC # BLD: 0 /100 WBCS — SIGNIFICANT CHANGE UP (ref 0–0)
PLATELET # BLD AUTO: 171 K/UL — SIGNIFICANT CHANGE UP (ref 150–400)
POTASSIUM SERPL-MCNC: 3.7 MMOL/L — SIGNIFICANT CHANGE UP (ref 3.5–5.3)
POTASSIUM SERPL-SCNC: 3.7 MMOL/L — SIGNIFICANT CHANGE UP (ref 3.5–5.3)
PROT SERPL-MCNC: 7 G/DL — SIGNIFICANT CHANGE UP (ref 6–8.3)
RBC # BLD: 4.61 M/UL — SIGNIFICANT CHANGE UP (ref 3.8–5.2)
RBC # FLD: 14.4 % — SIGNIFICANT CHANGE UP (ref 10.3–14.5)
SODIUM SERPL-SCNC: 140 MMOL/L — SIGNIFICANT CHANGE UP (ref 135–145)
WBC # BLD: 4.61 K/UL — SIGNIFICANT CHANGE UP (ref 3.8–10.5)
WBC # FLD AUTO: 4.61 K/UL — SIGNIFICANT CHANGE UP (ref 3.8–10.5)

## 2021-02-24 PROCEDURE — 93306 TTE W/DOPPLER COMPLETE: CPT | Mod: 26

## 2021-02-24 PROCEDURE — 99233 SBSQ HOSP IP/OBS HIGH 50: CPT

## 2021-02-24 RX ADMIN — APIXABAN 5 MILLIGRAM(S): 2.5 TABLET, FILM COATED ORAL at 06:40

## 2021-02-24 RX ADMIN — SERTRALINE 100 MILLIGRAM(S): 25 TABLET, FILM COATED ORAL at 11:54

## 2021-02-24 RX ADMIN — Medication 40 MILLIGRAM(S): at 06:40

## 2021-02-24 RX ADMIN — PANTOPRAZOLE SODIUM 40 MILLIGRAM(S): 20 TABLET, DELAYED RELEASE ORAL at 06:40

## 2021-02-24 RX ADMIN — Medication 50 MILLIGRAM(S): at 06:40

## 2021-02-24 RX ADMIN — Medication 40 MILLIGRAM(S): at 17:54

## 2021-02-24 RX ADMIN — REMDESIVIR 500 MILLIGRAM(S): 5 INJECTION INTRAVENOUS at 11:54

## 2021-02-24 RX ADMIN — APIXABAN 5 MILLIGRAM(S): 2.5 TABLET, FILM COATED ORAL at 17:54

## 2021-02-24 RX ADMIN — Medication 6 MILLIGRAM(S): at 06:40

## 2021-02-24 RX ADMIN — TRAMADOL HYDROCHLORIDE 25 MILLIGRAM(S): 50 TABLET ORAL at 00:39

## 2021-02-24 NOTE — DIETITIAN NUTRITION RISK NOTIFICATION - TREATMENT: THE FOLLOWING DIET HAS BEEN RECOMMENDED
Diet, Regular:   DASH/TLC {Sodium & Cholesterol Restricted} (DASH) (02-22-21 @ 02:31) [Active]

## 2021-02-24 NOTE — PROGRESS NOTE ADULT - PROBLEM SELECTOR PLAN 1
Hypoxia to 91% with increased WOB on arrival, currently on BiPAP tolerating well with improved sats and no longer tachypneic. 2/2 COVID PNA   - c/w supplemental O2 on 4 L NC now   - COVID treatment as below  - dimer noted, low suspicion for PE at this time  - monitor O2 sat closely/ titrate down O2 supplement as needed Hypoxia to 91% with increased WOB on arrival, currently on BiPAP tolerating well with improved sats and no longer tachypneic. 2/2 COVID PNA   - c/w supplemental O2 and titrate down as tolerated   - COVID treatment as below  - dimer noted, low suspicion for PE at this time

## 2021-02-24 NOTE — CHART NOTE - NSCHARTNOTEFT_GEN_A_CORE
Nutrition Initial Assessment    Nutrition Consult Received: Yes [   ]  No [x]    Reason for Initial Nutrition Assessment: BMI >40 kg/m2    Source of Information: Unable to conduct a face to face interview due to limited contact restrictions related to pt's medical condition and isolation precautions. Information obtained from EMR and daughter Jacqueline Dennison [phone]. (Attempted to reach pt by phone x3 times, cellphone and room phone; no answer).     Admitting Diagnosis: 75F with PMH of COPD, HTN, DVT/PE s/p IVC filter, Afib on Eliquis, MGUS, chronic b/l LE lymphedema p/w SOB, a/w hypoxic respiratory failure likely 2/2 COVID PNA with COPD exacerbation.    PAST MEDICAL & SURGICAL HISTORY:  Obesity  Hyperlipidemia  PE (pulmonary thromboembolism)  DVT/PE 2/2016 - on Xarelto - discontinued 6/2017  Pneumonia - 3/2016  Edema of lower extremity  Osteoarthritis  Knee pain, right  Depression  HTN (hypertension)  History of hip replacement, total, right  H/O knee surgery  2016 - repair right  [place] filter in place - Placed 4/2016 removed 5/2017  H/O total knee replacement, right -11/2015, 8/2016  History of total left knee replacement -2007    Subjective Information: Per daughter, Pt was eating well with no changes in appetite. Pt was not following therapeutic diet. Home health aide and daughter try to have pt on low sodium diet, however once aide leaves pt will order take-out foods. i.e. pizza, Chinese food, and Diet Pepsi. Pt tells daughter she doesn't need to be on therapeutic diet, however was told by outpt cardiologist to follow low Na diet. Confirms no known food allergies. Denies Hx of chewing or swallowing issues. Denies nutrient supplement use. Pt takes Vitamin D weekly.    Per RN flowsheet, pt is eating well thus far. Unknown if pt is taking high-protein apple juice nor mighty shakes, however daughter reports these are foods pt would like. RD will continue to provide.     RD provided heart healthy diet education to daughter with emphasis on low Na, well-balanced diet. Also encouraged adequate protein intake.     GI Issues:  No known abdominal issues noted. Last documented BM 2/24.    Current Nutrition Order:  Regular: DASH/TLC {Sodium & Cholesterol Restricted} (DASH) (02-22-21 @ 02:31)    PO Intake:   Good (%) [x]    Fair (50-75%) [   ]    Poor (<50%) [   ]    Skin per nursing documentation: No pressure injuries noted.  Edema per nursing documentation: +2 generalized +3 Fab. leg     Labs:   02-24 Na140 mmol/L Glu 84 mg/dL K+ 3.7 mmol/L Cr  0.79 mg/dL BUN 22 mg/dL  Alb 3.6 g/dL      Medications:  MEDICATIONS  (STANDING):  apixaban 5 milliGRAM(s) Oral two times a day  dexAMETHasone  Injectable 6 milliGRAM(s) IV Push daily  furosemide    Tablet 40 milliGRAM(s) Oral two times a day  metoprolol succinate ER 50 milliGRAM(s) Oral daily  pantoprazole    Tablet 40 milliGRAM(s) Oral before breakfast  remdesivir  IVPB   IV Intermittent   remdesivir  IVPB 100 milliGRAM(s) IV Intermittent every 24 hours  sertraline 100 milliGRAM(s) Oral daily    MEDICATIONS  (PRN):  acetaminophen   Tablet .. 650 milliGRAM(s) Oral every 4 hours PRN Temp greater or equal to 38C (100.4F)  ALBUTerol    90 MICROgram(s) HFA Inhaler 2 Puff(s) Inhalation every 4 hours PRN Shortness of Breath and/or Wheezing  guaifenesin/dextromethorphan  Syrup 10 milliLiter(s) Oral every 4 hours PRN Cough  traMADol 25 milliGRAM(s) Oral every 6 hours PRN Severe Pain (7 - 10)    Admitted Anthropometrics:    Height (cm): 165.1 (02-22-21 @ 04:44)  Weight (kg): 124 (02-22-21 @ 04:44)  BMI (kg/m2): 45.5 (02-22-21 @ 04:44)  IBW: 125 lbs   IBW%: 218%    Weight History: No other wts in chart. Per daughter pt's wt has been increasing over past 10 months, unable to quantify likely related to poor diet and flid retention. RD will continue to trend as new wts available/able.     Nutrition Focused Physical Exam: Unable to complete due to limited isolation contact precautions at this time.     Estimated Energy Needs (25 kcal/kg- 30kcal/kg): 9622-1478 kcals   Estimated Protein Needs (1.0 g/kg- 1.2 g/kg): 63-75 gm  Fluid needs deferred to provider as pt on Lasix.   Based on Upper IBW: 137.5 lbs (62.5 kG)    [  ] No active nutrition diagnosis at this time  [  ] Current medical condition precludes nutrition intervention  [x] Nutrition Diagnosis: Overweight/Obese related to suspected high energy intake PTA as evidenced by BMI 45.5 kg/m2.  Limited Adherence to nutrition related recommendations related to limited interest in diet as evidenced by reported high intake of take-away high Na foods.     Goal: Pt to gradually and intentionally lose wt towards IBW. Pt to teach back 3 nutrition education points.     Nutrition Interventions: RD to provide diet education when able.     Recommendations:  1) Continue current DASH/TLC diet. RD remains available for diet changes as needed/able.   -Pt on Prednisone, however BG WNL. Continue to trend as able.   2) Obtain subjective information from pt as able.   3) RD to continue to provide High-protein apple juice and mighty shakes to optimize nutrient intake. Trend for tolerance.  4) Provide nutrition education as able to pt.   5) BMI >40 kg/m2 alert placed, provider made aware.     RD to follow-up per protocol.  Kera Katz, MS, RD, CDN Pager #[phone]

## 2021-02-24 NOTE — PROGRESS NOTE ADULT - SUBJECTIVE AND OBJECTIVE BOX
Patient is a 75y old  Female who presents with a chief complaint of SOB (23 Feb 2021 20:17)      SUBJECTIVE / OVERNIGHT EVENTS:   Pt desaturated to 84% when attempted to decrease O2 supplement to 2 L , she is currently on 3 L       ADDITIONAL REVIEW OF SYSTEMS: Negative except for above    MEDICATIONS  (STANDING):  apixaban 5 milliGRAM(s) Oral two times a day  dexAMETHasone  Injectable 6 milliGRAM(s) IV Push daily  furosemide    Tablet 40 milliGRAM(s) Oral two times a day  metoprolol succinate ER 50 milliGRAM(s) Oral daily  pantoprazole    Tablet 40 milliGRAM(s) Oral before breakfast  remdesivir  IVPB   IV Intermittent   remdesivir  IVPB 100 milliGRAM(s) IV Intermittent every 24 hours  sertraline 100 milliGRAM(s) Oral daily    MEDICATIONS  (PRN):  acetaminophen   Tablet .. 650 milliGRAM(s) Oral every 4 hours PRN Temp greater or equal to 38C (100.4F)  ALBUTerol    90 MICROgram(s) HFA Inhaler 2 Puff(s) Inhalation every 4 hours PRN Shortness of Breath and/or Wheezing  guaifenesin/dextromethorphan  Syrup 10 milliLiter(s) Oral every 4 hours PRN Cough  traMADol 25 milliGRAM(s) Oral every 6 hours PRN Severe Pain (7 - 10)      CAPILLARY BLOOD GLUCOSE        I&O's Summary    23 Feb 2021 07:01  -  24 Feb 2021 07:00  --------------------------------------------------------  IN: 720 mL / OUT: 1050 mL / NET: -330 mL    24 Feb 2021 07:01  -  24 Feb 2021 17:38  --------------------------------------------------------  IN: 660 mL / OUT: 1 mL / NET: 659 mL        PHYSICAL EXAM:  Vital Signs Last 24 Hrs  T(C): 36.4 (24 Feb 2021 11:36), Max: 36.9 (23 Feb 2021 21:30)  T(F): 97.6 (24 Feb 2021 11:36), Max: 98.4 (23 Feb 2021 21:30)  HR: 70 (24 Feb 2021 11:36) (70 - 84)  BP: 104/70 (24 Feb 2021 11:36) (104/70 - 141/83)  BP(mean): --  RR: 19 (24 Feb 2021 11:36) (19 - 21)  SpO2: 94% (24 Feb 2021 11:36) (84% - 95%)    PHYSICAL EXAM:  GENERAL: NAD, obese   HEAD:  Atraumatic, Normocephalic  EYES:  conjunctiva and sclera clear  NECK: Supple, No JVD  CHEST/LUNG: Clear to auscultation bilaterally; No wheeze  HEART: Regular rate and rhythm; No murmurs, rubs, or gallops  ABDOMEN: Soft, Nontender, Nondistended; Bowel sounds present  EXTREMITIES:  2+ Peripheral Pulses, No clubbing, cyanosis, chronic B/L leg edema       LABS:                        13.4   4.61  )-----------( 171      ( 24 Feb 2021 06:20 )             42.4     02-24    140  |  101  |  22  ----------------------------<  84  3.7   |  27  |  0.79    Ca    8.6      24 Feb 2021 06:20  Mg     1.9     02-24    TPro  7.0  /  Alb  3.6  /  TBili  0.9  /  DBili  x   /  AST  41<H>  /  ALT  33  /  AlkPhos  86  02-24              Culture - Blood (collected 22 Feb 2021 02:16)  Source: .Blood Blood-Venous  Preliminary Report (23 Feb 2021 03:04):    No growth to date.    Culture - Blood (collected 22 Feb 2021 02:16)  Source: .Blood Blood-Peripheral  Preliminary Report (23 Feb 2021 03:04):    No growth to date.        RADIOLOGY & ADDITIONAL TESTS:    Imaging Personally Reviewed:    Electrocardiogram Personally Reviewed:    COORDINATION OF CARE:  Care Discussed with Consultants/Other Providers [Y/N]:  Prior or Outpatient Records Reviewed [Y/N]:     Patient is a 75y old  Female who presents with a chief complaint of SOB (23 Feb 2021 20:17)      SUBJECTIVE / OVERNIGHT EVENTS:   Pt desaturated to 84% when attempted to decrease O2 supplement to 2 L , she is currently on 3 L .  nO issues presented by patient       ADDITIONAL REVIEW OF SYSTEMS: Negative except for above    MEDICATIONS  (STANDING):  apixaban 5 milliGRAM(s) Oral two times a day  dexAMETHasone  Injectable 6 milliGRAM(s) IV Push daily  furosemide    Tablet 40 milliGRAM(s) Oral two times a day  metoprolol succinate ER 50 milliGRAM(s) Oral daily  pantoprazole    Tablet 40 milliGRAM(s) Oral before breakfast  remdesivir  IVPB   IV Intermittent   remdesivir  IVPB 100 milliGRAM(s) IV Intermittent every 24 hours  sertraline 100 milliGRAM(s) Oral daily    MEDICATIONS  (PRN):  acetaminophen   Tablet .. 650 milliGRAM(s) Oral every 4 hours PRN Temp greater or equal to 38C (100.4F)  ALBUTerol    90 MICROgram(s) HFA Inhaler 2 Puff(s) Inhalation every 4 hours PRN Shortness of Breath and/or Wheezing  guaifenesin/dextromethorphan  Syrup 10 milliLiter(s) Oral every 4 hours PRN Cough  traMADol 25 milliGRAM(s) Oral every 6 hours PRN Severe Pain (7 - 10)      CAPILLARY BLOOD GLUCOSE        I&O's Summary    23 Feb 2021 07:01  -  24 Feb 2021 07:00  --------------------------------------------------------  IN: 720 mL / OUT: 1050 mL / NET: -330 mL    24 Feb 2021 07:01  -  24 Feb 2021 17:38  --------------------------------------------------------  IN: 660 mL / OUT: 1 mL / NET: 659 mL        PHYSICAL EXAM:  Vital Signs Last 24 Hrs  T(C): 36.4 (24 Feb 2021 11:36), Max: 36.9 (23 Feb 2021 21:30)  T(F): 97.6 (24 Feb 2021 11:36), Max: 98.4 (23 Feb 2021 21:30)  HR: 70 (24 Feb 2021 11:36) (70 - 84)  BP: 104/70 (24 Feb 2021 11:36) (104/70 - 141/83)  BP(mean): --  RR: 19 (24 Feb 2021 11:36) (19 - 21)  SpO2: 94% (24 Feb 2021 11:36) (84% - 95%)    PHYSICAL EXAM:  GENERAL: NAD, obese   HEAD:  Atraumatic, Normocephalic  EYES:  conjunctiva and sclera clear  NECK: Supple, No JVD  CHEST/LUNG: Clear to auscultation bilaterally; No wheeze  HEART: Regular rate and rhythm; No murmurs, rubs, or gallops  ABDOMEN: Soft, Nontender, Nondistended; Bowel sounds present  EXTREMITIES:  2+ Peripheral Pulses, No clubbing, cyanosis, chronic B/L leg edema       LABS:                        13.4   4.61  )-----------( 171      ( 24 Feb 2021 06:20 )             42.4     02-24    140  |  101  |  22  ----------------------------<  84  3.7   |  27  |  0.79    Ca    8.6      24 Feb 2021 06:20  Mg     1.9     02-24    TPro  7.0  /  Alb  3.6  /  TBili  0.9  /  DBili  x   /  AST  41<H>  /  ALT  33  /  AlkPhos  86  02-24              Culture - Blood (collected 22 Feb 2021 02:16)  Source: .Blood Blood-Venous  Preliminary Report (23 Feb 2021 03:04):    No growth to date.    Culture - Blood (collected 22 Feb 2021 02:16)  Source: .Blood Blood-Peripheral  Preliminary Report (23 Feb 2021 03:04):    No growth to date.        RADIOLOGY & ADDITIONAL TESTS:    Imaging Personally Reviewed:    Electrocardiogram Personally Reviewed:    COORDINATION OF CARE:  Care Discussed with Consultants/Other Providers [Y/N]:  Prior or Outpatient Records Reviewed [Y/N]:

## 2021-02-25 ENCOUNTER — TRANSCRIPTION ENCOUNTER (OUTPATIENT)
Age: 76
End: 2021-02-25

## 2021-02-25 LAB
ALBUMIN SERPL ELPH-MCNC: 3.6 G/DL — SIGNIFICANT CHANGE UP (ref 3.3–5)
ALP SERPL-CCNC: 94 U/L — SIGNIFICANT CHANGE UP (ref 40–120)
ALT FLD-CCNC: 34 U/L — SIGNIFICANT CHANGE UP (ref 10–45)
ANION GAP SERPL CALC-SCNC: 9 MMOL/L — SIGNIFICANT CHANGE UP (ref 5–17)
AST SERPL-CCNC: 37 U/L — SIGNIFICANT CHANGE UP (ref 10–40)
BASOPHILS # BLD AUTO: 0.01 K/UL — SIGNIFICANT CHANGE UP (ref 0–0.2)
BASOPHILS NFR BLD AUTO: 0.3 % — SIGNIFICANT CHANGE UP (ref 0–2)
BILIRUB DIRECT SERPL-MCNC: 0.2 MG/DL — SIGNIFICANT CHANGE UP (ref 0–0.2)
BILIRUB INDIRECT FLD-MCNC: 0.6 MG/DL — SIGNIFICANT CHANGE UP (ref 0.2–1)
BILIRUB SERPL-MCNC: 0.8 MG/DL — SIGNIFICANT CHANGE UP (ref 0.2–1.2)
BUN SERPL-MCNC: 23 MG/DL — SIGNIFICANT CHANGE UP (ref 7–23)
CALCIUM SERPL-MCNC: 8.6 MG/DL — SIGNIFICANT CHANGE UP (ref 8.4–10.5)
CHLORIDE SERPL-SCNC: 101 MMOL/L — SIGNIFICANT CHANGE UP (ref 96–108)
CO2 SERPL-SCNC: 30 MMOL/L — SIGNIFICANT CHANGE UP (ref 22–31)
CREAT SERPL-MCNC: 0.71 MG/DL — SIGNIFICANT CHANGE UP (ref 0.5–1.3)
EOSINOPHIL # BLD AUTO: 0.01 K/UL — SIGNIFICANT CHANGE UP (ref 0–0.5)
EOSINOPHIL NFR BLD AUTO: 0.3 % — SIGNIFICANT CHANGE UP (ref 0–6)
GLUCOSE SERPL-MCNC: 90 MG/DL — SIGNIFICANT CHANGE UP (ref 70–99)
HCT VFR BLD CALC: 42.2 % — SIGNIFICANT CHANGE UP (ref 34.5–45)
HGB BLD-MCNC: 13.7 G/DL — SIGNIFICANT CHANGE UP (ref 11.5–15.5)
IMM GRANULOCYTES NFR BLD AUTO: 0.5 % — SIGNIFICANT CHANGE UP (ref 0–1.5)
LYMPHOCYTES # BLD AUTO: 1.06 K/UL — SIGNIFICANT CHANGE UP (ref 1–3.3)
LYMPHOCYTES # BLD AUTO: 28.8 % — SIGNIFICANT CHANGE UP (ref 13–44)
MCHC RBC-ENTMCNC: 29.7 PG — SIGNIFICANT CHANGE UP (ref 27–34)
MCHC RBC-ENTMCNC: 32.5 GM/DL — SIGNIFICANT CHANGE UP (ref 32–36)
MCV RBC AUTO: 91.3 FL — SIGNIFICANT CHANGE UP (ref 80–100)
MONOCYTES # BLD AUTO: 0.5 K/UL — SIGNIFICANT CHANGE UP (ref 0–0.9)
MONOCYTES NFR BLD AUTO: 13.6 % — SIGNIFICANT CHANGE UP (ref 2–14)
NEUTROPHILS # BLD AUTO: 2.08 K/UL — SIGNIFICANT CHANGE UP (ref 1.8–7.4)
NEUTROPHILS NFR BLD AUTO: 56.5 % — SIGNIFICANT CHANGE UP (ref 43–77)
NRBC # BLD: 0 /100 WBCS — SIGNIFICANT CHANGE UP (ref 0–0)
PLATELET # BLD AUTO: 194 K/UL — SIGNIFICANT CHANGE UP (ref 150–400)
POTASSIUM SERPL-MCNC: 3.6 MMOL/L — SIGNIFICANT CHANGE UP (ref 3.5–5.3)
POTASSIUM SERPL-SCNC: 3.6 MMOL/L — SIGNIFICANT CHANGE UP (ref 3.5–5.3)
PROT SERPL-MCNC: 7.2 G/DL — SIGNIFICANT CHANGE UP (ref 6–8.3)
RBC # BLD: 4.62 M/UL — SIGNIFICANT CHANGE UP (ref 3.8–5.2)
RBC # FLD: 14.2 % — SIGNIFICANT CHANGE UP (ref 10.3–14.5)
SODIUM SERPL-SCNC: 140 MMOL/L — SIGNIFICANT CHANGE UP (ref 135–145)
WBC # BLD: 3.68 K/UL — LOW (ref 3.8–10.5)
WBC # FLD AUTO: 3.68 K/UL — LOW (ref 3.8–10.5)

## 2021-02-25 PROCEDURE — 99232 SBSQ HOSP IP/OBS MODERATE 35: CPT

## 2021-02-25 RX ADMIN — Medication 40 MILLIGRAM(S): at 17:15

## 2021-02-25 RX ADMIN — Medication 50 MILLIGRAM(S): at 05:17

## 2021-02-25 RX ADMIN — PANTOPRAZOLE SODIUM 40 MILLIGRAM(S): 20 TABLET, DELAYED RELEASE ORAL at 05:17

## 2021-02-25 RX ADMIN — TRAMADOL HYDROCHLORIDE 25 MILLIGRAM(S): 50 TABLET ORAL at 22:35

## 2021-02-25 RX ADMIN — APIXABAN 5 MILLIGRAM(S): 2.5 TABLET, FILM COATED ORAL at 05:17

## 2021-02-25 RX ADMIN — REMDESIVIR 500 MILLIGRAM(S): 5 INJECTION INTRAVENOUS at 10:56

## 2021-02-25 RX ADMIN — SERTRALINE 100 MILLIGRAM(S): 25 TABLET, FILM COATED ORAL at 11:43

## 2021-02-25 RX ADMIN — Medication 6 MILLIGRAM(S): at 05:17

## 2021-02-25 RX ADMIN — APIXABAN 5 MILLIGRAM(S): 2.5 TABLET, FILM COATED ORAL at 17:15

## 2021-02-25 RX ADMIN — Medication 40 MILLIGRAM(S): at 05:17

## 2021-02-25 RX ADMIN — TRAMADOL HYDROCHLORIDE 25 MILLIGRAM(S): 50 TABLET ORAL at 00:16

## 2021-02-25 NOTE — DISCHARGE NOTE PROVIDER - CARE PROVIDER_API CALL
Your primray care physician,   Phone: (   )    -  Fax: (   )    -  Follow Up Time:    Your primray care physician,   Phone: (   )    -  Fax: (   )    -  Follow Up Time:     Kenyetta Hamm)  Cardiovascular Disease  [facility]Dept [facility] Card[facility], [phone] [place] [address], [id number]  [place] [address]  Phone: [phone]  Fax: [phone]  Follow Up Time:

## 2021-02-25 NOTE — PROGRESS NOTE ADULT - SUBJECTIVE AND OBJECTIVE BOX
Patient is a 75y old  Female who presents with a chief complaint of SOB (25 Feb 2021 14:50)      SUBJECTIVE / OVERNIGHT EVENTS:   NO complaints today       ADDITIONAL REVIEW OF SYSTEMS: Negative except for above    MEDICATIONS  (STANDING):  apixaban 5 milliGRAM(s) Oral two times a day  dexAMETHasone  Injectable 6 milliGRAM(s) IV Push daily  furosemide    Tablet 40 milliGRAM(s) Oral two times a day  metoprolol succinate ER 50 milliGRAM(s) Oral daily  pantoprazole    Tablet 40 milliGRAM(s) Oral before breakfast  remdesivir  IVPB   IV Intermittent   remdesivir  IVPB 100 milliGRAM(s) IV Intermittent every 24 hours  sertraline 100 milliGRAM(s) Oral daily    MEDICATIONS  (PRN):  acetaminophen   Tablet .. 650 milliGRAM(s) Oral every 4 hours PRN Temp greater or equal to 38C (100.4F)  ALBUTerol    90 MICROgram(s) HFA Inhaler 2 Puff(s) Inhalation every 4 hours PRN Shortness of Breath and/or Wheezing  guaifenesin/dextromethorphan  Syrup 10 milliLiter(s) Oral every 4 hours PRN Cough  traMADol 25 milliGRAM(s) Oral every 6 hours PRN Severe Pain (7 - 10)      CAPILLARY BLOOD GLUCOSE        I&O's Summary    24 Feb 2021 07:01  -  25 Feb 2021 07:00  --------------------------------------------------------  IN: 1340 mL / OUT: 2101 mL / NET: -761 mL    25 Feb 2021 07:01  -  25 Feb 2021 19:01  --------------------------------------------------------  IN: 1160 mL / OUT: 1200 mL / NET: -40 mL        PHYSICAL EXAM:  Vital Signs Last 24 Hrs  T(C): 36.3 (25 Feb 2021 12:01), Max: 36.5 (25 Feb 2021 04:51)  T(F): 97.4 (25 Feb 2021 12:01), Max: 97.7 (25 Feb 2021 04:51)  HR: 69 (25 Feb 2021 12:01) (69 - 71)  BP: 116/72 (25 Feb 2021 12:01) (116/72 - 153/80)  BP(mean): --  RR: 17 (25 Feb 2021 12:01) (17 - 19)  SpO2: 93% (25 Feb 2021 12:01) (93% - 95%)    PHYSICAL EXAM:  GENERAL: NAD, obese   HEAD:  Atraumatic, Normocephalic  EYES:  conjunctiva and sclera clear  NECK: Supple, No JVD  CHEST/LUNG: Clear to auscultation bilaterally; No wheeze  HEART: Regular rate and rhythm; No murmurs, rubs, or gallops  ABDOMEN: Soft, Nontender, Nondistended; Bowel sounds present  EXTREMITIES:  2+ Peripheral Pulses, No clubbing, cyanosis, chronic B/L leg edema       LABS:                        13.7   3.68  )-----------( 194      ( 25 Feb 2021 05:58 )             42.2     02-25    140  |  101  |  23  ----------------------------<  90  3.6   |  30  |  0.71    Ca    8.6      25 Feb 2021 05:58  Mg     1.9     02-24    TPro  7.2  /  Alb  3.6  /  TBili  0.8  /  DBili  0.2  /  AST  37  /  ALT  34  /  AlkPhos  94  02-25                RADIOLOGY & ADDITIONAL TESTS:    Imaging Personally Reviewed:    Electrocardiogram Personally Reviewed:    COORDINATION OF CARE:  Care Discussed with Consultants/Other Providers [Y/N]:  Prior or Outpatient Records Reviewed [Y/N]:

## 2021-02-25 NOTE — DISCHARGE NOTE PROVIDER - HOSPITAL COURSE
75F with PMH of COPD, HTN, DVT/PE s/p IVC filter, Afib on Eliquis, MGUS, chronic b/l LE lymphedema p/w SOB, a/w hypoxic respiratory failure likely 2/2 COVID PNA with  COPD exacerbation.  Initially put on BIPAP. CXR showing b/l gg opacities. Treated with Remdesivir total 5 days and also treated with Dexamethasone. Highest D- Dimer during this admission 323 and already on Eliquis. Low suspicion for PE. Now respiratory status improved, however still requires 3LPM via NC.    75F with PMH of COPD, HTN, DVT/PE s/p IVC filter, Afib on Eliquis, MGUS, chronic b/l LE lymphedema p/w SOB, a/w hypoxic respiratory failure likely 2/2 COVID PNA with  COPD exacerbation.  Initially put on BIPAP. CXR showing b/l gg opacities. Treated with Remdesivir total 5 days and also treated with Dexamethasone. Highest D- Dimer during this admission 323 and already on Eliquis. Low suspicion for PE. Now respiratory status improved, however still requires 2LPM via NC.    75F with PMH of COPD, HTN, DVT/PE s/p IVC filter, Afib on Eliquis, MGUS, chronic b/l LE lymphedema p/w SOB, a/w hypoxic respiratory failure likely 2/2 COVID PNA with  COPD exacerbation.  Initially put on BIPAP. CXR showing b/l gg opacities. Treated with Remdesivir total 5 days and also treated with Dexamethasone. Highest D- Dimer during this admission 323 and already on Eliquis. Low suspicion for PE. Now respiratory status improved, however still requires 2LPM via NC.     Discharge Diagnoses:  # Pneumonia due to COVID-19 virus  # Acute respiratory failure with hypoxia  # Essential hypertension  # Paroxysmal atrial fibrillation  # Morbid obesity (BMI 45.5)  # Lower extremity Lymphedema (bilaterally)

## 2021-02-25 NOTE — DISCHARGE NOTE PROVIDER - NSDCMRMEDTOKEN_GEN_ALL_CORE_FT
apixaban 5 mg oral tablet: 1 tab(s) orally 2 times a day  furosemide 40 mg oral tablet: 1 tab(s) orally 2 times a day  lisinopril 20 mg oral tablet: 1 tab(s) orally once a day  metoprolol succinate 50 mg oral tablet, extended release: 1 tab(s) orally once a day  sertraline 100 mg oral tablet: 1 tab(s) orally once a day   apixaban 5 mg oral tablet: 1 tab(s) orally 2 times a day  furosemide 40 mg oral tablet: 1 tab(s) orally 2 times a day  lisinopril 20 mg oral tablet: 1 tab(s) orally once a day  metoprolol succinate 50 mg oral tablet, extended release: 1 tab(s) orally once a day  sertraline 100 mg oral tablet: 1 tab(s) orally once a day  Stationary Oxygen concentrator with Inogen, setting 3L/min via NC: ICD 10: J12.82, J44.9  CASA: 99   albuterol 90 mcg/inh inhalation aerosol: 2 puff(s) inhaled every 4 hours, As needed, Shortness of Breath and/or Wheezing  apixaban 5 mg oral tablet: 1 tab(s) orally 2 times a day  furosemide 40 mg oral tablet: 1 tab(s) orally 2 times a day  guaifenesin-dextromethorphan 100 mg-10 mg/5 mL oral liquid: 10 milliliter(s) orally every 4 hours, As needed, Cough  metoprolol succinate 50 mg oral tablet, extended release: 1 tab(s) orally once a day  sertraline 100 mg oral tablet: 1 tab(s) orally once a day  Stationary Oxygen concentrator with Xamplified, setting 3L/min via NC: ICD 10: J12.82, J44.9  CASA: 99

## 2021-02-25 NOTE — DISCHARGE NOTE PROVIDER - PROVIDER TOKENS
FREE:[LAST:[Your primray care physician],PHONE:[(   )    -],FAX:[(   )    -]] FREE:[LAST:[Your primray care physician],PHONE:[(   )    -],FAX:[(   )    -]],PROVIDER:[TOKEN:[[id number]]]

## 2021-02-25 NOTE — CHART NOTE - NSCHARTNOTEFT_GEN_A_CORE
75F with PMH of COPD, HTN, DVT/PE s/p IVC filter, Afib on Eliquis, MGUS, chronic b/l LE lymphedema p/w SOB, a/w hypoxic respiratory failure likely 2/2 COVID PNA with  COPD exacerbation. Treated with Remdesivir and Dexamethasone.   Respiratory status improved, however still requires Oxygen.  O2 sat 93% on 3L during ambulation, 88% on RA at rest    ICD 10: J12.82  CASA: 12 months     Milady Cummings NP-C  #[id number] 75F with PMH of COPD, HTN, DVT/PE s/p IVC filter, Afib on Eliquis, MGUS, chronic b/l LE lymphedema p/w SOB, a/w hypoxic respiratory failure likely 2/2 COVID PNA with  COPD exacerbation. Treated with Remdesivir and Dexamethasone.   Respiratory status improved, however still requires Oxygen.  O2 sat 93% on 3L during ambulation, 88% on RA at rest    ICD 10: J12.82, J44.9  CASA: 99 months     Milady Cummings NP-C  #[id number]

## 2021-02-25 NOTE — DISCHARGE NOTE PROVIDER - NSDCCPTREATMENT_GEN_ALL_CORE_FT
PRINCIPAL PROCEDURE  Procedure: Transthoracic echo  Findings and Treatment: 2/24/21  EF 60%  Conclusions:  1. Mild-moderate mitral regurgitation.  2. Moderate left atrial enlargement.  3. Mild concentric left ventricular hypertrophy.  4. Normal left ventricular systolic function. No segmental  wall motion abnormalities. Endocardial visualization  enhanced with intravenous injection of Ultrasonic Enhancing  Agent (Definity).  5. Mild diastolic dysfunction (Stage I).  6. Borderline right ventricular enlargement with mildly  decreased right ventricular systolic function.  7. Moderate tricuspid regurgitation.  8. Estimated pulmonary artery systolic pressure equals 39  mm Hg, assuming right atrial pressure equals 8 mm Hg,  consistent with borderline pulmonary pressures.

## 2021-02-25 NOTE — PROGRESS NOTE ADULT - ATTENDING COMMENTS
Pt will need home O2  pt will need out cardio f/up for tricuspid regurg      Angélica Roldan   Hospitalist

## 2021-02-25 NOTE — DISCHARGE NOTE PROVIDER - NSDCFUSCHEDAPPT_GEN_ALL_CORE_FT
ANGELIC COLON ; 03/10/2021 ; NP Surg Vasc [address] AN[address] ; 03/16/2021 ; [facility] Med Int [address] AN[address] ; 03/25/2021 ; [facility] Cardio [phone]

## 2021-02-25 NOTE — DISCHARGE NOTE PROVIDER - DETAILS OF MALNUTRITION DIAGNOSIS/DIAGNOSES
This patient has been assessed with a concern for Malnutrition and was treated during this hospitalization for the following Nutrition diagnosis/diagnoses:     -  02/24/2021: Morbid obesity (BMI > 40)   This patient has been assessed with a concern for Malnutrition and was treated during this hospitalization for the following Nutrition diagnosis/diagnoses:     -  02/24/2021: Morbid obesity (BMI > 40)    This patient has been assessed with a concern for Malnutrition and was treated during this hospitalization for the following Nutrition diagnosis/diagnoses:     -  02/24/2021: Morbid obesity (BMI > 40)   This patient has been assessed with a concern for Malnutrition and was treated during this hospitalization for the following Nutrition diagnosis/diagnoses:     -  02/24/2021: Morbid obesity (BMI > 40)    This patient has been assessed with a concern for Malnutrition and was treated during this hospitalization for the following Nutrition diagnosis/diagnoses:     -  02/24/2021: Morbid obesity (BMI > 40)    This patient has been assessed with a concern for Malnutrition and was treated during this hospitalization for the following Nutrition diagnosis/diagnoses:     -  02/24/2021: Morbid obesity (BMI > 40)   This patient has been assessed with a concern for Malnutrition and was treated during this hospitalization for the following Nutrition diagnosis/diagnoses:     -  02/24/2021: Morbid obesity (BMI > 40)    This patient has been assessed with a concern for Malnutrition and was treated during this hospitalization for the following Nutrition diagnosis/diagnoses:     -  02/24/2021: Morbid obesity (BMI > 40)    This patient has been assessed with a concern for Malnutrition and was treated during this hospitalization for the following Nutrition diagnosis/diagnoses:     -  02/24/2021: Morbid obesity (BMI > 40)    This patient has been assessed with a concern for Malnutrition and was treated during this hospitalization for the following Nutrition diagnosis/diagnoses:     -  02/24/2021: Morbid obesity (BMI > 40)   This patient has been assessed with a concern for Malnutrition and was treated during this hospitalization for the following Nutrition diagnosis/diagnoses:     -  02/24/2021: Morbid obesity (BMI > 40)    This patient has been assessed with a concern for Malnutrition and was treated during this hospitalization for the following Nutrition diagnosis/diagnoses:     -  02/24/2021: Morbid obesity (BMI > 40)    This patient has been assessed with a concern for Malnutrition and was treated during this hospitalization for the following Nutrition diagnosis/diagnoses:     -  02/24/2021: Morbid obesity (BMI > 40)    This patient has been assessed with a concern for Malnutrition and was treated during this hospitalization for the following Nutrition diagnosis/diagnoses:     -  02/24/2021: Morbid obesity (BMI > 40)    This patient has been assessed with a concern for Malnutrition and was treated during this hospitalization for the following Nutrition diagnosis/diagnoses:     -  02/24/2021: Morbid obesity (BMI > 40)

## 2021-02-25 NOTE — DISCHARGE NOTE PROVIDER - NSDCCPCAREPLAN_GEN_ALL_CORE_FT
PRINCIPAL DISCHARGE DIAGNOSIS  Diagnosis: Pneumonia due to COVID-19 virus  Assessment and Plan of Treatment: s/p Remdesivir for 5 days   please complete 10 days of Dexamethasone    You no longer require hospitalization.  Please restrict activities outside of your home except for getting medical care.  Do not go to work, school, or public areas.  Avoid using public transportation, ride-sharing, or taxis.  Separate yourself from other people and animals in your home.  Call ahead before visiting your doctor.  Wear a facemask when you are around other people. Cover your cough and sneezes.  Clean your hands often.  Avoid sharing personal household items.  Clean all frequently touched surfaces daily.   Please follow up with your primary care physician in one week      SECONDARY DISCHARGE DIAGNOSES  Diagnosis: Acute respiratory failure with hypoxia  Assessment and Plan of Treatment: Improved  Continue supplemental Oxygen    Diagnosis: Lymphedema  Assessment and Plan of Treatment: Continue Lasix as directed    Diagnosis: Essential hypertension  Assessment and Plan of Treatment: Continue current medications as directed  Low salt diet is recommended    Diagnosis: Paroxysmal atrial fibrillation  Assessment and Plan of Treatment: Continue Eliquis and Toprol as directed    Diagnosis: COPD (chronic obstructive pulmonary disease)  Assessment and Plan of Treatment: Continue current medictions as directed  Please follow up with your pulmonary doctor and primray care physician     PRINCIPAL DISCHARGE DIAGNOSIS  Diagnosis: Pneumonia due to COVID-19 virus  Assessment and Plan of Treatment: s/p Remdesivir for 5 days   please complete 10 days of Dexamethasone    You no longer require hospitalization.  Please restrict activities outside of your home except for getting medical care.  Do not go to work, school, or public areas.  Avoid using public transportation, ride-sharing, or taxis.  Separate yourself from other people and animals in your home.  Call ahead before visiting your doctor.  Wear a facemask when you are around other people. Cover your cough and sneezes.  Clean your hands often.  Avoid sharing personal household items.  Clean all frequently touched surfaces daily.   Please follow up with your primary care physician in one week      SECONDARY DISCHARGE DIAGNOSES  Diagnosis: Tricuspid valve finding  Assessment and Plan of Treatment: Moderate Tricuspid  regurgitation  Please follow up with your cardiologist    Diagnosis: Lymphedema  Assessment and Plan of Treatment: Continue Lasix as directed    Diagnosis: Essential hypertension  Assessment and Plan of Treatment: Continue current medications as directed  Low salt diet is recommended    Diagnosis: Paroxysmal atrial fibrillation  Assessment and Plan of Treatment: Continue Eliquis and Toprol as directed    Diagnosis: Acute respiratory failure with hypoxia  Assessment and Plan of Treatment: Improved  Continue supplemental Oxygen    Diagnosis: COPD (chronic obstructive pulmonary disease)  Assessment and Plan of Treatment: Continue current medictions as directed  Please follow up with your pulmonary doctor and primray care physician     PRINCIPAL DISCHARGE DIAGNOSIS  Diagnosis: Pneumonia due to COVID-19 virus  Assessment and Plan of Treatment: s/p Remdesivir for 5 days and Dexamethasone.     You no longer require hospitalization.  Please restrict activities outside of your home except for getting medical care.  Do not go to work, school, or public areas.  Avoid using public transportation, ride-sharing, or taxis.  Separate yourself from other people and animals in your home.  Call ahead before visiting your doctor.  Wear a facemask when you are around other people. Cover your cough and sneezes.  Clean your hands often.  Avoid sharing personal household items.  Clean all frequently touched surfaces daily.   Please follow up with your primary care physician in one week      SECONDARY DISCHARGE DIAGNOSES  Diagnosis: Tricuspid valve finding  Assessment and Plan of Treatment: Moderate Tricuspid  regurgitation  Please follow up with your cardiologist    Diagnosis: Lymphedema  Assessment and Plan of Treatment: Continue Lasix as directed    Diagnosis: Essential hypertension  Assessment and Plan of Treatment: Continue current medications as directed  Low salt diet is recommended    Diagnosis: Paroxysmal atrial fibrillation  Assessment and Plan of Treatment: Continue Eliquis and Toprol as directed    Diagnosis: Acute respiratory failure with hypoxia  Assessment and Plan of Treatment: Improved  Continue supplemental Oxygen    Diagnosis: COPD (chronic obstructive pulmonary disease)  Assessment and Plan of Treatment: Continue current medictions as directed  Please follow up with your pulmonary doctor and primray care physician

## 2021-02-25 NOTE — DISCHARGE NOTE PROVIDER - NSDCFUADDINST_GEN_ALL_CORE_FT
Make appointments to follow up with your out patient physicians.  Bring all discharge paperwork including discharge medication list to your follow up appointments.

## 2021-02-25 NOTE — DISCHARGE NOTE PROVIDER - CARE PROVIDERS DIRECT ADDRESSES
,DirectAddress_Unknown ,DirectAddress_Unknown,[email or web address]

## 2021-02-25 NOTE — PROGRESS NOTE ADULT - PROBLEM SELECTOR PLAN 1
Hypoxia to 91% with increased WOB on arrival, currently on BiPAP tolerating well with improved sats and no longer tachypneic. 2/2 COVID PNA   - c/w supplemental O2 and titrate down as tolerated --> home O2   - COVID treatment as below  - dimer noted, low suspicion for PE at this time  - pt will need out cardio f/up for tricuspid regurg

## 2021-02-26 ENCOUNTER — TRANSCRIPTION ENCOUNTER (OUTPATIENT)
Age: 76
End: 2021-02-26

## 2021-02-26 LAB
ALBUMIN SERPL ELPH-MCNC: 3.8 G/DL — SIGNIFICANT CHANGE UP (ref 3.3–5)
ALP SERPL-CCNC: 94 U/L — SIGNIFICANT CHANGE UP (ref 40–120)
ALT FLD-CCNC: 29 U/L — SIGNIFICANT CHANGE UP (ref 10–45)
ANION GAP SERPL CALC-SCNC: 15 MMOL/L — SIGNIFICANT CHANGE UP (ref 5–17)
AST SERPL-CCNC: 23 U/L — SIGNIFICANT CHANGE UP (ref 10–40)
BILIRUB SERPL-MCNC: 0.8 MG/DL — SIGNIFICANT CHANGE UP (ref 0.2–1.2)
BUN SERPL-MCNC: 26 MG/DL — HIGH (ref 7–23)
CALCIUM SERPL-MCNC: 8.8 MG/DL — SIGNIFICANT CHANGE UP (ref 8.4–10.5)
CHLORIDE SERPL-SCNC: 99 MMOL/L — SIGNIFICANT CHANGE UP (ref 96–108)
CO2 SERPL-SCNC: 27 MMOL/L — SIGNIFICANT CHANGE UP (ref 22–31)
CREAT SERPL-MCNC: 0.75 MG/DL — SIGNIFICANT CHANGE UP (ref 0.5–1.3)
GLUCOSE SERPL-MCNC: 87 MG/DL — SIGNIFICANT CHANGE UP (ref 70–99)
HCT VFR BLD CALC: 43.1 % — SIGNIFICANT CHANGE UP (ref 34.5–45)
HGB BLD-MCNC: 14.1 G/DL — SIGNIFICANT CHANGE UP (ref 11.5–15.5)
MCHC RBC-ENTMCNC: 29.7 PG — SIGNIFICANT CHANGE UP (ref 27–34)
MCHC RBC-ENTMCNC: 32.7 GM/DL — SIGNIFICANT CHANGE UP (ref 32–36)
MCV RBC AUTO: 90.9 FL — SIGNIFICANT CHANGE UP (ref 80–100)
NRBC # BLD: 0 /100 WBCS — SIGNIFICANT CHANGE UP (ref 0–0)
PLATELET # BLD AUTO: 244 K/UL — SIGNIFICANT CHANGE UP (ref 150–400)
POTASSIUM SERPL-MCNC: 3.5 MMOL/L — SIGNIFICANT CHANGE UP (ref 3.5–5.3)
POTASSIUM SERPL-SCNC: 3.5 MMOL/L — SIGNIFICANT CHANGE UP (ref 3.5–5.3)
PROT SERPL-MCNC: 7.1 G/DL — SIGNIFICANT CHANGE UP (ref 6–8.3)
RBC # BLD: 4.74 M/UL — SIGNIFICANT CHANGE UP (ref 3.8–5.2)
RBC # FLD: 14 % — SIGNIFICANT CHANGE UP (ref 10.3–14.5)
SODIUM SERPL-SCNC: 141 MMOL/L — SIGNIFICANT CHANGE UP (ref 135–145)
WBC # BLD: 5.33 K/UL — SIGNIFICANT CHANGE UP (ref 3.8–10.5)
WBC # FLD AUTO: 5.33 K/UL — SIGNIFICANT CHANGE UP (ref 3.8–10.5)

## 2021-02-26 PROCEDURE — 99232 SBSQ HOSP IP/OBS MODERATE 35: CPT

## 2021-02-26 RX ADMIN — Medication 40 MILLIGRAM(S): at 17:29

## 2021-02-26 RX ADMIN — PANTOPRAZOLE SODIUM 40 MILLIGRAM(S): 20 TABLET, DELAYED RELEASE ORAL at 06:18

## 2021-02-26 RX ADMIN — Medication 40 MILLIGRAM(S): at 06:18

## 2021-02-26 RX ADMIN — Medication 6 MILLIGRAM(S): at 06:18

## 2021-02-26 RX ADMIN — SERTRALINE 100 MILLIGRAM(S): 25 TABLET, FILM COATED ORAL at 13:54

## 2021-02-26 RX ADMIN — Medication 50 MILLIGRAM(S): at 06:18

## 2021-02-26 RX ADMIN — REMDESIVIR 500 MILLIGRAM(S): 5 INJECTION INTRAVENOUS at 10:55

## 2021-02-26 RX ADMIN — APIXABAN 5 MILLIGRAM(S): 2.5 TABLET, FILM COATED ORAL at 17:29

## 2021-02-26 RX ADMIN — APIXABAN 5 MILLIGRAM(S): 2.5 TABLET, FILM COATED ORAL at 06:18

## 2021-02-26 NOTE — DISCHARGE NOTE NURSING/CASE MANAGEMENT/SOCIAL WORK - PATIENT PORTAL LINK FT
You can access the FollowMyHealth Patient Portal offered by [facility] by registering at the following website: [email or web address]. By joining "[employer]"’s FollowMyHealth portal, you will also be able to view your health information using other applications (apps) compatible with our system.

## 2021-02-26 NOTE — PROGRESS NOTE ADULT - SUBJECTIVE AND OBJECTIVE BOX
Patient is a 75y old  Female who presents with a chief complaint of SOB (25 Feb 2021 19:01)      SUBJECTIVE / OVERNIGHT EVENTS:   Patient is seen in AM and states feel better .  She is still using the NC .        ADDITIONAL REVIEW OF SYSTEMS: Negative except for above    MEDICATIONS  (STANDING):  apixaban 5 milliGRAM(s) Oral two times a day  dexAMETHasone  Injectable 6 milliGRAM(s) IV Push daily  furosemide    Tablet 40 milliGRAM(s) Oral two times a day  metoprolol succinate ER 50 milliGRAM(s) Oral daily  pantoprazole    Tablet 40 milliGRAM(s) Oral before breakfast  sertraline 100 milliGRAM(s) Oral daily    MEDICATIONS  (PRN):  acetaminophen   Tablet .. 650 milliGRAM(s) Oral every 4 hours PRN Temp greater or equal to 38C (100.4F)  ALBUTerol    90 MICROgram(s) HFA Inhaler 2 Puff(s) Inhalation every 4 hours PRN Shortness of Breath and/or Wheezing  guaifenesin/dextromethorphan  Syrup 10 milliLiter(s) Oral every 4 hours PRN Cough  traMADol 25 milliGRAM(s) Oral every 6 hours PRN Severe Pain (7 - 10)      CAPILLARY BLOOD GLUCOSE        I&O's Summary    25 Feb 2021 07:01  -  26 Feb 2021 07:00  --------------------------------------------------------  IN: 2240 mL / OUT: 2451 mL / NET: -211 mL        PHYSICAL EXAM:  Vital Signs Last 24 Hrs  T(C): 36.3 (26 Feb 2021 12:09), Max: 36.5 (26 Feb 2021 04:09)  T(F): 97.4 (26 Feb 2021 12:09), Max: 97.7 (26 Feb 2021 04:09)  HR: 75 (26 Feb 2021 12:09) (70 - 75)  BP: 132/67 (26 Feb 2021 12:09) (130/71 - 144/80)  BP(mean): --  RR: 18 (26 Feb 2021 12:09) (18 - 18)  SpO2: 94% (26 Feb 2021 12:09) (93% - 96%)    PHYSICAL EXAM:  GENERAL: NAD, obese   HEAD:  Atraumatic, Normocephalic  EYES:  conjunctiva and sclera clear  NECK: Supple, No JVD  CHEST/LUNG: Clear to auscultation bilaterally; No wheeze  HEART: Regular rate and rhythm; No murmurs, rubs, or gallops  ABDOMEN: Soft, Nontender, Nondistended; Bowel sounds present  EXTREMITIES:  2+ Peripheral Pulses, No clubbing, cyanosis, chronic B/L leg edema         LABS:                        14.1   5.33  )-----------( 244      ( 26 Feb 2021 06:24 )             43.1     02-26    141  |  99  |  26<H>  ----------------------------<  87  3.5   |  27  |  0.75    Ca    8.8      26 Feb 2021 06:24    TPro  7.1  /  Alb  3.8  /  TBili  0.8  /  DBili  x   /  AST  23  /  ALT  29  /  AlkPhos  94  02-26                RADIOLOGY & ADDITIONAL TESTS:    Imaging Personally Reviewed:    Electrocardiogram Personally Reviewed:    COORDINATION OF CARE:  Care Discussed with Consultants/Other Providers [Y/N]:  Prior or Outpatient Records Reviewed [Y/N]:

## 2021-02-26 NOTE — PROGRESS NOTE ADULT - ATTENDING COMMENTS
Pt will need home O2  pt will need out cardio f/up for tricuspid regurg with Dr Hansel Scottuerre   Hospitalist

## 2021-02-26 NOTE — DISCHARGE NOTE NURSING/CASE MANAGEMENT/SOCIAL WORK - NSDCPEPTCAREGIVEDUMATLIST _GEN_ALL_CORE
Influenza Vaccination/Coronavirus/COVID19 Influenza Vaccination/Apixaban/Eliquis/Coronavirus/COVID19

## 2021-02-27 LAB
ALBUMIN SERPL ELPH-MCNC: 3.7 G/DL — SIGNIFICANT CHANGE UP (ref 3.3–5)
ALP SERPL-CCNC: 90 U/L — SIGNIFICANT CHANGE UP (ref 40–120)
ALT FLD-CCNC: 27 U/L — SIGNIFICANT CHANGE UP (ref 10–45)
ANION GAP SERPL CALC-SCNC: 13 MMOL/L — SIGNIFICANT CHANGE UP (ref 5–17)
AST SERPL-CCNC: 21 U/L — SIGNIFICANT CHANGE UP (ref 10–40)
BILIRUB SERPL-MCNC: 1 MG/DL — SIGNIFICANT CHANGE UP (ref 0.2–1.2)
BUN SERPL-MCNC: 24 MG/DL — HIGH (ref 7–23)
CALCIUM SERPL-MCNC: 9 MG/DL — SIGNIFICANT CHANGE UP (ref 8.4–10.5)
CHLORIDE SERPL-SCNC: 100 MMOL/L — SIGNIFICANT CHANGE UP (ref 96–108)
CO2 SERPL-SCNC: 29 MMOL/L — SIGNIFICANT CHANGE UP (ref 22–31)
CREAT SERPL-MCNC: 0.8 MG/DL — SIGNIFICANT CHANGE UP (ref 0.5–1.3)
CULTURE RESULTS: SIGNIFICANT CHANGE UP
CULTURE RESULTS: SIGNIFICANT CHANGE UP
GLUCOSE SERPL-MCNC: 88 MG/DL — SIGNIFICANT CHANGE UP (ref 70–99)
POTASSIUM SERPL-MCNC: 3.3 MMOL/L — LOW (ref 3.5–5.3)
POTASSIUM SERPL-SCNC: 3.3 MMOL/L — LOW (ref 3.5–5.3)
PROT SERPL-MCNC: 7 G/DL — SIGNIFICANT CHANGE UP (ref 6–8.3)
SARS-COV-2 RNA SPEC QL NAA+PROBE: DETECTED
SODIUM SERPL-SCNC: 142 MMOL/L — SIGNIFICANT CHANGE UP (ref 135–145)
SPECIMEN SOURCE: SIGNIFICANT CHANGE UP
SPECIMEN SOURCE: SIGNIFICANT CHANGE UP

## 2021-02-27 PROCEDURE — 99232 SBSQ HOSP IP/OBS MODERATE 35: CPT

## 2021-02-27 RX ORDER — POTASSIUM CHLORIDE 20 MEQ
40 PACKET (EA) ORAL ONCE
Refills: 0 | Status: COMPLETED | OUTPATIENT
Start: 2021-02-27 | End: 2021-02-27

## 2021-02-27 RX ADMIN — APIXABAN 5 MILLIGRAM(S): 2.5 TABLET, FILM COATED ORAL at 04:25

## 2021-02-27 RX ADMIN — Medication 50 MILLIGRAM(S): at 04:26

## 2021-02-27 RX ADMIN — TRAMADOL HYDROCHLORIDE 25 MILLIGRAM(S): 50 TABLET ORAL at 02:45

## 2021-02-27 RX ADMIN — APIXABAN 5 MILLIGRAM(S): 2.5 TABLET, FILM COATED ORAL at 17:54

## 2021-02-27 RX ADMIN — Medication 40 MILLIGRAM(S): at 17:54

## 2021-02-27 RX ADMIN — Medication 10 MILLILITER(S): at 04:26

## 2021-02-27 RX ADMIN — SERTRALINE 100 MILLIGRAM(S): 25 TABLET, FILM COATED ORAL at 13:18

## 2021-02-27 RX ADMIN — PANTOPRAZOLE SODIUM 40 MILLIGRAM(S): 20 TABLET, DELAYED RELEASE ORAL at 04:26

## 2021-02-27 RX ADMIN — Medication 6 MILLIGRAM(S): at 04:25

## 2021-02-27 RX ADMIN — Medication 40 MILLIEQUIVALENT(S): at 12:30

## 2021-02-27 RX ADMIN — Medication 40 MILLIGRAM(S): at 04:25

## 2021-02-27 NOTE — PROGRESS NOTE ADULT - SUBJECTIVE AND OBJECTIVE BOX
Patient is a 75y old  Female who presents with a chief complaint of SOB (26 Feb 2021 15:32)      SUBJECTIVE / OVERNIGHT EVENTS:    Pt felt better , nopain , no Dyspnea and no cough       ADDITIONAL REVIEW OF SYSTEMS: Negative except for above    MEDICATIONS  (STANDING):  apixaban 5 milliGRAM(s) Oral two times a day  dexAMETHasone  Injectable 6 milliGRAM(s) IV Push daily  furosemide    Tablet 40 milliGRAM(s) Oral two times a day  metoprolol succinate ER 50 milliGRAM(s) Oral daily  pantoprazole    Tablet 40 milliGRAM(s) Oral before breakfast  sertraline 100 milliGRAM(s) Oral daily    MEDICATIONS  (PRN):  acetaminophen   Tablet .. 650 milliGRAM(s) Oral every 4 hours PRN Temp greater or equal to 38C (100.4F)  ALBUTerol    90 MICROgram(s) HFA Inhaler 2 Puff(s) Inhalation every 4 hours PRN Shortness of Breath and/or Wheezing  guaifenesin/dextromethorphan  Syrup 10 milliLiter(s) Oral every 4 hours PRN Cough  traMADol 25 milliGRAM(s) Oral every 6 hours PRN Severe Pain (7 - 10)      CAPILLARY BLOOD GLUCOSE        I&O's Summary    26 Feb 2021 07:01  -  27 Feb 2021 07:00  --------------------------------------------------------  IN: 1300 mL / OUT: 1950 mL / NET: -650 mL    27 Feb 2021 07:01  -  27 Feb 2021 17:52  --------------------------------------------------------  IN: 450 mL / OUT: 650 mL / NET: -200 mL        PHYSICAL EXAM:  Vital Signs Last 24 Hrs  T(C): 36.5 (27 Feb 2021 11:12), Max: 36.5 (27 Feb 2021 04:15)  T(F): 97.7 (27 Feb 2021 11:12), Max: 97.7 (27 Feb 2021 04:15)  HR: 98 (27 Feb 2021 11:12) (68 - 98)  BP: 124/72 (27 Feb 2021 11:12) (114/67 - 124/72)  BP(mean): --  RR: 18 (27 Feb 2021 11:12) (17 - 18)  SpO2: 93% (27 Feb 2021 11:12) (93% - 97%)    PHYSICAL EXAM:  GENERAL: NAD, obese   HEAD:  Atraumatic, Normocephalic  EYES:  conjunctiva and sclera clear  NECK: Supple, No JVD  CHEST/LUNG: Clear to auscultation bilaterally; No wheeze  HEART: Regular rate and rhythm; No murmurs, rubs, or gallops  ABDOMEN: Soft, Nontender, Nondistended; Bowel sounds present  EXTREMITIES:  2+ Peripheral Pulses, No clubbing, cyanosis, chronic B/L leg edema       LABS:                        14.1   5.33  )-----------( 244      ( 26 Feb 2021 06:24 )             43.1     02-27    142  |  100  |  24<H>  ----------------------------<  88  3.3<L>   |  29  |  0.80    Ca    9.0      27 Feb 2021 06:14    TPro  7.0  /  Alb  3.7  /  TBili  1.0  /  DBili  x   /  AST  21  /  ALT  27  /  AlkPhos  90  02-27                RADIOLOGY & ADDITIONAL TESTS:    Imaging Personally Reviewed:    Electrocardiogram Personally Reviewed:    COORDINATION OF CARE:  Care Discussed with Consultants/Other Providers [Y/N]:  Prior or Outpatient Records Reviewed [Y/N]:

## 2021-02-28 LAB
ANION GAP SERPL CALC-SCNC: 9 MMOL/L — SIGNIFICANT CHANGE UP (ref 5–17)
BUN SERPL-MCNC: 26 MG/DL — HIGH (ref 7–23)
CALCIUM SERPL-MCNC: 9.1 MG/DL — SIGNIFICANT CHANGE UP (ref 8.4–10.5)
CHLORIDE SERPL-SCNC: 102 MMOL/L — SIGNIFICANT CHANGE UP (ref 96–108)
CO2 SERPL-SCNC: 33 MMOL/L — HIGH (ref 22–31)
CREAT SERPL-MCNC: 0.78 MG/DL — SIGNIFICANT CHANGE UP (ref 0.5–1.3)
GLUCOSE SERPL-MCNC: 138 MG/DL — HIGH (ref 70–99)
MAGNESIUM SERPL-MCNC: 2.1 MG/DL — SIGNIFICANT CHANGE UP (ref 1.6–2.6)
POTASSIUM SERPL-MCNC: 3.6 MMOL/L — SIGNIFICANT CHANGE UP (ref 3.5–5.3)
POTASSIUM SERPL-SCNC: 3.6 MMOL/L — SIGNIFICANT CHANGE UP (ref 3.5–5.3)
SARS-COV-2 RNA SPEC QL NAA+PROBE: DETECTED
SODIUM SERPL-SCNC: 144 MMOL/L — SIGNIFICANT CHANGE UP (ref 135–145)

## 2021-02-28 PROCEDURE — 99233 SBSQ HOSP IP/OBS HIGH 50: CPT

## 2021-02-28 RX ADMIN — Medication 10 MILLILITER(S): at 22:12

## 2021-02-28 RX ADMIN — PANTOPRAZOLE SODIUM 40 MILLIGRAM(S): 20 TABLET, DELAYED RELEASE ORAL at 04:40

## 2021-02-28 RX ADMIN — Medication 40 MILLIGRAM(S): at 18:36

## 2021-02-28 RX ADMIN — APIXABAN 5 MILLIGRAM(S): 2.5 TABLET, FILM COATED ORAL at 18:36

## 2021-02-28 RX ADMIN — Medication 40 MILLIGRAM(S): at 04:40

## 2021-02-28 RX ADMIN — APIXABAN 5 MILLIGRAM(S): 2.5 TABLET, FILM COATED ORAL at 04:42

## 2021-02-28 RX ADMIN — Medication 50 MILLIGRAM(S): at 04:40

## 2021-02-28 RX ADMIN — Medication 6 MILLIGRAM(S): at 04:42

## 2021-02-28 RX ADMIN — SERTRALINE 100 MILLIGRAM(S): 25 TABLET, FILM COATED ORAL at 11:23

## 2021-02-28 RX ADMIN — Medication 10 MILLILITER(S): at 13:21

## 2021-02-28 NOTE — PROGRESS NOTE ADULT - ASSESSMENT
75F with PMH of COPD, HTN, DVT/PE s/p IVC filter, Afib on Eliquis, MGUS, chronic b/l LE lymphedema p/w SOB, a/w hypoxic respiratory failure likely 2/2 COVID PNA with  COPD exacerbation.

## 2021-02-28 NOTE — PROGRESS NOTE ADULT - PROBLEM SELECTOR PLAN 1
Hypoxia to 91% with increased WOB on arrival, currently on BiPAP tolerating well with improved sats and no longer tachypneic. 2/2 COVID PNA   - c/w supplemental O2 and titrate down as tolerated --> home O2 ( she was not on home O2)   - COVID treatment as below  - dimer noted, low suspicion for PE at this time  - pt will need out cardio f/up for tricuspid regurg

## 2021-02-28 NOTE — PROGRESS NOTE ADULT - PROBLEM SELECTOR PLAN 3
hx of COPD, not on home O2, not on home bronchodilators. s/p nebulizers and solumedrol by EMS en route.   hypoxia possibly 2/2 COPD with COVID as pt endorsing wheezing at home; none noted on exam and VBG without hypercapnia   S/p ceftriaxone/azithromycin
hx of COPD, not on home O2, not on home bronchodilators. s/p nebulizers and solumedrol by EMS en route.   hypoxia possibly 2/2 COPD with COVID as pt endorsing wheezing at home; none noted on exam and VBG without hypercapnia   S/p ceftriaxone/azithromycin  CW with Proventil HFA
hx of COPD, not on home O2, not on home bronchodilators. s/p nebulizers and solumedrol by EMS en route.   hypoxia possibly 2/2 COPD with COVID as pt endorsing wheezing at home; none noted on exam and VBG without hypercapnia   S/p ceftriaxone/azithromycin

## 2021-02-28 NOTE — PROGRESS NOTE ADULT - PROBLEM SELECTOR PROBLEM 5
Paroxysmal atrial fibrillation

## 2021-02-28 NOTE — PROGRESS NOTE ADULT - PROBLEM SELECTOR PROBLEM 2
Pneumonia due to COVID-19 virus

## 2021-02-28 NOTE — PROGRESS NOTE ADULT - NUTRITIONAL ASSESSMENT
This patient has been assessed with a concern for Malnutrition and has been determined to have a diagnosis/diagnoses of Morbid obesity (BMI > 40).    This patient is being managed with:   Diet Regular-  DASH/TLC {Sodium & Cholesterol Restricted} (DASH)  Entered: Feb 22 2021  2:31AM    

## 2021-02-28 NOTE — PROGRESS NOTE ADULT - SUBJECTIVE AND OBJECTIVE BOX
Patient is a 75y old  Female who presents with a chief complaint of SOB (27 Feb 2021 17:51)      SUBJECTIVE / OVERNIGHT EVENTS:    Tele reviewed:       ADDITIONAL REVIEW OF SYSTEMS: Negative except for above    MEDICATIONS  (STANDING):  apixaban 5 milliGRAM(s) Oral two times a day  dexAMETHasone  Injectable 6 milliGRAM(s) IV Push daily  furosemide    Tablet 40 milliGRAM(s) Oral two times a day  metoprolol succinate ER 50 milliGRAM(s) Oral daily  pantoprazole    Tablet 40 milliGRAM(s) Oral before breakfast  sertraline 100 milliGRAM(s) Oral daily    MEDICATIONS  (PRN):  acetaminophen   Tablet .. 650 milliGRAM(s) Oral every 4 hours PRN Temp greater or equal to 38C (100.4F)  ALBUTerol    90 MICROgram(s) HFA Inhaler 2 Puff(s) Inhalation every 4 hours PRN Shortness of Breath and/or Wheezing  guaifenesin/dextromethorphan  Syrup 10 milliLiter(s) Oral every 4 hours PRN Cough  traMADol 25 milliGRAM(s) Oral every 6 hours PRN Severe Pain (7 - 10)      CAPILLARY BLOOD GLUCOSE        I&O's Summary    27 Feb 2021 07:01  -  28 Feb 2021 07:00  --------------------------------------------------------  IN: 1130 mL / OUT: 1151 mL / NET: -21 mL        PHYSICAL EXAM:  Vital Signs Last 24 Hrs  T(C): 36.4 (28 Feb 2021 04:03), Max: 36.5 (27 Feb 2021 11:12)  T(F): 97.6 (28 Feb 2021 04:03), Max: 97.7 (27 Feb 2021 11:12)  HR: 86 (28 Feb 2021 04:03) (73 - 98)  BP: 112/56 (28 Feb 2021 04:03) (112/56 - 152/80)  BP(mean): --  RR: 18 (28 Feb 2021 04:03) (18 - 18)  SpO2: 94% (28 Feb 2021 04:03) (93% - 96%)    PHYSICAL EXAM:        LABS:    02-27    142  |  100  |  24<H>  ----------------------------<  88  3.3<L>   |  29  |  0.80    Ca    9.0      27 Feb 2021 06:14    TPro  7.0  /  Alb  3.7  /  TBili  1.0  /  DBili  x   /  AST  21  /  ALT  27  /  AlkPhos  90  02-27                RADIOLOGY & ADDITIONAL TESTS:    Imaging Personally Reviewed:    Electrocardiogram Personally Reviewed:    COORDINATION OF CARE:  Care Discussed with Consultants/Other Providers [Y/N]:  Prior or Outpatient Records Reviewed [Y/N]:     Patient is a 75y old  Female who presents with a chief complaint of SOB (27 Feb 2021 17:51)      SUBJECTIVE / OVERNIGHT EVENTS:   NO complaints today .  Pt is doing ok       ADDITIONAL REVIEW OF SYSTEMS: Negative except for above    MEDICATIONS  (STANDING):  apixaban 5 milliGRAM(s) Oral two times a day  dexAMETHasone  Injectable 6 milliGRAM(s) IV Push daily  furosemide    Tablet 40 milliGRAM(s) Oral two times a day  metoprolol succinate ER 50 milliGRAM(s) Oral daily  pantoprazole    Tablet 40 milliGRAM(s) Oral before breakfast  sertraline 100 milliGRAM(s) Oral daily    MEDICATIONS  (PRN):  acetaminophen   Tablet .. 650 milliGRAM(s) Oral every 4 hours PRN Temp greater or equal to 38C (100.4F)  ALBUTerol    90 MICROgram(s) HFA Inhaler 2 Puff(s) Inhalation every 4 hours PRN Shortness of Breath and/or Wheezing  guaifenesin/dextromethorphan  Syrup 10 milliLiter(s) Oral every 4 hours PRN Cough  traMADol 25 milliGRAM(s) Oral every 6 hours PRN Severe Pain (7 - 10)      CAPILLARY BLOOD GLUCOSE        I&O's Summary    27 Feb 2021 07:01  -  28 Feb 2021 07:00  --------------------------------------------------------  IN: 1130 mL / OUT: 1151 mL / NET: -21 mL        PHYSICAL EXAM:  Vital Signs Last 24 Hrs  T(C): 36.4 (28 Feb 2021 04:03), Max: 36.5 (27 Feb 2021 11:12)  T(F): 97.6 (28 Feb 2021 04:03), Max: 97.7 (27 Feb 2021 11:12)  HR: 86 (28 Feb 2021 04:03) (73 - 98)  BP: 112/56 (28 Feb 2021 04:03) (112/56 - 152/80)  BP(mean): --  RR: 18 (28 Feb 2021 04:03) (18 - 18)  SpO2: 94% (28 Feb 2021 04:03) (93% - 96%)    PHYSICAL EXAM:  GENERAL: NAD, obese   HEAD:  Atraumatic, Normocephalic  EYES:  conjunctiva and sclera clear  NECK: Supple, No JVD  CHEST/LUNG: Clear to auscultation bilaterally; No wheeze  HEART: Regular rate and rhythm; No murmurs, rubs, or gallops  ABDOMEN: Soft, Nontender, Nondistended; Bowel sounds present  EXTREMITIES:  2+ Peripheral Pulses, No clubbing, cyanosis, chronic B/L leg edema       LABS:    02-27    142  |  100  |  24<H>  ----------------------------<  88  3.3<L>   |  29  |  0.80    Ca    9.0      27 Feb 2021 06:14    TPro  7.0  /  Alb  3.7  /  TBili  1.0  /  DBili  x   /  AST  21  /  ALT  27  /  AlkPhos  90  02-27                RADIOLOGY & ADDITIONAL TESTS:    Imaging Personally Reviewed:    Electrocardiogram Personally Reviewed:    COORDINATION OF CARE:  Care Discussed with Consultants/Other Providers [Y/N]:  Prior or Outpatient Records Reviewed [Y/N]:

## 2021-02-28 NOTE — PROGRESS NOTE ADULT - PROBLEM SELECTOR PLAN 5
currently rate controlled  c/w toprol 50mg daily  c/w eliquis for AC

## 2021-02-28 NOTE — PROGRESS NOTE ADULT - PROBLEM SELECTOR PLAN 2
COVID+ with CXR showing b/l gg opacities, hypoxic currently on BiPAP - severe COVID illness   - CW decadron 6mg IVP daily   - CW remdesevir given hypoxia   - d-dimer noted, low suspicion for PE currently   - c/w supplemental O2 as needed   - isolation precautions   - lovenox for VTE ppx   - trend inflamm markers.  monitor glucose
COVID+ with CXR showing b/l gg opacities, hypoxic  BiPAP--> NC  - severe COVID illness   - CW decadron 6mg IVP daily ( up until 3/4)   -S/p  remdesevir  - d-dimer noted, low suspicion for PE currently   - c/w supplemental O2 as needed   - isolation precautions   - trend inflamm markers.  -monitor glucose
COVID+ with CXR showing b/l gg opacities, hypoxic currently on BiPAP - severe COVID illness   - CW decadron 6mg IVP daily   - CW remdesevir given hypoxia   - d-dimer noted, low suspicion for PE currently   - c/w supplemental O2 as needed   - isolation precautions   - lovenox for VTE ppx   - trend inflamm markers.  monitor glucose
COVID+ with CXR showing b/l gg opacities, hypoxic currently on BiPAP - severe COVID illness   - CW decadron 6mg IVP daily   - CW remdesevir given hypoxia   - d-dimer noted, low suspicion for PE currently   - c/w supplemental O2 as needed   - isolation precautions   - lovenox for VTE ppx   - trend inflamm markers.  monitor glucose

## 2021-02-28 NOTE — PROGRESS NOTE ADULT - PROBLEM/PLAN-7
DISPLAY PLAN FREE TEXT
38.3
DISPLAY PLAN FREE TEXT

## 2021-02-28 NOTE — PROGRESS NOTE ADULT - PROBLEM SELECTOR PLAN 4
c/w toprol 25mg daily and lasix 40mg BID  holding lisinopril for now, resume as BP tolerates
c/w toprol 25mg daily and lasix 40mg BID  holding lisinopril for now, resume as BP tolerates on DC
c/w toprol 25mg daily and lasix 40mg BID  holding lisinopril for now, resume as BP tolerates
c/w toprol 25mg daily and lasix 40mg BID  holding lisinopril for now, resume as BP tolerates

## 2021-02-28 NOTE — PROGRESS NOTE ADULT - ATTENDING COMMENTS
Awaiting on COVID pos rehab / SOn Declan was updated and he states that he will inform Jacqueline Awaiting on COVID pos rehab / SOn Declan was updated on 2/28 and he states that he will inform Jacqueline Roldan   Hospitalist   [phone]

## 2021-02-28 NOTE — PROGRESS NOTE ADULT - PROBLEM SELECTOR PLAN 6
at baseline per pt report  c/w home lasix 40mg BID

## 2021-02-28 NOTE — PROGRESS NOTE ADULT - PROBLEM SELECTOR PLAN 7
therapeutic on eliquis

## 2021-03-01 VITALS
DIASTOLIC BLOOD PRESSURE: 73 MMHG | RESPIRATION RATE: 18 BRPM | SYSTOLIC BLOOD PRESSURE: 131 MMHG | TEMPERATURE: 98 F | HEART RATE: 79 BPM | OXYGEN SATURATION: 94 %

## 2021-03-01 LAB
ANION GAP SERPL CALC-SCNC: 13 MMOL/L — SIGNIFICANT CHANGE UP (ref 5–17)
BUN SERPL-MCNC: 26 MG/DL — HIGH (ref 7–23)
CALCIUM SERPL-MCNC: 9 MG/DL — SIGNIFICANT CHANGE UP (ref 8.4–10.5)
CHLORIDE SERPL-SCNC: 100 MMOL/L — SIGNIFICANT CHANGE UP (ref 96–108)
CO2 SERPL-SCNC: 30 MMOL/L — SIGNIFICANT CHANGE UP (ref 22–31)
CREAT SERPL-MCNC: 0.81 MG/DL — SIGNIFICANT CHANGE UP (ref 0.5–1.3)
GLUCOSE SERPL-MCNC: 68 MG/DL — LOW (ref 70–99)
HCT VFR BLD CALC: 44.9 % — SIGNIFICANT CHANGE UP (ref 34.5–45)
HGB BLD-MCNC: 14.1 G/DL — SIGNIFICANT CHANGE UP (ref 11.5–15.5)
MAGNESIUM SERPL-MCNC: 2.1 MG/DL — SIGNIFICANT CHANGE UP (ref 1.6–2.6)
MCHC RBC-ENTMCNC: 28.7 PG — SIGNIFICANT CHANGE UP (ref 27–34)
MCHC RBC-ENTMCNC: 31.4 GM/DL — LOW (ref 32–36)
MCV RBC AUTO: 91.4 FL — SIGNIFICANT CHANGE UP (ref 80–100)
NRBC # BLD: 0 /100 WBCS — SIGNIFICANT CHANGE UP (ref 0–0)
PHOSPHATE SERPL-MCNC: 2.8 MG/DL — SIGNIFICANT CHANGE UP (ref 2.5–4.5)
PLATELET # BLD AUTO: 272 K/UL — SIGNIFICANT CHANGE UP (ref 150–400)
POTASSIUM SERPL-MCNC: 3.5 MMOL/L — SIGNIFICANT CHANGE UP (ref 3.5–5.3)
POTASSIUM SERPL-SCNC: 3.5 MMOL/L — SIGNIFICANT CHANGE UP (ref 3.5–5.3)
RBC # BLD: 4.91 M/UL — SIGNIFICANT CHANGE UP (ref 3.8–5.2)
RBC # FLD: 14 % — SIGNIFICANT CHANGE UP (ref 10.3–14.5)
SODIUM SERPL-SCNC: 143 MMOL/L — SIGNIFICANT CHANGE UP (ref 135–145)
WBC # BLD: 6.2 K/UL — SIGNIFICANT CHANGE UP (ref 3.8–10.5)
WBC # FLD AUTO: 6.2 K/UL — SIGNIFICANT CHANGE UP (ref 3.8–10.5)

## 2021-03-01 PROCEDURE — 99239 HOSP IP/OBS DSCHRG MGMT >30: CPT

## 2021-03-01 RX ORDER — GUAIFENESIN/DEXTROMETHORPHAN 600MG-30MG
10 TABLET, EXTENDED RELEASE 12 HR ORAL
Qty: 840 | Refills: 0
Start: 2021-03-01 | End: 2021-03-14

## 2021-03-01 RX ORDER — LISINOPRIL 2.5 MG/1
1 TABLET ORAL
Qty: 0 | Refills: 0 | DISCHARGE

## 2021-03-01 RX ORDER — ALBUTEROL 90 UG/1
2 AEROSOL, METERED ORAL
Qty: 1 | Refills: 0
Start: 2021-03-01 | End: 2021-03-14

## 2021-03-01 RX ORDER — TRAMADOL HYDROCHLORIDE 50 MG/1
0.5 TABLET ORAL
Qty: 14 | Refills: 0
Start: 2021-03-01 | End: 2021-03-07

## 2021-03-01 RX ADMIN — Medication 40 MILLIGRAM(S): at 05:53

## 2021-03-01 RX ADMIN — APIXABAN 5 MILLIGRAM(S): 2.5 TABLET, FILM COATED ORAL at 05:53

## 2021-03-01 RX ADMIN — Medication 50 MILLIGRAM(S): at 05:53

## 2021-03-01 RX ADMIN — Medication 6 MILLIGRAM(S): at 05:53

## 2021-03-01 RX ADMIN — PANTOPRAZOLE SODIUM 40 MILLIGRAM(S): 20 TABLET, DELAYED RELEASE ORAL at 05:53

## 2021-03-01 RX ADMIN — SERTRALINE 100 MILLIGRAM(S): 25 TABLET, FILM COATED ORAL at 11:43

## 2021-03-01 NOTE — PROVIDER CONTACT NOTE (OTHER) - ASSESSMENT
Pt on 3 L NC O2 sat 95%
Patient is VSS and is asymptomatic
Pt is AXOX4, complaining of right hip, knee, and leg pain, pain rated 8/10. Denies calf pain. As per pt, this is a chronic pain which is relieved with Motrin.

## 2021-03-01 NOTE — PROVIDER CONTACT NOTE (OTHER) - SITUATION
Pt is complaining of right hip, knee, and leg pain, pain 8/10.
Patient has WCT 3.12 secs, up to 150 BPM.
Pt is not compliant with pulse ox.

## 2021-03-01 NOTE — CHART NOTE - NSCHARTNOTEFT_GEN_A_CORE
Medicine Attending - Discharge Day Note:  ================================================    # Pneumonia due to COVID-19 virus  # Acute respiratory failure with hypoxia  # Essential hypertension  # Paroxysmal atrial fibrillation  # Morbid obesity (BMI 45.5)  # Lower extremity Lymphedema (bilaterally)    The patient is medically stable for discharge to home with oxygen. Plan of care discussed in the detail with the patient, her son Declan by phone and CM. F/up with Dr. Hamm (cardiologist) re: valvulopathy (TR).    Discharge time spent 35 minutes.     Sloan Berry MD, EMILY  [phone]

## 2021-03-01 NOTE — PROVIDER CONTACT NOTE (OTHER) - ACTION/TREATMENT ORDERED:
Provider orders to monitor BMP, Phos, and Mag levels.
NATALIE Moody notified. Tylenol ordered.
Provider aware

## 2021-03-01 NOTE — PROVIDER CONTACT NOTE (OTHER) - BACKGROUND
75 F hx COPD on 2L home O2, PMH of HTN, HLD, DVT/PE s/p [place] IVC filter on lifelong anticoagulation with Eliquis, Afib, MGUS, chronic b/l LE lymphedema BIBEMS for difficulty breathing at home.
This is the first time on this admission that patient has this arrythmia
admitting dx Pneumonia due to organism

## 2021-03-02 ENCOUNTER — TRANSCRIPTION ENCOUNTER (OUTPATIENT)
Age: 76
End: 2021-03-02

## 2021-03-02 ENCOUNTER — NON-APPOINTMENT (OUTPATIENT)
Age: 76
End: 2021-03-02

## 2021-03-09 ENCOUNTER — APPOINTMENT (OUTPATIENT)
Dept: INTERNAL MEDICINE | Facility: CLINIC | Age: 76
End: 2021-03-09
Payer: MEDICARE

## 2021-03-09 VITALS
OXYGEN SATURATION: 92 % | HEART RATE: 110 BPM | SYSTOLIC BLOOD PRESSURE: 112 MMHG | TEMPERATURE: 97.6 F | DIASTOLIC BLOOD PRESSURE: 70 MMHG

## 2021-03-09 DIAGNOSIS — Z09 ENCOUNTER FOR FOLLOW-UP EXAMINATION AFTER COMPLETED TREATMENT FOR CONDITIONS OTHER THAN MALIGNANT NEOPLASM: ICD-10-CM

## 2021-03-09 DIAGNOSIS — F32.9 MAJOR DEPRESSIVE DISORDER, SINGLE EPISODE, UNSPECIFIED: ICD-10-CM

## 2021-03-09 PROCEDURE — 99072 ADDL SUPL MATRL&STAF TM PHE: CPT

## 2021-03-09 PROCEDURE — 99495 TRANSJ CARE MGMT MOD F2F 14D: CPT

## 2021-03-09 RX ORDER — OXYCODONE AND ACETAMINOPHEN 5; 325 MG/1; MG/1
5-325 TABLET ORAL
Qty: 60 | Refills: 0 | Status: DISCONTINUED | COMMUNITY
Start: 2018-06-06 | End: 2021-03-09

## 2021-03-09 RX ORDER — ERGOCALCIFEROL 1.25 MG/1
1.25 MG CAPSULE, LIQUID FILLED ORAL
Qty: 4 | Refills: 6 | Status: DISCONTINUED | COMMUNITY
Start: 2019-10-19 | End: 2021-03-09

## 2021-03-09 RX ORDER — GABAPENTIN 100 MG/1
100 CAPSULE ORAL 3 TIMES DAILY
Qty: 90 | Refills: 0 | Status: DISCONTINUED | COMMUNITY
Start: 2018-04-13 | End: 2021-03-09

## 2021-03-09 RX ORDER — OXYCODONE AND ACETAMINOPHEN 5; 325 MG/1; MG/1
5-325 TABLET ORAL
Qty: 14 | Refills: 0 | Status: DISCONTINUED | COMMUNITY
Start: 2019-05-29 | End: 2021-03-09

## 2021-03-09 RX ORDER — OXYBUTYNIN CHLORIDE 5 MG/1
5 TABLET ORAL DAILY
Qty: 30 | Refills: 2 | Status: DISCONTINUED | COMMUNITY
Start: 2019-03-26 | End: 2021-03-09

## 2021-03-09 NOTE — HISTORY OF PRESENT ILLNESS
[Post-hospitalization from ___ Hospital] : Post-hospitalization from [unfilled] Hospital [Admitted on: ___] : The patient was admitted on [unfilled] [Discharged on ___] : discharged on [unfilled] [Discharge Summary] : discharge summary [Discharge Med List] : discharge medication list [Patient Contacted By: ____] : and contacted by [unfilled] [FreeTextEntry2] : pt was admitted for Covid pneumonia , she had b/l gg opacities \par she was initially on BiPAP , discharged home on O2 concentration \par Treated w/ Remdesvir and steroids \par she feels better \par ct to have severe leg edema\par needs wrapping \par no chest pain or SOB \par no palpitations , on DOAC / BB \par

## 2021-03-09 NOTE — PHYSICAL EXAM
[Normal] : soft, non-tender, non-distended, no masses palpated, no HSM and normal bowel sounds [de-identified] : severe lymphedema

## 2021-03-09 NOTE — REVIEW OF SYSTEMS
[Fatigue] : fatigue [Shortness Of Breath] : shortness of breath [Cough] : cough [Negative] : Gastrointestinal

## 2021-03-09 NOTE — ASSESSMENT
[FreeTextEntry1] : 1) post hospital admission for Covid \par - improved \par - on home O2 concentrator \par - f/u in 6 week for CXR \par \par 2) HTN/ HL\par - ct meds , BP controlled\par - restart atorva- pt has been out of it \par \par 3) Lymphedema- severe - f/u vascular \par - reviewed DR Vasquez's note from 2/9/2021\par - ct wraps elevation \par - mobility is even reduced more - will order W/C for improved mobility at home \par \par 4) f/u cards for HTN AFib \par - ct BB/ OAC \par \par 5) Depression \par - sertraline nor working as well as before - will refer to psych\par \par 6) off of alendronate \par check BMD \par \par 7) MGUS \par - will f/u heme\par - CBC / BMP - nl at last reading - will reorder\par

## 2021-03-10 ENCOUNTER — APPOINTMENT (OUTPATIENT)
Dept: VASCULAR SURGERY | Facility: CLINIC | Age: 76
End: 2021-03-10
Payer: MEDICARE

## 2021-03-10 ENCOUNTER — NON-APPOINTMENT (OUTPATIENT)
Age: 76
End: 2021-03-10

## 2021-03-10 LAB
ANION GAP SERPL CALC-SCNC: 16 MMOL/L
BASOPHILS # BLD AUTO: 0.05 K/UL
BASOPHILS NFR BLD AUTO: 0.6 %
BUN SERPL-MCNC: 22 MG/DL
CALCIUM SERPL-MCNC: 10.1 MG/DL
CHLORIDE SERPL-SCNC: 100 MMOL/L
CO2 SERPL-SCNC: 27 MMOL/L
CREAT SERPL-MCNC: 0.81 MG/DL
EOSINOPHIL # BLD AUTO: 0.13 K/UL
EOSINOPHIL NFR BLD AUTO: 1.5 %
GLUCOSE SERPL-MCNC: 114 MG/DL
HCT VFR BLD CALC: 43.6 %
HGB BLD-MCNC: 13.4 G/DL
IMM GRANULOCYTES NFR BLD AUTO: 0.3 %
LYMPHOCYTES # BLD AUTO: 1.42 K/UL
LYMPHOCYTES NFR BLD AUTO: 16.2 %
MAN DIFF?: NORMAL
MCHC RBC-ENTMCNC: 29.9 PG
MCHC RBC-ENTMCNC: 30.7 GM/DL
MCV RBC AUTO: 97.3 FL
MONOCYTES # BLD AUTO: 1 K/UL
MONOCYTES NFR BLD AUTO: 11.4 %
NEUTROPHILS # BLD AUTO: 6.12 K/UL
NEUTROPHILS NFR BLD AUTO: 70 %
PLATELET # BLD AUTO: 226 K/UL
POTASSIUM SERPL-SCNC: 4.3 MMOL/L
RBC # BLD: 4.48 M/UL
RBC # FLD: 15.1 %
SODIUM SERPL-SCNC: 143 MMOL/L
WBC # FLD AUTO: 8.75 K/UL

## 2021-03-10 PROCEDURE — 99213 OFFICE O/P EST LOW 20 MIN: CPT | Mod: 95

## 2021-03-10 NOTE — REASON FOR VISIT
[Follow-Up: _____] : a [unfilled] follow-up visit [Home] : at home, [unfilled] , at the time of the visit. [Medical Office: ([facility])___] : at the medical office located in  [Other:____] : [unfilled] [Verbal consent obtained from patient] : the patient, [unfilled] [FreeTextEntry1] : My legs bother me

## 2021-03-10 NOTE — ASSESSMENT
[Arterial/Venous Disease] : arterial/venous disease [Medication Management] : medication management [Other: _____] : [unfilled] [Foot care/Footwear] : foot care/footwear [FreeTextEntry1] : Impression: Lymphedema, post phlebitic syndrome and venous insufficiency w/ chronic edema and recurrent pre ulcer lesions \par \par Medical Conservative management leg elevation, diet and weight loss, compression therapy once le wounds heal\par restart mindi le unna boot for 4 weeks and then re eval\par lymphedema pump use when  restart comp stockings \par pt inquired re increasing time needs for aide will have Misa RAE NP reach out to  homecare  to see what  letter of med necessity is needed\par 1 mo to re eval  via telehealth\par Telehealth visit  time duration 21 min\par \par \par \par \par Varicose veins are enlarged, twisted veins. Varicose veins are caused by increased blood pressure in the veins.  The blood moves towards the heart by 1-way valves in the veins. When the valves become weakened or damaged, blood can collect in the veins and pool in your lower legs (ankles). This causes the veins to become enlarged and incompetent with reflux. Sitting or standing for long periods can cause blood to pool in the leg veins, increasing the pressure within the veins. \par Risk factors for varicose veins or venous disease may include:  obesity, older age, standing or sitting for prolonged periods of time for several years, being female, pregnancy, taking oral contraceptive pills or hormone replacement, being inactive, and/or smoking. \par The most common symptoms of varicose veins are sensations in the legs, such as a heavy feeling, burning, and/or aching. However, each individual may experience symptoms differently.  Other symptoms may include:  color changes in the skin, sores on the legs, or rash.  Severe varicose veins or venous disease may eventually produce long-term mild swelling that can result in more serious skin and tissue problems, such as ulcers and non-healing sores.\par Varicose veins and venous disease are diagnosed by a complete medical history, physical examination, and diagnostic studies for varicose veins including duplex ultrasound and color-flow imaging.  \par Medical treatment for varicose veins and venous disease include:  compression stockings, sclerotherapy, endovenous ablation and/or surgical treatment with microphlebectomy.  \par \par

## 2021-03-10 NOTE — DATA REVIEWED
[FreeTextEntry1] : 7/26/2016 Venous Duplex  maduh le no  acute dvt svt\par \par 7/26/2016 Abd Venous Duplex  patent IVC and madhu iliac veins \par \par 3/28/2017 Venous Duplex  madhu le no acute dvt svt \par \par Feb 2018 LLE venous Duplex reviewed  sig for acute dvt from gastroc v to cfv \par \par 3/16/2018 venous duplex RLE no acute dvt svt LLE sig for subacute dvt of  per v, ptv, gastroc v, pop v ,  sfv\par \par 3/28/2018 Abd venous Duplex  patent ivc and madhu iliac veins  no sig pathology\par \par 3/28/2016 LLE Venous duplex  no acute dvt svt sig for chronic  part recannaliz   sfv dvt, pop v and ptv sig for edema \par \par 6/27/2018 Madhu le venous duplex  pt unable to complete due to severe back pain \par \par 4/4/19 Venous Duplex pt declined\par \par 5/29/2019 RLE venous duplex no acute dvt svt  sig for heavy edema \par

## 2021-03-10 NOTE — HISTORY OF PRESENT ILLNESS
[FreeTextEntry1] : 75 y/o f w/ history of lymphedema and post phlebitic syndrome presents today w/ c/o of bilateral leg swelling right leg significantly worse than left, which has gotten worse in the last 2 weeks. She states she has not used the lymphedema pump in quite a while and now her legs are too swollen for the sleeves. She was wrapping her legs with ace bandages from the ankle to calf without improvement. She attempted compression stockings in the past but unable to comply d/t discomfort and the inability to apply them. She also reports going to [facility] a few times in the past. Denies trauma or injury. Denies open wounds. No fever or chills.  [de-identified] : daughter in attendance\par pt reports new right  shin wound \par pt reports ongoing mindi le swelling  and heaviness\par pt was admitted to [facility] about 2 weeks ago for 1 week of hospitalization\par for covid\par pt has returned home 1 week ago\par homecare and  vns for mindi le unna boot rx has been d/c  since\par pt also has a aide and wants to increase the  time requirements for the aide at home

## 2021-03-10 NOTE — PHYSICAL EXAM
[Ankle Swelling (On Exam)] : present [Varicose Veins Of Lower Extremities] : bilaterally [Ankle Swelling On The Left] : moderate [] : bilaterally [Ankle Swelling Bilaterally] : severe [Alert] : alert [Oriented to Person] : oriented to person [Oriented to Place] : oriented to place [Oriented to Time] : oriented to time [Calm] : calm [JVD] : no jugular venous distention  [Abdomen Tenderness] : ~T ~M No abdominal tenderness [No HSM] : no hepatosplenomegaly [Stool Sample Taken] : No stool obtained  on rectal exam [de-identified] : nad [de-identified] : adelinal [de-identified] : no resp distress [FreeTextEntry1] : Bilateral lower extremity severe edema mid thigh to ankles. \par Severe venous insufficiency, lymphedema w/ severe chronic skin changes and xerosis. No s/s of infection. \par severe pre ulcerative skin changes w pre ulcer skin lesions  mindi calf and shins w \par right prox shin wound w eschar 7mm diam no drainage \par  [de-identified] : obese  [de-identified] : Difficulty w/ bilateral le ROM d/t back pain, obesity and lymphedema. Uses rolling walker to ambulate  [de-identified] : Madhu Cranial nerves 2-12 madhu grossly intact [de-identified] : cooperative

## 2021-03-11 ENCOUNTER — NON-APPOINTMENT (OUTPATIENT)
Age: 76
End: 2021-03-11

## 2021-03-11 DIAGNOSIS — L89.301 PRESSURE ULCER OF UNSPECIFIED BUTTOCK, STAGE 1: ICD-10-CM

## 2021-03-11 LAB — SARS-COV-2 N GENE NPH QL NAA+PROBE: DETECTED

## 2021-03-16 ENCOUNTER — APPOINTMENT (OUTPATIENT)
Dept: INTERNAL MEDICINE | Facility: CLINIC | Age: 76
End: 2021-03-16
Payer: MEDICARE

## 2021-03-16 PROCEDURE — 82565 ASSAY OF CREATININE: CPT

## 2021-03-16 PROCEDURE — C8929: CPT

## 2021-03-16 PROCEDURE — 82248 BILIRUBIN DIRECT: CPT

## 2021-03-16 PROCEDURE — 84484 ASSAY OF TROPONIN QUANT: CPT

## 2021-03-16 PROCEDURE — 97116 GAIT TRAINING THERAPY: CPT

## 2021-03-16 PROCEDURE — 84295 ASSAY OF SERUM SODIUM: CPT

## 2021-03-16 PROCEDURE — 85014 HEMATOCRIT: CPT

## 2021-03-16 PROCEDURE — U0005: CPT

## 2021-03-16 PROCEDURE — 85025 COMPLETE CBC W/AUTO DIFF WBC: CPT

## 2021-03-16 PROCEDURE — 83605 ASSAY OF LACTIC ACID: CPT

## 2021-03-16 PROCEDURE — 94660 CPAP INITIATION&MGMT: CPT

## 2021-03-16 PROCEDURE — 83735 ASSAY OF MAGNESIUM: CPT

## 2021-03-16 PROCEDURE — 84132 ASSAY OF SERUM POTASSIUM: CPT

## 2021-03-16 PROCEDURE — U0003: CPT

## 2021-03-16 PROCEDURE — 82728 ASSAY OF FERRITIN: CPT

## 2021-03-16 PROCEDURE — 82947 ASSAY GLUCOSE BLOOD QUANT: CPT

## 2021-03-16 PROCEDURE — 94640 AIRWAY INHALATION TREATMENT: CPT

## 2021-03-16 PROCEDURE — 82803 BLOOD GASES ANY COMBINATION: CPT

## 2021-03-16 PROCEDURE — 85018 HEMOGLOBIN: CPT

## 2021-03-16 PROCEDURE — 83880 ASSAY OF NATRIURETIC PEPTIDE: CPT

## 2021-03-16 PROCEDURE — 80053 COMPREHEN METABOLIC PANEL: CPT

## 2021-03-16 PROCEDURE — 83615 LACTATE (LD) (LDH) ENZYME: CPT

## 2021-03-16 PROCEDURE — 80076 HEPATIC FUNCTION PANEL: CPT

## 2021-03-16 PROCEDURE — 84145 PROCALCITONIN (PCT): CPT

## 2021-03-16 PROCEDURE — 85730 THROMBOPLASTIN TIME PARTIAL: CPT

## 2021-03-16 PROCEDURE — 85379 FIBRIN DEGRADATION QUANT: CPT

## 2021-03-16 PROCEDURE — 85027 COMPLETE CBC AUTOMATED: CPT

## 2021-03-16 PROCEDURE — 99442: CPT

## 2021-03-16 PROCEDURE — 80048 BASIC METABOLIC PNL TOTAL CA: CPT

## 2021-03-16 PROCEDURE — 85610 PROTHROMBIN TIME: CPT

## 2021-03-16 PROCEDURE — 97162 PT EVAL MOD COMPLEX 30 MIN: CPT

## 2021-03-16 PROCEDURE — 84100 ASSAY OF PHOSPHORUS: CPT

## 2021-03-16 PROCEDURE — 86140 C-REACTIVE PROTEIN: CPT

## 2021-03-16 PROCEDURE — 97110 THERAPEUTIC EXERCISES: CPT

## 2021-03-16 PROCEDURE — 82435 ASSAY OF BLOOD CHLORIDE: CPT

## 2021-03-16 PROCEDURE — 96374 THER/PROPH/DIAG INJ IV PUSH: CPT

## 2021-03-16 PROCEDURE — 71045 X-RAY EXAM CHEST 1 VIEW: CPT

## 2021-03-16 PROCEDURE — 99285 EMERGENCY DEPT VISIT HI MDM: CPT | Mod: 25

## 2021-03-16 PROCEDURE — 82962 GLUCOSE BLOOD TEST: CPT

## 2021-03-16 PROCEDURE — 82330 ASSAY OF CALCIUM: CPT

## 2021-03-16 PROCEDURE — 87040 BLOOD CULTURE FOR BACTERIA: CPT

## 2021-03-17 NOTE — ASSESSMENT
[FreeTextEntry1] : 1) knee pain / back pain / lymphedema\par - ct PT \par - elevation as much as possible\par - needs wheelchair - per family her width is 28 inches - will remeasure when her daughter comes\par \par 2) AFib \par - stable\par - ct meds \par \par spent 18 min w/ pt discussing her condition / needs and plan \par \par the pt family called back wih the following info \par hip width 28 inches \par O2 Tank delivered on 2/26/2021 From [facility]

## 2021-03-17 NOTE — HISTORY OF PRESENT ILLNESS
[Home] : at home, [unfilled] , at the time of the visit. [Medical Office: ([facility])___] : at the medical office located in  [Verbal consent obtained from patient] : the patient, [unfilled] [FreeTextEntry1] : pt reports that she has been doing well \par no falls isnce she came home\par is getting lymphedema therapy \par minimal improvement \par \par she i on home O2\par pt lives alone and has great difficulty ambulating even with the walker \par she has  OA / lymphedema b/l\par \par AFib \par no chest pain or palpitations\par

## 2021-03-23 ENCOUNTER — RX RENEWAL (OUTPATIENT)
Age: 76
End: 2021-03-23

## 2021-03-25 ENCOUNTER — NON-APPOINTMENT (OUTPATIENT)
Age: 76
End: 2021-03-25

## 2021-03-25 ENCOUNTER — APPOINTMENT (OUTPATIENT)
Dept: CARDIOLOGY | Facility: CLINIC | Age: 76
End: 2021-03-25
Payer: MEDICARE

## 2021-03-25 VITALS
BODY MASS INDEX: 33.32 KG/M2 | HEIGHT: 65 IN | OXYGEN SATURATION: 92 % | WEIGHT: 200 LBS | SYSTOLIC BLOOD PRESSURE: 121 MMHG | TEMPERATURE: 96.2 F | DIASTOLIC BLOOD PRESSURE: 78 MMHG | HEART RATE: 71 BPM | RESPIRATION RATE: 18 BRPM

## 2021-03-25 DIAGNOSIS — R60.9 EDEMA, UNSPECIFIED: ICD-10-CM

## 2021-03-25 PROCEDURE — 93000 ELECTROCARDIOGRAM COMPLETE: CPT

## 2021-03-25 PROCEDURE — 99214 OFFICE O/P EST MOD 30 MIN: CPT

## 2021-03-25 PROCEDURE — 99072 ADDL SUPL MATRL&STAF TM PHE: CPT

## 2021-03-25 NOTE — DISCUSSION/SUMMARY
[FreeTextEntry1] : 74 year old woman with AF/DVT/PE?chronic edema and HTN here for followup. Recent COVID hopsitalization. \par \par 1. AF- On Eliquis 5 mg BID, Metoprolol 50 mg BID. EKG without changes. Continue present meds\par 2. DVT/PE- On Eliquis as above\par 3. Dyspnea- improved with resuming lasix, continue present meds\par 4. HLD- Continue Statin therapy

## 2021-03-25 NOTE — HISTORY OF PRESENT ILLNESS
[FreeTextEntry1] : Here for followup. Accompanied by her daughter. Last seen 6/8/2020 for worsening edema. She was diagnosed with COVID earlier this month and hospitalized. Given Remdesivir and Dexamethasone and diuresed. Sent home on O2 but overall ok.\par 1. AF- On Eliquis 5 mg BID, Metoprolol 50 mg BID. EKG without changes. Continue present meds\par 2. DVT/PE- On Eliquis as above\par 3. Dyspnea- improved with resuming lasix, continue present meds\par 4. HLD- Continue Statin therapy

## 2021-03-25 NOTE — PHYSICAL EXAM
[General Appearance - Well Developed] : well developed [Normal Appearance] : normal appearance [Well Groomed] : well groomed [General Appearance - Well Nourished] : well nourished [No Deformities] : no deformities [General Appearance - In No Acute Distress] : no acute distress [Normal Conjunctiva] : the conjunctiva exhibited no abnormalities [Eyelids - No Xanthelasma] : the eyelids demonstrated no xanthelasmas [Normal Oral Mucosa] : normal oral mucosa [No Oral Pallor] : no oral pallor [No Oral Cyanosis] : no oral cyanosis [Normal Jugular Venous A Waves Present] : normal jugular venous A waves present [Normal Jugular Venous V Waves Present] : normal jugular venous V waves present [No Jugular Venous Starr A Waves] : no jugular venous starr A waves [Respiration, Rhythm And Depth] : normal respiratory rhythm and effort [Exaggerated Use Of Accessory Muscles For Inspiration] : no accessory muscle use [Heart Rate And Rhythm] : heart rate and rhythm were normal [Heart Sounds] : normal S1 and S2 [Murmurs] : no murmurs present [Abdomen Soft] : soft [Abdomen Tenderness] : non-tender [] : no hepato-splenomegaly [Abdomen Mass (___ Cm)] : no abdominal mass palpated [Abnormal Walk] : normal gait [Gait - Sufficient For Exercise Testing] : the gait was sufficient for exercise testing [FreeTextEntry1] : lower extremity skin consistent with chronic lymphedema

## 2021-03-29 NOTE — PROGRESS NOTE ADULT - PROBLEM/PLAN-7
Internal Medicine Progress Note      Chief Complaint:  SOB. HFrEF. LVAD work-up    46-year-old female with history of HIV on Haart therapy, CHF with EF around 20 % secondary to nonischemic cardiomyopathy, COPD/asthma, anemia, hx of CVA, also hx of PE in Nov 2019. Pt had prior admissions with CHF exacerbations. In Jan 2020 she was admitted for elective RHC, [place] monitor, found with elevated pressures, low CO/CI. Diuresed, weaned from milrinone, she also had single chamber ICD implanted.   Pt readmitted with cardiac decompensation, lower EF, had RHC march 3, now on inotrope, iv diuresis per Card, being evaluated for LVAD.        DAILY EVENTS:    3/3/21: RHC: Severely elevated RA/RV/PA/PCW pressures, severely reduced CO/CI, Mixed venous 38%  3/4/2021: S/p right heart catheterization and [place] placement.  Started on Lasix drip.  Lasix drip with 40 mg/h. F/U labs  3/5/2021: Decrease Lasix drip to 10 mg/h.  [place] out today.  Mobilize.  Offered all help and support to her.  Infectious disease consultation requested to manage her HAART medication as some of them are not available over here.Not orthoptic heart transplant candidate due to morbid obesity and HIV.  Plan to place PICC line, discontinue [place].  Keep inotrope for now.  Change Lasix to 10 mg/h.  Titrate milrinone.  Currently at 0.375 mcg/kg/min. 3/6/21:  On mil 0.375 and lasix gtt.  cc/hr. Declining VAD testing, discussion with cardiology  3/7/2021: Transferred to 9 W. c/o of itching at the [place] site.  We will give topical hydrocortisone for 3 days.  Currently on Lasix drip at 10 mg/h.  Milrinone 0.375 mcg/g/min.  Also on heparin drip.  LVAD work-up.  Patient with difficulty coping, help and support has been offered.   3/8/21:  Stable, BP on the low side, asymptomatic. On milrinone, Lasix drip, other GDMT on hold. AC with Hep drip. Cr stable, lytes replaced. Additional testing for LVAD work-up reviewed. CT scans overall unremarkable, fibroids. US carotids OK. 
Additional consults noted. Thigh biopsy pending. D/w pt and mother POW at bedside.   3/9/21:  Alert, feels better. BP controled. Milrinone, transitioned to oral diuresis. Cr and lytes stable. AC with Hep drip for now. Completing LVAD work-up. Skin biopsy also pending. F/U testing.   3/10:  Stable, RA, overall better. Milrinone, Hep drip. Plan to resume coumadin. Cr/lytes stable. Diuresis, GDMT per Card. Planning home milrinone. Complete LVAD work-up.   3/11:  Doing well. RA. BP low occasional, asymptomatic. Milrinone, oral diuresis, GDMT. Cr slight up, lytes stable. C/o R foot pain/swelling. X-ray unremarkable, elevated uric acid, suspected gout. Colchicine low dose, allopurinol. Can only do Colchicine every 2-3 days per pharmacy, d/t interaction with Prezista. Will use steroids if needed. Hep drip, coumadin not resumed, plan for colonoscopy.   3/12:  Alert, RA, c/o R foot pain. Cannot use colchicine, plan short steroid course, Allopurinol. Continue Milrinone, oral diuresis attenuated, Cr is slight up. Plans for colonoscopy per GI. AC with Hep drip. Labs reviewed. Completing LVAD work-up.   3/13:  Stable, RA. Foot pain better, plan to wear boot per Podiatry. Cr still up, labs reviewed. Milrinone, diuresis attenuated. Seen by GI, plan for colonoscopy Mon. F/U labs.   3/14:  Alert, RA, on Clears and will get prep for colonoscopy tomorrow. Cr still elevated, torsemide on hold. Milrinone, Hep drip. Labs reviewed. Gout/foot pain better. Pt prefers to go home on milrinone and return for possible LVAD surgery if possible. F/U labs.   3/15/2021: Colonoscopy today.  3/16/2021: Right heart catheterization today.  Possible transfer to ICU if need [place] in place.  3/17/2021:RHC yesterday. CVP 14. PA 53/28. CO 3.2. CI 1.7. Good UOP. Currently on lasix gtt 20mg/hr, milrinone 0.5 mcg/kg/min, and heparin gtt.  3/18/21: Cr down to 1.47. BUN 31. Hgb stable. proBNP down to 2.6k. 3/19/2021:CVP 13. MVO2 68%. Roger Index 2.6.  Net negative 
2.2L.  Actively titrating Lasix milrinone  3/19/2021:CVP 13. MVO2 68%. Roger Index 2.6.  Net negative 2.2L.  Actively titrating Lasix milrinone  3/20/2021: No new complaints.  Feels about the same.  Complains of discomfort at the [place] site.  Off dobutamine and Lasix drip.  Milrinone being actively titrated.  Sodium 127.  Repeat CMP in the morning.  On heparin, monitor PTT and adjust heparin drip.  3/21/2021: Complains of gout in the left big toe area.  Unable to give colchicine because of interaction with HIV meds.  Will treat with prednisone for 3 days and see symptomatic response.  If no improvement might need rheumatology to come and see her.  3/22: Alert, c/o mild HA, some gout pain in her foot.  Milrinone, Hep drip. Hemo's review. Off Isosorbide, tolerates hydralazine better. Diuresis with iv Lasix. HypoNa, tolvaptan dose. Labs reviewed. Plan for LVAD surgery, possible tomorrow. D/w RN  3/23:  Alert, O2NC. No HA. BP stable. Milrinone, back on Lasix drip, CVP was elevated, CXR with congestion. Cr/lytes stable. HypoNa better. AC with Hep drip. Planning OR tomorrow, LVAD surgery. D/w RN  3/24:  Alert, c/o fatigue, gout pain in her foot. Milrinone, iv diuresis. Hep drip. C/o mild HA after hydralazine doses. Awaiting LVAD surgery. Cr overall stable, lytes replaced. For gout, avoid steroids at this time, colchicine dose, will increase allopurinol after her surgery. F/U labs. D/w RN  3/25:  Alert, pain controled. BP stable. Milrinone, iv diuresis. [place] monitor. AC with Hep drip. Cr close to baseline, HypoNa stable. Plan for LVAD surgery tomorrow. D/w RN  3/26:  In OR for HM3, LVAD implant. Postop care in [place].   3/27:  Extubated, O2NC, responsive. Dual inotropes, Epi, Lasix drip. CVP mild elevated. Mild tachy, ICD back. Device stable flows. Good urine output. Labs reviewed. Cr, Hg stable. Postop care. Coumadin dose planned. D/w RN  3/28:  Alert, O2NC, appetite fair. Some issues with pain. CVP high, diuresis, Lasix 
drip, diuril. Weaning Epi. Dual inotropes. Device stable flows. Cr stable, mild postop anemia. F/U labs, OOB in chair. D/w RN  3/29/2021:MAPs 70s-80s. CVP 18, PA 30s/20s, CO 5.6, CI 2.8, . MVO2 68%. Net negative 260 cc/24hrs. Cr 1.38. BUN 25. Na 129. WBC 10.1. Hgb 8.5. INR 1.0. Dobutamine 5    mcg/kg/min, Epi 2 mcg/min, Lasix 40 mg/hr, Milrinone 0.5 mcg/kg/min.             Hospital Meds  Scheduled Medications  • [START ON 3/13/2021] predniSONE  10 mg Oral Daily with breakfast   • torsemide  60 mg Oral BID   • allopurinol  100 mg Oral Daily   • potassium CHLORIDE  20 mEq Oral BID WC   • metoPROLOL succinate  25 mg Oral QHS   • spironolactone  25 mg Oral Daily   • Potassium Standard Replacement Protocol   Does not apply See Admin Instructions   • sodium chloride (PF)  10 mL Injection 2 times per day   • cholecalciferol  50 mcg Oral Daily   • aspirin  81 mg Oral Daily   • darunavir  600 mg Oral BID   • emtricitabine-tenofovir DISOPROXIL  1 tablet Oral Daily   • ferrous sulfate  325 mg Oral Daily with dinner   • montelukast  10 mg Oral Nightly   • pantoprazole  40 mg Oral Nightly   • raltegravir  400 mg Oral BID   • ritonavir  100 mg Oral BID WC       PRN Medications  oxyCODONE-acetaminophen, bacitracin-polymyxin B, sodium chloride, sodium chloride (PF), sodium chloride (PF), HYDROcodone-acetaminophen, heparin (porcine), heparin (porcine), calcium carbonate, zolpidem, acetaminophen, ondansetron, albuterol, diphenhydrAMINE, fluticasone, sodium chloride, guaiFENesin-codeine    Last Recorded Vitals  Visit Vitals  /82 (BP Location: LLE - Left lower extremity, Patient Position: Semi-Velasco's)   Pulse (!) 102   Temp 98.1 °F (36.7 °C) (Oral)   Resp 17   Ht 5' 4\" (1.626 m)   Wt 103.4 kg (227 lb 15.3 oz)   SpO2 96%   BMI 39.13 kg/m²         SpO2 Readings from Last 3 Encounters:   03/12/21 96%   03/02/21 99%   02/26/21 99%        Physical Exam  Constitutional:       Appearance: Normal appearance.   HENT:      
Mouth/Throat:      Mouth: Mucous membranes are moist.   Eyes:      Extraocular Movements: Extraocular movements intact.   Cardiovascular:      Rate and Rhythm: Normal rate and regular rhythm.   Pulmonary:      Breath sounds: Normal breath sounds.   Abdominal:      General: Abdomen is flat.      Palpations: Abdomen is soft.   Musculoskeletal:      Cervical back: Neck supple.   Skin:     General: Skin is warm and dry.   Neurological:      General: No focal deficit present.      Mental Status: She is alert and oriented to person, place, and time.         Labs   Recent Labs     03/10/21  0506 03/11/21  0442 03/12/21  0514   WBC 4.7 4.6 4.9   HGB 11.3* 11.4* 11.3*   HCT 36.2 36.8 35.9*    201 184   MCV 77.2* 78.0 77.7*       Recent Labs   Lab 03/12/21  0514 03/11/21  0442 03/10/21  0506   SODIUM 127* 128* 128*   POTASSIUM 3.9 4.8 4.1   CHLORIDE 92* 97* 95*   CO2 27 24 27   BUN 31* 27* 22*   CREATININE 1.77* 1.45* 1.28*   GLUCOSE 110* 124* 149*   CALCIUM 9.4 9.3 9.2       No results found     Recent Labs   Lab 03/12/21  0514 03/11/21  0442 03/10/21  0506 03/09/21  0448 03/08/21  0557   AST 33 34 39* 36 28       Recent Labs     03/10/21  0506 03/11/21  0442  03/11/21  1228 03/11/21  1818 03/12/21  0514   INR 1.1 1.1  --   --   --  1.1   PT 11.5 11.9*  --   --   --  11.9*   PTT 51* 42*   < > 62* 54* 47*    < > = values in this interval not displayed.       No results available in last 24 hours      Microbiology Results     None            Final Result   1.   Mild generalized soft tissue swelling.  No soft tissue gas or foreign   bodies.   2.   No acute osseous changes.      Electronically Signed by: OLY MISHRA MD    Signed on: 3/11/2021 3:39 PM          CT CHEST ABDOMEN PELVIS WO CONTRAST   Final Result       Cardiomegaly.  No findings of interstitial edema.        Enlarged fibroid uterus.      Electronically Signed by: ELIZABETH GARCIA MD    Signed on: 3/8/2021 2:51 PM          CT HEAD WO CONTRAST   Final 
DISPLAY PLAN FREE TEXT
Result      1. No acute intracranial process.      Electronically Signed by: AGUSTÍN MAURO MD    Signed on: 3/8/2021 1:35 PM          XR ORTHOPANTOGRAM   Final Result      The patient is missing multiple teeth.  Otherwise unremarkable study.        Electronically Signed by: KRUPA CORTES    Signed on: 3/8/2021 12:26 PM          US VASC CAROTID DUPLEX BILATERAL   Final Result      No evidence for hemodynamically significant internal carotid stenosis on   either side.        Incidental note is made of thrombus in the right internal jugular vein.        Findings above were relayed to patients nurse, Anne, by the sonographer      Electronically Signed by: ELIZABETH GARCIA MD    Signed on: 3/7/2021 10:04 PM          US Ankle/Brachial 1 or 2 Level Extremity   Final Result   Preserved arterial flow with normal ankle-brachial index bilaterally.      Reference for JOAQUIN:   Normal: Greater than or equal to 1.0    Borderline: 0.91-0.99    Mild disease: 0.81-0.90    Moderate disease: 0.51-.80    Moderate to severe disease: 0.31-0.50    Severe disease: Less than or equal to 0.30    Calcified/noncompressible arteries: greater than 1.4        TOE PRESSURES/TBI LEVELS (interpretation):    Toe systolic pressure greater than 30 mmHg may indicate healing potential   of foot ulcers.    The normal range for TBI is > 0.65    If TBI is < 0.65, there is evidence of arterial occlusive disease.       Electronically Signed by: ELIZABETH GARCIA MD    Signed on: 3/7/2021 10:26 PM          US VASC EXTREMITY LOWER VENOUS DUPLEX   Final Result      1.   No evidence of bilateral lower extremity DVT.      Electronically Signed by: SILVA ALCAZAR MD    Signed on: 3/7/2021 8:22 PM          US ABDOMEN COMPLETE   Final Result   1. Hepatic steatosis.      Limited visualization of the distal pancreas and distal abdominal   aorta/bifurcation.      Status post cholecystectomy.      Thank you for the opportunity to participate in the care of your patient.   Please 
contact us if we can be of further assistance.         Electronically Signed by: AUBREY GARCIA DO    Signed on: 3/7/2021 10:35 AM          XR ABDOMEN 1 VIEW   Final Result   No evidence of an acute intraabdominal process.  Moderate   fecal stasis.      Electronically Signed by: AUBREY GARCIA DO    Signed on: 3/7/2021 10:12 AM          XR CHEST AP OR PA 1 VIEW   Final Result   Right PICC line tip projects over the atriocaval junction.      Electronically Signed by: HIEU TILLEY MD    Signed on: 3/6/2021 1:25 PM          XR CHEST PA OR AP 1 VIEW   Final Result   Mild central congestion.  Perihilar interstitial opacities.  Left lung base   opacity.  Small left pleural effusion.  Tiny right pleural effusion.        Electronically Signed by: HIEU TILLEY MD    Signed on: 3/6/2021 10:18 AM          XR CHEST AP OR PA 1 VIEW   Final Result   Stable support devices.  No significant change from prior exam.            Electronically Signed by: ERIBERTO LAMB MD    Signed on: 3/5/2021 9:37 AM          XR CHEST AP OR PA 1 VIEW   Final Result   Impression:     Cardiomegaly with findings of interstitial edema. Support devices as   above.      Electronically Signed by: ELIZABETH GARCIA MD    Signed on: 3/4/2021 9:34 AM          Cath/PV Case   Final Result      XR CHEST PA AND LATERAL 2 VIEWS   Final Result   1.    Mild cardiomegaly and pulmonary vascular congestion.         Electronically Signed by: CHEYANNE IRENE MD    Signed on: 3/3/2021 7:08 AM                Assessment/Plan:    Acute on chronic systolic CHF. NICM. HFrEF  Admitted with cardiac decompensation, hypervolemia, hypoxia, leg edema  Prior RHC Jan 2020 with Severely elevated intracardiac filling pressures, low CO/CI.   RHC on 3/3/21: Severely elevated RA/RV/PA/PCW pressures, severely reduced CO/CI, Mixed venous 38%  Home regimen included torsemide, aldactone, metolazone twice weekly, Entresto, metoprolol  Now on: Milrinone, iv Lasix drip. BP stable. Plan for LVAD 
surgery    Single chamber ICD since Jan 2020. Repeat ECHO with EF 15%, mod MR/TR  Cardiology evaluation, LVAD work-up completed.   Not candidate for OHT d/t elevated BMI, HIV  ICD Device recently interrogated   CT scans were unremarkable.      Acute Resp Failure, in the context of CHF exacerbation, pt also with COPD: stable, O2NC prn  Wean O2 as tolerated, diuresis,  inhalers, nebs prn.      OMAR. Had mild elevated Cr, suspect in the context of CHF. Cr back to baseline after diuresis  Low K, Mag: lytes replaced  HypoNa: treat CHF. Adjust diuresis, tolvaptan      Hx of PE:  recent CT chest no PE, US dopler no DVT  Pt was on coumadin, now on Hep drip     Hx of asthma/COPD: O2NC prn, nebs, inhalers. No evid of acute exacerbation     Hypertension. BP controled, GDMT now on hold.      Hx of CVA 10/2019, status post TPA and thrombectomy  recovered well, no residual deficits     DL: on statin.     vit D deficiency: recent level 14.2, on suplements  Anemia. of CD and iron deficiency. Recent iron panel noted. Hg stable     HIV, on [place] therapy, resume home meds. Labs OK, seen by ID     Thigh lesions, on both thighs, seen by ID, Derm, Onc  Biopsy done, inflammatory changes     GI and DVT prophylaxis       Code Status    Code Status: Full Resuscitation      3/12/2021 6:39 PM    I have spent greater than 35 min performing face-to-face patient care, including rendering the following critical care: Reviewed all the radiological studies available and their interpretation, reviewed  all the available labwork and all the pertinent consultant's notes. I have also answered all the patient/relatives questions.Pt is critically ill as documented above and requires high complexity medical decision making and active titration of therapies to preserve life.  03/29/21          
DISPLAY PLAN FREE TEXT

## 2021-04-14 ENCOUNTER — APPOINTMENT (OUTPATIENT)
Dept: VASCULAR SURGERY | Facility: CLINIC | Age: 76
End: 2021-04-14

## 2021-04-22 ENCOUNTER — RX RENEWAL (OUTPATIENT)
Age: 76
End: 2021-04-22

## 2021-04-27 ENCOUNTER — RX RENEWAL (OUTPATIENT)
Age: 76
End: 2021-04-27

## 2021-04-28 ENCOUNTER — RX RENEWAL (OUTPATIENT)
Age: 76
End: 2021-04-28

## 2021-05-28 ENCOUNTER — RX RENEWAL (OUTPATIENT)
Age: 76
End: 2021-05-28

## 2021-06-08 ENCOUNTER — RX RENEWAL (OUTPATIENT)
Age: 76
End: 2021-06-08

## 2021-07-13 ENCOUNTER — APPOINTMENT (OUTPATIENT)
Dept: DISASTER EMERGENCY | Facility: OTHER | Age: 76
End: 2021-07-13
Payer: MEDICARE

## 2021-07-13 PROCEDURE — 0001A: CPT

## 2021-07-24 ENCOUNTER — RX RENEWAL (OUTPATIENT)
Age: 76
End: 2021-07-24

## 2021-08-03 ENCOUNTER — RX RENEWAL (OUTPATIENT)
Age: 76
End: 2021-08-03

## 2021-08-03 ENCOUNTER — APPOINTMENT (OUTPATIENT)
Dept: DISASTER EMERGENCY | Facility: OTHER | Age: 76
End: 2021-08-03
Payer: MEDICARE

## 2021-08-03 PROCEDURE — 0002A: CPT

## 2021-08-14 ENCOUNTER — APPOINTMENT (OUTPATIENT)
Dept: DISASTER EMERGENCY | Facility: OTHER | Age: 76
End: 2021-08-14

## 2021-08-25 ENCOUNTER — APPOINTMENT (OUTPATIENT)
Dept: VASCULAR SURGERY | Facility: CLINIC | Age: 76
End: 2021-08-25

## 2021-09-01 ENCOUNTER — RX RENEWAL (OUTPATIENT)
Age: 76
End: 2021-09-01

## 2021-09-07 ENCOUNTER — APPOINTMENT (OUTPATIENT)
Dept: INTERNAL MEDICINE | Facility: CLINIC | Age: 76
End: 2021-09-07
Payer: MEDICARE

## 2021-09-07 ENCOUNTER — LABORATORY RESULT (OUTPATIENT)
Age: 76
End: 2021-09-07

## 2021-09-07 VITALS
OXYGEN SATURATION: 95 % | HEART RATE: 78 BPM | SYSTOLIC BLOOD PRESSURE: 130 MMHG | TEMPERATURE: 98.4 F | DIASTOLIC BLOOD PRESSURE: 66 MMHG | BODY MASS INDEX: 33.28 KG/M2 | WEIGHT: 200 LBS

## 2021-09-07 DIAGNOSIS — Z23 ENCOUNTER FOR IMMUNIZATION: ICD-10-CM

## 2021-09-07 DIAGNOSIS — H91.90 UNSPECIFIED HEARING LOSS, UNSPECIFIED EAR: ICD-10-CM

## 2021-09-07 DIAGNOSIS — J06.9 ACUTE UPPER RESPIRATORY INFECTION, UNSPECIFIED: ICD-10-CM

## 2021-09-07 DIAGNOSIS — L98.9 DISORDER OF THE SKIN AND SUBCUTANEOUS TISSUE, UNSPECIFIED: ICD-10-CM

## 2021-09-07 PROCEDURE — 90662 IIV NO PRSV INCREASED AG IM: CPT

## 2021-09-07 PROCEDURE — G0008: CPT

## 2021-09-07 PROCEDURE — 99214 OFFICE O/P EST MOD 30 MIN: CPT | Mod: 25

## 2021-09-07 NOTE — HISTORY OF PRESENT ILLNESS
[de-identified] : 76 y.o. female, PMHx hypertension, a-fib, chronic DVT, lymphedema, MGUS, OA, obesity, chronic venous insufficiency, varicose veins.  Patient of Dr. Wellington. \par Presents for follow up.  \par \par Has HHA 4 hr/day.  Helps with meds, dressing, bathing, food prep, light house work.  \par Walks with walker at home.  \par Uses pneumatic compression boots at home twice daily for lymphedema.  \par Follows regularly with vascular.  \par COVID infection in March of this year.  Vaccinated in July, felt well with the vaccine.  \par Gets short of breath with activity, which is unchanged.  No chest pain, no dizziness, no palpitations.  No confusion.  \par Noticed a lump on the side of her head for at least a year.  Maybe slightly bigger. No itch, no pain.  \par

## 2021-09-07 NOTE — ASSESSMENT
[FreeTextEntry1] : Lymphedema, chronic DVT :\par Follows regularly with vascular, no change\par remains on anticoagulation\par monitors skin regularly\par HHA with assistance bathing, dressing, cooking cleaning\par uses walker\par \par H/o a-fib, dyspnea:\par Regular cardiology follow up\par Remains anticoagulated\par Dyspnea improved with twice daily dosing of lasix, uses home o2\par PFTs last year with mild restriction.  Feels fully recovered from COVID infection\par \par Hard of hearing:\par Would like evaluation for possible hearing aids\par \par HM:\par Would like referral for skin screening\par Flu shot today\par Recieved COVID vaccine series\par \par

## 2021-09-07 NOTE — PHYSICAL EXAM
[Normal] : no CVA or spinal tenderness [de-identified] : bilateral leg lymphedema  [de-identified] : skin intact on lower legs, skin tag on scalp

## 2021-09-09 LAB
25(OH)D3 SERPL-MCNC: 17 NG/ML
ALBUMIN SERPL ELPH-MCNC: 4.6 G/DL
ALP BLD-CCNC: 107 U/L
ALT SERPL-CCNC: 12 U/L
ANION GAP SERPL CALC-SCNC: 12 MMOL/L
AST SERPL-CCNC: 14 U/L
BASOPHILS # BLD AUTO: 0.04 K/UL
BASOPHILS NFR BLD AUTO: 0.7 %
BILIRUB SERPL-MCNC: 0.8 MG/DL
BUN SERPL-MCNC: 14 MG/DL
CALCIUM SERPL-MCNC: 9.6 MG/DL
CHLORIDE SERPL-SCNC: 107 MMOL/L
CHOLEST SERPL-MCNC: 200 MG/DL
CO2 SERPL-SCNC: 25 MMOL/L
CREAT SERPL-MCNC: 0.76 MG/DL
EOSINOPHIL # BLD AUTO: 0.19 K/UL
EOSINOPHIL NFR BLD AUTO: 3.4 %
ESTIMATED AVERAGE GLUCOSE: 120 MG/DL
GLUCOSE SERPL-MCNC: 90 MG/DL
HBA1C MFR BLD HPLC: 5.8 %
HCT VFR BLD CALC: 42.4 %
HDLC SERPL-MCNC: 59 MG/DL
HGB BLD-MCNC: 13 G/DL
IMM GRANULOCYTES NFR BLD AUTO: 0.4 %
LDLC SERPL CALC-MCNC: 109 MG/DL
LYMPHOCYTES # BLD AUTO: 1.53 K/UL
LYMPHOCYTES NFR BLD AUTO: 27.7 %
MAN DIFF?: NORMAL
MCHC RBC-ENTMCNC: 29.2 PG
MCHC RBC-ENTMCNC: 30.7 GM/DL
MCV RBC AUTO: 95.3 FL
MONOCYTES # BLD AUTO: 0.52 K/UL
MONOCYTES NFR BLD AUTO: 9.4 %
NEUTROPHILS # BLD AUTO: 3.22 K/UL
NEUTROPHILS NFR BLD AUTO: 58.4 %
NONHDLC SERPL-MCNC: 141 MG/DL
PLATELET # BLD AUTO: 209 K/UL
POTASSIUM SERPL-SCNC: 4.4 MMOL/L
PROT SERPL-MCNC: 7.3 G/DL
RBC # BLD: 4.45 M/UL
RBC # FLD: 14.7 %
SODIUM SERPL-SCNC: 143 MMOL/L
TRIGL SERPL-MCNC: 159 MG/DL
TSH SERPL-ACNC: 4.25 UIU/ML
VIT B12 SERPL-MCNC: 302 PG/ML
WBC # FLD AUTO: 5.52 K/UL

## 2021-09-09 RX ORDER — ERGOCALCIFEROL 1.25 MG/1
1.25 MG CAPSULE, LIQUID FILLED ORAL
Qty: 12 | Refills: 1 | Status: ACTIVE | COMMUNITY
Start: 2021-09-09 | End: 1900-01-01

## 2021-09-13 LAB
ALBUMIN MFR SERPL ELPH: 56.8 %
ALBUMIN SERPL-MCNC: 4.1 G/DL
ALBUMIN/GLOB SERPL: 1.3 RATIO
ALPHA1 GLOB MFR SERPL ELPH: 3.4 %
ALPHA1 GLOB SERPL ELPH-MCNC: 0.2 G/DL
ALPHA2 GLOB MFR SERPL ELPH: 10.3 %
ALPHA2 GLOB SERPL ELPH-MCNC: 0.8 G/DL
B-GLOBULIN MFR SERPL ELPH: 13.4 %
B-GLOBULIN SERPL ELPH-MCNC: 1 G/DL
DEPRECATED KAPPA LC FREE/LAMBDA SER: 1.05 RATIO
GAMMA GLOB FLD ELPH-MCNC: 1.2 G/DL
GAMMA GLOB MFR SERPL ELPH: 16.1 %
IGA SER QL IEP: 225 MG/DL
IGG SER QL IEP: 1119 MG/DL
IGM SER QL IEP: 219 MG/DL
INTERPRETATION SERPL IEP-IMP: NORMAL
KAPPA LC CSF-MCNC: 1.7 MG/DL
KAPPA LC SERPL-MCNC: 1.78 MG/DL
M PROTEIN MFR SERPL ELPH: 7.7 %
M PROTEIN SPEC IFE-MCNC: NORMAL
MONOCLON BAND OBS SERPL: 0.6 G/DL
PROT SERPL-MCNC: 7.3 G/DL
PROT SERPL-MCNC: 7.3 G/DL

## 2021-09-21 ENCOUNTER — APPOINTMENT (OUTPATIENT)
Dept: VASCULAR SURGERY | Facility: CLINIC | Age: 76
End: 2021-09-21
Payer: MEDICARE

## 2021-09-21 VITALS
HEART RATE: 77 BPM | TEMPERATURE: 97.8 F | BODY MASS INDEX: 33.32 KG/M2 | DIASTOLIC BLOOD PRESSURE: 84 MMHG | HEIGHT: 65 IN | WEIGHT: 200 LBS | SYSTOLIC BLOOD PRESSURE: 138 MMHG

## 2021-09-21 DIAGNOSIS — I87.009 POSTTHROMBOTIC SYNDROME W/OUT COMPLICATIONS OF UNSPECIFIED EXTREMITY: ICD-10-CM

## 2021-09-21 PROCEDURE — 29580 STRAPPING UNNA BOOT: CPT | Mod: 50

## 2021-09-21 PROCEDURE — 99214 OFFICE O/P EST MOD 30 MIN: CPT | Mod: 25

## 2021-09-21 PROCEDURE — 93970 EXTREMITY STUDY: CPT

## 2021-09-21 NOTE — PHYSICAL EXAM
[2+] : left 2+ [0] : left 0 [Ankle Swelling (On Exam)] : present [Varicose Veins Of Lower Extremities] : bilaterally [Ankle Swelling On The Left] : moderate [] : bilaterally [Ankle Swelling Bilaterally] : severe [No HSM] : no hepatosplenomegaly [No Rash or Lesion] : No rash or lesion [Alert] : alert [Oriented to Person] : oriented to person [Oriented to Place] : oriented to place [Oriented to Time] : oriented to time [Calm] : calm [JVD] : no jugular venous distention  [Abdomen Masses] : No abdominal masses [Abdomen Tenderness] : ~T ~M No abdominal tenderness [Tender] : was nontender [Stool Sample Taken] : No stool obtained  on rectal exam [de-identified] : nad [de-identified] : adelinal [de-identified] : no resp distress [FreeTextEntry1] : unable to palpate bilateral pedal pulses due to edema. Feet w/ good cap refill. \par Bilateral lower extremity severe edema mid thigh to ankles. \par Severe venous insufficiency, lymphedema w/ severe chronic skin changes and xerosis. No s/s of infection. \par severe pre ulcerative skin changes w pre ulcer skin lesions  mindi calf and shins w \par right mid  medial calf skin breakdown w cellulitis \par  [de-identified] : obese  [de-identified] : Madhu Cranial nerves 2-12 madhu grossly intact [de-identified] : Difficulty w/ bilateral le ROM d/t back pain, obesity and lymphedema. Uses rolling walker to ambulate  [de-identified] : cooperative

## 2021-09-21 NOTE — ASSESSMENT
[Arterial/Venous Disease] : arterial/venous disease [Medication Management] : medication management [Other: _____] : [unfilled] [Foot care/Footwear] : foot care/footwear [FreeTextEntry1] : Impression: Lymphedema, post phlebitic syndrome and venous insufficiency w/ chronic edema and recurrent pre ulcer lesions mindi le and  rt leg cellulits \par \par Medical Conservative management leg elevation,, compression therapy once le wounds heal\par restart mindi le unna boot till  le wounds heal\par once pt start comp stockings use advised her to restart lymphedema pump \par start augmentin 875 bid 7 days \par dec 2021 telehealth visit \par \par \par \par \par \par Varicose veins are enlarged, twisted veins. Varicose veins are caused by increased blood pressure in the veins.  The blood moves towards the heart by 1-way valves in the veins. When the valves become weakened or damaged, blood can collect in the veins and pool in your lower legs (ankles). This causes the veins to become enlarged and incompetent with reflux. Sitting or standing for long periods can cause blood to pool in the leg veins, increasing the pressure within the veins. \par Risk factors for varicose veins or venous disease may include:  obesity, older age, standing or sitting for prolonged periods of time for several years, being female, pregnancy, taking oral contraceptive pills or hormone replacement, being inactive, and/or smoking. \par The most common symptoms of varicose veins are sensations in the legs, such as a heavy feeling, burning, and/or aching. However, each individual may experience symptoms differently.  Other symptoms may include:  color changes in the skin, sores on the legs, or rash.  Severe varicose veins or venous disease may eventually produce long-term mild swelling that can result in more serious skin and tissue problems, such as ulcers and non-healing sores.\par Varicose veins and venous disease are diagnosed by a complete medical history, physical examination, and diagnostic studies for varicose veins including duplex ultrasound and color-flow imaging.  \par Medical treatment for varicose veins and venous disease include:  compression stockings, sclerotherapy, endovenous ablation and/or surgical treatment with microphlebectomy.  \par \par

## 2021-09-21 NOTE — DATA REVIEWED
[FreeTextEntry1] : 7/26/2016 Venous Duplex  madhu le no  acute dvt svt\par \par 7/26/2016 Abd Venous Duplex  patent IVC and madhu iliac veins \par \par 3/28/2017 Venous Duplex  madhu le no acute dvt svt \par \par Feb 2018 LLE venous Duplex reviewed  sig for acute dvt from gastroc v to cfv \par \par 3/16/2018 venous duplex RLE no acute dvt svt LLE sig for subacute dvt of  per v, ptv, gastroc v, pop v ,  sfv\par \par 3/28/2018 Abd venous Duplex  patent ivc and madhu iliac veins  no sig pathology\par \par 3/28/2016 LLE Venous duplex  no acute dvt svt sig for chronic  part recannaliz   sfv dvt, pop v and ptv sig for edema \par \par 6/27/2018 Madhu le venous duplex  pt unable to complete due to severe back pain \par \par 4/4/19 Venous Duplex pt declined\par \par 5/29/2019 RLE venous duplex no acute dvt svt  sig for heavy edema \par \par 9/21/2021 Madhu LE Venous Duplex  Madhu le no acute dvt svt \par                                        sig left  chronic  webbing pop v \par \par \par \par

## 2021-09-21 NOTE — REASON FOR VISIT
[Follow-Up: _____] : a [unfilled] follow-up visit [FreeTextEntry1] : My legs bother me and are swollen again

## 2021-09-21 NOTE — HISTORY OF PRESENT ILLNESS
[FreeTextEntry1] : 75 y/o f w/ history of lymphedema and post phlebitic syndrome presents today w/ c/o of bilateral leg swelling right leg significantly worse than left, which has gotten worse in the last 2 weeks. She states she has not used the lymphedema pump in quite a while and now her legs are too swollen for the sleeves. She was wrapping her legs with ace bandages from the ankle to calf without improvement. She attempted compression stockings in the past but unable to comply d/t discomfort and the inability to apply them. She also reports going to [facility] a few times in the past. Denies trauma or injury. Denies open wounds. No fever or chills.  [de-identified] : pt is partially complaint w comp stockings and lymphedema pump\par pt states  both legs are swollen\par pt states   rt calf is red  and warm \par onset sev days ago

## 2021-10-01 ENCOUNTER — APPOINTMENT (OUTPATIENT)
Dept: ORTHOPEDIC SURGERY | Facility: CLINIC | Age: 76
End: 2021-10-01
Payer: MEDICARE

## 2021-10-01 DIAGNOSIS — M17.12 UNILATERAL PRIMARY OSTEOARTHRITIS, LEFT KNEE: ICD-10-CM

## 2021-10-01 DIAGNOSIS — Z96.651 PRESENCE OF RIGHT ARTIFICIAL KNEE JOINT: ICD-10-CM

## 2021-10-01 DIAGNOSIS — Z96.652 PRESENCE OF LEFT ARTIFICIAL KNEE JOINT: ICD-10-CM

## 2021-10-01 DIAGNOSIS — L03.115 CELLULITIS OF RIGHT LOWER LIMB: ICD-10-CM

## 2021-10-01 DIAGNOSIS — R25.2 CRAMP AND SPASM: ICD-10-CM

## 2021-10-01 PROCEDURE — 99214 OFFICE O/P EST MOD 30 MIN: CPT

## 2021-10-01 PROCEDURE — 73562 X-RAY EXAM OF KNEE 3: CPT | Mod: RT

## 2021-10-01 PROCEDURE — 73502 X-RAY EXAM HIP UNI 2-3 VIEWS: CPT | Mod: RT

## 2021-10-29 ENCOUNTER — RX RENEWAL (OUTPATIENT)
Age: 76
End: 2021-10-29

## 2021-10-30 ENCOUNTER — RX RENEWAL (OUTPATIENT)
Age: 76
End: 2021-10-30

## 2021-11-06 ENCOUNTER — RX RENEWAL (OUTPATIENT)
Age: 76
End: 2021-11-06

## 2021-11-22 NOTE — REVIEW OF SYSTEMS
**For crisis resources, please see the information at the end of this document**     Patient Education      Thank you for coming to the [facility] [place] [facility].    Lab Testing:  If you had lab testing today and your results are reassuring or normal they will be mailed to you or sent through [employer] within 7 days. If the lab tests need quick action we will call you with the results. The phone number we will call with results is # [phone] (home) . If this is not the best number please call our clinic and change the number.    Medication Refills:  If you need any refills please call your pharmacy and they will contact us. Our fax number for refills is [phone]. Please allow three business for refill processing. If you need to  your refill at a new pharmacy, please contact the new pharmacy directly. The new pharmacy will help you get your medications transferred.     Scheduling:  If you have any concerns about today's visit or wish to schedule another appointment please call our office during normal business hours [phone] (8-5:00 M-F)    Contact Us:  Please call [phone] during business hours (8-5:00 M-F).  If after clinic hours, or on the weekend, please call  [phone].    Financial Assistance [phone]  [facility] Billing [phone]  Central Billing Office, [facility]: [phone]  [facility] Billing [phone]  Medical Records [phone]  [facility] Patient Bill of Rights [email or web address]       MENTAL HEALTH CRISIS NUMBERS:  For a medical emergency please call  911 or go to the nearest ER.     [facility]:   [facility] -[phone]   Crisis Residence [facility] -[phone]   Walk-In Counseling Center [facility] -[phone]   COPE 24/7 [facility] Mobile Team -[phone] (adults)/[phone] (child)  CHILD: [facility] needs assessment  team - [phone]      [facility]:   [facility] - [phone]   Walk-in counseling [facility] - [phone]   Walk-in counseling [facility] - [phone]   Crisis Residence [facility] - [phone]  Urgent Care Adult Mental [facility]-[phone] mobile unit/ 24/7 crisis line    National Crisis Numbers:   National Suicide Prevention Lifeline: 5-057-068-TALK (577-078-9682)  Poison Control Center - 9-589-541-4721  [employer]/resources for a list of additional resources (SOS)  Trans Lifeline a hotline for transgender people 1-402.964.9020  The Lb Project a hotline for LGBT youth 8-532-866-7306  Crisis Text Line: For any crisis 24/7   To: 843169  see www.crisistextline.org  - IF MAKING A CALL FEELS TOO HARD, send a text!         Again thank you for choosing [facility] MENTAL HEALTH & ADDICTION [facility] and please let us know how we can best partner with you to improve you and your family's health.    You may be receiving a survey regarding this appointment. We would love to have your feedback, both positive and negative. The survey is done by an external company, so your answers are anonymous.            [Limb Pain] : limb pain [Limb Swelling] : limb swelling [Negative] : Heme/Lymph [As Noted in HPI] : as noted in HPI

## 2021-12-07 ENCOUNTER — APPOINTMENT (OUTPATIENT)
Dept: VASCULAR SURGERY | Facility: CLINIC | Age: 76
End: 2021-12-07

## 2021-12-07 ENCOUNTER — RX RENEWAL (OUTPATIENT)
Age: 76
End: 2021-12-07

## 2021-12-22 NOTE — PHYSICAL THERAPY INITIAL EVALUATION ADULT - TRANSFER TRAINING, PT EVAL
OFFICE VISIT      Patient: Meme Pascal   : 1942 MRN: [id number]    SUBJECTIVE:  Chief Complaint   Patient presents with   • Imm/Inj     Flu vaccine       A 79 year old female who presents for the followup of multiple conditions.    Patient has given consent to record this visit for documentation in their clinical record.      HISTORY OF PRESENT ILLNESS:  Typical atrial flutter (CMS/HCC): Heart rate was 53 beats/min. She has a pacemaker and the range should be minimum 50 beats/min. In 2021, the range of the pacemaker was increased by 60 beats/min; however she experienced severe palpitations and then was turned it to 50 beats/min. Is on Atenolol and Amiodarone, Xarelto.    Elevated ratio of cholesterol to high density lipoprotein (HDL); Mixed hyperlipidemia: The reports states cholesterol was 158 mg/dL, LDL was 94 mg/dL, HDL was 44 mg/dL.    Primary hypertension: The blood pressure today is 108/58 mmHg.    Impaired glucose tolerance: The reports blood glucose was 101 mg/dL, HbA1c was 5.4%.    Hypothyroidism due to amiodarone: Is on Levothyroxine.    Need for vaccination: Due for influenza vaccine.    Hypertensive kidney disease with chronic kidney disease stage IV (CMS/HCC): Consults Dr. Lomas for the condition. The reports state the GFR is 29 mL/min with no protein in the urine. Dr. Marrufo was prescribed with Miralax and states that the bottle was advised to avoid it considering kidney disease.    Recurrent major depressive disorder, in partial remission (CMS/HCC); Anxiety: Is on Alprazolam.    Additional comments:  States that she consulted ophthalmology multiple times because she lost her vision of left eye on 2021. Currently, the vision is blurred but is improved. She has glaucoma and was suggested that cataract was blocking that draining angle closure, the pressure was built up which leads to losing the vision. She underwent laser surgery thrice without benefits, and so she  underwent the surgery for the glaucoma.  She underwent surgery in [facility]. States that pressure has been decreased and currently after the surgery the vision is different at different time, and it's improving. She is not willing for further surgery.  The doctor suggested that she can drive legally. She was on eye drop for glaucoma since years.    She was suffering from colitis with bleeding and lightheadedness when she was suffering from vision abnormalities. She was suggested to do the colonoscopy; however she denied with it, she underwent the colonoscopy at 71 years of age. Currently, it has been settled down.     Labs: the potassium was normal.      Patient Active Problem List   Diagnosis   • Age-related osteoporosis without current pathological fracture   • Mixed hyperlipidemia   • Primary hypertension   • Gastroesophageal reflux disease   • Right carotid bruit   • Hypertensive kidney disease with chronic kidney disease stage IV (CMS/[facility])   • Hyperparathyroidism due to renal insufficiency (CMS/[facility])   • Proteinuria   • Atrial flutter (CMS/[facility])   • Atrial premature depolarization   • Shortness of breath   • Nonrheumatic aortic valve insufficiency   • Recurrent major depressive disorder, in partial remission (CMS/[facility])   • Presence of cardiac pacemaker   • Primary angle-closure glaucoma   • Nuclear sclerosis of both eyes   • Personal history of other drug therapy   • Benign paroxysmal vertigo of right ear   • Dry eyes   • Impaired glucose tolerance   • Lymphedema of left arm   • Squamous blepharitis   • Vitreous syneresis of both eyes   • History of ischemic colitis   • Chronic frontal sinusitis   • Aneurysm of carotid artery (CMS/[facility])   • Hypothyroidism due to amiodarone   • Ulnar neuropathy of left upper extremity   • Grief reaction   • 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[facility])   • Age-related nuclear cataract of both eyes   • Depressive disorder   • Gastrointestinal hemorrhage   • Lymphedema   • Osteoporosis   • Tear  film insufficiency   • Ulcerative colitis (CMS/HCC)   • Vitreous opacities   • Anxiety   • Chronic obstructive pulmonary disease with acute lower respiratory infection (CMS/HCC)   • Elevated ratio of cholesterol to high density lipoprotein (HDL)   • Ischemic colitis (CMS/HCC)   • Disorder of thyroid      I have reviewed the past medical, family and social history sections including the medications and allergies listed in the above medical record as well as the nursing notes.    REVIEW OF SYSTEMS  Constitutional: As Per HPI.  Eyes: As Per HPI.  Respiratory:  Negative for shortness of breath  Cardiovascular:  Negative for chest pain or pressure.  Gastrointestinal: As Per HPI.  Endocrine: As Per HPI.  Genitourinary: As Per HPI.  Psychiatric/Behavioral: As Per HPI.    OBJECTIVE:  Vitals:    12/21/21 1137   BP: 108/58   BP Location: RUE - Right upper extremity   Patient Position: Sitting   Cuff Size: Regular   Pulse: (!) 53   Temp: 97.7 °F (36.5 °C)   TempSrc: Temporal   SpO2: 97%   Weight: 66 kg (145 lb 8.1 oz)   Height: 5' 3\" (1.6 m)       PHYSICAL EXAM:  Constitutional: Alert, in no acute distress and current vital signs reviewed.  Head and Face: Atraumatic and normocephalic.  Eyes: No discharge, no eyelid swelling and the sclerae were normal.  ENT: Oropharynx normal. Normal appearing outer ear. Tympanic membranes are bilaterally clear, normal appearing nose and normal lips.  Neck: Normal appearing neck and supple neck.  Pulmonary: Breath sounds clear to auscultation bilaterally, but no respiratory distress and normal respiratory rate and effort.  Cardiovascular: Normal rate, regular rhythm, normal S1, normal S2 and edema was not present in the lower extremities.  Abdomen: Soft and nontender.  Psychiatric: Alert and awake, interactive and mood/affect were appropriate.  Skin, Hair, Nails: Normal skin color and pigmentation.    DIAGNOSTIC STUDIES:  Patient Active Problem List   Component Date Value Ref Range Status   •  Sodium 12/14/2021 137  135 - 145 mmol/L Final   • Potassium 12/14/2021 4.4  3.4 - 5.1 mmol/L Final   • Chloride 12/14/2021 101  98 - 107 mmol/L Final   • Carbon Dioxide 12/14/2021 26  21 - 32 mmol/L Final   • Anion Gap 12/14/2021 14  10 - 20 mmol/L Final   • Glucose 12/14/2021 101* 70 - 99 mg/dL Final   • BUN 12/14/2021 26* 6 - 20 mg/dL Final   • Creatinine 12/14/2021 1.64* 0.51 - 0.95 mg/dL Final   • Glomerular Filtration Rate 12/14/2021 29* >=60 Final    eGFR 15 - 29 mL/min/1.73 m2 = Severe decrease in kidney function. Stage 4 CKD (chronic kidney disease), or severe kidney disease. Estimated GFR calculated using the 2009 CKD-EPI creatinine equation.   • BUN/ Creatinine Ratio 12/14/2021 16  7 - 25 Final   • Calcium 12/14/2021 8.9  8.4 - 10.2 mg/dL Final   • Microalbumin, Urine 12/14/2021 2.21  mg/dL Final   • Creatinine, Urine 12/14/2021 73.80  mg/dL Final   • Microalbumin/ Creatinine Ratio 12/14/2021 29.9  <30.0 mg/g Final   • TSH 12/14/2021 3.133  0.350 - 5.000 mcUnits/mL Final    Findings most consistent with euthyroid state, no additional testing suggested. TSH may be normal in patients with thyroid dysfunction and pituitary disease. Clinical correlation recommended.    (Reflex TSH algorithm is not recommended in hospitalized patients. A variety of drugs, as well as serious acute and chronic illnesses may alter thyroid function tests. Commonly implicated drugs include glucocorticoids, dopamine, carbamazepine, iodine, amiodarone, lithium and heparin.)   • Cholesterol 12/14/2021 158  <=199 mg/dL Final    Desirable         <200  Borderline High   200 to 239  High              >=240   • Triglycerides 12/14/2021 98  <=149 mg/dL Final    Normal            <150  Borderline High   150 to 199  High              200 to 499  Very High         >=500   • HDL 12/14/2021 44* >=50 mg/dL Final    Low              <40  Borderline Low   40 to 49  Near Optimal     50 to 59  Optimal          >=60   • LDL 12/14/2021 94  <=129 mg/dL  Final    OPTIMAL           <100  NEAR OPTIMAL      100 to 129  BORDERLINE HIGH   130 to 159  HIGH              160 to 189  VERY HIGH         >=190   • Non-HDL Cholesterol 12/14/2021 114  mg/dL Final    Therapeutic Target:  CHD and risk equivalents  <130  Multiple risk factors     <160  0 to 1 risk factor        <190   • Cholesterol/ HDL Ratio 12/14/2021 3.6  <=4.4 Final   • Hemoglobin A1C 12/14/2021 5.4  4.5 - 5.6 % Final      Diabetic Screening  Non Diabetic:             <5.7%  Increased Risk:           5.7-6.4%  Diagnostic For Diabetes:  >6.4%    Diabetic Control  A1C%       eAG mg/dL  6.0            126  6.5            140  7.0            154  7.5            169  8.0            183  8.5            197  9.0            212  9.5            226  10.0           240       ASSESSMENT AND PLAN:  This is a 79 year old female who presents with :  1. Typical atrial flutter (CMS/HCC)    2. Elevated ratio of cholesterol to high density lipoprotein (HDL)    3. Mixed hyperlipidemia    4. Primary hypertension    5. Impaired glucose tolerance    6. Hypothyroidism due to amiodarone    7. Need for vaccination    8. Hypertensive kidney disease with chronic kidney disease stage IV (CMS/HCC)    9. Anxiety    10. Recurrent major depressive disorder, in partial remission (CMS/HCC)        Orders Placed This Encounter   • Influenza Quadrivalent Enhanced High Dose Split Pres Free 0.7 mL Pre-filled Vacc (Fluzone High Dose Quad; ages 65+ yrs) - [id number]   • Glycohemoglobin   • Comprehensive Metabolic Panel   • Thyroid Stimulating Hormone Reflex   • ALPRAZolam (XANAX) 0.25 MG tablet       Plan:  Typical atrial flutter (CMS/HCC)  Currently, in regular rhythm.  Advised to continue with Atenolol, Amiodarone and Xarelto.  Advised to continue with pacemaker checks.  Will consider changes in the Xarelto if the rectal bleeding persists.    Elevated ratio of cholesterol to high density lipoprotein (HDL)  Previous reports were reviewed and  discussed.  Ordered comprehensive metabolic panel; refer to orders.  Advised to continue with Atorvastatin.    Mixed hyperlipidemia  Previous reports were reviewed and discussed.  Ordered comprehensive metabolic panel; refer to orders.  Advised to continue with Atorvastatin.    Primary hypertension  Advised to continue with Atenolol.    Impaired glucose tolerance  Previous reports were reviewed and discussed.  Ordered comprehensive metabolic panel; refer to orders.  Ordered glycohemoglobin; refer to orders.    Hypothyroidism due to amiodarone  Previous reports were reviewed and discussed.  Ordered thyroid stimulating hormone reflex; refer to orders.  Advised to continue with Levothyroxine.    Need for vaccination  Administered Influenza vaccine in office today without any complications; guidelines, significance and side effects explained.     Hypertensive kidney disease with chronic kidney disease stage IV (CMS/HCC)  Previous reports were reviewed and discussed.  Ordered comprehensive metabolic panel; refer to orders.  Advised to continue with Dr. Lomas.    Anxiety  Continue with Alprazolam; refills provided.    Recurrent major depressive disorder, in partial remission (CMS/HCC)  Continue with Alprazolam; refills provided.    Additional comments:  Colitis:  Resolved.    Previous reports were reviewed and discussed.  Medications reviewed and reconciled.  Follow up in 6 months or sooner as needed.    Refer to orders.  Medical compliance with plan discussed and risks of non-compliance reviewed.  Patient education completed on disease process, etiology & prognosis.  Proper usage and side effects of medications reviewed & discussed.  Patient understands and agrees with the plan.  Return to clinic as clinically indicated as discussed with patient who verbalized understanding of the plan and is in agreement with the plan.    Return in about 6 months (around 6/21/2022) for Fasting labs prior to 6 mo Dr. Sarah Lott  [place], have created a visit summary document based on the audio recording between Dr. Trice Silva MD and this patient for the physician to review, edit as needed, and authenticate.  Creation Date: 12/22/2021   I have reviewed and edited the visit summary above and attest that it is accurate.         GOAL: Pt will perform all transfers using appropriate assistive device indep in 2 weeks

## 2022-01-19 NOTE — PHYSICAL THERAPY INITIAL EVALUATION ADULT - LEVEL OF INDEPENDENCE: STAIR NEGOTIATION, REHAB EVAL
Called  named Kiya Houston for Nicole Bonds, as she is confused regarding her actual surgery dates and eye drop regimen    LMOM for hr to call back TBA

## 2022-01-21 ENCOUNTER — APPOINTMENT (OUTPATIENT)
Dept: ORTHOPEDIC SURGERY | Facility: CLINIC | Age: 77
End: 2022-01-21

## 2022-02-12 ENCOUNTER — RX RENEWAL (OUTPATIENT)
Age: 77
End: 2022-02-12

## 2022-02-16 ENCOUNTER — APPOINTMENT (OUTPATIENT)
Dept: INTERNAL MEDICINE | Facility: CLINIC | Age: 77
End: 2022-02-16

## 2022-03-31 NOTE — ASU PATIENT PROFILE, ADULT - BARIATRIC
[de-identified] : Mrs. Manley is a 63 year old female who is referred by Dr.Michael Truong for second opinion for anemia. \par She reports having normal blood counts as of July but was found in Sept to have symptomatic anemia. She has received several transfusions.  She had also received IV iron infusions from her gastroenterologist.  Endoscopy, colonoscopy and capsule endoscopy were all negative and her GI believes that her NEGRA is unrelated to GI issues.  [FreeTextEntry1] : Parenteral B12 monthly no [de-identified] : She presents for follow-up of B12 and iron deficiency anemias, receiving monthly dose of B12 parenterally.  She completed Venofer on 3/22/2022.  She presented to the emergency department on March 26, 2022 for worsening weakness, and shortness of breath and black tarry stool. over the past week.   While in the hospital she underwent EGD which showed normal esophagus, small hiatal hernia, gastritis and normal duodenum.  She then underwent colonoscopy on March 28, 2022 at [facility] which showed polyps in the sigmoid colon, diverticulosis of the sigmoid colon and multiple scattered erosions with dried blood in the ascending colon. Her last colonoscopy was in 2020. She denies rash, fever, infections, bleeding, bruising, nausea,vomiting,cough, SOB, chest pain, change in BM, headache or dizziness.

## 2022-04-12 ENCOUNTER — APPOINTMENT (OUTPATIENT)
Dept: INTERNAL MEDICINE | Facility: CLINIC | Age: 77
End: 2022-04-12
Payer: MEDICARE

## 2022-04-12 PROCEDURE — 99213 OFFICE O/P EST LOW 20 MIN: CPT | Mod: 95

## 2022-04-12 RX ORDER — AMOXICILLIN AND CLAVULANATE POTASSIUM 875; 125 MG/1; MG/1
875-125 TABLET, COATED ORAL
Qty: 20 | Refills: 1 | Status: DISCONTINUED | COMMUNITY
Start: 2021-09-21 | End: 2022-04-12

## 2022-04-14 ENCOUNTER — NON-APPOINTMENT (OUTPATIENT)
Age: 77
End: 2022-04-14

## 2022-04-14 NOTE — REVIEW OF SYSTEMS
[Lower Ext Edema] : lower extremity edema [Muscle Weakness] : muscle weakness [Confusion] : confusion [Memory Loss] : memory loss [Unsteady Walking] : ataxia [Fever] : no fever [Chills] : no chills [Night Sweats] : no night sweats [Chest Pain] : no chest pain [Palpitations] : no palpitations [Orthopnea] : no orthopnea [Shortness Of Breath] : no shortness of breath [Wheezing] : no wheezing [Cough] : no cough [Abdominal Pain] : no abdominal pain [Nausea] : no nausea [Constipation] : no constipation [Diarrhea] : diarrhea [Vomiting] : no vomiting [Headache] : no headache [Fainting] : no fainting

## 2022-04-14 NOTE — ASSESSMENT
[FreeTextEntry1] : 76 y.o. female, PMHx hypertension, a-fib, chronic DVT, lymphedema, MGUS, OA, obesity, chronic venous insufficiency, varicose veins. \par \par Presents for f/u (via telethealth) after fall with subsequent hospitalization and rehab.\par Discharged home 1 week ago, reports no medication changes other than possible decreased dose of furosemide.\par Will increase back to 40mg twice daily if not taking already.  \par Will get labs via home draw next week.  \par Started home PT services, but patient currently bed bound and receiving only 6 hours of care from a home health aid.  \par Appeal in with medicaid for additional home care services.  Letter of support of additional services written. \par Discussed with daughter establishing care with visiting doctors service as her PCP (phone number provided) as she is unable to being her in for evaluation.  \par Will discuss with PCP visiting nursing service for further evaluation.  \par Will bring her back to the hospital for any increased dyspnea or confusion.  Advised to monitor legs for sores, increased redness.  \par

## 2022-04-14 NOTE — HISTORY OF PRESENT ILLNESS
[Home] : at home, [unfilled] , at the time of the visit. [Medical Office: ([facility])___] : at the medical office located in  [Other:____] : [unfilled] [FreeTextEntry8] : 76 y.o. female, PMHx hypertension, a-fib, chronic DVT, lymphedema, MGUS, OA, obesity, chronic venous insufficiency, varicose veins. Patient of Dr. Wellington. \par \par Fall at home in January.  Went to [facility].  Hospitalized for 1-2 weeks for disorientation, increased edema and dyspnea.  \par Discharged to inpatient rehab x 2 months.\par While in rehab facility she developed increasing leg pain and was unable to participate in therapy, so was unable to stay.  \par She is now back home, lives alone.  Currently receives 6 hours of care from a A.  \par However she is bedbound.  Able to stand with assistance, but not able to walk at all at this point d/t leg pain, deconditioning and leg swelling.  \par Daughter reports that she is getting forgetful at night.  \renaldo Uses pneumatic compression boots for lymphedema, when someone can put them on.  \renaldo Was followed regularly with vascular, but family unable to get her out of the house to medical appointments in the office d/t her immobility.  \par Currently no dyspnea, but does use oxygen at home.  No chest pain, no dizziness.  \par A/0 x 2-3 (correct month, incorrect year), during the video visit.  \par Resting comfortably in a chair, appears well with large, swollen legs.  \par Appeal in with medicaid to receive more services at home.

## 2022-04-14 NOTE — PHYSICAL EXAM
[No Acute Distress] : no acute distress [Normal Voice/Communication] : normal voice/communication [de-identified] : large, swelling legs

## 2022-04-16 ENCOUNTER — NON-APPOINTMENT (OUTPATIENT)
Age: 77
End: 2022-04-16

## 2022-04-16 RX ORDER — OXYCODONE AND ACETAMINOPHEN 2.5; 325 MG/1; MG/1
2.5-325 TABLET ORAL
Qty: 6 | Refills: 0 | Status: DISCONTINUED | COMMUNITY
Start: 2022-04-14 | End: 2022-04-16

## 2022-04-19 ENCOUNTER — NON-APPOINTMENT (OUTPATIENT)
Age: 77
End: 2022-04-19

## 2022-04-20 LAB
ALBUMIN SERPL ELPH-MCNC: 4 G/DL
ALP BLD-CCNC: 127 U/L
ALT SERPL-CCNC: 8 U/L
ANION GAP SERPL CALC-SCNC: 16 MMOL/L
AST SERPL-CCNC: 12 U/L
BASOPHILS # BLD AUTO: 0.04 K/UL
BASOPHILS NFR BLD AUTO: 0.7 %
BILIRUB SERPL-MCNC: 0.9 MG/DL
BUN SERPL-MCNC: 9 MG/DL
CALCIUM SERPL-MCNC: 9.5 MG/DL
CHLORIDE SERPL-SCNC: 101 MMOL/L
CO2 SERPL-SCNC: 24 MMOL/L
CREAT SERPL-MCNC: 0.6 MG/DL
EGFR: 92 ML/MIN/1.73M2
EOSINOPHIL # BLD AUTO: 0.17 K/UL
EOSINOPHIL NFR BLD AUTO: 2.9 %
GLUCOSE SERPL-MCNC: 81 MG/DL
HCT VFR BLD CALC: 42 %
HGB BLD-MCNC: 13.3 G/DL
IMM GRANULOCYTES NFR BLD AUTO: 0.3 %
LYMPHOCYTES # BLD AUTO: 1.55 K/UL
LYMPHOCYTES NFR BLD AUTO: 26.2 %
MAN DIFF?: NORMAL
MCHC RBC-ENTMCNC: 27.9 PG
MCHC RBC-ENTMCNC: 31.7 GM/DL
MCV RBC AUTO: 88.2 FL
MONOCYTES # BLD AUTO: 0.55 K/UL
MONOCYTES NFR BLD AUTO: 9.3 %
NEUTROPHILS # BLD AUTO: 3.59 K/UL
NEUTROPHILS NFR BLD AUTO: 60.6 %
PLATELET # BLD AUTO: 243 K/UL
POTASSIUM SERPL-SCNC: 4.6 MMOL/L
PROT SERPL-MCNC: 6.8 G/DL
RBC # BLD: 4.76 M/UL
RBC # FLD: 16 %
SODIUM SERPL-SCNC: 141 MMOL/L
WBC # FLD AUTO: 5.92 K/UL

## 2022-04-26 ENCOUNTER — RX RENEWAL (OUTPATIENT)
Age: 77
End: 2022-04-26

## 2022-04-27 ENCOUNTER — RX RENEWAL (OUTPATIENT)
Age: 77
End: 2022-04-27

## 2022-04-29 ENCOUNTER — APPOINTMENT (OUTPATIENT)
Dept: INTERNAL MEDICINE | Facility: CLINIC | Age: 77
End: 2022-04-29
Payer: MEDICARE

## 2022-04-29 VITALS
SYSTOLIC BLOOD PRESSURE: 105 MMHG | OXYGEN SATURATION: 96 % | WEIGHT: 200 LBS | DIASTOLIC BLOOD PRESSURE: 67 MMHG | HEIGHT: 65 IN | HEART RATE: 81 BPM | BODY MASS INDEX: 33.32 KG/M2 | TEMPERATURE: 98.1 F

## 2022-04-29 DIAGNOSIS — M47.816 SPONDYLOSIS W/OUT MYELOPATHY OR RADICULOPATHY, LUMBAR REGION: ICD-10-CM

## 2022-04-29 PROCEDURE — 99213 OFFICE O/P EST LOW 20 MIN: CPT

## 2022-04-29 NOTE — ASSESSMENT
[FreeTextEntry1] : Hx of lower back pain -acute flare \par - advised to avoid pulling pushing , lifting , carrying weights , bending etc \par - do warm compresses \par -rx for percoset 5/325 1/2 to 1 tab bid # 14 pills renewed \par - watch for constipation - hydration and stimulant laxative if no BM in 24 hrs \par - side effects reviewed \par - rtc if no improvement \par

## 2022-04-29 NOTE — HISTORY OF PRESENT ILLNESS
[FreeTextEntry8] : 76 y.o. female, PMHx hypertension, a-fib, chronic DVT, lymphedema, MGUS, OA, obesity, chronic venous insufficiency, varicose veins. Patient of Dr. Wellington. \par \par seen for lower back pain \par hx lower back pain ch - with acute flare 2 weeks left side -no trauma no radiation of pain - now 8-9/10 at times today 7/10 \par -tried pain medication percoset 5-325 1 tab bid but ran out needs refill \par -is on Coumadin cannot take NASAID - tylenol does not help

## 2022-05-03 ENCOUNTER — RX RENEWAL (OUTPATIENT)
Age: 77
End: 2022-05-03

## 2022-05-06 RX ORDER — CYCLOBENZAPRINE HYDROCHLORIDE 5 MG/1
5 TABLET, FILM COATED ORAL
Qty: 30 | Refills: 0 | Status: ACTIVE | COMMUNITY
Start: 2022-05-06 | End: 1900-01-01

## 2022-05-17 ENCOUNTER — RX RENEWAL (OUTPATIENT)
Age: 77
End: 2022-05-17

## 2022-05-18 ENCOUNTER — RX RENEWAL (OUTPATIENT)
Age: 77
End: 2022-05-18

## 2022-05-24 ENCOUNTER — RX RENEWAL (OUTPATIENT)
Age: 77
End: 2022-05-24

## 2022-06-14 ENCOUNTER — APPOINTMENT (OUTPATIENT)
Dept: INTERNAL MEDICINE | Facility: CLINIC | Age: 77
End: 2022-06-14
Payer: MEDICARE

## 2022-06-14 VITALS
HEART RATE: 84 BPM | BODY MASS INDEX: 31.99 KG/M2 | SYSTOLIC BLOOD PRESSURE: 135 MMHG | DIASTOLIC BLOOD PRESSURE: 77 MMHG | HEIGHT: 65 IN | WEIGHT: 192 LBS | OXYGEN SATURATION: 95 %

## 2022-06-14 DIAGNOSIS — R77.8 OTHER SPECIFIED ABNORMALITIES OF PLASMA PROTEINS: ICD-10-CM

## 2022-06-14 DIAGNOSIS — D47.2 MONOCLONAL GAMMOPATHY: ICD-10-CM

## 2022-06-14 PROCEDURE — 99214 OFFICE O/P EST MOD 30 MIN: CPT

## 2022-06-14 NOTE — HISTORY OF PRESENT ILLNESS
[FreeTextEntry1] : f/u \par \par HTN / HL / A fib \par \par severe lymphedema , knee pain , back pain \par she has been unable to sleep bc pain \par \par lives alone but has aide during the day \par the pain is 10/10 when it is at its worst \par interferes w/ ability to rest / sleep \par she cannot take NSAID in significant doses bc comorbidities \par \par \par no recent fall \par mostly wheelchair bound \par \par MGUS - followed w/ heme in the past \par \par renal fx / hemoglobin stable

## 2022-06-14 NOTE — ASSESSMENT
[FreeTextEntry1] : 1) HTN/ HL \par \par - stable\par - ct meds\par \par 2) MGUS\par - stable\par - monitor Hb / renal fx \par \par 3) back pain / knee pain \par - will start oxycodone 1 tab daily only for severe pain \par - can use tylenol intermitentely \par \par f/u in 3 month

## 2022-06-14 NOTE — PHYSICAL EXAM
[Normal Sclera/Conjunctiva] : normal sclera/conjunctiva [Normal] : soft, non-tender, non-distended, no masses palpated, no HSM and normal bowel sounds

## 2022-06-14 NOTE — REVIEW OF SYSTEMS
[Recent Change In Weight] : ~T recent weight change [Joint Pain] : joint pain [Joint Stiffness] : joint stiffness [Muscle Pain] : muscle pain [Back Pain] : back pain [Negative] : Respiratory [FreeTextEntry2] : has lost weight bc changes in diet

## 2022-07-05 ENCOUNTER — APPOINTMENT (OUTPATIENT)
Dept: INTERNAL MEDICINE | Facility: CLINIC | Age: 77
End: 2022-07-05

## 2022-07-09 DIAGNOSIS — R06.02 SHORTNESS OF BREATH: ICD-10-CM

## 2022-07-29 ENCOUNTER — APPOINTMENT (OUTPATIENT)
Dept: PULMONOLOGY | Facility: CLINIC | Age: 77
End: 2022-07-29

## 2022-07-29 PROCEDURE — 99214 OFFICE O/P EST MOD 30 MIN: CPT | Mod: 95

## 2022-07-29 NOTE — HISTORY OF PRESENT ILLNESS
[TextBox_4] : Patient seen in 2020 for dyspnea, determined to probably be secondary to CHF, no obstruction on PFTs in spite of smoking hx. Since then, lost to follow up, but has needed oxygen of past year secondary to fluid overload and covid in 2021. Now needs renewal of oxygen prescription.

## 2022-07-29 NOTE — REASON FOR VISIT
[Home] : at home, [unfilled] , at the time of the visit. [Medical Office: ([facility])___] : at the medical office located in  [Patient] : the patient [Follow-Up] : a follow-up visit

## 2022-07-29 NOTE — ASSESSMENT
[FreeTextEntry1] : CHF: No evidence for copd. Advised patient to come in for face to face and six minute walk to assess oxygen needs. Also f/u with cardiologist.

## 2022-08-03 DIAGNOSIS — N39.46 MIXED INCONTINENCE: ICD-10-CM

## 2022-08-09 ENCOUNTER — RX RENEWAL (OUTPATIENT)
Age: 77
End: 2022-08-09

## 2022-08-10 ENCOUNTER — APPOINTMENT (OUTPATIENT)
Dept: PULMONOLOGY | Facility: CLINIC | Age: 77
End: 2022-08-10

## 2022-08-10 VITALS
SYSTOLIC BLOOD PRESSURE: 104 MMHG | BODY MASS INDEX: 33.29 KG/M2 | DIASTOLIC BLOOD PRESSURE: 60 MMHG | TEMPERATURE: 97.8 F | HEIGHT: 64 IN | HEART RATE: 59 BPM | OXYGEN SATURATION: 95 % | WEIGHT: 195 LBS

## 2022-08-10 PROCEDURE — 94618 PULMONARY STRESS TESTING: CPT

## 2022-08-10 PROCEDURE — 99214 OFFICE O/P EST MOD 30 MIN: CPT | Mod: 25

## 2022-08-10 PROCEDURE — ZZZZZ: CPT

## 2022-08-10 NOTE — HISTORY OF PRESENT ILLNESS
[TextBox_4] : 76 y.o female MF Afib,PE,  chronic SOB, CHF on supplemental O2 here for follow up O2 testing. Notes some SOB with exertion at nights'\par - not worsening, at her baseline\par \par

## 2022-08-10 NOTE — PHYSICAL EXAM
[No Acute Distress] : no acute distress [Normal Rate/Rhythm] : normal rate/rhythm [Normal S1, S2] : normal s1, s2 [No Resp Distress] : no resp distress [Clear to Auscultation Bilaterally] : clear to auscultation bilaterally [Normal Gait] : normal gait [No Focal Deficits] : no focal deficits [Oriented x3] : oriented x3

## 2022-08-10 NOTE — ASSESSMENT
[FreeTextEntry1] : SOB: no desat noted on 6MW test. Will order overnight O2 monitoring for further eval of hypoxia and O2 needs for chronic chf. \par \par Will call with results when available.\par \par Jane VITALE NP, am scribing for and in the presence of Dr. Jose Eric, the following sections HISTORY OF PRESENT ILLNESS, PAST MEDICAL/FAMILY/SOCIAL HISTORY; REVIEW OF SYSTEMS; VITAL SIGNS; PHYSICAL EXAM; DISPOSITION.\par \par \par

## 2022-08-11 ENCOUNTER — RX RENEWAL (OUTPATIENT)
Age: 77
End: 2022-08-11

## 2022-08-11 DIAGNOSIS — R35.0 FREQUENCY OF MICTURITION: ICD-10-CM

## 2022-08-18 ENCOUNTER — APPOINTMENT (OUTPATIENT)
Dept: CARDIOLOGY | Facility: CLINIC | Age: 77
End: 2022-08-18

## 2022-08-18 ENCOUNTER — RX RENEWAL (OUTPATIENT)
Age: 77
End: 2022-08-18

## 2022-08-18 ENCOUNTER — NON-APPOINTMENT (OUTPATIENT)
Age: 77
End: 2022-08-18

## 2022-08-18 VITALS
SYSTOLIC BLOOD PRESSURE: 109 MMHG | DIASTOLIC BLOOD PRESSURE: 65 MMHG | BODY MASS INDEX: 34.33 KG/M2 | OXYGEN SATURATION: 94 % | HEIGHT: 64 IN | HEART RATE: 57 BPM

## 2022-08-18 VITALS — BODY MASS INDEX: 34.33 KG/M2 | WEIGHT: 200 LBS

## 2022-08-18 DIAGNOSIS — M54.50 LOW BACK PAIN, UNSPECIFIED: ICD-10-CM

## 2022-08-18 PROCEDURE — 93000 ELECTROCARDIOGRAM COMPLETE: CPT

## 2022-08-18 PROCEDURE — 99214 OFFICE O/P EST MOD 30 MIN: CPT | Mod: 25

## 2022-08-18 RX ORDER — NALOXONE HYDROCHLORIDE 4 MG/.1ML
4 SPRAY NASAL
Qty: 1 | Refills: 0 | Status: ACTIVE | COMMUNITY
Start: 2022-08-18 | End: 1900-01-01

## 2022-08-18 NOTE — DISCUSSION/SUMMARY
[EKG obtained to assist in diagnosis and management of assessed problem(s)] : EKG obtained to assist in diagnosis and management of assessed problem(s) [FreeTextEntry1] : 76 year old woman with AF/DVT/PE?chronic edema and HTN here for followup. \par # AF- On Eliquis 5 mg BID, Metoprolol 50 mg BID. EKG without changes. Continue present meds\par # DVT/PE- On Eliquis as above\par # Dyspnea- improved with resuming lasix, continue present meds\par # HLD- Continue Statin therapy

## 2022-08-18 NOTE — HISTORY OF PRESENT ILLNESS
[FreeTextEntry1] : Here for followup. Accompanied by her son. Last seen 2021 after COVID diagnosis.\par TTE showed EF 60%; mild to moderate MR\par \par # AF- On Eliquis 5 mg BID, Metoprolol 50 mg BID. EKG without changes. Continue present meds\par # DVT/PE- On Eliquis as above\par # Dyspnea- improved with resuming lasix, continue present meds\par # HLD- Continue Statin therapy

## 2022-08-19 ENCOUNTER — RX RENEWAL (OUTPATIENT)
Age: 77
End: 2022-08-19

## 2022-09-07 ENCOUNTER — RX RENEWAL (OUTPATIENT)
Age: 77
End: 2022-09-07

## 2022-09-20 ENCOUNTER — RX RENEWAL (OUTPATIENT)
Age: 77
End: 2022-09-20

## 2022-09-20 RX ORDER — OXYCODONE AND ACETAMINOPHEN 5; 325 MG/1; MG/1
5-325 TABLET ORAL
Qty: 30 | Refills: 0 | Status: ACTIVE | COMMUNITY
Start: 2022-04-16 | End: 1900-01-01

## 2022-09-22 ENCOUNTER — APPOINTMENT (OUTPATIENT)
Dept: SLEEP CENTER | Facility: CLINIC | Age: 77
End: 2022-09-22

## 2022-10-04 NOTE — H&P PST ADULT - ANESTHESIA, PREVIOUS REACTION, PROFILE
2nd Attempt: Signed Letter not received from member, resent per process.     Marjorie Epstein  Case Management Specialist  [facility]  [phone]        
[facility] Care Coordination Contact    Member wants to go back to previous adult day care provider, [facility].   Discussed with member that s he will be receiving care plan change letter via mail .  Also, this CC discussed if member wanted other POC or other information sent to providers per requirement.  Member at this time is choosing not to share any information with  providers.  Instructed to review and sign letter as well as mail the letter back to CC with enclosed prestamped envelop.  Also, update CMS to update auth and mail provider change letter.  Johnna Malin RN BSN PHN  [facility]   Care Coordinator   Phone:   [phone]  Fax:       [phone]    
[facility] Care Coordination Contact    Member Signature - POC Change:  Per CC, member has made a change to their POC.  Care Plan Change Letter mailed to member for signature with a self-addressed return envelope.     Re: [facility]    Nimisha Murillo  Care Management Specialist  [facility]  [phone]        
none

## 2022-10-25 ENCOUNTER — APPOINTMENT (OUTPATIENT)
Dept: VASCULAR SURGERY | Facility: CLINIC | Age: 77
End: 2022-10-25

## 2022-10-25 VITALS
HEIGHT: 64 IN | SYSTOLIC BLOOD PRESSURE: 139 MMHG | HEART RATE: 55 BPM | BODY MASS INDEX: 33.8 KG/M2 | WEIGHT: 198 LBS | TEMPERATURE: 97.8 F | DIASTOLIC BLOOD PRESSURE: 78 MMHG

## 2022-10-25 DIAGNOSIS — M79.606 PAIN IN LEG, UNSPECIFIED: ICD-10-CM

## 2022-10-25 DIAGNOSIS — L03.115 CELLULITIS OF RIGHT LOWER LIMB: ICD-10-CM

## 2022-10-25 PROCEDURE — 99214 OFFICE O/P EST MOD 30 MIN: CPT

## 2022-10-25 PROCEDURE — 93970 EXTREMITY STUDY: CPT

## 2022-10-25 RX ORDER — ACETAMINOPHEN AND CODEINE 300; 30 MG/1; MG/1
300-30 TABLET ORAL DAILY
Qty: 7 | Refills: 0 | Status: ACTIVE | COMMUNITY
Start: 2022-10-25 | End: 1900-01-01

## 2022-10-25 NOTE — REASON FOR VISIT
[Follow-Up: _____] : a [unfilled] follow-up visit [FreeTextEntry1] : My legs bother me and are swollen again and my legs hurt me

## 2022-10-25 NOTE — HISTORY OF PRESENT ILLNESS
[FreeTextEntry1] : 75 y/o f w/ history of lymphedema and post phlebitic syndrome presents today w/ c/o of bilateral leg swelling right leg significantly worse than left, which has gotten worse in the last 2 weeks. She states she has not used the lymphedema pump in quite a while and now her legs are too swollen for the sleeves. She was wrapping her legs with ace bandages from the ankle to calf without improvement. She attempted compression stockings in the past but unable to comply d/t discomfort and the inability to apply them. She also reports going to [facility] a few times in the past. Denies trauma or injury. Denies open wounds. No fever or chills.  [de-identified] : pt is partially complaint w comp stockings and lymphedema pump\par pt states  both legs are swollen w constant pain   w rest and activity \par pt has not f/u w spine surgeon to eval for hx of spinal stenosis

## 2022-10-25 NOTE — PHYSICAL EXAM
[0] : left 0 [Ankle Swelling (On Exam)] : present [Varicose Veins Of Lower Extremities] : bilaterally [Ankle Swelling On The Left] : moderate [] : bilaterally [Ankle Swelling Bilaterally] : severe [No Rash or Lesion] : No rash or lesion [Alert] : alert [Oriented to Person] : oriented to person [Oriented to Place] : oriented to place [Oriented to Time] : oriented to time [Calm] : calm [JVD] : no jugular venous distention  [Abdomen Tenderness] : ~T ~M No abdominal tenderness [Stool Sample Taken] : No stool obtained  on rectal exam [de-identified] : nad [de-identified] : adelinal [de-identified] : no resp distress [FreeTextEntry1] : unable to palpate bilateral pedal pulses due to edema. Feet w/ good cap refill. \par Bilateral lower extremity severe edema mid thigh to ankles. \par Severe venous insufficiency, lymphedema w/ severe chronic skin changes and xerosis. No s/s of infection. \par severe pre ulc skin changes mindi calf and shins \par mindi calf tenderness w exam \par  right calf mild diffuse cellulitis \par  [de-identified] : obese  [de-identified] : pt is wheelchair  [de-identified] : Madhu Cranial nerves 2-12 madhu grossly intact [de-identified] : cooperative

## 2022-10-25 NOTE — ASSESSMENT
[Arterial/Venous Disease] : arterial/venous disease [Medication Management] : medication management [Other: _____] : [unfilled] [Foot care/Footwear] : foot care/footwear [FreeTextEntry1] : Impression: Lymphedema, post phlebitic syndrome and venous insufficiency w/ chronic edema and  rt leg cellulits \par and clinical s/o  spinal stenosis progression \par \par \par Medical Conservative management leg elevation, compression therapy continue lymphedema pump \par start augmentin 875 bid 7 days \par pain rx prn\par d/w pt to f/u w ortho spine for eval of spinal stenosis \par rx  f/u w Dr ROGELIO Valladares MD \par ov 6mo to re eval \par \par \par \par \par Varicose veins are enlarged, twisted veins. Varicose veins are caused by increased blood pressure in the veins.  The blood moves towards the heart by 1-way valves in the veins. When the valves become weakened or damaged, blood can collect in the veins and pool in your lower legs (ankles). This causes the veins to become enlarged and incompetent with reflux. Sitting or standing for long periods can cause blood to pool in the leg veins, increasing the pressure within the veins. \par Risk factors for varicose veins or venous disease may include:  obesity, older age, standing or sitting for prolonged periods of time for several years, being female, pregnancy, taking oral contraceptive pills or hormone replacement, being inactive, and/or smoking. \par The most common symptoms of varicose veins are sensations in the legs, such as a heavy feeling, burning, and/or aching. However, each individual may experience symptoms differently.  Other symptoms may include:  color changes in the skin, sores on the legs, or rash.  Severe varicose veins or venous disease may eventually produce long-term mild swelling that can result in more serious skin and tissue problems, such as ulcers and non-healing sores.\par Varicose veins and venous disease are diagnosed by a complete medical history, physical examination, and diagnostic studies for varicose veins including duplex ultrasound and color-flow imaging.  \par Medical treatment for varicose veins and venous disease include:  compression stockings, sclerotherapy, endovenous ablation and/or surgical treatment with microphlebectomy.  \par \par

## 2022-10-25 NOTE — DATA REVIEWED
[FreeTextEntry1] : 7/26/2016 Venous Duplex  madhu le no  acute dvt svt\par \par 7/26/2016 Abd Venous Duplex  patent IVC and madhu iliac veins \par \par 3/28/2017 Venous Duplex  madhu le no acute dvt svt \par \par Feb 2018 LLE venous Duplex reviewed  sig for acute dvt from gastroc v to cfv \par \par 3/16/2018 venous duplex RLE no acute dvt svt LLE sig for subacute dvt of  per v, ptv, gastroc v, pop v ,  sfv\par \par 3/28/2018 Abd venous Duplex  patent ivc and madhu iliac veins  no sig pathology\par \par 3/28/2016 LLE Venous duplex  no acute dvt svt sig for chronic  part recannaliz   sfv dvt, pop v and ptv sig for edema \par \par 6/27/2018 Madhu le venous duplex  pt unable to complete due to severe back pain \par \par 4/4/19 Venous Duplex pt declined\par \par 5/29/2019 RLE venous duplex no acute dvt svt  sig for heavy edema \par \par 9/21/2021 Madhu LE Venous Duplex  Madhu le no acute dvt svt \par                                        sig left  chronic  webbing pop v \par \par \par 10/25/2022 Venous Duplex  madhu le no acute dvt svt\par                                      sig for  LLE chronic changes  pop v \par \par \par

## 2022-10-31 NOTE — ED ADULT TRIAGE NOTE - PAIN: PRESENCE, MLM
Report to [facility] on [address]. Told of patient's bowel movement, brief changed, and urinary catheter thrown away. Lidocaine in epidural space so patient could have some numbness in her legs. Fentanyl given, epidural can take 2-5 days to start working, bandaid can come off today, ice to back today, no heat. Patient can eat/drink now.   complains of pain/discomfort

## 2022-11-14 ENCOUNTER — APPOINTMENT (OUTPATIENT)
Dept: ORTHOPEDIC SURGERY | Facility: CLINIC | Age: 77
End: 2022-11-14

## 2022-11-15 ENCOUNTER — RX RENEWAL (OUTPATIENT)
Age: 77
End: 2022-11-15

## 2022-11-22 ENCOUNTER — APPOINTMENT (OUTPATIENT)
Dept: INTERNAL MEDICINE | Facility: CLINIC | Age: 77
End: 2022-11-22

## 2022-12-15 NOTE — ED ADULT NURSE NOTE - PRO INTERPRETER NEED 2
English
[FreeTextEntry1] : 1. hypertension\par * BP at goal on metoprolol, to continue\par 2. hypercholesterolemia\par * continue atorvastatin 20 mg\par * low cholesterol, low triglycerides diet,dietary counseling given; dietary avoidance discussed; diet and exercise reviewed with patient\par 3. prediabetes\par * Dietary counseling given, dietary avoidance discussed, diet and exercise reviewed with patient; patient reminded of importance of aerobic exercise, weight control, dietary compliance and regular glucose monitoring\par 4. need for Pneumococcal vaccine\par * Pneumovax 23 vaccine administered\par * follow up in six months.

## 2022-12-19 ENCOUNTER — RX RENEWAL (OUTPATIENT)
Age: 77
End: 2022-12-19

## 2022-12-22 ENCOUNTER — RX RENEWAL (OUTPATIENT)
Age: 77
End: 2022-12-22

## 2022-12-30 NOTE — DISCHARGE NOTE PROVIDER - NSDCQMAMI_CARD_ALL_CORE
[facility], [place]  [address] [place], [place] [address]  [phone]      December 30, 2022      Mellisa Burr was evaluated in Urgent Care today.  Please excuse her from work 12/30/22 and 12/31/22.      Comments: Following diagnosis of acute febrile illness, patient should stay home until afebrile for 24 hours.  Please be advised that if additional time off is requested, follow up with regular provider is suggested.                 SIGNATURE: ___________________________________________      AGUILAR Aggarwal            WORK AND SCHOOL EXCUSE POLICY    At your request, we will provide you with a letter stating that you were seen today in Urgent Care.     Additionally, with new illness or injury, we will abdelrahman up to two excused days off*.  Dates granted will be at the discretion of the physician, physician assistant, or nurse practitioner on the day of your visit.     Follow up with your Primary Care Provider or return visit to Urgent Care is recommended for reevaluation and discussion of requests for additional time off.     We are unable to specifically excuse time off that occurred prior to your visit to Urgent Care.     *: Following diagnosis of acute febrile illness, patient should stay home until afebrile for 24 hours.  
No

## 2023-01-06 NOTE — REASON FOR VISIT
What Type Of Note Output Would You Prefer (Optional)?: Standard Output How Severe Is Your Rash?: mild Is This A New Presentation, Or A Follow-Up?: Rash [Follow-Up: _____] : a [unfilled] follow-up visit [FreeTextEntry1] : leg swelling

## 2023-01-17 ENCOUNTER — RX RENEWAL (OUTPATIENT)
Age: 78
End: 2023-01-17

## 2023-01-19 ENCOUNTER — RX RENEWAL (OUTPATIENT)
Age: 78
End: 2023-01-19

## 2023-02-14 ENCOUNTER — RX RENEWAL (OUTPATIENT)
Age: 78
End: 2023-02-14

## 2023-02-14 NOTE — ED PROVIDER NOTE - CARE PLAN
CT Surgery Week 3 Survey    Flowsheet Row Responses   [facility] patient discharged from? [place]   Does the patient have one of the following disease processes/diagnoses(primary or secondary)? Cardiothoracic surgery   Week 3 attempt successful? No   Unsuccessful attempts Attempt 2          RL FUNK - Registered Nurse  
Principal Discharge DX:	Acute saddle pulmonary embolism without acute cor pulmonale

## 2023-02-16 ENCOUNTER — APPOINTMENT (OUTPATIENT)
Dept: CARDIOLOGY | Facility: CLINIC | Age: 78
End: 2023-02-16

## 2023-02-21 ENCOUNTER — RX RENEWAL (OUTPATIENT)
Age: 78
End: 2023-02-21

## 2023-02-21 ENCOUNTER — NON-APPOINTMENT (OUTPATIENT)
Age: 78
End: 2023-02-21

## 2023-03-14 ENCOUNTER — APPOINTMENT (OUTPATIENT)
Dept: CARDIOLOGY | Facility: CLINIC | Age: 78
End: 2023-03-14
Payer: MEDICARE

## 2023-03-14 ENCOUNTER — NON-APPOINTMENT (OUTPATIENT)
Age: 78
End: 2023-03-14

## 2023-03-14 VITALS
DIASTOLIC BLOOD PRESSURE: 56 MMHG | SYSTOLIC BLOOD PRESSURE: 125 MMHG | TEMPERATURE: 97.6 F | OXYGEN SATURATION: 96 % | HEART RATE: 76 BPM | RESPIRATION RATE: 16 BRPM

## 2023-03-14 PROCEDURE — 93000 ELECTROCARDIOGRAM COMPLETE: CPT

## 2023-03-14 PROCEDURE — 99214 OFFICE O/P EST MOD 30 MIN: CPT | Mod: 25

## 2023-03-14 NOTE — DISCUSSION/SUMMARY
[EKG obtained to assist in diagnosis and management of assessed problem(s)] : EKG obtained to assist in diagnosis and management of assessed problem(s) [FreeTextEntry1] : 77 year old woman with AF/DVT/PE?chronic edema and HTN here for followup. \par # AF- On Eliquis 5 mg BID, Metoprolol 50 mg BID. EKG without changes. Continue present meds\par # DVT/PE- On Eliquis as above\par # Dyspnea- improved with resuming lasix, continue present meds\par # HLD- Continue Statin therapy

## 2023-03-15 ENCOUNTER — RX RENEWAL (OUTPATIENT)
Age: 78
End: 2023-03-15

## 2023-04-03 NOTE — H&P ADULT. - MS GEN HX ROS MEA POS PC
Depth In Mm: 0.1 Depth In Mm: 2 Location #1: cheeks temples Depth In Mm: 1 Infusions (Optional): hyaluronic acid Topical Anesthesia?: BLT cream (benzocaine 20%, lidocaine 6%, tetracaine 4%) Post-Care Instructions: After the procedure, take precautions agains sun exposure. Do not apply sunscreen for 12 hours after the procedure. Do not apply make-up for 12 hours after the procedure. Avoid alcohol based toners for 10-14 days. After 2-3 days patients can return to their regular skin regimen. Detail Level: Zone Depth In Mm: 2.5 Price (Use Numbers Only, No Special Characters Or $): 200 Treatment Number (Optional): 0 Consent: Written consent obtained, risks reviewed including but not limited to pain, scarring, infection and incomplete improvement.  Patient understands the procedure is cosmetic in nature and will require out of pocket payment. arthritis/arthralgia/leg pain R

## 2023-04-04 ENCOUNTER — RX RENEWAL (OUTPATIENT)
Age: 78
End: 2023-04-04

## 2023-04-13 ENCOUNTER — LABORATORY RESULT (OUTPATIENT)
Age: 78
End: 2023-04-13

## 2023-04-13 ENCOUNTER — RX RENEWAL (OUTPATIENT)
Age: 78
End: 2023-04-13

## 2023-04-15 LAB — FERRITIN SERPL-MCNC: 168 NG/ML

## 2023-04-18 ENCOUNTER — APPOINTMENT (OUTPATIENT)
Dept: VASCULAR SURGERY | Facility: CLINIC | Age: 78
End: 2023-04-18
Payer: MEDICARE

## 2023-04-18 VITALS
TEMPERATURE: 97.8 F | HEIGHT: 65 IN | WEIGHT: 198 LBS | SYSTOLIC BLOOD PRESSURE: 119 MMHG | DIASTOLIC BLOOD PRESSURE: 70 MMHG | BODY MASS INDEX: 32.99 KG/M2 | HEART RATE: 80 BPM

## 2023-04-18 VITALS — HEART RATE: 76 BPM | SYSTOLIC BLOOD PRESSURE: 135 MMHG | DIASTOLIC BLOOD PRESSURE: 84 MMHG

## 2023-04-18 DIAGNOSIS — L03.115 CELLULITIS OF RIGHT LOWER LIMB: ICD-10-CM

## 2023-04-18 PROCEDURE — 93970 EXTREMITY STUDY: CPT

## 2023-04-18 PROCEDURE — 99214 OFFICE O/P EST MOD 30 MIN: CPT

## 2023-04-18 RX ORDER — ACETAMINOPHEN AND CODEINE 300; 30 MG/1; MG/1
300-30 TABLET ORAL
Qty: 10 | Refills: 0 | Status: ACTIVE | COMMUNITY
Start: 2023-04-18 | End: 1900-01-01

## 2023-04-18 NOTE — ASSESSMENT
[Arterial/Venous Disease] : arterial/venous disease [Medication Management] : medication management [Other: _____] : [unfilled] [Foot care/Footwear] : foot care/footwear [FreeTextEntry1] : Impression: Lymphedema, post phlebitic syndrome and venous insufficiency w/ chronic edema and  rt leg cellulits \par and clinical s/o  spinal stenosis progression \par \par \par Medical Conservative management leg elevation, compression therapy continue lymphedema pump \par continue augmentin 875 bid x 10 days (rx sent)\par take Tylenol #3 prn for pain (rx sent)\par d/w pt to f/u w ortho spine for eval of spinal stenosis \par  ov 2 weeks to re eval bilateral lower extremities\par \par \par \par \par Varicose veins are enlarged, twisted veins. Varicose veins are caused by increased blood pressure in the veins.  The blood moves towards the heart by 1-way valves in the veins. When the valves become weakened or damaged, blood can collect in the veins and pool in your lower legs (ankles). This causes the veins to become enlarged and incompetent with reflux. Sitting or standing for long periods can cause blood to pool in the leg veins, increasing the pressure within the veins. \par Risk factors for varicose veins or venous disease may include:  obesity, older age, standing or sitting for prolonged periods of time for several years, being female, pregnancy, taking oral contraceptive pills or hormone replacement, being inactive, and/or smoking. \par The most common symptoms of varicose veins are sensations in the legs, such as a heavy feeling, burning, and/or aching. However, each individual may experience symptoms differently.  Other symptoms may include:  color changes in the skin, sores on the legs, or rash.  Severe varicose veins or venous disease may eventually produce long-term mild swelling that can result in more serious skin and tissue problems, such as ulcers and non-healing sores.\par Varicose veins and venous disease are diagnosed by a complete medical history, physical examination, and diagnostic studies for varicose veins including duplex ultrasound and color-flow imaging.  \par Medical treatment for varicose veins and venous disease include:  compression stockings, sclerotherapy, endovenous ablation and/or surgical treatment with microphlebectomy.  \par \par

## 2023-04-18 NOTE — REASON FOR VISIT
[Follow-Up: _____] : a [unfilled] follow-up visit [Other: _____] : [unfilled] [FreeTextEntry1] : My legs bother me and are swollen again and my legs hurt me

## 2023-04-18 NOTE — HISTORY OF PRESENT ILLNESS
[FreeTextEntry1] : 73 y/o f w/ history of lymphedema and post phlebitic syndrome presents today w/ c/o of bilateral leg swelling right leg significantly worse than left, which has gotten worse in the last 2 weeks. She states she has not used the lymphedema pump in quite a while and now her legs are too swollen for the sleeves. She was wrapping her legs with ace bandages from the ankle to calf without improvement. She attempted compression stockings in the past but unable to comply d/t discomfort and the inability to apply them. She also reports going to [facility] a few times in the past. Denies trauma or injury. Denies open wounds. No fever or chills.  [de-identified] : pt c/o  bilateral leg swelling R>L for the past 2 weeks\par seen in the presence of her aide\par redness in mindi le R>L\par pain associated with standing\par denies fever or chills\par denies falls, injuries, long car rides or flights\par denies wounds or ulcers\par pt does not ambulate much\par not compliant with compression stockings/wraps \par partially compliant with ace wraps\par pt taking augmentin 875 mg BID\par taking eliquis 5 mg BID for afib\par pt has not f/u w spine surgeon to eval for hx of spinal stenosis

## 2023-04-18 NOTE — DATA REVIEWED
[FreeTextEntry1] : 7/26/2016 Venous Duplex  madhu le no  acute dvt svt\par \par 7/26/2016 Abd Venous Duplex  patent IVC and madhu iliac veins \par \par 3/28/2017 Venous Duplex  madhu le no acute dvt svt \par \par Feb 2018 LLE venous Duplex reviewed  sig for acute dvt from gastroc v to cfv \par \par 3/16/2018 venous duplex RLE no acute dvt svt LLE sig for subacute dvt of  per v, ptv, gastroc v, pop v ,  sfv\par \par 3/28/2018 Abd venous Duplex  patent ivc and madhu iliac veins  no sig pathology\par \par 3/28/2016 LLE Venous duplex  no acute dvt svt sig for chronic  part recannaliz   sfv dvt, pop v and ptv sig for edema \par \par 6/27/2018 Madhu le venous duplex  pt unable to complete due to severe back pain \par \par 4/4/19 Venous Duplex pt declined\par \par 5/29/2019 RLE venous duplex no acute dvt svt  sig for heavy edema \par \par 9/21/2021 Madhu LE Venous Duplex  Madhu le no acute dvt svt \par                                        sig left  chronic  webbing pop v \par \par \par 10/25/2022 Venous Duplex  madhu le no acute dvt svt\par                                      sig for  LLE chronic changes  pop v \par \par 4/18/2023 Madhu LE Venous Duplex no acute dvt svt\par

## 2023-04-18 NOTE — REVIEW OF SYSTEMS
[Limb Pain] : limb pain [Limb Swelling] : limb swelling [Negative] : Heme/Lymph [As Noted in HPI] : as noted in HPI [Lower Ext Edema] : lower extremity edema

## 2023-04-18 NOTE — PHYSICAL EXAM
[0] : left 0 [Ankle Swelling (On Exam)] : present [Varicose Veins Of Lower Extremities] : bilaterally [Ankle Swelling On The Left] : moderate [] : bilaterally [Ankle Swelling Bilaterally] : severe [No Rash or Lesion] : No rash or lesion [Alert] : alert [Oriented to Person] : oriented to person [Oriented to Place] : oriented to place [Oriented to Time] : oriented to time [Calm] : calm [JVD] : no jugular venous distention  [de-identified] : nad [de-identified] : adelinal [de-identified] : no resp distress [FreeTextEntry1] : unable to palpate bilateral pedal pulses due to edema. Feet w/ good cap refill. \par Bilateral lower extremity severe edema mid thigh to ankles. \par Severe venous insufficiency, lymphedema w/ severe chronic skin changes and xerosis. \par severe pre ulc skin changes mindi calf and shins \par mindi calf tenderness w exam \par  right calf mild diffuse cellulitis \par  [de-identified] : pt in wheelchair  [de-identified] : Madhu Cranial nerves 2-12 madhu grossly intact [de-identified] : cooperative

## 2023-04-20 ENCOUNTER — APPOINTMENT (OUTPATIENT)
Dept: INTERNAL MEDICINE | Facility: CLINIC | Age: 78
End: 2023-04-20
Payer: MEDICARE

## 2023-04-20 DIAGNOSIS — I83.229 VARICOSE VEINS OF LEFT LOWER EXTREMITY WITH BOTH ULCER OF UNSPECIFIED SITE AND INFLAMMATION: ICD-10-CM

## 2023-04-20 DIAGNOSIS — T84.038A MECHANICAL LOOSENING OF OTHER INTERNAL PROSTHETIC JOINT, INITIAL ENCOUNTER: ICD-10-CM

## 2023-04-20 DIAGNOSIS — I10 ESSENTIAL (PRIMARY) HYPERTENSION: ICD-10-CM

## 2023-04-20 DIAGNOSIS — I82.512 CHRONIC EMBOLISM AND THROMBOSIS OF LEFT FEMORAL VEIN: ICD-10-CM

## 2023-04-20 DIAGNOSIS — I26.99 OTHER PULMONARY EMBOLISM W/OUT ACUTE COR PULMONALE: ICD-10-CM

## 2023-04-20 DIAGNOSIS — I48.91 UNSPECIFIED ATRIAL FIBRILLATION: ICD-10-CM

## 2023-04-20 DIAGNOSIS — L97.929 VARICOSE VEINS OF LEFT LOWER EXTREMITY WITH BOTH ULCER OF UNSPECIFIED SITE AND INFLAMMATION: ICD-10-CM

## 2023-04-20 DIAGNOSIS — L97.919 VARICOSE VEINS OF RIGHT LOWER EXTREMITY WITH BOTH ULCER OF UNSPECIFIED SITE AND INFLAMMATION: ICD-10-CM

## 2023-04-20 DIAGNOSIS — I83.219 VARICOSE VEINS OF RIGHT LOWER EXTREMITY WITH BOTH ULCER OF UNSPECIFIED SITE AND INFLAMMATION: ICD-10-CM

## 2023-04-20 DIAGNOSIS — E78.5 HYPERLIPIDEMIA, UNSPECIFIED: ICD-10-CM

## 2023-04-20 DIAGNOSIS — Z96.659 MECHANICAL LOOSENING OF OTHER INTERNAL PROSTHETIC JOINT, INITIAL ENCOUNTER: ICD-10-CM

## 2023-04-20 DIAGNOSIS — E66.01 MORBID (SEVERE) OBESITY DUE TO EXCESS CALORIES: ICD-10-CM

## 2023-04-20 PROCEDURE — 99212 OFFICE O/P EST SF 10 MIN: CPT | Mod: 95

## 2023-04-20 NOTE — ASSESSMENT
[FreeTextEntry1] : 1) AFib\par - stable\par - ct Eliquis and BB \par \par 2)  HTN\par - stable by hx \par - ct lisinopril and metoprolol \par \par 3) HL\par - ct atorvastatin \par \par 4) H/O DVT / ch leg edema\par - elevation \par - ct lasix BID \par - finish the abx \par \par 5) drop in H/H \par - no abd pain / melena\par - ferritin - nl \par - recheck CBC in 1-2 week\par -  FOBT - pedning

## 2023-04-20 NOTE — PHYSICAL EXAM
[No Acute Distress] : no acute distress [Well Nourished] : well nourished [Well Developed] : well developed [Normal Voice/Communication] : normal voice/communication [No Respiratory Distress] : no respiratory distress  [de-identified] : redness / swelling egs - B/L

## 2023-05-02 ENCOUNTER — APPOINTMENT (OUTPATIENT)
Dept: VASCULAR SURGERY | Facility: CLINIC | Age: 78
End: 2023-05-02
Payer: MEDICARE

## 2023-05-02 PROCEDURE — 99213 OFFICE O/P EST LOW 20 MIN: CPT

## 2023-05-02 RX ORDER — ACETAMINOPHEN AND CODEINE PHOSPHATE 300; 30 MG/1; MG/1
300-30 TABLET ORAL
Qty: 10 | Refills: 0 | Status: ACTIVE | COMMUNITY
Start: 2023-05-02 | End: 1900-01-01

## 2023-05-02 NOTE — ASSESSMENT
[Arterial/Venous Disease] : arterial/venous disease [Medication Management] : medication management [Other: _____] : [unfilled] [Foot care/Footwear] : foot care/footwear [FreeTextEntry1] : Impression: Lymphedema, post phlebitic syndrome and venous insufficiency w/ chronic edema and  resolved rt leg cellulits and clinical s/o  spinal stenosis progression \par \par \par Medical Conservative management moisturize skin, leg elevation, weight loss, diet control, calf muscle exercises, compression therapy with ace wrap, continue lymphedema pump \par take Tylenol #3 prn for pain (refill sent). no more refills for pain medication\par referred pt to see pain specialist Dr. LONNIE Villaseñor for chronic pain in bilateral lower extremities\par advised pt she would benefit from [facility] lymp[facility] therapy (referral given)\par d/w pt to f/u w ortho spine for eval of spinal stenosis \par  ov 3 months Aug 2023 to re eval bilateral lower extremities\par \par \par \par \par Varicose veins are enlarged, twisted veins. Varicose veins are caused by increased blood pressure in the veins.  The blood moves towards the heart by 1-way valves in the veins. When the valves become weakened or damaged, blood can collect in the veins and pool in your lower legs (ankles). This causes the veins to become enlarged and incompetent with reflux. Sitting or standing for long periods can cause blood to pool in the leg veins, increasing the pressure within the veins. \par Risk factors for varicose veins or venous disease may include:  obesity, older age, standing or sitting for prolonged periods of time for several years, being female, pregnancy, taking oral contraceptive pills or hormone replacement, being inactive, and/or smoking. \par The most common symptoms of varicose veins are sensations in the legs, such as a heavy feeling, burning, and/or aching. However, each individual may experience symptoms differently.  Other symptoms may include:  color changes in the skin, sores on the legs, or rash.  Severe varicose veins or venous disease may eventually produce long-term mild swelling that can result in more serious skin and tissue problems, such as ulcers and non-healing sores.\par Varicose veins and venous disease are diagnosed by a complete medical history, physical examination, and diagnostic studies for varicose veins including duplex ultrasound and color-flow imaging.  \par Medical treatment for varicose veins and venous disease include:  compression stockings, sclerotherapy, endovenous ablation and/or surgical treatment with microphlebectomy.  \par \par

## 2023-05-02 NOTE — PHYSICAL EXAM
[0] : left 0 [Ankle Swelling (On Exam)] : present [Varicose Veins Of Lower Extremities] : bilaterally [Ankle Swelling On The Left] : moderate [] : bilaterally [Ankle Swelling Bilaterally] : severe [No Rash or Lesion] : No rash or lesion [Alert] : alert [Oriented to Person] : oriented to person [Oriented to Place] : oriented to place [Oriented to Time] : oriented to time [Calm] : calm [JVD] : no jugular venous distention  [de-identified] : nad [de-identified] : adelinal [de-identified] : no resp distress [FreeTextEntry1] : unable to palpate bilateral pedal pulses due to edema. Feet w/ good cap refill. \par Bilateral lower extremity severe edema mid thigh to ankles. \par Severe venous insufficiency, lymphedema w/ severe chronic skin changes and xerosis. \par severe pre ulc skin changes mindi calf and shins \par RLE erythema resolved\par no wounds/ulcers\par  [de-identified] : pt in wheelchair  [de-identified] : Madhu Cranial nerves 2-12 madhu grossly intact [de-identified] : cooperative

## 2023-05-02 NOTE — HISTORY OF PRESENT ILLNESS
[FreeTextEntry1] : 75 y/o f w/ history of lymphedema and post phlebitic syndrome presents today w/ c/o of bilateral leg swelling right leg significantly worse than left, which has gotten worse in the last 2 weeks. She states she has not used the lymphedema pump in quite a while and now her legs are too swollen for the sleeves. She was wrapping her legs with ace bandages from the ankle to calf without improvement. She attempted compression stockings in the past but unable to comply d/t discomfort and the inability to apply them. She also reports going to [facility] a few times in the past. Denies trauma or injury. Denies open wounds. No fever or chills.  [de-identified] : pt c/o  bilateral leg swelling R>L \par seen in the presence of her aide\par redness in mindi le R>L improved\par not warm to touch\par completed augmentin 875 mg x 10 days\par pain and discomfort in RLE, associated with standing\par denies fever or chills\par denies falls, injuries, long car rides or flights\par denies wounds or ulcers\par pt does not ambulate much\par not compliant with compression stockings or lymphedema pump\par partially compliant with ace wraps\par pt has help from her aide 24/7\par taking eliquis 5 mg BID for afib\par pt has not f/u w spine surgeon to eval for hx of spinal stenosis

## 2023-05-02 NOTE — REVIEW OF SYSTEMS
[Lower Ext Edema] : lower extremity edema [As Noted in HPI] : as noted in HPI [Limb Pain] : limb pain [Limb Swelling] : limb swelling [Negative] : Heme/Lymph

## 2023-05-09 ENCOUNTER — RX RENEWAL (OUTPATIENT)
Age: 78
End: 2023-05-09

## 2023-05-10 ENCOUNTER — RX RENEWAL (OUTPATIENT)
Age: 78
End: 2023-05-10

## 2023-05-18 ENCOUNTER — RX RENEWAL (OUTPATIENT)
Age: 78
End: 2023-05-18

## 2023-06-07 ENCOUNTER — RX RENEWAL (OUTPATIENT)
Age: 78
End: 2023-06-07

## 2023-06-25 NOTE — ASU PATIENT PROFILE, ADULT - PSH
[place] filter in place  placed approximately 5/2016 - [facility]  H/O knee surgery  2016 - repair right  H/O total knee replacement, right  11/2015, 8/2016  History of total left knee replacement  2007
Opt out

## 2023-07-07 ENCOUNTER — RX RENEWAL (OUTPATIENT)
Age: 78
End: 2023-07-07

## 2023-07-11 ENCOUNTER — RX RENEWAL (OUTPATIENT)
Age: 78
End: 2023-07-11

## 2023-07-18 ENCOUNTER — NON-APPOINTMENT (OUTPATIENT)
Age: 78
End: 2023-07-18

## 2023-07-21 ENCOUNTER — APPOINTMENT (OUTPATIENT)
Dept: INTERNAL MEDICINE | Facility: CLINIC | Age: 78
End: 2023-07-21
Payer: MEDICARE

## 2023-07-21 VITALS
DIASTOLIC BLOOD PRESSURE: 78 MMHG | TEMPERATURE: 99.3 F | HEART RATE: 80 BPM | OXYGEN SATURATION: 94 % | SYSTOLIC BLOOD PRESSURE: 146 MMHG

## 2023-07-21 VITALS — DIASTOLIC BLOOD PRESSURE: 64 MMHG | SYSTOLIC BLOOD PRESSURE: 126 MMHG

## 2023-07-21 DIAGNOSIS — R06.00 DYSPNEA, UNSPECIFIED: ICD-10-CM

## 2023-07-21 DIAGNOSIS — R06.09 OTHER FORMS OF DYSPNEA: ICD-10-CM

## 2023-07-21 DIAGNOSIS — M25.541 PAIN IN JOINTS OF RIGHT HAND: ICD-10-CM

## 2023-07-21 DIAGNOSIS — M25.542 PAIN IN JOINTS OF RIGHT HAND: ICD-10-CM

## 2023-07-21 PROCEDURE — 99214 OFFICE O/P EST MOD 30 MIN: CPT

## 2023-07-21 RX ORDER — AMOXICILLIN AND CLAVULANATE POTASSIUM 875; 125 MG/1; MG/1
875-125 TABLET, COATED ORAL
Qty: 10 | Refills: 0 | Status: COMPLETED | COMMUNITY
Start: 2023-04-13 | End: 2023-05-16

## 2023-07-21 RX ORDER — AMOXICILLIN AND CLAVULANATE POTASSIUM 500; 125 MG/1; MG/1
500-125 TABLET, FILM COATED ORAL
Qty: 20 | Refills: 1 | Status: COMPLETED | COMMUNITY
Start: 2022-10-25 | End: 2022-11-14

## 2023-07-21 NOTE — REVIEW OF SYSTEMS
[Lower Ext Edema] : lower extremity edema [Shortness Of Breath] : shortness of breath [Wheezing] : wheezing [Dyspnea on Exertion] : dyspnea on exertion [Nausea] : nausea [Heartburn] : heartburn [Joint Pain] : joint pain [Joint Swelling] : joint swelling [Fever] : no fever [Chills] : no chills [Recent Change In Weight] : ~T no recent weight change [Vision Problems] : no vision problems [Chest Pain] : no chest pain [Palpitations] : no palpitations [Abdominal Pain] : no abdominal pain [Vomiting] : no vomiting

## 2023-07-21 NOTE — PHYSICAL EXAM
[No Acute Distress] : no acute distress [Normal Voice/Communication] : normal voice/communication [No Respiratory Distress] : no respiratory distress  [No Accessory Muscle Use] : no accessory muscle use [Clear to Auscultation] : lungs were clear to auscultation bilaterally [Normal] : normal rate, regular rhythm, normal S1 and S2 and no murmur heard [de-identified] : bilateral edema with chronic erythematous changes, no warmth.  [de-identified] : hands with synovial thickening at PIP joints, slightly spongy.

## 2023-07-21 NOTE — ASSESSMENT
[FreeTextEntry1] : Assessment as indicated above in impressions with plans through orders below. \par \par Patient advised to follow up with primary care provider soon to address other concerns.

## 2023-07-21 NOTE — HISTORY OF PRESENT ILLNESS
[Family Member] : family member [Other: _____] : [unfilled] [FreeTextEntry8] : Bilateral hand pain present for some time now.  Patient is worried about what is going on with her hands, she is wondering if it is arthritis or something else.  She is accompanied by her daughter and home health assistant who assists with some of the history.  Patient reports noticing that her right knuckle seems swollen.  Both hands are painful at the joints.  She sometimes has problems bending her fingers.  Denies any falls or injuries.  No recent changes to her medications.  \par \par Daughter also expressed concerns regarding patient's medication, stating that the lasix seems to have plateaued as her health assistants reports patient isn't urinating frequently.  Patient reports taking her water pill twice daily and is stating that she wakes up frequently at night to urinate which causes her not to have a restful sleep.  \par \par Health assistant is concerned as she hears patient with some breathing sounds along with snoring from time to time.\par \par Patient reports feeling short of breath with exertion confirming daughter's observation.  But then she continues to have a lot of leg swelling from her lymphedema which has not changed much.

## 2023-08-02 ENCOUNTER — RX RENEWAL (OUTPATIENT)
Age: 78
End: 2023-08-02

## 2023-08-03 ENCOUNTER — RX RENEWAL (OUTPATIENT)
Age: 78
End: 2023-08-03

## 2023-08-08 ENCOUNTER — APPOINTMENT (OUTPATIENT)
Dept: VASCULAR SURGERY | Facility: CLINIC | Age: 78
End: 2023-08-08
Payer: MEDICARE

## 2023-08-08 VITALS
SYSTOLIC BLOOD PRESSURE: 147 MMHG | HEART RATE: 71 BPM | BODY MASS INDEX: 33.15 KG/M2 | DIASTOLIC BLOOD PRESSURE: 77 MMHG | TEMPERATURE: 97.7 F | WEIGHT: 199 LBS | HEIGHT: 65 IN

## 2023-08-08 VITALS — SYSTOLIC BLOOD PRESSURE: 159 MMHG | HEART RATE: 69 BPM | DIASTOLIC BLOOD PRESSURE: 79 MMHG

## 2023-08-08 DIAGNOSIS — I83.893 VARICOSE VEINS OF BILATERAL LOWER EXTREMITIES WITH OTHER COMPLICATIONS: ICD-10-CM

## 2023-08-08 DIAGNOSIS — I87.2 VENOUS INSUFFICIENCY (CHRONIC) (PERIPHERAL): ICD-10-CM

## 2023-08-08 DIAGNOSIS — I89.0 LYMPHEDEMA, NOT ELSEWHERE CLASSIFIED: ICD-10-CM

## 2023-08-08 PROCEDURE — 29580 STRAPPING UNNA BOOT: CPT | Mod: 50

## 2023-08-08 PROCEDURE — 99213 OFFICE O/P EST LOW 20 MIN: CPT | Mod: 25

## 2023-08-08 NOTE — PHYSICAL EXAM
[0] : left 0 [Ankle Swelling (On Exam)] : present [Varicose Veins Of Lower Extremities] : bilaterally [Ankle Swelling On The Left] : moderate [] : bilaterally [Ankle Swelling Bilaterally] : severe [No Rash or Lesion] : No rash or lesion [Alert] : alert [Oriented to Person] : oriented to person [Oriented to Place] : oriented to place [Oriented to Time] : oriented to time [Calm] : calm [JVD] : no jugular venous distention  [de-identified] : nad [de-identified] : adelinal [de-identified] : no resp distress [FreeTextEntry1] : unable to palpate bilateral pedal pulses due to edema. Feet w/ good cap refill.  Bilateral lower extremity severe edema mid thigh to ankles.  Severe venous insufficiency, lymphedema w/ severe chronic skin changes and xerosis.  severe pre ulc skin changes madhu calf and shins  Madhu LE red with venous stasis changes no wounds/ulcers not warm to touch  [de-identified] : pt in wheelchair  [de-identified] : Madhu Cranial nerves 2-12 madhu grossly intact [de-identified] : cooperative

## 2023-08-08 NOTE — ASSESSMENT
[Arterial/Venous Disease] : arterial/venous disease [Medication Management] : medication management [Other: _____] : [unfilled] [Foot care/Footwear] : foot care/footwear [FreeTextEntry1] : Impression: Lymphedema, post phlebitic syndrome and venous insufficiency w/ chronic edema and  resolved rt leg cellulits and clinical s/o  spinal stenosis progression    Medical Conservative management moisturize skin, leg elevation, weight loss, diet control, calf muscle exercises, compression therapy with ace wrap after mindi unna boot, continue lymphedema pump  remove mindi unna boot in 3 days (friday) no more refills for pain medication referred pt to see pain specialist Dr. KEVIN Brennan for chronic pain in bilateral lower extremities advised pt she would benefit from [facility] lymphedema therapy (referral given) d/w pt to f/u w ortho spine for eval of spinal stenosis  ov 2 months Oct 2023 to re eval bilateral lower extremities     Varicose veins are enlarged, twisted veins. Varicose veins are caused by increased blood pressure in the veins.  The blood moves towards the heart by 1-way valves in the veins. When the valves become weakened or damaged, blood can collect in the veins and pool in your lower legs (ankles). This causes the veins to become enlarged and incompetent with reflux. Sitting or standing for long periods can cause blood to pool in the leg veins, increasing the pressure within the veins.  Risk factors for varicose veins or venous disease may include:  obesity, older age, standing or sitting for prolonged periods of time for several years, being female, pregnancy, taking oral contraceptive pills or hormone replacement, being inactive, and/or smoking.  The most common symptoms of varicose veins are sensations in the legs, such as a heavy feeling, burning, and/or aching. However, each individual may experience symptoms differently.  Other symptoms may include:  color changes in the skin, sores on the legs, or rash.  Severe varicose veins or venous disease may eventually produce long-term mild swelling that can result in more serious skin and tissue problems, such as ulcers and non-healing sores. Varicose veins and venous disease are diagnosed by a complete medical history, physical examination, and diagnostic studies for varicose veins including duplex ultrasound and color-flow imaging.   Medical treatment for varicose veins and venous disease include:  compression stockings, sclerotherapy, endovenous ablation and/or surgical treatment with microphlebectomy.

## 2023-08-08 NOTE — PROCEDURE
[FreeTextEntry1] : Madhu Unna boot applied from toes to knee  w/o complications Pt karmen procedure well Pt is not allergic to the unna boot

## 2023-09-05 ENCOUNTER — RX RENEWAL (OUTPATIENT)
Age: 78
End: 2023-09-05

## 2023-09-05 RX ORDER — ATORVASTATIN CALCIUM 40 MG/1
40 TABLET, FILM COATED ORAL
Qty: 90 | Refills: 0 | Status: ACTIVE | COMMUNITY
Start: 2017-10-31 | End: 1900-01-01

## 2023-09-16 ENCOUNTER — RX RENEWAL (OUTPATIENT)
Age: 78
End: 2023-09-16

## 2023-10-06 ENCOUNTER — EMERGENCY (EMERGENCY)
Facility: HOSPITAL | Age: 78
LOS: 1 days | Discharge: HOME | End: 2023-10-06
Attending: EMERGENCY MEDICINE
Payer: COMMERCIAL

## 2023-10-06 VITALS
OXYGEN SATURATION: 93 % | HEART RATE: 91 BPM | SYSTOLIC BLOOD PRESSURE: 135 MMHG | RESPIRATION RATE: 20 BRPM | TEMPERATURE: 98 F | DIASTOLIC BLOOD PRESSURE: 64 MMHG

## 2023-10-06 VITALS
RESPIRATION RATE: 22 BRPM | TEMPERATURE: 98 F | DIASTOLIC BLOOD PRESSURE: 70 MMHG | SYSTOLIC BLOOD PRESSURE: 104 MMHG | OXYGEN SATURATION: 95 % | HEART RATE: 98 BPM

## 2023-10-06 DIAGNOSIS — Z98.890 OTHER SPECIFIED POSTPROCEDURAL STATES: Chronic | ICD-10-CM

## 2023-10-06 DIAGNOSIS — Z96.652 PRESENCE OF LEFT ARTIFICIAL KNEE JOINT: Chronic | ICD-10-CM

## 2023-10-06 DIAGNOSIS — Z95.828 PRESENCE OF OTHER VASCULAR IMPLANTS AND GRAFTS: Chronic | ICD-10-CM

## 2023-10-06 DIAGNOSIS — Z96.651 PRESENCE OF RIGHT ARTIFICIAL KNEE JOINT: Chronic | ICD-10-CM

## 2023-10-06 DIAGNOSIS — Z96.641 PRESENCE OF RIGHT ARTIFICIAL HIP JOINT: Chronic | ICD-10-CM

## 2023-10-06 LAB
ALBUMIN SERPL ELPH-MCNC: 4.1 G/DL — SIGNIFICANT CHANGE UP (ref 3.3–5)
ALP SERPL-CCNC: 139 U/L — HIGH (ref 40–120)
ALT FLD-CCNC: 19 U/L — SIGNIFICANT CHANGE UP (ref 10–45)
ANION GAP SERPL CALC-SCNC: 15 MMOL/L — SIGNIFICANT CHANGE UP (ref 5–17)
AST SERPL-CCNC: 35 U/L — SIGNIFICANT CHANGE UP (ref 10–40)
BASOPHILS # BLD AUTO: 0.04 K/UL — SIGNIFICANT CHANGE UP (ref 0–0.2)
BASOPHILS NFR BLD AUTO: 0.7 % — SIGNIFICANT CHANGE UP (ref 0–2)
BILIRUB SERPL-MCNC: 0.9 MG/DL — SIGNIFICANT CHANGE UP (ref 0.2–1.2)
BUN SERPL-MCNC: 13 MG/DL — SIGNIFICANT CHANGE UP (ref 7–23)
CALCIUM SERPL-MCNC: 9.6 MG/DL — SIGNIFICANT CHANGE UP (ref 8.4–10.5)
CHLORIDE SERPL-SCNC: 104 MMOL/L — SIGNIFICANT CHANGE UP (ref 96–108)
CK SERPL-CCNC: 70 U/L — SIGNIFICANT CHANGE UP (ref 25–170)
CO2 SERPL-SCNC: 20 MMOL/L — LOW (ref 22–31)
CREAT SERPL-MCNC: 0.57 MG/DL — SIGNIFICANT CHANGE UP (ref 0.5–1.3)
CRP SERPL-MCNC: 12 MG/L — HIGH (ref 0–4)
EGFR: 92 ML/MIN/1.73M2 — SIGNIFICANT CHANGE UP
EOSINOPHIL # BLD AUTO: 0.11 K/UL — SIGNIFICANT CHANGE UP (ref 0–0.5)
EOSINOPHIL NFR BLD AUTO: 1.9 % — SIGNIFICANT CHANGE UP (ref 0–6)
GLUCOSE SERPL-MCNC: 106 MG/DL — HIGH (ref 70–99)
HCT VFR BLD CALC: 41.3 % — SIGNIFICANT CHANGE UP (ref 34.5–45)
HGB BLD-MCNC: 13 G/DL — SIGNIFICANT CHANGE UP (ref 11.5–15.5)
IMM GRANULOCYTES NFR BLD AUTO: 0.2 % — SIGNIFICANT CHANGE UP (ref 0–0.9)
LYMPHOCYTES # BLD AUTO: 0.91 K/UL — LOW (ref 1–3.3)
LYMPHOCYTES # BLD AUTO: 15.9 % — SIGNIFICANT CHANGE UP (ref 13–44)
MCHC RBC-ENTMCNC: 28.9 PG — SIGNIFICANT CHANGE UP (ref 27–34)
MCHC RBC-ENTMCNC: 31.5 GM/DL — LOW (ref 32–36)
MCV RBC AUTO: 91.8 FL — SIGNIFICANT CHANGE UP (ref 80–100)
MONOCYTES # BLD AUTO: 0.68 K/UL — SIGNIFICANT CHANGE UP (ref 0–0.9)
MONOCYTES NFR BLD AUTO: 11.9 % — SIGNIFICANT CHANGE UP (ref 2–14)
NEUTROPHILS # BLD AUTO: 3.98 K/UL — SIGNIFICANT CHANGE UP (ref 1.8–7.4)
NEUTROPHILS NFR BLD AUTO: 69.4 % — SIGNIFICANT CHANGE UP (ref 43–77)
NRBC # BLD: 0 /100 WBCS — SIGNIFICANT CHANGE UP (ref 0–0)
PLATELET # BLD AUTO: 210 K/UL — SIGNIFICANT CHANGE UP (ref 150–400)
POTASSIUM SERPL-MCNC: 4.6 MMOL/L — SIGNIFICANT CHANGE UP (ref 3.5–5.3)
POTASSIUM SERPL-MCNC: 6.3 MMOL/L — CRITICAL HIGH (ref 3.5–5.3)
POTASSIUM SERPL-SCNC: 4.6 MMOL/L — SIGNIFICANT CHANGE UP (ref 3.5–5.3)
POTASSIUM SERPL-SCNC: 6.3 MMOL/L — CRITICAL HIGH (ref 3.5–5.3)
PROT SERPL-MCNC: 7.9 G/DL — SIGNIFICANT CHANGE UP (ref 6–8.3)
RBC # BLD: 4.5 M/UL — SIGNIFICANT CHANGE UP (ref 3.8–5.2)
RBC # FLD: 15.2 % — HIGH (ref 10.3–14.5)
SODIUM SERPL-SCNC: 139 MMOL/L — SIGNIFICANT CHANGE UP (ref 135–145)
WBC # BLD: 5.73 K/UL — SIGNIFICANT CHANGE UP (ref 3.8–10.5)
WBC # FLD AUTO: 5.73 K/UL — SIGNIFICANT CHANGE UP (ref 3.8–10.5)

## 2023-10-06 PROCEDURE — 73130 X-RAY EXAM OF HAND: CPT

## 2023-10-06 PROCEDURE — 84132 ASSAY OF SERUM POTASSIUM: CPT

## 2023-10-06 PROCEDURE — 73110 X-RAY EXAM OF WRIST: CPT

## 2023-10-06 PROCEDURE — 99285 EMERGENCY DEPT VISIT HI MDM: CPT

## 2023-10-06 PROCEDURE — 80053 COMPREHEN METABOLIC PANEL: CPT

## 2023-10-06 PROCEDURE — 99284 EMERGENCY DEPT VISIT MOD MDM: CPT | Mod: 25

## 2023-10-06 PROCEDURE — 73110 X-RAY EXAM OF WRIST: CPT | Mod: 26,50

## 2023-10-06 PROCEDURE — 82550 ASSAY OF CK (CPK): CPT

## 2023-10-06 PROCEDURE — 86140 C-REACTIVE PROTEIN: CPT

## 2023-10-06 PROCEDURE — 73130 X-RAY EXAM OF HAND: CPT | Mod: 26,50

## 2023-10-06 PROCEDURE — 85652 RBC SED RATE AUTOMATED: CPT

## 2023-10-06 PROCEDURE — 85025 COMPLETE CBC W/AUTO DIFF WBC: CPT

## 2023-10-06 RX ORDER — ACETAMINOPHEN 500 MG
975 TABLET ORAL ONCE
Refills: 0 | Status: COMPLETED | OUTPATIENT
Start: 2023-10-06 | End: 2023-10-06

## 2023-10-06 RX ORDER — OXYCODONE HYDROCHLORIDE 5 MG/1
5 TABLET ORAL ONCE
Refills: 0 | Status: DISCONTINUED | OUTPATIENT
Start: 2023-10-06 | End: 2023-10-06

## 2023-10-06 RX ADMIN — Medication 975 MILLIGRAM(S): at 17:55

## 2023-10-06 RX ADMIN — OXYCODONE HYDROCHLORIDE 5 MILLIGRAM(S): 5 TABLET ORAL at 14:00

## 2023-10-06 NOTE — ED PROVIDER NOTE - PHYSICAL EXAMINATION
GENERAL: NAD  HEENT:  Atraumatic  CHEST/LUNG: Chest rise equal bilaterally  HEART: Regular rate and rhythm  ABDOMEN: Soft, Nontender, Nondistended  EXTREMITIES:  Extremities warm  PSYCH: A&Ox3  SKIN: No obvious rashes or lesions  MSK: No cervical spine TTP, able to range neck to the left and right/ No midline spinal TTP.   Upper Extremities: diffusely tender to b/l wrist and hands. Unable to flex/extend wrist and hand due to pain. Radial pulses intact. Normal capillary refill. sensation intact.  NEUROLOGY: strength and sensation intact in all extremities.

## 2023-10-06 NOTE — ED ADULT TRIAGE NOTE - CHIEF COMPLAINT QUOTE
bilateral wrist and hand pain, R x weeks, L started today. feels weakness in bilateral hands due to pain.

## 2023-10-06 NOTE — ED ADULT NURSE NOTE - NSFALLRISKINTERV_ED_ALL_ED

## 2023-10-06 NOTE — ED PROVIDER NOTE - CLINICAL SUMMARY MEDICAL DECISION MAKING FREE TEXT BOX
78-year-old female patient past medical history arthritis, COPD, lymphedema, PE x2 (compliant on Eliquis), hypertension, hypercholesterolemia progressively worsening atraumatic bilateral wrist and hand pain x1 month. Pt went to PMD for same complaint, unable to get referral for hand specialst. Pt has been taking tylenol with no relief. Pt progressively has been unable to use forks or  the phone due to pain.    Most likely RA. Will set up had specialist and rheumatology f/u. Will not xray; low concern for fx. Pt is non toxic appearing, vitals stable, low concern for septic joint. Pain management in ED.

## 2023-10-06 NOTE — ED ADULT NURSE NOTE - OBJECTIVE STATEMENT
79 y/o F w/ PMH of CHF, COPD, HTN, came w/ complaints of bilateral hand pain. Per EMS, pt had seen MD and was referred, but pt had not had the chance to see specialist yet. Per EMS, pt sating at 92-93% RA, and uses 2L NC at home and refused o2 w/ EMS. Pt A&Ox3 gross neuro intact, no difficulty speaking in complete sentences.

## 2023-10-06 NOTE — ED PROVIDER NOTE - NSICDXPASTSURGICALHX_GEN_ALL_CORE_FT
PAST SURGICAL HISTORY:  [place] filter in place Placed 4/2016 removed 5/2017    H/O knee surgery 2016 - repair right    H/O total knee replacement, right 11/2015, 8/2016    History of hip replacement, total, right     History of total left knee replacement 2007

## 2023-10-06 NOTE — ED PROVIDER NOTE - NSFOLLOWUPINSTRUCTIONS_ED_ALL_ED_FT
The results of any blood tests and imaging studies completed during your visit today were discussed and explained to you and a copy provided with your discharge instructions. Please follow up with your primary care doctor within 48 hours.     Please follow up with your hand specialist and rheumatology appt as discussed. You may take tylenol and motrin as needed for pain.      Arthritis    WHAT YOU NEED TO KNOW:    What is arthritis? Arthritis is pain or disease in one or more joints. There are many types of arthritis. Types such as rheumatoid arthritis cause inflammation in the joints. Other types wear away the cartilage between joints, such as osteoarthritis. This makes the bones of the joint rub together when you move the joint. An infection from bacteria, a virus, or a fungus can also cause arthritis. Your symptoms may be constant, or symptoms may come and go. Arthritis often gets worse over time and can cause permanent joint damage.    What increases my risk for arthritis?    A family history of arthritis    Infection, trauma, or injury to the joint    Obesity    A disease such as diabetes, heart disease, hypothyroidism, or psoriasis    An immune deficiency disorder, such as AIDS or lupus  What are the signs and symptoms of arthritis?    Pain, swelling, or stiffness in the joint    Limited range of motion in the joint    Warmth or redness over the joint    Tenderness when you touch the joint    Stiff joints in the morning that loosen with movement    A creaking or grinding sound when you move the joint    Fever  How is arthritis diagnosed?    Blood tests are used to measure the amount of inflammation in your body.    An x-ray, CT, MRI, or ultrasound may be used to check for joint damage, swelling, or loss of bone. Do not enter the MRI room with anything metal. Metal can cause serious damage. Tell the healthcare provider if you have any metal in or on your body.    A sample of the fluid in the joint may be tested for uric acid or calcium crystals, or for signs of infection.  How is arthritis treated? Treatment will depend on the type of arthritis you have and if it is severe. You may need any of the following:    Acetaminophen decreases pain and fever. It is available without a doctor's order. Ask how much to take and how often to take it. Follow directions. Read the labels of all other medicines you are using to see if they also contain acetaminophen, or ask your doctor or pharmacist. Acetaminophen can cause liver damage if not taken correctly.    NSAIDs, such as ibuprofen, help decrease swelling, pain, and fever. This medicine is available with or without a doctor's order. NSAIDs can cause stomach bleeding or kidney problems in certain people. If you take blood thinner medicine, always ask your healthcare provider if NSAIDs are safe for you. Always read the medicine label and follow directions.    Prescription pain medicine may be given. Ask your healthcare provider how to take this medicine safely. Some prescription pain medicines contain acetaminophen. Do not take other medicines that contain acetaminophen without talking to your healthcare provider. Too much acetaminophen may cause liver damage. Prescription pain medicine may cause constipation. Ask your healthcare provider how to prevent or treat constipation.    Steroid medicine helps reduce swelling and pain.    Surgery may be needed to repair or replace a damaged joint.  What can I do to manage my symptoms?    Rest your painful joint so it can heal. Your healthcare provider may recommend crutches or a walker if the affected joint is in a leg.    Apply ice or heat to the joint. Both can help decrease swelling and pain. Ice may also help prevent tissue damage. Use an ice pack, or put crushed ice in a plastic bag. Cover it with a towel and place it on your joint for 15 to 20 minutes every hour or as directed. You can apply heat for 20 minutes every 2 hours. Heat treatment includes hot packs or heat lamps.    Elevate your joint. Elevation helps reduce swelling and pain. Raise your joint above the level of your heart as often as you can. Prop your painful joint on pillows to keep it above your heart comfortably.  Elevate Leg  What can I do to manage arthritis?    Talk to your healthcare providers about your arthritis medicines. Some medicines may only be needed when you have arthritis pain. You may need to take other medicines every day to prevent arthritis from getting worse. Your healthcare providers will help you understand all your medicines and when to take them. It is important to take the medicines as directed, even if you start to feel better. You can continue to have joint damage and inflammation even if you do not feel it.    Eat a variety of healthy foods. Healthy foods include fruits, vegetables, whole-grain breads, low-fat dairy products, beans, lean meats, and fish. Ask if you need to be on a special diet. A diet rich in calcium and vitamin D may decrease your risk of osteoporosis. Foods high in calcium include milk, cheese, broccoli, and tofu. Vitamin D may be found in meat, fish, fortified milk, cereal and bread. Ask if you need calcium or vitamin D supplements.    Healthy Foods      Go to physical or occupational therapy as directed. A physical therapist can teach you exercises to improve flexibility and range of motion. You may also be shown non-weight-bearing exercises that are safe for your joints, such as swimming. Exercise can help keep your joints flexible and reduce pain. An occupational therapist can help you learn to do your daily activities when your joints are stiff or sore.    Maintain a healthy weight. Extra weight puts increased pressure on your joints. Ask your healthcare provider what you should weigh. If you need to lose weight, he or she can help you create a weight loss program. Weight loss can help reduce pain and increase your ability to do your activities.    Wear flat or low-heeled shoes. This will help decrease pain and reduce pressure on your ankle, knee, and hip joints.    Do not smoke. Nicotine and other chemicals in cigarettes and cigars can damage your bones and joints. Ask your healthcare provider for information if you currently smoke and need help to quit. E-cigarettes or smokeless tobacco still contain nicotine. Talk to your healthcare provider before you use these products.  What support devices can help manage arthritis?    Orthotic shoes or insoles help support your feet when you walk.    Crutches, a cane, or a walker may help decrease your risk for falling. They also decrease stress on affected joints.    Devices to prevent falls include raised toilet seats and bathtub bars to help you get up from sitting. Handrails can be placed in areas where you need balance and support.  Fall Prevention for Adults      Devices to help with support and rest include splints to wear on your hands and a firm pillow while you sleep. Use a pillow that is firm enough to support your neck and head.  When should I call my doctor?    You have a fever and severe joint pain or swelling.    You cannot move the affected joint.    You have severe joint pain you cannot tolerate.    You have a new or worsening rash.    Your pain or swelling does not get better with treatment.    You have questions or concerns about your condition or care.

## 2023-10-06 NOTE — ED PROVIDER NOTE - PATIENT PORTAL LINK FT
You can access the FollowMyHealth Patient Portal offered by [facility] by registering at the following website: [email or web address]. By joining [facility]’s FollowMyHealth portal, you will also be able to view your health information using other applications (apps) compatible with our system.

## 2023-10-06 NOTE — ED PROVIDER NOTE - ATTENDING CONTRIBUTION TO CARE
Dr. Stoddard: I have personally performed a face to face bedside history and physical examination of this patient. I have discussed the history, examination, review of systems, assessment and plan of management with the resident. I have reviewed the electronic medical record and amended it to reflect my history, review of systems, physical exam, assessment and plan.    Dr. Stoddard: 78-year-old female history of arthritis, COPD not on home O2, chronic bilateral lower extremity lymphedema, PE x2 on Eliquis, hypertension, hyperlipidemia, lives at home with an aide, here with daughter for 1 month of bilateral wrist and hand pain that has been progressively worsening.  No trauma, no fevers or chills, no rashes.  Patient has been unable to get an appointment with Ortho.      On exam patient is well-appearing, no acute distress, regular rate and rhythm, lungs clear to auscultation bilaterally, bilateral wrist with minimal diffuse tenderness to palpation but no edema, no erythema, no warmth, no crepitus, no wounds, normal active and passive range of motion, no snuffbox tenderness to palpation.  Abdomen soft nontender nondistended.  Bilateral lower extremity with 3+ edema and chronic venous stasis changes.    Discussed with daughter, likely arthritis, given patient is on a statin we will check CK rule out rhabdo, will discuss with referral coordinator for outpatient rheumatology and Ortho follow-up.  We will get x-ray rule out fracture however unlikely, septic joint unlikely.

## 2023-10-06 NOTE — ED PROVIDER NOTE - PROGRESS NOTE DETAILS
Keenan was able to get hand specialist appt in 1 week. And rheumatology appt in 2weeks. Pt and daughter is made aware of appts. Pain reassessment: pt's pain is improved. ready to d/c home, request transportation home due to severe lymphedema.

## 2023-10-06 NOTE — ED PROVIDER NOTE - OBJECTIVE STATEMENT
78-year-old female patient past medical history arthritis, COPD, lymphedema, PE x2 (compliant on Eliquis), hypertension, hypercholesterolemia progressively worsening bilateral wrist and hand pain x1 month. Pt went to PMD for same complaint, unable to get referral for hand specialist. Pt has been taking tylenol with no relief. Pt progressively has been unable to use forks or  the phone due to pain.

## 2023-10-07 LAB — ERYTHROCYTE [SEDIMENTATION RATE] IN BLOOD: 70 MM/HR — HIGH (ref 0–20)

## 2023-10-07 NOTE — ED POST DISCHARGE NOTE - RESULT SUMMARY
esr 68 seen in ED for polymyalgias, ED team suspected rheum cause such as RA, given rheum followup. no urgent intervention needed at this time - Derek Reyna PA-C

## 2023-10-10 ENCOUNTER — RX RENEWAL (OUTPATIENT)
Age: 78
End: 2023-10-10

## 2023-10-12 ENCOUNTER — APPOINTMENT (OUTPATIENT)
Dept: ORTHOPEDIC SURGERY | Facility: CLINIC | Age: 78
End: 2023-10-12

## 2023-10-14 ENCOUNTER — RX RENEWAL (OUTPATIENT)
Age: 78
End: 2023-10-14

## 2023-10-17 ENCOUNTER — APPOINTMENT (OUTPATIENT)
Dept: VASCULAR SURGERY | Facility: CLINIC | Age: 78
End: 2023-10-17

## 2023-10-20 ENCOUNTER — APPOINTMENT (OUTPATIENT)
Dept: RHEUMATOLOGY | Facility: CLINIC | Age: 78
End: 2023-10-20

## 2023-10-20 ENCOUNTER — APPOINTMENT (OUTPATIENT)
Dept: PAIN MANAGEMENT | Facility: CLINIC | Age: 78
End: 2023-10-20

## 2023-10-31 ENCOUNTER — RX RENEWAL (OUTPATIENT)
Age: 78
End: 2023-10-31

## 2023-11-14 ENCOUNTER — RX RENEWAL (OUTPATIENT)
Age: 78
End: 2023-11-14

## 2023-11-24 ENCOUNTER — NON-APPOINTMENT (OUTPATIENT)
Age: 78
End: 2023-11-24

## 2023-11-28 ENCOUNTER — NON-APPOINTMENT (OUTPATIENT)
Age: 78
End: 2023-11-28

## 2023-12-12 ENCOUNTER — RX RENEWAL (OUTPATIENT)
Age: 78
End: 2023-12-12

## 2023-12-13 ENCOUNTER — RX RENEWAL (OUTPATIENT)
Age: 78
End: 2023-12-13

## 2023-12-13 RX ORDER — METOPROLOL SUCCINATE 50 MG/1
50 TABLET, EXTENDED RELEASE ORAL
Qty: 90 | Refills: 0 | Status: ACTIVE | COMMUNITY
Start: 2019-01-17 | End: 1900-01-01

## 2024-01-19 ENCOUNTER — RX RENEWAL (OUTPATIENT)
Age: 79
End: 2024-01-19

## 2024-01-30 ENCOUNTER — RX RENEWAL (OUTPATIENT)
Age: 79
End: 2024-01-30

## 2024-02-06 ENCOUNTER — RX RENEWAL (OUTPATIENT)
Age: 79
End: 2024-02-06

## 2024-02-06 RX ORDER — APIXABAN 5 MG/1
5 TABLET, FILM COATED ORAL
Qty: 180 | Refills: 0 | Status: ACTIVE | COMMUNITY
Start: 2018-03-02 | End: 1900-01-01

## 2024-02-26 NOTE — PROGRESS NOTE ADULT - SUBJECTIVE AND OBJECTIVE BOX
Patient is a 72y old  Female who presents with a chief complaint of Back pain (28 Jun 2018 03:57)      INTERVAL HPI/OVERNIGHT EVENTS:   73 yo F w pmhx of HTN, HLD, 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  No fever , no diarrhea , no chest pain.    Overnight patient was reported having foul smelling urine and urine incontinence and a U/A is ordered and not collected yet.  As per Nurse, patient pain is not well controlled.  NO fever , no diarrhea.        Medications:MEDICATIONS  (STANDING):  apixaban 5 milliGRAM(s) Oral every 12 hours  atorvastatin 40 milliGRAM(s) Oral at bedtime  furosemide    Tablet 40 milliGRAM(s) Oral daily  gabapentin 100 milliGRAM(s) Oral three times a day  lidocaine   Patch 1 Patch Transdermal daily  metoprolol succinate ER 25 milliGRAM(s) Oral daily  mupirocin 2% Ointment 1 Application(s) Topical two times a day  sertraline 75 milliGRAM(s) Oral daily    MEDICATIONS  (PRN):  acetaminophen    Suspension. 650 milliGRAM(s) Oral every 6 hours PRN Mild Pain (1 - 3)  oxyCODONE    IR 2.5 milliGRAM(s) Oral every 6 hours PRN Moderate Pain (4 - 6)  oxyCODONE    IR 5 milliGRAM(s) Oral every 6 hours PRN Severe Pain (7 - 10)  polyethylene glycol 3350 17 Gram(s) Oral daily PRN Constipation      Allergies: Allergies    No Known Allergies    Intolerances      REVIEW OF SYSTEMS:  CONSTITUTIONAL: No fever  NECK: No pain or stiffness  RESPIRATORY: No cough, No shortness of breath  CARDIOVASCULAR: No chest pain, palpitations, dizziness, pos chronic b/l  legs swelling  GASTROINTESTINAL: No abdominal pain. No nausea, vomiting, or hematemesis; No diarrhea  Genitourinary          :  pos foul urine as reported by nurse , pos urinary incontinence   NEUROLOGICAL: No headaches      T(C): 36.9 (07-01-18 @ 05:02), Max: 36.9 (07-01-18 @ 05:02)  HR: 99 (07-01-18 @ 05:02) (83 - 99)  BP: 150/82 (07-01-18 @ 05:02) (140/78 - 150/82)  RR: 16 (07-01-18 @ 05:02) (16 - 16)  SpO2: 95% (07-01-18 @ 05:02) (93% - 96%)  Wt(kg): --Vital Signs Last 24 Hrs  T(C): 36.9 (01 Jul 2018 05:02), Max: 36.9 (01 Jul 2018 05:02)  T(F): 98.4 (01 Jul 2018 05:02), Max: 98.4 (01 Jul 2018 05:02)  HR: 99 (01 Jul 2018 05:02) (83 - 99)  BP: 150/82 (01 Jul 2018 05:02) (140/78 - 150/82)  BP(mean): --  RR: 16 (01 Jul 2018 05:02) (16 - 16)  SpO2: 95% (01 Jul 2018 05:02) (93% - 96%)  I&O's Summary    30 Jun 2018 07:01  -  01 Jul 2018 07:00  --------------------------------------------------------  IN: 1000 mL / OUT: 700 mL / NET: 300 mL      Last Menstrual Period      PHYSICAL EXAM:  GENERAL: NAD, well-groomed, well-developed  HEAD:  Atraumatic, Normocephalic  NECK: Supple, No JVD, Normal thyroid  NERVOUS SYSTEM:  Alert & Oriented X3, Good concentration  CHEST/LUNG: Clear to percussion bilaterally  HEART: Regular rate and rhythm  ABDOMEN: Soft, Nontender, Nondistended; Bowel sounds present  EXTREMITIES:  pos b/l large leg edema with no gross erythema/ open blister     Consultant(s) Notes Reviewed:  [x ] YES  [ ] NO  Care Discussed with Consultants/Other Providers [ x] YES  [ ] NO  Name of Consultant  LABS:                        12.8   5.21  )-----------( 225      ( 30 Jun 2018 08:27 )             40.3     07-01    145  |  99  |  16  ----------------------------<  102<H>  3.8   |  32<H>  |  0.75    Ca    8.9      01 Jul 2018 06:39          CAPILLARY BLOOD GLUCOSE                RADIOLOGY & ADDITIONAL TESTS:  EKG :     Imaging Personally Reviewed:  [ ] YES  [ ] NO  HEALTH ISSUES - PROBLEM Dx:  Chronic atrial fibrillation: Chronic atrial fibrillation  Essential hypertension: Essential hypertension  Varicose veins with other complication: Varicose veins with other complication  Venous insufficiency of both lower extremities: Venous insufficiency of both lower extremities  Dyspnea on exertion: Dyspnea on exertion  Atrial fibrillation: Atrial fibrillation  Need for prophylactic measure: Need for prophylactic measure  Depression: Depression  DVT (deep venous thrombosis): DVT (deep venous thrombosis)  Obesity: Obesity  Hyperlipidemia: Hyperlipidemia  HTN (hypertension): HTN (hypertension)  Edema of lower extremity: Edema of lower extremity  Back pain: Back pain

## 2024-09-10 NOTE — PHYSICAL THERAPY INITIAL EVALUATION ADULT - PHYSICAL ASSIST/NONPHYSICAL ASSIST: SUPINE/SIT, REHAB EVAL
Labs received and will be reviewed at next office visit 9/13/24 1 person assist/set-up required/verbal cues

## 2025-01-23 NOTE — REASON FOR VISIT
New. DASH diet (low saturated fat, cholesterol, and total fat; increase fruits and vegetables; fat free or low fat milk or milk products; and increased fiber). Aerobic exercise and limitation of sodium. Weight loss.       [Follow-Up: _____] : a [unfilled] follow-up visit Continue Regimen: Benzoyl peroxide wash daily Discontinue Regimen: Cordran tape discontinue due to sticking to pt chest hair Render In Strict Bullet Format?: No Detail Level: Zone

## 2025-06-26 NOTE — ED PROVIDER NOTE - EKG #1 DATE/TIME
Appreciate ID who are following for MSSA bacteremia  Avoid ciprofloxacin and Bactrim as patient reports shortness of breath with these antibiotics   25-Feb-2018 16:54

## 2025-07-21 NOTE — PHYSICAL THERAPY INITIAL EVALUATION ADULT - PATIENT/FAMILY/SIGNIFICANT OTHER GOALS STATEMENT, PT EVAL
[de-identified] : History of injury/pain - This is Ms. NAPOLEON CHAN  a 64 year old female who comes in today complaining of bilateral knee pain since falling over a year ago. Patient complaining of R>L medial knee pain. Reports pain with rising from sitting and prolonged walking.    Date of injury/onset -2024 Worst level of pain (out of 10) -5 Type of pain (sharp, dull, mechanical) -dull Instability/Clicking/Locking -clicking Previous treatments (NSAIDS/Tylenol/Physical therapy/Home exercise/RICE) -ice Previous injuries to area -n Assistive device -n   Work injury or school sports injury -n Occupation/School - Currently (working/out of work/retired) - retired   PMH -   GI disease/Ulcers/Reflux -n   Kidney disease/Renal insufficiency -yes   Diabetes?  A1c (Date checked)? -n   Cardiac/Pulmonary disease -stent from previous heart attack   Hypertension -y   Other/Glaucoma -n   Anticoagulation -aspirin/plavix   PSH -   Smoking/Alcohol/Drugs -non smoker   wants to go home